# Patient Record
Sex: FEMALE | Race: WHITE | NOT HISPANIC OR LATINO | Employment: OTHER | URBAN - METROPOLITAN AREA
[De-identification: names, ages, dates, MRNs, and addresses within clinical notes are randomized per-mention and may not be internally consistent; named-entity substitution may affect disease eponyms.]

---

## 2017-05-15 ENCOUNTER — ALLSCRIPTS OFFICE VISIT (OUTPATIENT)
Dept: OTHER | Facility: OTHER | Age: 77
End: 2017-05-15

## 2017-05-24 ENCOUNTER — APPOINTMENT (OUTPATIENT)
Dept: LAB | Facility: CLINIC | Age: 77
End: 2017-05-24
Payer: COMMERCIAL

## 2017-05-24 ENCOUNTER — ALLSCRIPTS OFFICE VISIT (OUTPATIENT)
Dept: OTHER | Facility: OTHER | Age: 77
End: 2017-05-24

## 2017-05-24 ENCOUNTER — TRANSCRIBE ORDERS (OUTPATIENT)
Dept: LAB | Facility: CLINIC | Age: 77
End: 2017-05-24

## 2017-05-24 DIAGNOSIS — I10 ESSENTIAL (PRIMARY) HYPERTENSION: ICD-10-CM

## 2017-05-24 DIAGNOSIS — E78.5 HYPERLIPIDEMIA: ICD-10-CM

## 2017-05-24 DIAGNOSIS — Z00.00 ENCOUNTER FOR GENERAL ADULT MEDICAL EXAMINATION WITHOUT ABNORMAL FINDINGS: ICD-10-CM

## 2017-05-24 DIAGNOSIS — I10 ESSENTIAL HYPERTENSION, MALIGNANT: Primary | ICD-10-CM

## 2017-05-24 LAB
ALBUMIN SERPL BCP-MCNC: 4.1 G/DL (ref 3.5–5)
ALP SERPL-CCNC: 57 U/L (ref 46–116)
ALT SERPL W P-5'-P-CCNC: 20 U/L (ref 12–78)
ANION GAP SERPL CALCULATED.3IONS-SCNC: 8 MMOL/L (ref 4–13)
AST SERPL W P-5'-P-CCNC: 16 U/L (ref 5–45)
BASOPHILS # BLD AUTO: 0.02 THOUSANDS/ΜL (ref 0–0.1)
BASOPHILS NFR BLD AUTO: 0 % (ref 0–1)
BILIRUB SERPL-MCNC: 0.37 MG/DL (ref 0.2–1)
BUN SERPL-MCNC: 17 MG/DL (ref 5–25)
CALCIUM SERPL-MCNC: 9.2 MG/DL (ref 8.3–10.1)
CHLORIDE SERPL-SCNC: 104 MMOL/L (ref 100–108)
CHOLEST SERPL-MCNC: 214 MG/DL (ref 50–200)
CO2 SERPL-SCNC: 27 MMOL/L (ref 21–32)
CREAT SERPL-MCNC: 0.76 MG/DL (ref 0.6–1.3)
EOSINOPHIL # BLD AUTO: 0.12 THOUSAND/ΜL (ref 0–0.61)
EOSINOPHIL NFR BLD AUTO: 2 % (ref 0–6)
ERYTHROCYTE [DISTWIDTH] IN BLOOD BY AUTOMATED COUNT: 13.1 % (ref 11.6–15.1)
GFR SERPL CREATININE-BSD FRML MDRD: >60 ML/MIN/1.73SQ M
GLUCOSE P FAST SERPL-MCNC: 72 MG/DL (ref 65–99)
HCT VFR BLD AUTO: 39.7 % (ref 34.8–46.1)
HDLC SERPL-MCNC: 112 MG/DL (ref 40–60)
HGB BLD-MCNC: 13.1 G/DL (ref 11.5–15.4)
LDLC SERPL CALC-MCNC: 92 MG/DL (ref 0–100)
LYMPHOCYTES # BLD AUTO: 2.1 THOUSANDS/ΜL (ref 0.6–4.47)
LYMPHOCYTES NFR BLD AUTO: 34 % (ref 14–44)
MCH RBC QN AUTO: 30.2 PG (ref 26.8–34.3)
MCHC RBC AUTO-ENTMCNC: 33 G/DL (ref 31.4–37.4)
MCV RBC AUTO: 92 FL (ref 82–98)
MONOCYTES # BLD AUTO: 0.86 THOUSAND/ΜL (ref 0.17–1.22)
MONOCYTES NFR BLD AUTO: 14 % (ref 4–12)
NEUTROPHILS # BLD AUTO: 3.06 THOUSANDS/ΜL (ref 1.85–7.62)
NEUTS SEG NFR BLD AUTO: 50 % (ref 43–75)
NRBC BLD AUTO-RTO: 0 /100 WBCS
PLATELET # BLD AUTO: 258 THOUSANDS/UL (ref 149–390)
PMV BLD AUTO: 9.8 FL (ref 8.9–12.7)
POTASSIUM SERPL-SCNC: 3.9 MMOL/L (ref 3.5–5.3)
PROT SERPL-MCNC: 7.4 G/DL (ref 6.4–8.2)
RBC # BLD AUTO: 4.34 MILLION/UL (ref 3.81–5.12)
SODIUM SERPL-SCNC: 139 MMOL/L (ref 136–145)
TRIGL SERPL-MCNC: 52 MG/DL
WBC # BLD AUTO: 6.17 THOUSAND/UL (ref 4.31–10.16)

## 2017-05-24 PROCEDURE — 80053 COMPREHEN METABOLIC PANEL: CPT

## 2017-05-24 PROCEDURE — 80061 LIPID PANEL: CPT

## 2017-05-24 PROCEDURE — 36415 COLL VENOUS BLD VENIPUNCTURE: CPT

## 2017-05-24 PROCEDURE — 85025 COMPLETE CBC W/AUTO DIFF WBC: CPT

## 2017-05-26 ENCOUNTER — APPOINTMENT (OUTPATIENT)
Dept: LAB | Facility: CLINIC | Age: 77
End: 2017-05-26
Payer: COMMERCIAL

## 2017-05-26 LAB
BACTERIA UR QL AUTO: NORMAL /HPF
BILIRUB UR QL STRIP: NEGATIVE
CLARITY UR: CLEAR
COLOR UR: YELLOW
GLUCOSE UR STRIP-MCNC: NEGATIVE MG/DL
HGB UR QL STRIP.AUTO: NEGATIVE
HYALINE CASTS #/AREA URNS LPF: NORMAL /LPF
KETONES UR STRIP-MCNC: NEGATIVE MG/DL
LEUKOCYTE ESTERASE UR QL STRIP: ABNORMAL
NITRITE UR QL STRIP: NEGATIVE
NON-SQ EPI CELLS URNS QL MICRO: NORMAL /HPF
PH UR STRIP.AUTO: 6.5 [PH] (ref 4.5–8)
PROT UR STRIP-MCNC: NEGATIVE MG/DL
RBC #/AREA URNS AUTO: NORMAL /HPF
SP GR UR STRIP.AUTO: 1.01 (ref 1–1.03)
UROBILINOGEN UR QL STRIP.AUTO: 0.2 E.U./DL
WBC #/AREA URNS AUTO: NORMAL /HPF

## 2017-05-26 PROCEDURE — 81001 URINALYSIS AUTO W/SCOPE: CPT

## 2017-05-30 ENCOUNTER — GENERIC CONVERSION - ENCOUNTER (OUTPATIENT)
Dept: OTHER | Facility: OTHER | Age: 77
End: 2017-05-30

## 2017-10-11 ENCOUNTER — ALLSCRIPTS OFFICE VISIT (OUTPATIENT)
Dept: OTHER | Facility: OTHER | Age: 77
End: 2017-10-11

## 2017-10-12 NOTE — PROGRESS NOTES
Assessment  1  Psoriasis (696 1) (L40 9)   2  Screening for skin condition (V82 0) (Z 89)    Plan   · Betamethasone Dipropionate Aug 0 05 % External Ointment; apply sparingly to  affected area(s) twice daily psoriasis   · Calcipotriene 0 005 % External Solution; APPLY TO SCALP TWICE A DAY   · Clobetasol Propionate 0 05 % External Foam; APPLY TOPICALLY TWICE DAILY TO  AFFECTED AREA   · Follow-up visit in 1 month Evaluation and Treatment  Follow-up  Status: Hold For -  Scheduling  Requested for: 11Oct2017    Discussion/Summary  Discussion Summary:   Psoriasis flaring advised the importance of using the medication on a continuous basis to get this under control and then to back off at that point and not to just use it for the itching we also suggested continued use of T-Gel shampoo and add calcipotriene solution  In addition lesion on the legs appear to be consistent with psoriasis but also concerned regarding squamous neoplasia we'll recheck the areas in another month and consider biopsy if any changes that make us concerned  Nothing else of concern noted on exam       Chief Complaint  Chief Complaint Free Text Note Form: psoriasis flare-up      History of Present Illness  HPI: 71-year-old female with known psoriasis presents secondary to concerns regarding flare on her scalp  Patient notes lesions on the scalp with hair loss also concerned regarding several spots on her legs she has been using some clobetasol foam on the area but not consistently only using it when it itches      Review of Systems  [facility] [address] [place] [address] [address] Patient:   Constitutional: Denies constitutional symptoms  Eyes: Denies eye symptoms  ENT:  denies ear symptoms, nasal symptoms, mouth or throat symptoms  Cardiovascular: Denies cardiovascular symptoms  Respiratory: Denies respiratory symptoms  Gastrointestinal: Denies gastrointestinal symptoms  Musculoskeletal: Denies musculoskeletal symptoms     Integumentary: Denies skin, hair and nail symptoms  Neurological: Denies neurologic symptoms  Psychiatric: Denies psychiatric symptoms  Endocrine: Denies endocrine symptoms  Hematologic/Lymphatic: Denies hematologic symptoms  Active Problems  1  Arthropathy (716 90) (M12 9)   2  Benign essential hypertension (401 1) (I10)   3  Encounter for screening colonoscopy (V76 51) (Z12 11)   4  Encounter for screening mammogram for breast cancer (V76 12) (Z12 31)   5  Hyperlipidemia (272 4) (E78 5)   6  Hypertension (401 9) (I10)   7  Osteoporosis (733 00) (M81 0)   8  Psoriasis (696 1) (L40 9)   9  Screening for genitourinary condition (V81 6) (Z13 89)   10  Screening for skin condition (V82 0) (Z13 89)   11  Seborrheic keratoses (702 19) (L82 1)    Past Medical History  1  History of Benign neoplasm of skin of face (216 3) (D23 30)   2  History of Currently Wearing Contact Lenses   3  History of acute sinusitis (V12 69) (Z87 09)   4  History of backache (V13 59) (Z87 39)   5  History of chest pain (V13 89) (Z87 898)   6  History of sebaceous cyst (V13 3) (Z87 2)   7  History of Pulmonary Embolism (V12 55)  Past Medical History Reviewed- Derm:   The past medical history was reviewed  Surgical History  1  History of Appendectomy   2  History of Breast Surgery   3  History of Dilation And Curettage   4  History of Surgical Excision Of Tubal Pregnancy  Surgical History Reviewed [facility] [place]- Derm:   Surgical History reviewed      Family History  Mother    1  Family history of Family Health Status Of Mother -    2  Family history of Hypertension (V17 49)   3  Family history of Ovarian Cancer (V16 41)  Father    4  Family history of Father  At Age 80   11  Family history of Pneumonia  Family History Reviewed- Derm:   Family History was reviewed      Social History   · Being A Social Drinker   · Former smoker (H82 76) (D36 521)  Social History Reviewed [facility] [place]- Derm: The social history was reviewed      Current Meds   1  Caltrate 600+D 600-400 MG-UNIT TABS; Take 1 tablet daily; Therapy: 71DOK1194 to Recorded   2  Clobetasol Propionate 0 05 % External Foam; APPLY SPARINGLY TO AFFECTED   AREA(S) TWICE DAILY; Therapy: 50XGW0348 to (Last Rx:15May2017)  Requested for: 32TFD1907 Ordered   3  Glucosamine Chondr 500 Complex Oral Capsule; TAKE 1 CAPSULE DAILY; Therapy: 23LOT0192 to Recorded   4  Losartan Potassium 50 MG Oral Tablet; TAKE 1 TABLET DAILY; Therapy: 53GNP4826 to (Ibrahima Muñoz)  Requested for: 87XJC1309; Last   Rx:26May2017 Ordered   5  Mometasone Furoate 0 1 % External Cream; APPLY SPARINGLY TO THE AFFECTED   AREA(S) TWICE DAILY  Ears; Therapy: 45RZL0774 to (Last Rx:15May2017)  Requested for: 09XID2450 Ordered  Medication List Reviewed: The medication list was reviewed and updated today  Allergies  1  No Known Drug Allergies    Physical Exam    Constitutional   General appearance: Appears healthy and well developed  Lymphatic   No visible disturbance  Musculoskeletal   Digits and nails: No clubbing, cyanosis or edema  Cutaneous and nail exam normal     Skin   Scalp skin texture and hair distribution: Abnormal     Head: Abnormal     Neck: Normal turgor, no rashes, no lesions  Chest: Normal turgor, no rashes, no lesions  Abdomen: Normal turgor, no rashes, no lesions  Back: Normal turgor, no rashes, no lesions  Right upper extremity: Normal turgor, no rashes, no lesions  Left upper extremity: Normal turgor, no rashes, no lesions  Right lower extremity: Abnormal     Left lower extremity: Abnormal     Neuro/Psych   Alert and oriented x 3  Displays comfort and cooperation during encounterl  Affect is normal     Finding On the scalp increased erythema scaling hair loss noted well-demarcated plaques with silvery scale also keratotic papules noted on the lower legs no other evidence psoriasis elsewhere        Health Management  Encounter for screening colonoscopy   COLONOSCOPY; every 10 years; Last 98QGO6285; Next Due: L1478447; Active  Encounter for screening mammogram for breast cancer   Digital Bilateral Screening Mammogram With CAD; every 1 year; Next Due: 14CNH5000; Overdue  Health Maintenance   * MAMMO SCREENING BILATERAL W CAD; every 1 year; Last 32VKV9101; Next Due: 94UJY8019; Active  Influenza; every 1 year; Last 68DSD5125; Next Due: 89MFB4145; Overdue  Medicare Annual Wellness Visit; every 1 year; Last 51Klz7599; Next Due: 64TCX3463; Overdue    Future Appointments    Date/Time Provider Specialty Site   11/15/2017 08:05 AM SREEDHAR Villarreal  Dermatology [facility]   12/01/2017 02:00 PM Jeaneth Marlowr, DO Internal Medicine [facility]   05/15/2018 03:05 PM SREEDHAR Villarreal   Dermatology [facility]     Signatures   Electronically signed by : SREEDHAR Nieves ; Oct 11 2017  8:33AM EST                       (Author)

## 2017-11-15 ENCOUNTER — ALLSCRIPTS OFFICE VISIT (OUTPATIENT)
Dept: OTHER | Facility: OTHER | Age: 77
End: 2017-11-15

## 2017-11-16 NOTE — PROGRESS NOTES
Assessment  1  Psoriasis (696 1) (L40 9)   2  Screening for skin condition (V82 0) (Z13 89)    Plan     · Betamethasone Dipropionate Aug 0 05 % External Ointment; apply sparingly toaffected area(s) twice daily psoriasis   · Calcipotriene 0 005 % External Solution; APPLY TO SCALP TWICE A DAY   · Clobetasol Propionate 0 05 % External Foam; APPLY SPARINGLY TO AFFECTEDAREA(S) TWICE DAILY   · Follow-up visit in 3 months Evaluation and Treatment  Follow-up  Status: Hold For -Scheduling  Requested for: 87LJU2606    Discussion/Summary  Discussion Summary:   Psoriasis still active on really diffuse involvement at present we discussed options including phototherapy at present patient reluctant to spend the time to do this will go ahead and continue topicals at this point follow-up in 3 months or earlier if the patient decides to go ahead with phototherapy  Chief Complaint  Chief Complaint Free Text Note Form: Psoriasis recheck      History of Present Illness  HPI: 50-year-old female presents for follow-up for psoriasis flare continues to complain of itching a new areas that developed on her torso and back arms and back however notes improvement on the scalp but still present      Review of Systems  [facility] Patient:  Constitutional: Denies constitutional symptoms  Eyes: Denies eye symptoms  ENT:  denies ear symptoms, nasal symptoms, mouth or throat symptoms  Cardiovascular: Denies cardiovascular symptoms  Respiratory: Denies respiratory symptoms  Gastrointestinal: Denies gastrointestinal symptoms  Musculoskeletal: Denies musculoskeletal symptoms  Integumentary: Denies skin, hair and nail symptoms  Neurological: Denies neurologic symptoms  Psychiatric: Denies psychiatric symptoms  Endocrine: Denies endocrine symptoms  Hematologic/Lymphatic: Denies hematologic symptoms  Active Problems  1  Arthropathy (716 90) (M12 9)   2  Benign essential hypertension (401 1) (I10)   3  Encounter for screening colonoscopy (V76 51) (Z12 11)   4  Encounter for screening mammogram for breast cancer (V76 12) (Z12 31)   5  Hyperlipidemia (272 4) (E78 5)   6  Hypertension (401 9) (I10)   7  Osteoporosis (733 00) (M81 0)   8  Psoriasis (696 1) (L40 9)   9  Screening for genitourinary condition (V81 6) (Z13 89)   10  Screening for skin condition (V82 0) (Z13 89)   11  Seborrheic keratoses (702 19) (L82 1)    Past Medical History  1  History of Benign neoplasm of skin of face (216 3) (D23 30)   2  History of Currently Wearing Contact Lenses   3  History of acute sinusitis (V12 69) (Z87 09)   4  History of backache (V13 59) (Z87 39)   5  History of chest pain (V13 89) (Z87 898)   6  History of sebaceous cyst (V13 3) (Z87 2)   7  History of Pulmonary Embolism (V12 55)  Past Medical History Reviewed- Derm:   The past medical history was reviewed  Surgical History  1  History of Appendectomy   2  History of Breast Surgery   3  History of Dilation And Curettage   4  History of Surgical Excision Of Tubal Pregnancy  Surgical History Reviewed [place] [facility]- Derm:   Surgical History reviewed      Family History  Mother    1  Family history of Family Health Status Of Mother -    2  Family history of Hypertension (V17 49)   3  Family history of Ovarian Cancer (V16 41)  Father    4  Family history of Father  At Age 80   11  Family history of Pneumonia  Family History Reviewed- Derm:   Family History was reviewed      Social History     · Being A Social Drinker   · Former smoker (G89 57) (L21 572)  Social History Reviewed [place] [facility]- Derm: The social history was reviewed      Current Meds   1  Betamethasone Dipropionate Aug 0 05 % External Ointment; apply sparingly to affected area(s) twice daily psoriasis; Therapy: 47BBR9089 to (Last Rx:2017)  Requested for: 2017 Ordered   2  Calcipotriene 0 005 % External Solution; APPLY TO SCALP TWICE A DAY;  Therapy: 69NKN5213 to (Last Rx:2017)  Requested for: 16PNQ8485 Ordered   3  Caltrate 600+D 600-400 MG-UNIT TABS; Take 1 tablet daily; Therapy: 26LYG2531 to Recorded   4  Clobetasol Propionate 0 05 % External Foam; APPLY SPARINGLY TO AFFECTED AREA(S) TWICE DAILY; Therapy: 67ZDJ1266 to (Last Rx:60Hio7953)  Requested for: 01ZPB5505 Ordered   5  Clobetasol Propionate 0 05 % External Foam; APPLY TOPICALLY TWICE DAILY TO AFFECTED AREA; Therapy: 36XRV4145 to (Last Rx:11Oct2017)  Requested for: 78TTS2279 Ordered   6  Glucosamine Chondr 500 Complex Oral Capsule; TAKE 1 CAPSULE DAILY; Therapy: 38YHD1072 to Recorded   7  Losartan Potassium 50 MG Oral Tablet; TAKE 1 TABLET DAILY; Therapy: 44PWF7160 to (Boris Chavez)  Requested for: 78BIG2208; Last Rx:61Eqi7656 Ordered   8  Mometasone Furoate 0 1 % External Cream; APPLY SPARINGLY TO THE AFFECTED AREA(S) TWICE DAILY  Ears; Therapy: 21FEE7234 to (Last Rx:15May2017)  Requested for: 54JGB7186 Ordered  Medication List Reviewed: The medication list was reviewed and updated today  Allergies    1  No Known Drug Allergies    Physical Exam   Constitutional  General appearance: Appears healthy and well developed  Lymphatic  No visible disturbance  Musculoskeletal  Digits and nails: No clubbing, cyanosis or edema  Cutaneous and nail exam normal    Skin  Scalp skin texture and hair distribution: Abnormal    Head: Abnormal    Neck: Abnormal    Chest: Abnormal    Abdomen: Abnormal    Back: Abnormal    Right upper extremity: Abnormal    Left upper extremity: Abnormal    Right lower extremity: Abnormal    Left lower extremity: Abnormal    Inspection of eccrine and apocrine glands: Abnormal    Neuro/Psych  Alert and oriented x 3  Displays comfort and cooperation during encounterl  Affect is normal    Finding Scaling erythematous some well-defined patches noted on the scalp scattered areas on the arms legs back and chest mostly thin patches noted        Health Management  Encounter for screening colonoscopy   COLONOSCOPY; every 10 years; Last 17ZXB6321; Next Due: T6508280; Active  Encounter for screening mammogram for breast cancer   Digital Bilateral Screening Mammogram With CAD; every 1 year; Next Due: 31CGJ2557; Overdue  Health Maintenance   * MAMMO SCREENING BILATERAL W CAD; every 1 year; Last 47DEC7342; Next Due: 82QTG9816; Active  Influenza; every 1 year; Last 21RFN2226; Next Due: 95EWG9928; Overdue  Medicare Annual Wellness Visit; every 1 year; Last 28Apr2015; Next Due: 43VBE7394; Overdue    Future Appointments    Date/Time Provider Specialty Site   12/01/2017 02:00 PM Zion Rodrigez DO Internal Medicine [facility]   05/15/2018 03:05 PM SREEDHAR Mayfield   Dermatology [facility]       Signatures   Electronically signed by : SREEDHAR Fried ; Nov 15 2017  8:13AM EST                       (Author)

## 2017-12-01 ENCOUNTER — ALLSCRIPTS OFFICE VISIT (OUTPATIENT)
Dept: OTHER | Facility: OTHER | Age: 77
End: 2017-12-01

## 2017-12-01 ENCOUNTER — APPOINTMENT (OUTPATIENT)
Dept: LAB | Facility: CLINIC | Age: 77
End: 2017-12-01
Payer: COMMERCIAL

## 2017-12-01 DIAGNOSIS — M72.0 PALMAR FASCIAL FIBROMATOSIS: ICD-10-CM

## 2017-12-01 DIAGNOSIS — L40.9 PSORIASIS: ICD-10-CM

## 2017-12-01 DIAGNOSIS — I10 ESSENTIAL (PRIMARY) HYPERTENSION: ICD-10-CM

## 2017-12-01 LAB
ANION GAP SERPL CALCULATED.3IONS-SCNC: 6 MMOL/L (ref 4–13)
BUN SERPL-MCNC: 22 MG/DL (ref 5–25)
CALCIUM SERPL-MCNC: 9.7 MG/DL (ref 8.3–10.1)
CHLORIDE SERPL-SCNC: 104 MMOL/L (ref 100–108)
CO2 SERPL-SCNC: 27 MMOL/L (ref 21–32)
CREAT SERPL-MCNC: 1.06 MG/DL (ref 0.6–1.3)
GFR SERPL CREATININE-BSD FRML MDRD: 51 ML/MIN/1.73SQ M
GLUCOSE P FAST SERPL-MCNC: 91 MG/DL (ref 65–99)
POTASSIUM SERPL-SCNC: 4.3 MMOL/L (ref 3.5–5.3)
SODIUM SERPL-SCNC: 137 MMOL/L (ref 136–145)

## 2017-12-01 PROCEDURE — 36415 COLL VENOUS BLD VENIPUNCTURE: CPT

## 2017-12-01 PROCEDURE — 80048 BASIC METABOLIC PNL TOTAL CA: CPT

## 2017-12-05 NOTE — PROGRESS NOTES
Assessment    1  History of Tobacco non-user (V49 89) (Z78 9)   2  History of Active advance directive (V49 89) (Z78 9)   3  Dupuytren's contracture (728 6) (M72 0)   4  Hypertension (401 9) (I10)   5  Psoriasis (696 1) (L40 9)    Plan  Dupuytren's contracture, Hypertension, Psoriasis    · (1) BASIC METABOLIC PROFILE; Status:Active; Requested for:61Hzq9902;    · Follow-up visit in 6 months Evaluation and Treatment  Follow-up  Status: Hold For - Scheduling Requested for: 25LGP0477   · Prevnar 13 Intramuscular Suspension; INJECT 0 5  ML Intramuscular; To Be Done:48Elu2888  Health Maintenance    · * MAMMO SCREENING BILATERAL W CAD ; every 1 year; Last 87WIH3023; Next 30May2018;Status:Active    Discussion/Summary  Discussion Summary:   Regarding her blood pressure she is stable at the present time  psoriasis is cared for by Dermatology  labs are stable and will be repeated  will get a shingles vaccine in a Prevnar injection and I will see her back here in 6 months  Before if any problems  Chief Complaint  Chief Complaint Free Text Note Form: Problems are 1  Hypertension 2  Psoriasis 3  Do per trains contractures      History of Present Illness  HPI: I see her about every 6 months  She does have hand contractures  She is going to see the plastic surgeon at her request   otherwise is doing well  She is a nonsmoker  She has no angina  She has psoriasis which is being care for by Dermatology  cardiac complaints that she is up-to-date on health maintenance issues  Does have a history of colon polyps      Review of Systems  Complete-Female:  Constitutional: No fever, no chills, feels well, no tiredness, no recent weight gain or weight loss  Eyes: No complaints of eye pain, no red eyes, no eyesight problems, no discharge, no dry eyes, no itching of eyes  ENT: no complaints of earache, no loss of hearing, no nose bleeds, no nasal discharge, no sore throat, no hoarseness    Cardiovascular: No complaints of slow heart rate, no fast heart rate, no chest pain, no palpitations, no leg claudication, no lower extremity edema  Respiratory: No complaints of shortness of breath, no wheezing, no cough, no SOB on exertion, no orthopnea, no PND  Gastrointestinal: No complaints of abdominal pain, no constipation, no nausea or vomiting, no diarrhea, no bloody stools  Genitourinary: No complaints of dysuria, no incontinence, no pelvic pain, no dysmenorrhea, no vaginal discharge or bleeding  Musculoskeletal: No complaints of arthralgias, no myalgias, no joint swelling or stiffness, no limb pain or swelling  Integumentary: No complaints of skin rash or lesions, no itching, no skin wounds, no breast pain or lump  Neurological: No complaints of headache, no confusion, no convulsions, no numbness, no dizziness or fainting, no tingling, no limb weakness, no difficulty walking  Psychiatric: Not suicidal, no sleep disturbance, no anxiety or depression, no change in personality, no emotional problems  Endocrine: No complaints of proptosis, no hot flashes, no muscle weakness, no deepening of the voice, no feelings of weakness  Hematologic/Lymphatic: No complaints of swollen glands, no swollen glands in the neck, does not bleed easily, does not bruise easily  Active Problems  1  History of Active advance directive (V49 89) (Z78 9)   2  Arthropathy (716 90) (M12 9)   3  Benign essential hypertension (401 1) (I10)   4  Encounter for screening colonoscopy (V76 51) (Z12 11)   5  Encounter for screening mammogram for breast cancer (V76 12) (Z12 31)   6  Hyperlipidemia (272 4) (E78 5)   7  Hypertension (401 9) (I10)   8  Osteoporosis (733 00) (M81 0)   9  Psoriasis (696 1) (L40 9)   10  Screening for genitourinary condition (V81 6) (Z13 89)   11  Screening for skin condition (V82 0) (Z13 89)   12  Seborrheic keratoses (702 19) (L82 1)    Past Medical History  1  History of Benign neoplasm of skin of face (216 3) (D23 30)   2   History of Currently Wearing Contact Lenses   3  History of acute sinusitis (V12 69) (Z87 09)   4  History of backache (V13 59) (Z87 39)   5  History of chest pain (V13 89) (Z87 898)   6  History of sebaceous cyst (V13 3) (Z87 2)   7  History of Pulmonary Embolism (V12 55)  Active Problems And Past Medical History Reviewed: The active problems and past medical history were reviewed and updated today  Surgical History  1  History of Appendectomy   2  History of Breast Surgery   3  History of Dilation And Curettage   4  History of Surgical Excision Of Tubal Pregnancy  Surgical History Reviewed: The surgical history was reviewed and updated today  Family History  Mother    1  Family history of Family Health Status Of Mother -    2  Family history of Hypertension (V17 49)   3  Family history of Ovarian Cancer (V16 41)  Father    4  Family history of Father  At Age 80   11  Family history of Pneumonia  Family History Reviewed: The family history was reviewed and updated today  Social History     · History of Active advance directive (V49 89) (Z78 9)   · Being A Social Drinker   · Former smoker (L43 66) (S32 354)   · History of Tobacco non-user (V4) (Z78 9)  Social History Reviewed: The social history was reviewed and updated today  The social history was reviewed and is unchanged  Current Meds   1  Betamethasone Dipropionate Aug 0 05 % External Ointment; apply sparingly to affected area(s) twice daily psoriasis; Therapy: 92EPX7678 to (Last Rx:74Tqe4538)  Requested for: 06BBT7813 Ordered   2  Calcipotriene 0 005 % External Solution; APPLY TO SCALP TWICE A DAY; Therapy: 28JSK2364 to (Last Rx:50Hgd4635)  Requested for: 12WHA6178 Ordered   3  Caltrate 600+D 600-400 MG-UNIT TABS; Take 1 tablet daily; Therapy: 62ZIV8076 to Recorded   4  Clobetasol Propionate 0 05 % External Foam; APPLY SPARINGLY TO AFFECTED AREA(S) TWICE DAILY;  Therapy: 78NSQ0796 to (Last Rx:77Wxp2785)  Requested for: 85VVG4466 Ordered   5  Clobetasol Propionate 0 05 % External Foam; APPLY TOPICALLY TWICE DAILY TO AFFECTED AREA; Therapy: 96MCP7328 to (Last Rx:11Oct2017)  Requested for: 26KZI4098 Ordered   6  Glucosamine Chondr 500 Complex Oral Capsule; TAKE 1 CAPSULE DAILY; Therapy: 19RTX2066 to Recorded   7  Losartan Potassium 50 MG Oral Tablet; TAKE 1 TABLET DAILY; Therapy: 17KAS0827 to (Prudence Blakes)  Requested for: 16LSA6174; Last Rx:38Ygg5996 Ordered   8  Mometasone Furoate 0 1 % External Cream; APPLY SPARINGLY TO THE AFFECTED AREA(S) TWICE DAILY  Ears; Therapy: 46CEM6196 to (Last Rx:65Ofh5083)  Requested for: 94FBG3794 Ordered  Medication List Reviewed: The medication list was reviewed and updated today  Allergies  1  No Known Drug Allergies    Vitals  Vital Signs    Recorded: 24EPL2544 01:55PM   Heart Rate 93   Systolic 106   Diastolic 68   Height 5 ft 4 in   Weight 127 lb    BMI Calculated 21 8   BSA Calculated 1 61   O2 Saturation 98       Physical Exam   Constitutional  General appearance: No acute distress, well appearing and well nourished  -- Blood pressure is 118/68  Weight is 127 lb  O2  Eyes  Conjunctiva and lids: No swelling, erythema or discharge  Pupils and irises: Equal, round and reactive to light  Ears, Nose, Mouth, and Throat  External inspection of ears and nose: Normal    Otoscopic examination: Tympanic membranes translucent with normal light reflex  Canals patent without erythema  Nasal mucosa, septum, and turbinates: Normal without edema or erythema  Oropharynx: Normal with no erythema, edema, exudate or lesions  Pulmonary  Respiratory effort: No increased work of breathing or signs of respiratory distress  Auscultation of lungs: Clear to auscultation  -- Lungs are completely clear  Cardiovascular  Palpation of heart: Normal PMI, no thrills  Auscultation of heart: Normal rate and rhythm, normal S1 and S2, without murmurs     Examination of extremities for edema and/or varicosities: Abnormal  -- She has a few venous varicosities  Carotid pulses: Normal    Abdomen  Abdomen: Non-tender, no masses  Liver and spleen: No hepatomegaly or splenomegaly  Lymphatic  Palpation of lymph nodes in neck: No lymphadenopathy  Musculoskeletal  Gait and station: Normal    Digits and nails: Normal without clubbing or cyanosis  Inspection/palpation of joints, bones, and muscles: Normal    Skin  Skin and subcutaneous tissue: Abnormal  -- She has scattered psoriatic changes of her skin  Neurologic  Cranial nerves: Cranial nerves 2-12 intact  Reflexes: 2+ and symmetric  Sensation: No sensory loss  Psychiatric  Orientation to person, place, and time: Normal    Mood and affect: Normal          Results/Data  PHQ-2 Adult Depression Screening 43LWR2912 01:59PM User, [employer]     Test Name Result Flag Reference   PHQ-2 Adult Depression Score 0       Over the last two weeks, how often have you been bothered by any of the following problems? Little interest or pleasure in doing things: Not at all - 0 Feeling down, depressed, or hopeless: Not at all - 0   PHQ-2 Adult Depression Screening Negative       Falls Risk Assessment (Dx Z13 89 Screen for Neurologic Disorder) 13ENQ0966 01:59PM User, [employer]     Test Name Result Flag Reference   Falls Risk      No falls in the past year     *VB - Urinary Incontinence Screen (Dx Z13 89 Screen for UI) 98AST9802 01:58PM Kavya Romeroberry     Test Name Result Flag Reference   Urinary Incontinence Assessment 11URF6014           Health Management  Encounter for screening colonoscopy   COLONOSCOPY; every 10 years; Last 99MHX1393; Next Due: [id number]; Active  Encounter for screening mammogram for breast cancer   Digital Bilateral Screening Mammogram With CAD; every 1 year; Next Due: 36ACZ1551; Overdue  Health Maintenance   * MAMMO SCREENING BILATERAL W CAD; every 1 year; Last 82LZA0412; Next Due: 92ESV8309; Active  Influenza; every 1 year; Last 99QNA8146;  Next Due: 83QFG8916; Overdue  Medicare Annual Wellness Visit; every 1 year; Last 28Apr2015; Next Due: 54FGH2135; Overdue    Future Appointments    Date/Time Provider Specialty Site   05/31/2018 12:15 PM Joelle Bautista DO Internal Medicine [facility]   02/21/2018 08:05 AM SREEDHAR Fisher  Dermatology [facility]   05/15/2018 03:05 PM SREEDHAR Fisher   Dermatology [facility]       Signatures   Electronically signed by : Tonya Hoang DO; Dec  1 2017  5:51PM EST                       (Author)

## 2017-12-29 ENCOUNTER — ALLSCRIPTS OFFICE VISIT (OUTPATIENT)
Dept: OTHER | Facility: OTHER | Age: 77
End: 2017-12-29

## 2017-12-30 NOTE — PROGRESS NOTES
Assessment   1  Conductive hearing loss, bilateral (389 06) (H90 0)   2  Benign essential hypertension (401 1) (I10)   3  Hyperlipidemia (272 4) (E78 5)   4  Hypertension (401 9) (I10)    Plan   Hypertension    · Follow-up as previously scheduled Evaluation and Treatment  Follow-up  Status: Complete  Done:    66EFW3378    Discussion/Summary   Discussion Summary:    No wax in her ears significant conductive hearing loss she will continue following up with the ENT and audiology  Medication SE Review and Pt Understands Tx: Possible side effects of new medications were reviewed with the patient/guardian today  The treatment plan was reviewed with the patient/guardian  The patient/guardian understands and agrees with the treatment plan      Chief Complaint   Chief Complaint Free Text Note Form: Decreased hearing clogged ears      History of Present Illness   HPI: She has had ongoing problem with decreasing hearing over the past year or so  She has recently seen ENT and hearing aid dealer in order to get a hearing aid  Her ears have been increasingly clogged feeling over the past 2 weeks she irrigated her ears out last week but her ears remained with a clogged up feeling  She feels well otherwise no tinnitus no headaches or dizziness no nausea vomiting normally active and well exercises regularly    Hyperlipidemia (Follow-Up): The patient states her hyperlipidemia has been under good control since the last visit  Comorbid Illnesses: hypertension  She has no significant interval events  Symptoms: The patient is doing well with her hyperlipidemia goals  Hypertension (Follow-Up): The patient presents for follow-up of essential hypertension  The patient states she has been doing well with her blood pressure control since the last visit  She has no comorbid illnesses  She has no significant interval events  Symptoms: The patient is currently asymptomatic        Disease Management: the patient is doing well with her blood pressure goals  Review of Systems   Complete-Female:      Constitutional: No fever, no chills, feels well, no tiredness, no recent weight gain or weight loss  Eyes: No complaints of eye pain, no red eyes, no eyesight problems, no discharge, no dry eyes, no itching of eyes  ENT: as noted in HPI  Cardiovascular: No complaints of slow heart rate, no fast heart rate, no chest pain, no palpitations, no leg claudication, no lower extremity edema  Respiratory: No complaints of shortness of breath, no wheezing, no cough, no SOB on exertion, no orthopnea, no PND  Gastrointestinal: No complaints of abdominal pain, no constipation, no nausea or vomiting, no diarrhea, no bloody stools  Genitourinary: No complaints of dysuria, no incontinence, no pelvic pain, no dysmenorrhea, no vaginal discharge or bleeding  Musculoskeletal: No complaints of arthralgias, no myalgias, no joint swelling or stiffness, no limb pain or swelling  Integumentary: No complaints of skin rash or lesions, no itching, no skin wounds, no breast pain or lump  Neurological: No complaints of headache, no confusion, no convulsions, no numbness, no dizziness or fainting, no tingling, no limb weakness, no difficulty walking  Psychiatric: Not suicidal, no sleep disturbance, no anxiety or depression, no change in personality, no emotional problems  Endocrine: No complaints of proptosis, no hot flashes, no muscle weakness, no deepening of the voice, no feelings of weakness  Hematologic/Lymphatic: No complaints of swollen glands, no swollen glands in the neck, does not bleed easily, does not bruise easily  ROS Reviewed:    ROS reviewed  Active Problems   1  History of Active advance directive (V49 89) (Z78 9)   2  Arthropathy (716 90) (M12 9)   3  Benign essential hypertension (401 1) (I10)   4  Dupuytren's contracture (728 6) (M72 0)   5   Encounter for screening colonoscopy (V76 51) (Z12 11)   6  Encounter for screening mammogram for breast cancer (V76 12) (Z12 31)   7  Hyperlipidemia (272 4) (E78 5)   8  Hypertension (401 9) (I10)   9  Osteoporosis (733 00) (M81 0)   10  Psoriasis (696 1) (L40 9)   11  Screening for genitourinary condition (V81 6) (Z13 89)   12  Screening for skin condition (V82 0) (Z13 89)   13  Seborrheic keratoses (702 19) (L82 1)    Past Medical History   1  History of Benign neoplasm of skin of face (216 3) (D23 30)   2  History of Currently Wearing Contact Lenses   3  History of acute sinusitis (V12 69) (Z87 09)   4  History of backache (V13 59) (Z87 39)   5  History of chest pain (V13 89) (Z87 898)   6  History of sebaceous cyst (V13 3) (Z87 2)   7  History of Pulmonary Embolism (V12 55)  Active Problems And Past Medical History Reviewed: The active problems and past medical history were reviewed and updated today  Surgical History   1  History of Appendectomy   2  History of Breast Surgery   3  History of Dilation And Curettage   4  History of Surgical Excision Of Tubal Pregnancy  Surgical History Reviewed: The surgical history was reviewed and updated today  Family History   Mother    1  Family history of Family Health Status Of Mother -    2  Family history of Hypertension (V17 49)   3  Family history of Ovarian Cancer (V16 41)  Father    4  Family history of Father  At Age 80   11  Family history of Pneumonia  Family History Reviewed: The family history was reviewed and updated today  Social History    · History of Active advance directive (V49 89) (Z78 9)   · Being A Social Drinker   · Former smoker (L12 03) (H23 190)   · History of Tobacco non-user (V49 89) (Z78 9)  Social History Reviewed: The social history was reviewed and updated today  Current Meds    1  Betamethasone Dipropionate Aug 0 05 % External Ointment; apply sparingly to affected area(s)     twice daily psoriasis;      Therapy: 74ZJJ8021 to (Last Rx:61Mnj3749)  Requested for: 06BRS7175 Ordered   2  Calcipotriene 0 005 % External Solution; APPLY TO SCALP TWICE A DAY; Therapy: 08DZE8126 to (Last Rx:61Ynq6084)  Requested for: 60MSP3123 Ordered   3  Caltrate 600+D 600-400 MG-UNIT TABS; Take 1 tablet daily; Therapy: 69VOQ7750 to Recorded   4  Clobetasol Propionate 0 05 % External Foam; APPLY SPARINGLY TO AFFECTED AREA(S) TWICE     DAILY; Therapy: 43HLB1388 to (Last Rx:07Icz6548)  Requested for: 40JBQ6241 Ordered   5  Clobetasol Propionate 0 05 % External Foam; APPLY TOPICALLY TWICE DAILY TO AFFECTED AREA; Therapy: 39QIX8657 to (Last Rx:11Oct2017)  Requested for: 48GWI7731 Ordered   6  Glucosamine Chondr 500 Complex Oral Capsule; TAKE 1 CAPSULE DAILY; Therapy: 52PLW2283 to Recorded   7  Losartan Potassium 50 MG Oral Tablet; TAKE 1 TABLET DAILY; Therapy: 20DLX7755 to (Royal Ly)  Requested for: 33ZZP4565; Last Rx:23Eug5572     Ordered   8  Mometasone Furoate 0 1 % External Cream; APPLY SPARINGLY TO THE AFFECTED AREA(S) TWICE     DAILY  Ears; Therapy: 38RXA1486 to (Last Rx:95Hwz8359)  Requested for: 27THX9894 Ordered  Medication List Reviewed: The medication list was reviewed and updated today  Allergies   1  No Known Drug Allergies    Vitals   Vital Signs    Recorded: 70KDG4059 10:20AM   Temperature 97 6 F, Oral   Heart Rate 65   Systolic 560, LUE, Sitting   Diastolic 82, LUE, Sitting   Height 5 ft 4 in   Weight 130 lb 6 oz   BMI Calculated 22 38   BSA Calculated 1 63   O2 Saturation 96     Physical Exam        Constitutional      General appearance: No acute distress, well appearing and well nourished  Eyes      Conjunctiva and lids: No swelling, erythema or discharge  Pupils and irises: Equal, round and reactive to light  Ears, Nose, Mouth, and Throat      External inspection of ears and nose: Normal        Otoscopic examination: Abnormal   The right tympanic membrane had a diminished light reflex  The left tympanic membrane had a diminished light reflex  Severe tympanosclerosis both sides  -- Decreased hearing both sides  Nasal mucosa, septum, and turbinates: Normal without edema or erythema  Oropharynx: Normal with no erythema, edema, exudate or lesions  Pulmonary      Respiratory effort: No increased work of breathing or signs of respiratory distress  Auscultation of lungs: Clear to auscultation  Cardiovascular      Palpation of heart: Normal PMI, no thrills  Auscultation of heart: Normal rate and rhythm, normal S1 and S2, without murmurs  Examination of extremities for edema and/or varicosities: Normal        Carotid pulses: Normal        Abdomen      Abdomen: Non-tender, no masses  Liver and spleen: No hepatomegaly or splenomegaly  Lymphatic      Palpation of lymph nodes in neck: No lymphadenopathy  Musculoskeletal      Gait and station: Normal        Digits and nails: Normal without clubbing or cyanosis  Inspection/palpation of joints, bones, and muscles: Normal        Skin      Skin and subcutaneous tissue: Normal without rashes or lesions  Neurologic      Cranial nerves: Cranial nerves 2-12 intact  Reflexes: 2+ and symmetric  Sensation: No sensory loss  Psychiatric      Orientation to person, place, and time: Normal        Mood and affect: Normal           Health Management   Encounter for screening colonoscopy   COLONOSCOPY; every 10 years; Last 29KRW8518; Next Due: I9086089; Active  Encounter for screening mammogram for breast cancer   Digital Bilateral Screening Mammogram With CAD; every 1 year; Next Due: 62DAQ7729; Overdue  Health Maintenance   * MAMMO SCREENING BILATERAL W CAD; every 1 year; Last 43MXO8288; Next Due: 30JAO7780; Active  Influenza; every 1 year; Last 11ZMA9222; Next Due: 27QCN6895; Overdue  Medicare Annual Wellness Visit; every 1 year; Last 28Apr2015; Next Due: 63BQW3512;  Overdue    Future Appointments      Date/Time Provider Specialty Site   05/31/2018 12:15 PM Rafaela Richardson DO Internal Medicine [facility]   02/21/2018 08:05 AM SREEDHAR Terrazas  Dermatology [facility]   05/15/2018 03:05 PM SREEDHAR Terrazas   Dermatology [facility]     Signatures    Electronically signed by : Elin Rey [facility]; Dec 29 2017  4:57PM EST                       (Author)     Electronically signed by : Lyndsay Noble DO; Dec 29 2017  5:00PM EST                       (Co-author)

## 2018-01-12 NOTE — PROGRESS NOTES
Plan  Hypertension    · Losartan Potassium 50 MG Oral Tablet; TAKE 1 TABLET DAILY    Discussion/Summary    AWV DONE  PT FEELS SAFE AT HOME, NO ONE IS HURTING HER AT HOME  History of Present Illness  The patient is being seen for the subsequent annual wellness visit  Medicare Screening and Risk Factors   Medicare Screening Tests Risk Questions   Osteoporosis risk assessment: , female gender, over 48years of age and alcohol use  HIV risk assessment: none indicated  Drug and Alcohol Use: The patient is a former cigarette smoker and quit smoking 10 yrs ago  She has 2 PPWEEK pack year(s) of cigarette use  The patient reports occasional alcohol use and drinking 1 drinks per day  She has never used illicit drugs  Diet and Physical Activity: Current diet includes well balanced meals, low salt food choices, limited junk food, 2 servings of fruit per day, 3 servings of vegetables per day, 1 servings of meat per day, 1 servings of whole grains per day, 1 servings of simple carbohydrates per day, 2 servings of dairy products per day and 2 cups of coffee per day  She exercises 3 times per week  Exercise: water exercises, stretching, strength training 60+ minutes per day  Mood Disorder and Cognitive Impairment Screening: PHQ-9 Depression Scale   Over the past 2 weeks, how often have you been bothered by the following problems? 1 ) Little interest or pleasure in doing things? Not at all    2 ) Feeling down, depressed or hopeless? Not at all    3 ) Trouble falling asleep or sleeping too much? Several days  4 ) Feeling tired or having little energy? Not at all    5 ) Poor appetite or overeating? Not at all    6 ) Feeling bad about yourself, or that you are a failure, or have let yourself or your family down? Not at all    7 ) Trouble concentrating on things, such as reading a newspaper or watching television?  Not at all    8 ) Moving or speaking so slowly that other people could have noticed, or the opposite, moving or speaking faster than usual? Not at all    9 ) Thoughts that you would be off dead or of hurting yourself in some way? Not at all  TOTAL SCORE: 1  Functional Ability/Level of Safety: Hearing is slightly decreased and a hearing aid is not used  The patient is currently able to do activities of daily living without limitations, able to do instrumental activities of daily living without limitations, able to participate in social activities without limitations and able to drive without limitations  Activities of daily living details: does not need help using the phone, no transportation help needed, does not need help shopping, no meal preparation help needed, does not need help doing housework, does not need help doing laundry, does not need help managing medications and does not need help managing money  Fall risk factors: The patient fell 0 times in the past 12 months  Home safety risk factors:  no handrails on the stairs and FRONT HOUSE STEPS, but no unfamiliar surroundings, no loose rugs, no poor household lighting, no uneven floors, no household clutter and grab bars in the bathroom  Advance Directives: Advance directives: living will, durable power of  for health care directives and advance directives  Co-Managers and Medical Equipment/Suppliers: See Patient Care Team      Patient Care Team    Care Team Member Role Specialty Office Number   Nettie Ly M D  Dermatology [phone]   Reddy Sevilla [facility]  Internal Medicine [phone]     Active Problems    1  Arthropathy (716 90) (M12 9)   2  Benign essential hypertension (401 1) (I10)   3  Encounter for screening colonoscopy (V76 51) (Z12 11)   4  Encounter for screening mammogram for breast cancer (V76 12) (Z12 31)   5  Hyperlipidemia (272 4) (E78 5)   6  Hypertension (401 9) (I10)   7  Osteoporosis (733 00) (M81 0)   8  Psoriasis (696 1) (L40 9)   9  Screening for skin condition (V82 0) (Z13 89)   10  Seborrheic keratoses (702 19) (L82 1)    Past Medical History    1  History of Benign neoplasm of skin of face (216 3) (D23 30)   2  History of Currently Wearing Contact Lenses   3  History of acute sinusitis (V12 69) (Z87 09)   4  History of backache (V13 59) (Z87 39)   5  History of chest pain (V13 89) (Z87 898)   6  History of sebaceous cyst (V13 3) (Z87 2)   7  History of Pulmonary Embolism (V12 55)    Surgical History    1  History of Appendectomy   2  History of Breast Surgery   3  History of Dilation And Curettage   4  History of Surgical Excision Of Tubal Pregnancy    Family History  Mother    1  Family history of Family Health Status Of Mother -    2  Family history of Hypertension (V17 49)   3  Family history of Ovarian Cancer (V16 41)  Father    4  Family history of Father  At Age 80   11  Family history of Pneumonia    Social History    · Being A Social Drinker   · Former smoker (Q38 37) (N16 272)    Current Meds   1  Caltrate 600+D 600-400 MG-UNIT TABS; Take 1 tablet daily; Therapy: 18QXR2376 to Recorded   2  Clobetasol Propionate 0 05 % External Foam; APPLY SPARINGLY TO AFFECTED   AREA(S) TWICE DAILY; Therapy: 51KPY7768 to (Last Rx:19Ppy7623)  Requested for: 74Str7275 Ordered   3  Glucosamine Chondr 500 Complex Oral Capsule; TAKE 1 CAPSULE DAILY; Therapy: 90JSE4042 to Recorded   4  Losartan Potassium 50 MG Oral Tablet; TAKE 1 TABLET DAILY; Therapy: 51EFH9085 to (Isaac Bocanegra)  Requested for: 90NRJ0418; Last   MI:66NQI4238 Ordered   5  Mometasone Furoate 0 1 % External Cream; APPLY SPARINGLY TO THE AFFECTED   AREA(S) TWICE DAILY; Therapy: 85HHS4894 to (Last CA:67OPI5498) Ordered    Allergies    1   No Known Drug Allergies    Immunizations  Influenza --- James Verdelk: [id number]   Pneumococcal --- James Verdelk: Unknown     Vitals  Signs [Data Includes: Current Encounter]   Recorded: 22WKN5736 01:50PM   Heart Rate: 80  Systolic: 968  Diastolic: 72  Height: 5 ft 4 in  Weight: 134 lb 6 08 oz  BMI Calculated: 23 07  BSA Calculated: 1 66    Results/Data  Encounter Results   *VB - Fall Risk Assessment  (Dx V80 09 Screen for Neurologic Disorder) 29INH5249 01:41PM Mayfield Spry     Test Name Result Flag Reference   Fall Risk Assessment 74OIP3009       *VB-Urinary Incontinence Screen (Dx V81 6 Screen for UI) 74YGN8017 01:41PM Ad Spry     Test Name Result Flag Reference   Urinary Incontinence Assessment 04ICZ0104         Health Management  Encounter for screening colonoscopy   COLONOSCOPY; every 10 years; Last 22WHK3149; Next Due: [id number]; Active  Encounter for screening mammogram for breast cancer   Digital Bilateral Screening Mammogram With CAD; every 1 year; Next Due: 46OAD1841; Overdue  Health Maintenance   Influenza; every 1 year; Last 22NAM9794; Next Due: 16LOS9390; Overdue  Medicare Annual Wellness Visit; every 1 year; Last 89Ird9386; Next Due: 14EQT9843; Overdue    Future Appointments    Date/Time Provider Specialty Site   05/10/2016 02:45 PM SREEDHAR Brasher   [facility]     Signatures   Electronically signed by : Lloi Parikh, [facility]; May  9 2016  2:22PM EST                       (Author)    Electronically signed by : Guzman Fraser DO; May  9 2016  3:15PM EST                       (Co-author)

## 2018-01-13 VITALS
HEART RATE: 77 BPM | BODY MASS INDEX: 22.53 KG/M2 | WEIGHT: 132 LBS | HEIGHT: 64 IN | DIASTOLIC BLOOD PRESSURE: 80 MMHG | SYSTOLIC BLOOD PRESSURE: 130 MMHG | OXYGEN SATURATION: 94 %

## 2018-01-13 NOTE — RESULT NOTES
Verified Results  (1) URINALYSIS (will reflex a microscopy if leukocytes, occult blood, protein or nitrites are not within normal limits) 53KWV5481 11:32AM Shelia Anderson     Test Name Result Flag Reference   COLOR Yellow     CLARITY Clear     SPECIFIC GRAVITY UA 1 010  1 003-1 030   PH UA 6 5  4 5-8 0   LEUKOCYTE ESTERASE UA Trace A Negative   NITRITE UA Negative  Negative   PROTEIN UA Negative mg/dl  Negative   GLUCOSE UA Negative mg/dl  Negative   KETONES UA Negative mg/dl  Negative   UROBILINOGEN UA 0 2 E U /dl  0 2, 1 0 E U /dl   BILIRUBIN UA Negative  Negative   BLOOD UA Negative  Negative   BACTERIA None Seen /hpf  None Seen, Occasional   EPITHELIAL CELLS None Seen /hpf  None Seen, Occasional   HYALINE CASTS None Seen /lpf  None Seen   RBC UA None Seen /hpf  None Seen   WBC UA None Seen /hpf  None Seen     (1) CBC/PLT/DIFF 17CPQ2121 05:21PM Shelia Anderson     Test Name Result Flag Reference   WBC COUNT 6 17 Thousand/uL  4 31-10 16   RBC COUNT 4 34 Million/uL  3 81-5 12   HEMOGLOBIN 13 1 g/dL  11 5-15 4   HEMATOCRIT 39 7 %  34 8-46  1   MCV 92 fL  82-98   MCH 30 2 pg  26 8-34 3   MCHC 33 0 g/dL  31 4-37 4   RDW 13 1 %  11 6-15 1   MPV 9 8 fL  8 9-12 7   PLATELET COUNT 200 Thousands/uL  149-390   nRBC AUTOMATED 0 /100 WBCs     NEUTROPHILS RELATIVE PERCENT 50 %  43-75   LYMPHOCYTES RELATIVE PERCENT 34 %  14-44   MONOCYTES RELATIVE PERCENT 14 % H 4-12   EOSINOPHILS RELATIVE PERCENT 2 %  0-6   BASOPHILS RELATIVE PERCENT 0 %  0-1   NEUTROPHILS ABSOLUTE COUNT 3 06 Thousands/? ??L  1 85-7 62   LYMPHOCYTES ABSOLUTE COUNT 2 10 Thousands/? ??L  0 60-4 47   MONOCYTES ABSOLUTE COUNT 0 86 Thousand/? ??L  0 17-1 22   EOSINOPHILS ABSOLUTE COUNT 0 12 Thousand/? ??L  0 00-0 61   BASOPHILS ABSOLUTE COUNT 0 02 Thousands/? ??L  0 00-0 10     (1) COMPREHENSIVE METABOLIC PANEL 33XLD3038 24:48QM Shelia Anderson     Test Name Result Flag Reference   SODIUM 139 mmol/L  136-145   POTASSIUM 3 9 mmol/L  3 5-5 3   CHLORIDE 104 mmol/L 100-108   CARBON DIOXIDE 27 mmol/L  21-32   ANION GAP (CALC) 8 mmol/L  4-13   BLOOD UREA NITROGEN 17 mg/dL  5-25   CREATININE 0 76 mg/dL  0 60-1 30   Standardized to IDMS reference method   CALCIUM 9 2 mg/dL  8 3-10 1   BILI, TOTAL 0 37 mg/dL  0 20-1 00   ALK PHOSPHATAS 57 U/L     ALT (SGPT) 20 U/L  12-78   AST(SGOT) 16 U/L  5-45   ALBUMIN 4 1 g/dL  3 5-5 0   TOTAL PROTEIN 7 4 g/dL  6 4-8 2   eGFR Non-African American      >60 0 ml/min/1 73sq m   [facility] Disease Education Program recommendations are as follows:  GFR calculation is accurate only with a steady state creatinine  Chronic Kidney disease less than 60 ml/min/1 73 sq  meters  Kidney failure less than 15 ml/min/1 73 sq  meters  GLUCOSE FASTING 72 mg/dL  65-99     (1) LIPID PANEL, FASTING 43CHS2345 05:21PM Francheska Reagin     Test Name Result Flag Reference   CHOLESTEROL 214 mg/dL H    HDL,DIRECT 112 mg/dL H 40-60   Specimen collection should occur prior to Metamizole administration due to the potential for falsely depressed results  LDL CHOLESTEROL CALCULATED 92 mg/dL  0-100   Triglyceride:         Normal              <150 mg/dl       Borderline High    150-199 mg/dl       High               200-499 mg/dl       Very High          >499 mg/dl  Cholesterol:         Desirable        <200 mg/dl      Borderline High  200-239 mg/dl      High             >239 mg/dl  HDL Cholesterol:        High    >59 mg/dL      Low     <41 mg/dL  LDL CALCULATED:    This screening LDL is a calculated result  It does not have the accuracy of the Direct Measured LDL in the monitoring of patients with hyperlipidemia and/or statin therapy  Direct Measure LDL (IBW121) must be ordered separately in these patients  TRIGLYCERIDES 52 mg/dL  <=150   Specimen collection should occur prior to N-Acetylcysteine or Metamizole administration due to the potential for falsely depressed results       Health Maintenance Flow Sheet 36PEY9351 11:09AM      Test Name Result Flag Reference   DEXA 03/13/2012         Plan  Encounter for screening colonoscopy    · COLONOSCOPY ; every 10 years; Last 93FCD4731;  Next [id number]; Status:Active

## 2018-01-22 VITALS
WEIGHT: 127 LBS | HEART RATE: 93 BPM | BODY MASS INDEX: 21.68 KG/M2 | SYSTOLIC BLOOD PRESSURE: 118 MMHG | DIASTOLIC BLOOD PRESSURE: 68 MMHG | HEIGHT: 64 IN | OXYGEN SATURATION: 98 %

## 2018-01-22 VITALS
DIASTOLIC BLOOD PRESSURE: 82 MMHG | SYSTOLIC BLOOD PRESSURE: 118 MMHG | HEIGHT: 64 IN | HEART RATE: 65 BPM | WEIGHT: 130.38 LBS | BODY MASS INDEX: 22.26 KG/M2 | TEMPERATURE: 97.6 F | OXYGEN SATURATION: 96 %

## 2018-02-20 RX ORDER — CALCIPOTRIENE 0.05 MG/ML
SOLUTION TOPICAL 2 TIMES DAILY
COMMUNITY
Start: 2017-10-11 | End: 2018-02-21 | Stop reason: SDUPTHER

## 2018-02-20 RX ORDER — LOSARTAN POTASSIUM 50 MG/1
1 TABLET ORAL DAILY
COMMUNITY
Start: 2014-01-15 | End: 2018-05-30 | Stop reason: SDUPTHER

## 2018-02-20 RX ORDER — BETAMETHASONE DIPROPIONATE 0.5 MG/G
OINTMENT TOPICAL
COMMUNITY
Start: 2017-10-11 | End: 2018-02-21 | Stop reason: SDUPTHER

## 2018-02-20 RX ORDER — MOMETASONE FUROATE 1 MG/G
CREAM TOPICAL
COMMUNITY
Start: 2014-03-11 | End: 2018-05-31 | Stop reason: CLARIF

## 2018-02-21 ENCOUNTER — OFFICE VISIT (OUTPATIENT)
Dept: DERMATOLOGY | Facility: CLINIC | Age: 78
End: 2018-02-21
Payer: COMMERCIAL

## 2018-02-21 DIAGNOSIS — L40.9 PSORIASIS: Primary | ICD-10-CM

## 2018-02-21 DIAGNOSIS — Z13.89 SCREENING FOR SKIN CONDITION: ICD-10-CM

## 2018-02-21 PROCEDURE — 99213 OFFICE O/P EST LOW 20 MIN: CPT | Performed by: DERMATOLOGY

## 2018-02-21 RX ORDER — CLOBETASOL PROPIONATE 0.5 MG/G
AEROSOL, FOAM TOPICAL
Refills: 0 | COMMUNITY
Start: 2017-12-29 | End: 2018-02-21 | Stop reason: SDUPTHER

## 2018-02-21 RX ORDER — CALCIPOTRIENE 0.05 MG/ML
1 SOLUTION TOPICAL 2 TIMES DAILY
Qty: 60 ML | Refills: 4 | Status: SHIPPED | OUTPATIENT
Start: 2018-02-21 | End: 2021-04-05

## 2018-02-21 RX ORDER — BETAMETHASONE DIPROPIONATE 0.5 MG/G
OINTMENT TOPICAL DAILY
Qty: 50 G | Refills: 2 | Status: SHIPPED | OUTPATIENT
Start: 2018-02-21 | End: 2021-04-05

## 2018-02-21 RX ORDER — CLOBETASOL PROPIONATE 0.5 MG/G
AEROSOL, FOAM TOPICAL 2 TIMES DAILY
Qty: 100 G | Refills: 5 | Status: SHIPPED | OUTPATIENT
Start: 2018-02-21 | End: 2021-04-05

## 2018-02-21 NOTE — PATIENT INSTRUCTIONS
Psoriasis still active despite using topical therapy consistently  We discussed potential for other treatments at present my feeling very simply is to consider phototherapy if her insurance will cover this  If this not possible then consider methotrexate    Will plan follow-up again in 3 months or earlier if we can get approval for phototherapy nothing else of concern

## 2018-02-21 NOTE — PROGRESS NOTES
[address] [address] [facility] DERMATOLOGY  [address] [address]     MRN: [id number] : 1940  Encounter: [id number]  Patient Information: Qasim Hahn  Chief complaint: psoriasis follow up  History of present illness:  78-year-old female presents for follow-up for psoriasis  Overall continues to have quite a bit of problems especially on the scalp in scattered areas of her body patient is quite concerned about the appearance and irritation and itching related to this process  Patient said she called her insurance company that said that they do not cover phototherapy or that she has to pay 40 dollar co-pay each treatment  Nothing else of concern noted  No past medical history on file  No past surgical history on file  Social History   History   Alcohol use Not on file     History   Drug use: Unknown     History   Smoking Status    Not on file   Smokeless Tobacco    Not on file     No family history on file  Meds/Allergies   No Known Allergies    Meds:  Prior to Admission medications    Medication Sig Start Date End Date Taking?  Authorizing Provider   betamethasone, augmented, (DIPROLENE) 0 05 % ointment Apply topically 10/11/17  Yes Historical Provider, MD   calcipotriene (DOVONEX) 0 005 % ointment Apply topically 2 (two) times a day 10/11/17  Yes Historical Provider, MD   Calcium Carbonate-Vitamin D (CALTRATE 600+D) 600-400 MG-UNIT per tablet Take 1 tablet by mouth daily 3/11/14  Yes Historical Provider, MD   clobetasol (OLUX) 0 05 % topical foam  17  Yes Historical Provider, MD   Glucosamine-Chondroit-Vit C-Mn (GLUCOSAMINE 1500 COMPLEX PO) Take 1 capsule by mouth daily 3/11/14  Yes Historical Provider, MD   losartan (COZAAR) 50 mg tablet Take 1 tablet by mouth daily 1/15/14  Yes Historical Provider, MD   mometasone (ELOCON) 0 1 % cream Apply topically 3/11/14  Yes Historical Provider, MD       Subjective:     Review of Systems:    General: negative for - chills, fatigue, fever,  weight gain or weight loss  Psychological: negative for - anxiety, behavioral disorder, concentration difficulties, decreased libido, depression, irritability, memory difficulties, mood swings, sleep disturbances or suicidal ideation  ENT: negative for - hearing difficulties , nasal congestion, nasal discharge, oral lesions, sinus pain, sneezing, sore throat  Allergy and Immunology: negative for - hives, insect bite sensitivity,  Hematological and Lymphatic: negative for - bleeding problems, blood clots,bruising, swollen lymph nodes  Endocrine: negative for - hair pattern changes, hot flashes, malaise/lethargy, mood swings, palpitations, polydipsia/polyuria, skin changes, temperature intolerance or unexpected weight change  Respiratory: negative for - cough, hemoptysis, orthopnea, shortness of breath, or wheezing  Cardiovascular: negative for - chest pain, dyspnea on exertion, edema,  Gastrointestinal: negative for - abdominal pain, nausea/vomiting  Genito-Urinary: negative for - dysuria, incontinence, irregular/heavy menses or urinary frequency/urgency  Musculoskeletal: negative for - gait disturbance, joint pain, joint stiffness, joint swelling, muscle pain, muscular weakness  Dermatological:  As in HPI  Neurological: negative for confusion, dizziness, headaches, impaired coordination/balance, memory loss, numbness/tingling, seizures, speech problems, tremors or weakness       Objective: There were no vitals taken for this visit      Physical Exam:    General Appearance:    Alert, cooperative, no distress   Head:    Normocephalic, without obvious abnormality, atraumatic           Skin:   A full skin exam was performed including scalp, head scalp, eyes, ears, nose, lips, neck, chest, axilla, abdomen, back, buttocks, bilateral upper extremities, bilateral lower extremities, hands, feet, fingers, toes, fingernails, and toenails scaling area on scalp chest back arms and legs 30 %BSA involved   Nothing else of concern noted on complete exam      Assessment:     1  Psoriasis  betamethasone, augmented, (DIPROLENE) 0 05 % ointment    calcipotriene (DOVONEX) 0 005 % ointment    clobetasol (OLUX) 0 05 % topical foam   2  Screening for skin condition           Plan:   Psoriasis still active despite using topical therapy consistently  We discussed potential for other treatments at present my feeling very simply is to consider phototherapy if her insurance will cover this  If this not possible then consider methotrexate  Will plan follow-up again in 3 months or earlier if we can get approval for phototherapy nothing else of concern    Clarita Palomo MD  2/21/2018,8:31 AM    Portions of the record may have been created with voice recognition software   Occasional wrong word or "sound a like" substitutions may have occurred due to the inherent limitations of voice recognition software   Read the chart carefully and recognize, using context, where substitutions have occurred

## 2018-05-22 ENCOUNTER — OFFICE VISIT (OUTPATIENT)
Dept: DERMATOLOGY | Facility: CLINIC | Age: 78
End: 2018-05-22
Payer: COMMERCIAL

## 2018-05-22 ENCOUNTER — APPOINTMENT (OUTPATIENT)
Dept: LAB | Facility: CLINIC | Age: 78
End: 2018-05-22
Payer: COMMERCIAL

## 2018-05-22 DIAGNOSIS — D69.2 SOLAR PURPURA (HCC): ICD-10-CM

## 2018-05-22 DIAGNOSIS — Z13.89 SCREENING FOR SKIN CONDITION: ICD-10-CM

## 2018-05-22 DIAGNOSIS — L40.9 PSORIASIS: ICD-10-CM

## 2018-05-22 DIAGNOSIS — D69.2 SOLAR PURPURA (HCC): Primary | ICD-10-CM

## 2018-05-22 LAB
BASOPHILS # BLD AUTO: 0.03 THOUSANDS/ΜL (ref 0–0.1)
BASOPHILS NFR BLD AUTO: 0 % (ref 0–1)
EOSINOPHIL # BLD AUTO: 0.11 THOUSAND/ΜL (ref 0–0.61)
EOSINOPHIL NFR BLD AUTO: 2 % (ref 0–6)
ERYTHROCYTE [DISTWIDTH] IN BLOOD BY AUTOMATED COUNT: 12.8 % (ref 11.6–15.1)
HCT VFR BLD AUTO: 40.1 % (ref 34.8–46.1)
HGB BLD-MCNC: 13 G/DL (ref 11.5–15.4)
INR PPP: 0.97 (ref 0.86–1.17)
LYMPHOCYTES # BLD AUTO: 2.19 THOUSANDS/ΜL (ref 0.6–4.47)
LYMPHOCYTES NFR BLD AUTO: 32 % (ref 14–44)
MCH RBC QN AUTO: 30.1 PG (ref 26.8–34.3)
MCHC RBC AUTO-ENTMCNC: 32.4 G/DL (ref 31.4–37.4)
MCV RBC AUTO: 93 FL (ref 82–98)
MONOCYTES # BLD AUTO: 1.12 THOUSAND/ΜL (ref 0.17–1.22)
MONOCYTES NFR BLD AUTO: 16 % (ref 4–12)
NEUTROPHILS # BLD AUTO: 3.48 THOUSANDS/ΜL (ref 1.85–7.62)
NEUTS SEG NFR BLD AUTO: 50 % (ref 43–75)
NRBC BLD AUTO-RTO: 0 /100 WBCS
PLATELET # BLD AUTO: 255 THOUSANDS/UL (ref 149–390)
PMV BLD AUTO: 9.4 FL (ref 8.9–12.7)
PROTHROMBIN TIME: 13 SECONDS (ref 11.8–14.2)
RBC # BLD AUTO: 4.32 MILLION/UL (ref 3.81–5.12)
WBC # BLD AUTO: 6.94 THOUSAND/UL (ref 4.31–10.16)

## 2018-05-22 PROCEDURE — 99213 OFFICE O/P EST LOW 20 MIN: CPT | Performed by: DERMATOLOGY

## 2018-05-22 PROCEDURE — 85610 PROTHROMBIN TIME: CPT

## 2018-05-22 PROCEDURE — 85025 COMPLETE CBC W/AUTO DIFF WBC: CPT

## 2018-05-22 PROCEDURE — 36415 COLL VENOUS BLD VENIPUNCTURE: CPT

## 2018-05-22 NOTE — PATIENT INSTRUCTIONS
solar purpura may be related to chronic sun exposure also could be related to the topical steroid use over time at present will go ahead and order a CBC and PT to rule out any other etiology for bleeding including thrombocytopenia    Patient will also be seen Dr Eddi Barrios shortly for overall checkup    psoriasis appears stable still active on the scalp and on the back at present will hold off on any other interventions because of concerns regarding the side effects for both  Phototherapy and use of topical steroids   screening for dermatologic disorders nothing else of concern noted plan on seeing follow-up in 6 months

## 2018-05-22 NOTE — PROGRESS NOTES
[address] [facility] DERMATOLOG[facility]  [address] [address]     MRN: [id number] : 1940  Encounter: [id number]  Patient Information: Danyell Goldman  Chief complaint:    History of present illness:   Past Medical History:   Diagnosis Date    Benign neoplasm of skin     Face     Pulmonary embolism ([address] )     Sebaceous cyst     Wears contact lenses      Past Surgical History:   Procedure Laterality Date    APPENDECTOMY      BREAST SURGERY      BX - Benign     DILATION AND CURETTAGE OF UTERUS      OTHER SURGICAL HISTORY      surgical excision of tubal pregnancy      Social History   History   Alcohol Use    Yes     Comment: social      History   Drug use: Unknown     History   Smoking Status    Former Smoker    Quit date:    Smokeless Tobacco    Not on file     Comment: History of tobacco non-user noted in "allscripts"      Family History   Problem Relation Age of Onset    Hypertension Mother     Ovarian cancer Mother     Pneumonia Father      Meds/Allergies   No Known Allergies    Meds:  Prior to Admission medications    Medication Sig Start Date End Date Taking?  Authorizing Provider   betamethasone, augmented, (DIPROLENE) 0 05 % ointment Apply topically daily To psoriasis plaques 18  Yes Magdalene Abreu MD   calcipotriene (DOVONEX) 0 005 % ointment Apply 1 application topically 2 (two) times a day To scalp 18  Yes Magdalene Abreu MD   clobetasol (OLUX) 0 05 % topical foam Apply topically 2 (two) times a day To scalp 18  Yes Magdalene Abreu MD   Glucosamine-Chondroit-Vit C-Mn (GLUCOSAMINE 1500 COMPLEX PO) Take 1 capsule by mouth daily 3/11/14  Yes Historical Provider, MD   losartan (COZAAR) 50 mg tablet Take 1 tablet by mouth daily 1/15/14  Yes Historical Provider, MD   mometasone (ELOCON) 0 1 % cream Apply topically 3/11/14  Yes Historical Provider, MD   Calcium Carbonate-Vitamin D (CALTRATE 600+D) 600-400 MG-UNIT per tablet Take 1 tablet by mouth daily 3/11/14   Historical Provider, MD       Subjective:     Review of Systems:    General: negative for - chills, fatigue, fever,  weight gain or weight loss  Psychological: negative for - anxiety, behavioral disorder, concentration difficulties, decreased libido, depression, irritability, memory difficulties, mood swings, sleep disturbances or suicidal ideation  ENT: negative for - hearing difficulties , nasal congestion, nasal discharge, oral lesions, sinus pain, sneezing, sore throat  Allergy and Immunology: negative for - hives, insect bite sensitivity,  Hematological and Lymphatic: negative for - bleeding problems, blood clots,bruising, swollen lymph nodes  Endocrine: negative for - hair pattern changes, hot flashes, malaise/lethargy, mood swings, palpitations, polydipsia/polyuria, skin changes, temperature intolerance or unexpected weight change  Respiratory: negative for - cough, hemoptysis, orthopnea, shortness of breath, or wheezing  Cardiovascular: negative for - chest pain, dyspnea on exertion, edema,  Gastrointestinal: negative for - abdominal pain, nausea/vomiting  Genito-Urinary: negative for - dysuria, incontinence, irregular/heavy menses or urinary frequency/urgency  Musculoskeletal: negative for - gait disturbance, joint pain, joint stiffness, joint swelling, muscle pain, muscular weakness  Dermatological:  As in HPI  Neurological: negative for confusion, dizziness, headaches, impaired coordination/balance, memory loss, numbness/tingling, seizures, speech problems, tremors or weakness       Objective: There were no vitals taken for this visit      Physical Exam:    General Appearance:    Alert, cooperative, no distress   Head:    Normocephalic, without obvious abnormality, atraumatic           Skin:   A full skin exam was performed including scalp, head scalp, eyes, ears, nose, lips, neck, chest, axilla, abdomen, back, buttocks, bilateral upper extremities, bilateral lower extremities, hands, feet, fingers, toes, fingernails, and toenails Widespread purpuric areas noted on the legs and arms a little bit on the chest normal scaling erythematous well-demarcated patches diffusely present a little bit on the back and scalp but not as bad as previous     Assessment:     1  Psoriasis     2  Screening for skin condition     3  Solar purpura ([address] )           Plan:    solar purpura may be related to chronic sun exposure also could be related to the topical steroid use over time at present will go ahead and order a CBC and PT to rule out any other etiology for bleeding including thrombocytopenia  Patient will also be seen Dr Dread Hernández shortly for overall checkup    psoriasis appears stable still active on the scalp and on the back at present will hold off on any other interventions because of concerns regarding the side effects for both  Phototherapy and use of topical steroids   screening for dermatologic disorders nothing else of concern noted plan on seeing follow-up in 6 months    Bartolome Burton MD  9/03/7818,2:00 PM    Portions of the record may have been created with voice recognition software   Occasional wrong word or "sound a like" substitutions may have occurred due to the inherent limitations of voice recognition software   Read the chart carefully and recognize, using context, where substitutions have occurred

## 2018-05-30 DIAGNOSIS — I10 ESSENTIAL HYPERTENSION: Primary | ICD-10-CM

## 2018-05-30 RX ORDER — LOSARTAN POTASSIUM 50 MG/1
TABLET ORAL
Qty: 90 TABLET | Refills: 1 | Status: SHIPPED | OUTPATIENT
Start: 2018-05-30 | End: 2018-05-31 | Stop reason: SDUPTHER

## 2018-05-31 ENCOUNTER — OFFICE VISIT (OUTPATIENT)
Dept: INTERNAL MEDICINE CLINIC | Facility: CLINIC | Age: 78
End: 2018-05-31

## 2018-05-31 ENCOUNTER — APPOINTMENT (OUTPATIENT)
Dept: LAB | Facility: CLINIC | Age: 78
End: 2018-05-31
Payer: COMMERCIAL

## 2018-05-31 VITALS
OXYGEN SATURATION: 93 % | WEIGHT: 130 LBS | HEIGHT: 65 IN | DIASTOLIC BLOOD PRESSURE: 78 MMHG | SYSTOLIC BLOOD PRESSURE: 116 MMHG | HEART RATE: 82 BPM | BODY MASS INDEX: 21.66 KG/M2

## 2018-05-31 DIAGNOSIS — M25.50 ARTHRALGIA, UNSPECIFIED JOINT: Primary | ICD-10-CM

## 2018-05-31 DIAGNOSIS — L40.9 PSORIASIS: ICD-10-CM

## 2018-05-31 DIAGNOSIS — I10 ESSENTIAL HYPERTENSION: ICD-10-CM

## 2018-05-31 DIAGNOSIS — M25.50 ARTHRALGIA, UNSPECIFIED JOINT: ICD-10-CM

## 2018-05-31 DIAGNOSIS — Z13.89 SCREENING FOR SKIN CONDITION: ICD-10-CM

## 2018-05-31 LAB
ANION GAP SERPL CALCULATED.3IONS-SCNC: 5 MMOL/L (ref 4–13)
BACTERIA UR QL AUTO: NORMAL /HPF
BILIRUB UR QL STRIP: NEGATIVE
BUN SERPL-MCNC: 17 MG/DL (ref 5–25)
CALCIUM SERPL-MCNC: 9.2 MG/DL (ref 8.3–10.1)
CHLORIDE SERPL-SCNC: 106 MMOL/L (ref 100–108)
CLARITY UR: CLEAR
CO2 SERPL-SCNC: 29 MMOL/L (ref 21–32)
COLOR UR: YELLOW
CREAT SERPL-MCNC: 0.82 MG/DL (ref 0.6–1.3)
GFR SERPL CREATININE-BSD FRML MDRD: 70 ML/MIN/1.73SQ M
GLUCOSE P FAST SERPL-MCNC: 80 MG/DL (ref 65–99)
GLUCOSE UR STRIP-MCNC: NEGATIVE MG/DL
HGB UR QL STRIP.AUTO: NEGATIVE
HYALINE CASTS #/AREA URNS LPF: NORMAL /LPF
KETONES UR STRIP-MCNC: NEGATIVE MG/DL
LEUKOCYTE ESTERASE UR QL STRIP: ABNORMAL
NITRITE UR QL STRIP: NEGATIVE
NON-SQ EPI CELLS URNS QL MICRO: NORMAL /HPF
PH UR STRIP.AUTO: 7 [PH] (ref 4.5–8)
POTASSIUM SERPL-SCNC: 3.7 MMOL/L (ref 3.5–5.3)
PROT UR STRIP-MCNC: NEGATIVE MG/DL
RBC #/AREA URNS AUTO: NORMAL /HPF
SODIUM SERPL-SCNC: 140 MMOL/L (ref 136–145)
SP GR UR STRIP.AUTO: 1.01 (ref 1–1.03)
UROBILINOGEN UR QL STRIP.AUTO: 0.2 E.U./DL
WBC #/AREA URNS AUTO: NORMAL /HPF

## 2018-05-31 PROCEDURE — 86618 LYME DISEASE ANTIBODY: CPT

## 2018-05-31 PROCEDURE — 81001 URINALYSIS AUTO W/SCOPE: CPT | Performed by: INTERNAL MEDICINE

## 2018-05-31 PROCEDURE — 3074F SYST BP LT 130 MM HG: CPT | Performed by: INTERNAL MEDICINE

## 2018-05-31 PROCEDURE — 1036F TOBACCO NON-USER: CPT | Performed by: INTERNAL MEDICINE

## 2018-05-31 PROCEDURE — 80048 BASIC METABOLIC PNL TOTAL CA: CPT

## 2018-05-31 PROCEDURE — 1160F RVW MEDS BY RX/DR IN RCRD: CPT | Performed by: INTERNAL MEDICINE

## 2018-05-31 PROCEDURE — 99214 OFFICE O/P EST MOD 30 MIN: CPT | Performed by: INTERNAL MEDICINE

## 2018-05-31 PROCEDURE — 3078F DIAST BP <80 MM HG: CPT | Performed by: INTERNAL MEDICINE

## 2018-05-31 PROCEDURE — 1101F PT FALLS ASSESS-DOCD LE1/YR: CPT | Performed by: INTERNAL MEDICINE

## 2018-05-31 PROCEDURE — 3008F BODY MASS INDEX DOCD: CPT | Performed by: INTERNAL MEDICINE

## 2018-05-31 PROCEDURE — 4040F PNEUMOC VAC/ADMIN/RCVD: CPT | Performed by: INTERNAL MEDICINE

## 2018-05-31 PROCEDURE — 36415 COLL VENOUS BLD VENIPUNCTURE: CPT

## 2018-05-31 PROCEDURE — 3725F SCREEN DEPRESSION PERFORMED: CPT | Performed by: INTERNAL MEDICINE

## 2018-05-31 RX ORDER — LOSARTAN POTASSIUM 50 MG/1
50 TABLET ORAL DAILY
Qty: 90 TABLET | Refills: 2 | Status: SHIPPED | OUTPATIENT
Start: 2018-05-31 | End: 2019-08-19 | Stop reason: SDUPTHER

## 2018-06-01 NOTE — PROGRESS NOTES
Assessment/Plan:  She is up-to-date on eye checkups  No cardiopulmonary complaints  She plays tennis without difficulty  Does have chronic skin problems for which she sees the dermatologist     Will check a BMP urinalysis and because of the joint pain Lyme serology  Will see her back here in 6-12 months  Before if any problems      Recent Results (from the past 1008 hour(s))   CBC and differential    Collection Time: 05/22/18  4:32 PM   Result Value Ref Range    WBC 6 94 4 31 - 10 16 Thousand/uL    RBC 4 32 3 81 - 5 12 Million/uL    Hemoglobin 13 0 11 5 - 15 4 g/dL    Hematocrit 40 1 34 8 - 46 1 %    MCV 93 82 - 98 fL    MCH 30 1 26 8 - 34 3 pg    MCHC 32 4 31 4 - 37 4 g/dL    RDW 12 8 11 6 - 15 1 %    MPV 9 4 8 9 - 12 7 fL    Platelets 758 415 - 987 Thousands/uL    nRBC 0 /100 WBCs    Neutrophils Relative 50 43 - 75 %    Lymphocytes Relative 32 14 - 44 %    Monocytes Relative 16 (H) 4 - 12 %    Eosinophils Relative 2 0 - 6 %    Basophils Relative 0 0 - 1 %    Neutrophils Absolute 3 48 1 85 - 7 62 Thousands/µL    Lymphocytes Absolute 2 19 0 60 - 4 47 Thousands/µL    Monocytes Absolute 1 12 0 17 - 1 22 Thousand/µL    Eosinophils Absolute 0 11 0 00 - 0 61 Thousand/µL    Basophils Absolute 0 03 0 00 - 0 10 Thousands/µL   Protime-INR    Collection Time: 05/22/18  4:32 PM   Result Value Ref Range    Protime 13 0 11 8 - 14 2 seconds    INR 0 97 0 86 - 1 17   UA w Reflex to Microscopic w Reflex to Culture - Clinic Collect    Collection Time: 05/31/18  2:41 PM   Result Value Ref Range    Color, UA Yellow     Clarity, UA Clear     Specific [place], UA 1 011 1 003 - 1 030    pH, UA 7 0 4 5 - 8 0    Leukocytes, UA Trace (A) Negative    Nitrite, UA Negative Negative    Protein, UA Negative Negative mg/dl    Glucose, UA Negative Negative mg/dl    Ketones, UA Negative Negative mg/dl    Urobilinogen, UA 0 2 0 2, 1 0 E U /dl E U /dl    Bilirubin, UA Negative Negative    Blood, UA Negative Negative   Basic metabolic panel Collection Time: 05/31/18  2:41 PM   Result Value Ref Range    Sodium 140 136 - 145 mmol/L    Potassium 3 7 3 5 - 5 3 mmol/L    Chloride 106 100 - 108 mmol/L    CO2 29 21 - 32 mmol/L    Anion Gap 5 4 - 13 mmol/L    BUN 17 5 - 25 mg/dL    Creatinine 0 82 0 60 - 1 30 mg/dL    Glucose, Fasting 80 65 - 99 mg/dL    Calcium 9 2 8 3 - 10 1 mg/dL    eGFR 70 ml/min/1 73sq m   Urine Microscopic    Collection Time: 05/31/18  2:41 PM   Result Value Ref Range    RBC, UA None Seen None Seen, 0-5 /hpf    WBC, UA None Seen None Seen, 0-5, 5-55, 5-65 /hpf    Epithelial Cells None Seen None Seen, Occasional /hpf    Bacteria, UA None Seen None Seen, Occasional /hpf    Hyaline Casts, UA None Seen None Seen /lpf       1  Arthralgia, unspecified joint  Lyme Antibody Profile with reflex to WB   2  Essential hypertension  losartan (COZAAR) 50 mg tablet    Basic metabolic panel    CBC and differential    UA w Reflex to Microscopic w Reflex to Culture - Clinic Collect    Urine Microscopic       Orders Placed This Encounter   Procedures    Basic metabolic panel    CBC and differential    Lyme Antibody Profile with reflex to WB    UA w Reflex to Microscopic w Reflex to Culture - Clinic Collect    Urine Microscopic         Subjective:  Hypertension and psoriasis   Patient ID: Charlene Cutler is a 69 y o  female  HPI she comes in for follow-up  She has controlled hypertension  She takes losartan 50 milligrams per day  She is doing well with the exception of her psoriasis which is troublesome  Up-to-date on her health maintenance issues  No cardiopulmonary complaints  The following portions of the patient's history were reviewed and updated as appropriate:   She has a past medical history of Benign neoplasm of skin; Pulmonary embolism ([address] ); Sebaceous cyst; and Wears contact lenses  ,   does not have any pertinent problems on file  ,   has a past surgical history that includes Appendectomy; Breast surgery; Dilation and curettage of uterus; and Other surgical history  ,  family history includes Hypertension in her mother; Ovarian cancer in her mother; Pneumonia in her father  ,   reports that she quit smoking about 12 years ago  She has never used smokeless tobacco  She reports that she drinks alcohol  She reports that she does not use drugs  ,  has No Known Allergies       Current Outpatient Prescriptions:     betamethasone, augmented, (DIPROLENE) 0 05 % ointment, Apply topically daily To psoriasis plaques, Disp: 50 g, Rfl: 2    calcipotriene (DOVONEX) 0 005 % ointment, Apply 1 application topically 2 (two) times a day To scalp, Disp: 60 mL, Rfl: 4    Calcium Carbonate-Vitamin D (CALTRATE 600+D) 600-400 MG-UNIT per tablet, Take 1 tablet by mouth daily, Disp: , Rfl:     clobetasol (OLUX) 0 05 % topical foam, Apply topically 2 (two) times a day To scalp, Disp: 100 g, Rfl: 5    Glucosamine-Chondroit-Vit C-Mn (GLUCOSAMINE 1500 COMPLEX PO), Take 1 capsule by mouth daily, Disp: , Rfl:     losartan (COZAAR) 50 mg tablet, Take 1 tablet (50 mg total) by mouth daily, Disp: 90 tablet, Rfl: 2    Review of Systems   Constitutional: Negative for activity change, appetite change, chills, diaphoresis, fatigue, fever and unexpected weight change  HENT: Negative for congestion, ear pain, hearing loss, mouth sores, nosebleeds, postnasal drip, sinus pain, sinus pressure, sore throat and trouble swallowing  Eyes: Negative for pain, discharge and visual disturbance  Respiratory: Negative for apnea, cough, chest tightness, shortness of breath and wheezing  Cardiovascular: Negative for chest pain, palpitations and leg swelling  Gastrointestinal: Negative for abdominal pain, anal bleeding, blood in stool, constipation, diarrhea, nausea and vomiting  Endocrine: Negative for polydipsia and polyphagia  Genitourinary: Negative for decreased urine volume, dysuria, flank pain, frequency, hematuria and urgency     Musculoskeletal: Positive for arthralgias  Negative for back pain, gait problem, joint swelling and myalgias  Skin: Negative for rash and wound  Allergic/Immunologic: Negative for environmental allergies and food allergies  Neurological: Negative for dizziness, tremors, seizures, syncope, speech difficulty, light-headedness, numbness and headaches  Hematological: Negative for adenopathy  Does not bruise/bleed easily  Psychiatric/Behavioral: Negative for agitation, confusion, hallucinations, sleep disturbance and suicidal ideas  The patient is not nervous/anxious  Objective:  Vitals:    05/31/18 1330   BP: 116/78   Pulse: 82   SpO2: 93%     Body mass index is 21 63 kg/m²  Physical Exam   Constitutional: She appears well-developed and well-nourished  No distress  Blood pressure is 120/70  Heart rate is say 80  Rhythm is regular  Respirations are 20  HENT:   Head: Normocephalic  Right Ear: External ear normal    Left Ear: External ear normal    Nose: Nose normal    Mouth/Throat: Oropharynx is clear and moist  No oropharyngeal exudate  Eyes: Conjunctivae and EOM are normal  Pupils are equal, round, and reactive to light  Right eye exhibits no discharge  Left eye exhibits no discharge  Neck: Normal range of motion  Neck supple  No thyromegaly present  Cardiovascular: Normal rate, regular rhythm, normal heart sounds and intact distal pulses  Exam reveals no gallop and no friction rub  No murmur heard  Pulmonary/Chest: Effort normal and breath sounds normal  No respiratory distress  She has no wheezes  She has no rales  Abdominal: Soft  Bowel sounds are normal  She exhibits no distension and no mass  There is no tenderness  There is no rebound and no guarding  Musculoskeletal: Normal range of motion  She exhibits no edema, tenderness or deformity  Lymphadenopathy:     She has no cervical adenopathy  Neurological: She is alert  She has normal reflexes  No cranial nerve deficit   Coordination normal  Skin: Skin is warm and dry  No rash noted  No erythema  Psychiatric: She has a normal mood and affect  Her behavior is normal  Judgment and thought content normal    Nursing note and vitals reviewed

## 2018-06-04 LAB
B BURGDOR IGG SER IA-ACNC: 0.21
B BURGDOR IGM SER IA-ACNC: 0.26

## 2019-04-25 ENCOUNTER — APPOINTMENT (OUTPATIENT)
Dept: LAB | Facility: CLINIC | Age: 79
End: 2019-04-25
Payer: COMMERCIAL

## 2019-04-25 ENCOUNTER — OFFICE VISIT (OUTPATIENT)
Dept: INTERNAL MEDICINE CLINIC | Facility: CLINIC | Age: 79
End: 2019-04-25
Payer: COMMERCIAL

## 2019-04-25 VITALS
HEIGHT: 65 IN | DIASTOLIC BLOOD PRESSURE: 66 MMHG | BODY MASS INDEX: 22.02 KG/M2 | WEIGHT: 132.2 LBS | SYSTOLIC BLOOD PRESSURE: 110 MMHG | OXYGEN SATURATION: 99 % | HEART RATE: 70 BPM

## 2019-04-25 DIAGNOSIS — L40.9 PSORIASIS: ICD-10-CM

## 2019-04-25 DIAGNOSIS — I10 ESSENTIAL HYPERTENSION: ICD-10-CM

## 2019-04-25 DIAGNOSIS — I10 ESSENTIAL HYPERTENSION: Primary | ICD-10-CM

## 2019-04-25 DIAGNOSIS — Z12.39 SCREENING FOR BREAST CANCER: ICD-10-CM

## 2019-04-25 DIAGNOSIS — R06.02 EXERTIONAL SHORTNESS OF BREATH: ICD-10-CM

## 2019-04-25 DIAGNOSIS — M13.0 POLYARTHRITIS: ICD-10-CM

## 2019-04-25 LAB
ALBUMIN SERPL BCP-MCNC: 4 G/DL (ref 3.5–5)
ALP SERPL-CCNC: 58 U/L (ref 46–116)
ALT SERPL W P-5'-P-CCNC: 20 U/L (ref 12–78)
ANION GAP SERPL CALCULATED.3IONS-SCNC: 7 MMOL/L (ref 4–13)
AST SERPL W P-5'-P-CCNC: 15 U/L (ref 5–45)
BACTERIA UR QL AUTO: NORMAL /HPF
BASOPHILS # BLD AUTO: 0.03 THOUSANDS/ΜL (ref 0–0.1)
BASOPHILS NFR BLD AUTO: 1 % (ref 0–1)
BILIRUB SERPL-MCNC: 0.47 MG/DL (ref 0.2–1)
BILIRUB UR QL STRIP: NEGATIVE
BUN SERPL-MCNC: 21 MG/DL (ref 5–25)
CALCIUM SERPL-MCNC: 8.8 MG/DL (ref 8.3–10.1)
CHLORIDE SERPL-SCNC: 105 MMOL/L (ref 100–108)
CHOLEST SERPL-MCNC: 211 MG/DL (ref 50–200)
CLARITY UR: CLEAR
CO2 SERPL-SCNC: 29 MMOL/L (ref 21–32)
COLOR UR: YELLOW
CREAT SERPL-MCNC: 0.8 MG/DL (ref 0.6–1.3)
EOSINOPHIL # BLD AUTO: 0.09 THOUSAND/ΜL (ref 0–0.61)
EOSINOPHIL NFR BLD AUTO: 2 % (ref 0–6)
ERYTHROCYTE [DISTWIDTH] IN BLOOD BY AUTOMATED COUNT: 13.1 % (ref 11.6–15.1)
GFR SERPL CREATININE-BSD FRML MDRD: 71 ML/MIN/1.73SQ M
GLUCOSE P FAST SERPL-MCNC: 85 MG/DL (ref 65–99)
GLUCOSE UR STRIP-MCNC: NEGATIVE MG/DL
HCT VFR BLD AUTO: 40.3 % (ref 34.8–46.1)
HDLC SERPL-MCNC: 92 MG/DL (ref 40–60)
HGB BLD-MCNC: 12.8 G/DL (ref 11.5–15.4)
HGB UR QL STRIP.AUTO: NEGATIVE
HYALINE CASTS #/AREA URNS LPF: NORMAL /LPF
IMM GRANULOCYTES # BLD AUTO: 0.01 THOUSAND/UL (ref 0–0.2)
IMM GRANULOCYTES NFR BLD AUTO: 0 % (ref 0–2)
KETONES UR STRIP-MCNC: NEGATIVE MG/DL
LDLC SERPL CALC-MCNC: 106 MG/DL (ref 0–100)
LEUKOCYTE ESTERASE UR QL STRIP: ABNORMAL
LYMPHOCYTES # BLD AUTO: 1.86 THOUSANDS/ΜL (ref 0.6–4.47)
LYMPHOCYTES NFR BLD AUTO: 32 % (ref 14–44)
MCH RBC QN AUTO: 30.3 PG (ref 26.8–34.3)
MCHC RBC AUTO-ENTMCNC: 31.8 G/DL (ref 31.4–37.4)
MCV RBC AUTO: 96 FL (ref 82–98)
MONOCYTES # BLD AUTO: 0.85 THOUSAND/ΜL (ref 0.17–1.22)
MONOCYTES NFR BLD AUTO: 15 % (ref 4–12)
NEUTROPHILS # BLD AUTO: 3.03 THOUSANDS/ΜL (ref 1.85–7.62)
NEUTS SEG NFR BLD AUTO: 50 % (ref 43–75)
NITRITE UR QL STRIP: NEGATIVE
NON-SQ EPI CELLS URNS QL MICRO: NORMAL /HPF
NONHDLC SERPL-MCNC: 119 MG/DL
NRBC BLD AUTO-RTO: 0 /100 WBCS
PH UR STRIP.AUTO: 6.5 [PH]
PLATELET # BLD AUTO: 245 THOUSANDS/UL (ref 149–390)
PMV BLD AUTO: 9.6 FL (ref 8.9–12.7)
POTASSIUM SERPL-SCNC: 3.8 MMOL/L (ref 3.5–5.3)
PROT SERPL-MCNC: 6.9 G/DL (ref 6.4–8.2)
PROT UR STRIP-MCNC: NEGATIVE MG/DL
RBC # BLD AUTO: 4.22 MILLION/UL (ref 3.81–5.12)
RBC #/AREA URNS AUTO: NORMAL /HPF
SODIUM SERPL-SCNC: 141 MMOL/L (ref 136–145)
SP GR UR STRIP.AUTO: 1.02 (ref 1–1.03)
TRIGL SERPL-MCNC: 64 MG/DL
UROBILINOGEN UR QL STRIP.AUTO: 0.2 E.U./DL
WBC # BLD AUTO: 5.87 THOUSAND/UL (ref 4.31–10.16)
WBC #/AREA URNS AUTO: NORMAL /HPF

## 2019-04-25 PROCEDURE — 99214 OFFICE O/P EST MOD 30 MIN: CPT | Performed by: INTERNAL MEDICINE

## 2019-04-25 PROCEDURE — 80061 LIPID PANEL: CPT

## 2019-04-25 PROCEDURE — 85025 COMPLETE CBC W/AUTO DIFF WBC: CPT

## 2019-04-25 PROCEDURE — 3725F SCREEN DEPRESSION PERFORMED: CPT | Performed by: INTERNAL MEDICINE

## 2019-04-25 PROCEDURE — 81001 URINALYSIS AUTO W/SCOPE: CPT | Performed by: INTERNAL MEDICINE

## 2019-04-25 PROCEDURE — 36415 COLL VENOUS BLD VENIPUNCTURE: CPT

## 2019-04-25 PROCEDURE — 80053 COMPREHEN METABOLIC PANEL: CPT

## 2019-04-25 PROCEDURE — 1101F PT FALLS ASSESS-DOCD LE1/YR: CPT | Performed by: INTERNAL MEDICINE

## 2019-04-26 ENCOUNTER — TELEPHONE (OUTPATIENT)
Dept: INTERNAL MEDICINE CLINIC | Facility: CLINIC | Age: 79
End: 2019-04-26

## 2019-05-07 DIAGNOSIS — Z12.39 SCREENING FOR BREAST CANCER: ICD-10-CM

## 2019-05-23 ENCOUNTER — CONSULT (OUTPATIENT)
Dept: CARDIOLOGY CLINIC | Facility: CLINIC | Age: 79
End: 2019-05-23
Payer: COMMERCIAL

## 2019-05-23 VITALS
BODY MASS INDEX: 21.66 KG/M2 | HEIGHT: 65 IN | HEART RATE: 78 BPM | OXYGEN SATURATION: 97 % | WEIGHT: 130 LBS | DIASTOLIC BLOOD PRESSURE: 60 MMHG | SYSTOLIC BLOOD PRESSURE: 124 MMHG

## 2019-05-23 DIAGNOSIS — R00.2 INTERMITTENT PALPITATIONS: ICD-10-CM

## 2019-05-23 DIAGNOSIS — R06.02 EXERTIONAL SHORTNESS OF BREATH: Primary | ICD-10-CM

## 2019-05-23 DIAGNOSIS — I10 ESSENTIAL HYPERTENSION: ICD-10-CM

## 2019-05-23 PROCEDURE — 93000 ELECTROCARDIOGRAM COMPLETE: CPT | Performed by: INTERNAL MEDICINE

## 2019-05-23 PROCEDURE — 99204 OFFICE O/P NEW MOD 45 MIN: CPT | Performed by: INTERNAL MEDICINE

## 2019-06-06 ENCOUNTER — HOSPITAL ENCOUNTER (OUTPATIENT)
Dept: NON INVASIVE DIAGNOSTICS | Facility: CLINIC | Age: 79
Discharge: HOME | End: 2019-06-06
Payer: COMMERCIAL

## 2019-06-06 DIAGNOSIS — R06.02 EXERTIONAL SHORTNESS OF BREATH: ICD-10-CM

## 2019-06-06 DIAGNOSIS — R00.2 INTERMITTENT PALPITATIONS: ICD-10-CM

## 2019-06-06 PROCEDURE — A9502 TC99M TETROFOSMIN: HCPCS

## 2019-06-06 PROCEDURE — 93018 CV STRESS TEST I&R ONLY: CPT | Performed by: INTERNAL MEDICINE

## 2019-06-06 PROCEDURE — 78452 HT MUSCLE IMAGE SPECT MULT: CPT | Performed by: INTERNAL MEDICINE

## 2019-06-06 PROCEDURE — 93017 CV STRESS TEST TRACING ONLY: CPT

## 2019-06-06 PROCEDURE — 93306 TTE W/DOPPLER COMPLETE: CPT

## 2019-06-06 PROCEDURE — 78452 HT MUSCLE IMAGE SPECT MULT: CPT

## 2019-06-06 PROCEDURE — 93016 CV STRESS TEST SUPVJ ONLY: CPT | Performed by: INTERNAL MEDICINE

## 2019-06-07 LAB
ARRHY DURING EX: NORMAL
CHEST PAIN STATEMENT: NORMAL
MAX DIASTOLIC BP: 70 MMHG
MAX HEART RATE: 127 BPM
MAX PREDICTED HEART RATE: 143 BPM
MAX. SYSTOLIC BP: 160 MMHG
PROTOCOL NAME: NORMAL
REASON FOR TERMINATION: NORMAL
TARGET HR FORMULA: NORMAL
TEST INDICATION: NORMAL
TIME IN EXERCISE PHASE: NORMAL

## 2019-06-07 PROCEDURE — 93306 TTE W/DOPPLER COMPLETE: CPT | Performed by: INTERNAL MEDICINE

## 2019-06-18 ENCOUNTER — OFFICE VISIT (OUTPATIENT)
Dept: CARDIOLOGY CLINIC | Facility: CLINIC | Age: 79
End: 2019-06-18
Payer: COMMERCIAL

## 2019-06-18 VITALS
OXYGEN SATURATION: 96 % | HEIGHT: 65 IN | DIASTOLIC BLOOD PRESSURE: 72 MMHG | HEART RATE: 84 BPM | BODY MASS INDEX: 21.83 KG/M2 | SYSTOLIC BLOOD PRESSURE: 120 MMHG | WEIGHT: 131 LBS

## 2019-06-18 DIAGNOSIS — I51.89 DIASTOLIC DYSFUNCTION: ICD-10-CM

## 2019-06-18 DIAGNOSIS — I34.0 NONRHEUMATIC MITRAL VALVE REGURGITATION: ICD-10-CM

## 2019-06-18 DIAGNOSIS — I10 ESSENTIAL HYPERTENSION: ICD-10-CM

## 2019-06-18 DIAGNOSIS — R06.02 EXERTIONAL SHORTNESS OF BREATH: Primary | ICD-10-CM

## 2019-06-18 DIAGNOSIS — R00.2 INTERMITTENT PALPITATIONS: ICD-10-CM

## 2019-06-18 PROCEDURE — 99214 OFFICE O/P EST MOD 30 MIN: CPT | Performed by: INTERNAL MEDICINE

## 2019-08-08 NOTE — PROGRESS NOTES
Spoke to pt Alert-The patient is alert, awake and responds to voice. The patient is oriented to time, place, and person. The triage nurse is able to obtain subjective information.

## 2019-08-19 DIAGNOSIS — I10 ESSENTIAL HYPERTENSION: ICD-10-CM

## 2019-08-19 RX ORDER — LOSARTAN POTASSIUM 50 MG/1
TABLET ORAL
Qty: 90 TABLET | Refills: 2 | Status: SHIPPED | OUTPATIENT
Start: 2019-08-19 | End: 2020-05-14 | Stop reason: SDUPTHER

## 2020-05-14 DIAGNOSIS — I10 ESSENTIAL HYPERTENSION: ICD-10-CM

## 2020-05-14 RX ORDER — LOSARTAN POTASSIUM 50 MG/1
50 TABLET ORAL DAILY
Qty: 90 TABLET | Refills: 2 | Status: SHIPPED | OUTPATIENT
Start: 2020-05-14 | End: 2021-02-11

## 2020-11-12 ENCOUNTER — APPOINTMENT (OUTPATIENT)
Dept: RADIOLOGY | Facility: CLINIC | Age: 80
End: 2020-11-12
Payer: COMMERCIAL

## 2020-11-12 ENCOUNTER — APPOINTMENT (OUTPATIENT)
Dept: LAB | Facility: CLINIC | Age: 80
End: 2020-11-12
Payer: COMMERCIAL

## 2020-11-12 ENCOUNTER — OFFICE VISIT (OUTPATIENT)
Dept: INTERNAL MEDICINE CLINIC | Facility: CLINIC | Age: 80
End: 2020-11-12
Payer: COMMERCIAL

## 2020-11-12 VITALS
BODY MASS INDEX: 20.73 KG/M2 | HEIGHT: 65 IN | SYSTOLIC BLOOD PRESSURE: 138 MMHG | HEART RATE: 82 BPM | WEIGHT: 124.4 LBS | DIASTOLIC BLOOD PRESSURE: 78 MMHG | TEMPERATURE: 97.9 F

## 2020-11-12 DIAGNOSIS — I10 ESSENTIAL HYPERTENSION: ICD-10-CM

## 2020-11-12 DIAGNOSIS — M19.90 ARTHRITIS: ICD-10-CM

## 2020-11-12 DIAGNOSIS — I10 ESSENTIAL HYPERTENSION: Primary | ICD-10-CM

## 2020-11-12 DIAGNOSIS — H57.02 ANISOCORIA: ICD-10-CM

## 2020-11-12 DIAGNOSIS — Z00.00 MEDICARE ANNUAL WELLNESS VISIT, SUBSEQUENT: ICD-10-CM

## 2020-11-12 DIAGNOSIS — Z87.39 HX OF OSTEOPOROSIS: ICD-10-CM

## 2020-11-12 DIAGNOSIS — M25.512 ACUTE PAIN OF LEFT SHOULDER: ICD-10-CM

## 2020-11-12 DIAGNOSIS — M72.0 DUPUYTREN'S CONTRACTURE OF BOTH HANDS: ICD-10-CM

## 2020-11-12 LAB
ALBUMIN SERPL BCP-MCNC: 4.1 G/DL (ref 3.5–5)
ALP SERPL-CCNC: 58 U/L (ref 46–116)
ALT SERPL W P-5'-P-CCNC: 20 U/L (ref 12–78)
ANION GAP SERPL CALCULATED.3IONS-SCNC: 2 MMOL/L (ref 4–13)
AST SERPL W P-5'-P-CCNC: 13 U/L (ref 5–45)
BASOPHILS # BLD AUTO: 0.03 THOUSANDS/ΜL (ref 0–0.1)
BASOPHILS NFR BLD AUTO: 1 % (ref 0–1)
BILIRUB SERPL-MCNC: 0.28 MG/DL (ref 0.2–1)
BUN SERPL-MCNC: 22 MG/DL (ref 5–25)
CALCIUM SERPL-MCNC: 9 MG/DL (ref 8.3–10.1)
CHLORIDE SERPL-SCNC: 109 MMOL/L (ref 100–108)
CO2 SERPL-SCNC: 29 MMOL/L (ref 21–32)
CREAT SERPL-MCNC: 0.93 MG/DL (ref 0.6–1.3)
EOSINOPHIL # BLD AUTO: 0.14 THOUSAND/ΜL (ref 0–0.61)
EOSINOPHIL NFR BLD AUTO: 3 % (ref 0–6)
ERYTHROCYTE [DISTWIDTH] IN BLOOD BY AUTOMATED COUNT: 13.3 % (ref 11.6–15.1)
GFR SERPL CREATININE-BSD FRML MDRD: 59 ML/MIN/1.73SQ M
GLUCOSE SERPL-MCNC: 87 MG/DL (ref 65–140)
HCT VFR BLD AUTO: 39.5 % (ref 34.8–46.1)
HGB BLD-MCNC: 12.5 G/DL (ref 11.5–15.4)
IMM GRANULOCYTES # BLD AUTO: 0.01 THOUSAND/UL (ref 0–0.2)
IMM GRANULOCYTES NFR BLD AUTO: 0 % (ref 0–2)
LYMPHOCYTES # BLD AUTO: 1.73 THOUSANDS/ΜL (ref 0.6–4.47)
LYMPHOCYTES NFR BLD AUTO: 31 % (ref 14–44)
MCH RBC QN AUTO: 29.9 PG (ref 26.8–34.3)
MCHC RBC AUTO-ENTMCNC: 31.6 G/DL (ref 31.4–37.4)
MCV RBC AUTO: 95 FL (ref 82–98)
MONOCYTES # BLD AUTO: 0.91 THOUSAND/ΜL (ref 0.17–1.22)
MONOCYTES NFR BLD AUTO: 16 % (ref 4–12)
NEUTROPHILS # BLD AUTO: 2.8 THOUSANDS/ΜL (ref 1.85–7.62)
NEUTS SEG NFR BLD AUTO: 49 % (ref 43–75)
NRBC BLD AUTO-RTO: 0 /100 WBCS
PLATELET # BLD AUTO: 236 THOUSANDS/UL (ref 149–390)
PMV BLD AUTO: 9.6 FL (ref 8.9–12.7)
POTASSIUM SERPL-SCNC: 4.1 MMOL/L (ref 3.5–5.3)
PROT SERPL-MCNC: 7.2 G/DL (ref 6.4–8.2)
RBC # BLD AUTO: 4.18 MILLION/UL (ref 3.81–5.12)
SODIUM SERPL-SCNC: 140 MMOL/L (ref 136–145)
WBC # BLD AUTO: 5.62 THOUSAND/UL (ref 4.31–10.16)

## 2020-11-12 PROCEDURE — 1101F PT FALLS ASSESS-DOCD LE1/YR: CPT | Performed by: FAMILY MEDICINE

## 2020-11-12 PROCEDURE — 3725F SCREEN DEPRESSION PERFORMED: CPT | Performed by: FAMILY MEDICINE

## 2020-11-12 PROCEDURE — 3288F FALL RISK ASSESSMENT DOCD: CPT | Performed by: FAMILY MEDICINE

## 2020-11-12 PROCEDURE — 73030 X-RAY EXAM OF SHOULDER: CPT

## 2020-11-12 PROCEDURE — 1160F RVW MEDS BY RX/DR IN RCRD: CPT | Performed by: FAMILY MEDICINE

## 2020-11-12 PROCEDURE — 80053 COMPREHEN METABOLIC PANEL: CPT

## 2020-11-12 PROCEDURE — G0439 PPPS, SUBSEQ VISIT: HCPCS | Performed by: FAMILY MEDICINE

## 2020-11-12 PROCEDURE — 1036F TOBACCO NON-USER: CPT | Performed by: FAMILY MEDICINE

## 2020-11-12 PROCEDURE — 36415 COLL VENOUS BLD VENIPUNCTURE: CPT

## 2020-11-12 PROCEDURE — 99214 OFFICE O/P EST MOD 30 MIN: CPT | Performed by: FAMILY MEDICINE

## 2020-11-12 PROCEDURE — 1125F AMNT PAIN NOTED PAIN PRSNT: CPT | Performed by: FAMILY MEDICINE

## 2020-11-12 PROCEDURE — 3075F SYST BP GE 130 - 139MM HG: CPT | Performed by: FAMILY MEDICINE

## 2020-11-12 PROCEDURE — 85025 COMPLETE CBC W/AUTO DIFF WBC: CPT

## 2020-11-12 PROCEDURE — 3078F DIAST BP <80 MM HG: CPT | Performed by: FAMILY MEDICINE

## 2020-11-12 PROCEDURE — 1170F FXNL STATUS ASSESSED: CPT | Performed by: FAMILY MEDICINE

## 2020-11-13 ENCOUNTER — TELEPHONE (OUTPATIENT)
Dept: INTERNAL MEDICINE CLINIC | Facility: CLINIC | Age: 80
End: 2020-11-13

## 2020-11-17 ENCOUNTER — TELEPHONE (OUTPATIENT)
Dept: INTERNAL MEDICINE CLINIC | Facility: CLINIC | Age: 80
End: 2020-11-17

## 2021-01-20 DIAGNOSIS — Z23 ENCOUNTER FOR IMMUNIZATION: ICD-10-CM

## 2021-01-25 ENCOUNTER — IMMUNIZATIONS (OUTPATIENT)
Dept: FAMILY MEDICINE CLINIC | Facility: HOSPITAL | Age: 81
End: 2021-01-25

## 2021-01-25 DIAGNOSIS — Z23 ENCOUNTER FOR IMMUNIZATION: Primary | ICD-10-CM

## 2021-01-25 PROCEDURE — 0011A SARS-COV-2 / COVID-19 MRNA VACCINE (MODERNA) 100 MCG: CPT

## 2021-01-25 PROCEDURE — 91301 SARS-COV-2 / COVID-19 MRNA VACCINE (MODERNA) 100 MCG: CPT

## 2021-02-11 DIAGNOSIS — I10 ESSENTIAL HYPERTENSION: ICD-10-CM

## 2021-02-11 RX ORDER — LOSARTAN POTASSIUM 50 MG/1
TABLET ORAL
Qty: 90 TABLET | Refills: 0 | Status: SHIPPED | OUTPATIENT
Start: 2021-02-11 | End: 2021-05-14

## 2021-02-20 ENCOUNTER — IMMUNIZATIONS (OUTPATIENT)
Dept: FAMILY MEDICINE CLINIC | Facility: HOSPITAL | Age: 81
End: 2021-02-20

## 2021-02-20 DIAGNOSIS — Z23 ENCOUNTER FOR IMMUNIZATION: Primary | ICD-10-CM

## 2021-02-20 PROCEDURE — 0012A SARS-COV-2 / COVID-19 MRNA VACCINE (MODERNA) 100 MCG: CPT

## 2021-02-20 PROCEDURE — 91301 SARS-COV-2 / COVID-19 MRNA VACCINE (MODERNA) 100 MCG: CPT

## 2021-03-29 ENCOUNTER — RA CDI HCC (OUTPATIENT)
Dept: OTHER | Facility: HOSPITAL | Age: 81
End: 2021-03-29

## 2021-03-29 NOTE — PROGRESS NOTES
[facility]  coding oppertunities          Chart reviewed, no opportunity found: CHART REVIEWED, NO OPPORTUNITY FOUND

## 2021-04-05 ENCOUNTER — CONSULT (OUTPATIENT)
Dept: INTERNAL MEDICINE CLINIC | Facility: CLINIC | Age: 81
End: 2021-04-05
Payer: COMMERCIAL

## 2021-04-05 VITALS
TEMPERATURE: 97.9 F | SYSTOLIC BLOOD PRESSURE: 98 MMHG | DIASTOLIC BLOOD PRESSURE: 60 MMHG | HEIGHT: 65 IN | BODY MASS INDEX: 20.83 KG/M2 | WEIGHT: 125 LBS | HEART RATE: 84 BPM | OXYGEN SATURATION: 99 %

## 2021-04-05 DIAGNOSIS — Z01.818 PRE-OP EXAMINATION: Primary | ICD-10-CM

## 2021-04-05 PROCEDURE — 1036F TOBACCO NON-USER: CPT | Performed by: FAMILY MEDICINE

## 2021-04-05 PROCEDURE — 99215 OFFICE O/P EST HI 40 MIN: CPT | Performed by: FAMILY MEDICINE

## 2021-04-05 PROCEDURE — 93000 ELECTROCARDIOGRAM COMPLETE: CPT | Performed by: FAMILY MEDICINE

## 2021-04-05 RX ORDER — TRIAMCINOLONE ACETONIDE 5 MG/G
OINTMENT TOPICAL AS NEEDED
COMMUNITY
Start: 2020-10-23 | End: 2022-05-19 | Stop reason: ALTCHOICE

## 2021-04-05 NOTE — PROGRESS NOTES
INTERNAL MEDICINE PRE-OPERATIVE EVALUATION  [facility] [address] [place] [address]    NAME: Rosa Smith  AGE: 79 y o  SEX: female  : 1940     DATE: 2021     Internal Medicine Pre-Operative Evaluation:     Chief Complaint: Pre-operative Evaluation     Surgery: phaco with Anthony Huertas  Anticipated Date of Surgery: right eye on  4/15/2021  And left eye on    Referring Provider: Dr Anibal Lee       History of Present Illness:     Rosa Smith is a 79 y o  female who presents to the office today for a preoperative consultation at the request of surgeon, SEE ABOVE, who plans on performing SEE ABOVE  Planned anesthesia is MAC  Patient has a bleeding risk of: no recent abnormal bleeding  Patient does NOT have objections to receiving blood products if needed  Current anti-platelet/anti-coagulation medications that the patient is prescribed includes: none   Assessment of Chronic Conditions:   - Hypertension: well controlled  Assessment of Cardiac Risk:  · Denies unstable or severe angina or MI in the last 6 weeks or history of stent placement in the last year   · Denies decompensated heart failure (e g  New onset heart failure, NYHA functional class IV heart failure, or worsening existing heart failure)  · Denies significant arrhythmias such as high grade AV block, symptomatic ventricular arrhythmia, newly recognized ventricular tachycardia, supraventricular tachycardia with resting heart rate >100, or symptomatic bradycardia  · Denies severe heart valve disease including aortic stenosis or symptomatic mitral stenosis     Exercise Capacity:  · Able to walk 4 blocks without symptoms?: Yes  · Able to walk 2 flights without symptoms?: Yes    Prior Anesthesia Reactions: No     Personal history of venous thromboembolic disease? No    History of steroid use for >2 weeks within last year?  No    STOP-BANG Sleep Apnea Screening Questionnaire:      Do you SNORE loudly (louder than talking or loud enough to be heard through closed doors)? No = 0 point   Do you often feel TIRED, fatigued, or sleepy during daytime? Yes = 1 point   Has anyone OBSERVED you stop breathing during your sleep? No = 0 point   Do you have or are you being treated for high blood pressure? Yes = 1 point   BMI more than 35 kg/m2? No = 0 point   AGE over 48years old? Yes = 1 point   NECK circumference > 16 inches (40 cm)? No = 0 point   Male GENDER? No = 0 point   TOTAL SCORE 3 = INTERMEDIATE risk of GUERO       Review of Systems:     Review of Systems   Constitutional: Negative for fatigue and fever  Respiratory: Negative for shortness of breath  Cardiovascular: Negative for chest pain and palpitations  Gastrointestinal: Negative for constipation and diarrhea  Neurological: Negative for headaches          Problem List:     Patient Active Problem List   Diagnosis    Psoriasis    Screening for skin condition    Solar purpura ([facility] [address] )    Essential hypertension    Anisocoria    Dupuytren's contracture of both hands    Arthritis        Allergies:     No Known Allergies     Current Medications:       Current Outpatient Medications:     Glucosamine-Chondroit-Vit C-Mn (GLUCOSAMINE 1500 COMPLEX PO), Take 1 capsule by mouth daily, Disp: , Rfl:     losartan (COZAAR) 50 mg tablet, take 1 tablet by mouth once daily, Disp: 90 tablet, Rfl: 0    triamcinolone (KENALOG) 0 5 % ointment, Apply topically 2 (two) times a day, Disp: , Rfl:      Past History:     Past Medical History:   Diagnosis Date    Benign neoplasm of skin     Face     Pulmonary embolism (HCC)     Sebaceous cyst     Wears contact lenses         Past Surgical History:   Procedure Laterality Date    APPENDECTOMY      BREAST SURGERY      BX - Benign     DILATION AND CURETTAGE OF UTERUS      OTHER SURGICAL HISTORY      surgical excision of tubal pregnancy         Family History   Problem Relation Age of Onset    Hypertension Mother    Jensen Dunaway Ovarian cancer Mother     Pneumonia Father         Social History     Socioeconomic History    Marital status: /Civil Union     Spouse name: Not on file    Number of children: Not on file    Years of education: Not on file    Highest education level: Not on file   Occupational History    Not on file   Social Needs    Financial resource strain: Not on file    Food insecurity     Worry: Not on file     Inability: Not on file   [employer] needs     Medical: Not on file     Non-medical: Not on file   Tobacco Use    Smoking status: Former Smoker     Packs/day: 0 50     Years: 20 00     Pack years: 10 00     Quit date: 1/1/2006     Years since quitting: 15 2    Smokeless tobacco: Never Used    Tobacco comment: History of tobacco non-user noted in "allscripts"    Substance and Sexual Activity    Alcohol use: Yes     Comment: social     Drug use: No    Sexual activity: Not Currently   Lifestyle    Physical activity     Days per week: Not on file     Minutes per session: Not on file    Stress: Not on file   Relationships    Social connections     Talks on phone: Not on file     Gets together: Not on file     Attends Mosque service: Not on file     Active member of club or organization: Not on file     Attends meetings of clubs or organizations: Not on file     Relationship status: Not on file    Intimate partner violence     Fear of current or ex partner: Not on file     Emotionally abused: Not on file     Physically abused: Not on file     Forced sexual activity: Not on file   Other Topics Concern    Not on file   Social History Narrative    History of advance directive         Physical Exam:      BP 98/60 (BP Location: Left arm, Patient Position: Sitting) Comment: bvbvc  Pulse 84   Temp 97 9 °F (36 6 °C) (Tympanic) Comment: bcvbcb  Ht 5' 5" (1 651 m)   Wt 56 7 kg (125 lb)   SpO2 99%   BMI 20 80 kg/m²     Physical Exam  HENT:      Head: Normocephalic and atraumatic        Right Ear: Tympanic membrane and external ear normal       Left Ear: Tympanic membrane and external ear normal       Mouth/Throat:      Pharynx: Oropharynx is clear  Eyes:      Conjunctiva/sclera: Conjunctivae normal       Pupils:      Right eye: Pupil is round and reactive  Left eye: Pupil is not reactive  Pupil is round  Cardiovascular:      Rate and Rhythm: Normal rate and regular rhythm  Heart sounds: No murmur  Pulmonary:      Effort: Pulmonary effort is normal       Breath sounds: Normal breath sounds  Abdominal:      General: Bowel sounds are normal       Palpations: Abdomen is soft  Tenderness: There is no abdominal tenderness  Musculoskeletal:      Right lower leg: No edema  Left lower leg: No edema  Neurological:      Mental Status: She is alert  Gait: Gait normal    Psychiatric:         Mood and Affect: Mood normal          Behavior: Behavior normal               Data:     Pre-operative work-up    Laboratory Results: I have personally reviewed the pertinent laboratory results/reports     EKG: I have personally reviewed pertinent reports  Chest x-ray: n/a     Previous cardiopulmonary studies within the past year:  · Echocardiogram: 2019; EF 60% grade one diastolic HF otherwise unremarkable  · Cardiac Catheterization:n/a  · Stress Test: 2019-negative for evidence of ischemia   · Pulmonary Function Testing:n/a        Assessment:     1  Pre-op examination  POCT ECG        Plan:     79 y o  female with planned surgery of  phaco with IOLOD    Cardiac Risk Estimation: per the Revised Cardiac Risk Index (Circ  100:1043, 1999), the patient's risk factors for cardiac complications include none, putting her in: RCI RISK CLASS I (0 risk factors, risk of major cardiac compl  appr  0 5%)  1  Further preoperative workup as follows:   - ECG- NSR  2  Medication Management/Recommendations:   - None, continue medication regimen including morning of surgery, with sip of water    3  Prophylaxis for cardiac events with perioperative beta-blockers: not indicated  4  Patient requires further consultation with: None    Clearance  Patient is CLEARED for surgery without any additional cardiac testing       Nelson Erickson DO  [facility]  [address] [facility] [address]  Phone#  [phone]  Fax#  [phone]

## 2021-04-08 ENCOUNTER — TELEPHONE (OUTPATIENT)
Dept: INTERNAL MEDICINE CLINIC | Facility: CLINIC | Age: 81
End: 2021-04-08

## 2021-04-08 NOTE — TELEPHONE ENCOUNTER
Please fax over pre op note for patient    [facility] eye fax # [phone]    SD 4/15

## 2021-05-14 DIAGNOSIS — I10 ESSENTIAL HYPERTENSION: ICD-10-CM

## 2021-05-14 RX ORDER — LOSARTAN POTASSIUM 50 MG/1
TABLET ORAL
Qty: 90 TABLET | Refills: 0 | Status: SHIPPED | OUTPATIENT
Start: 2021-05-14 | End: 2021-05-28 | Stop reason: SDUPTHER

## 2021-05-28 ENCOUNTER — TELEPHONE (OUTPATIENT)
Dept: CARDIOLOGY CLINIC | Facility: CLINIC | Age: 81
End: 2021-05-28

## 2021-05-28 ENCOUNTER — OFFICE VISIT (OUTPATIENT)
Dept: CARDIOLOGY CLINIC | Facility: CLINIC | Age: 81
End: 2021-05-28
Payer: COMMERCIAL

## 2021-05-28 VITALS
HEART RATE: 59 BPM | RESPIRATION RATE: 18 BRPM | HEIGHT: 65 IN | WEIGHT: 123 LBS | DIASTOLIC BLOOD PRESSURE: 82 MMHG | OXYGEN SATURATION: 99 % | SYSTOLIC BLOOD PRESSURE: 134 MMHG | BODY MASS INDEX: 20.49 KG/M2

## 2021-05-28 DIAGNOSIS — I48.0 PAROXYSMAL ATRIAL FIBRILLATION (HCC): Primary | ICD-10-CM

## 2021-05-28 DIAGNOSIS — Z79.01 ANTICOAGULANT LONG-TERM USE: ICD-10-CM

## 2021-05-28 DIAGNOSIS — I34.0 NONRHEUMATIC MITRAL VALVE REGURGITATION: ICD-10-CM

## 2021-05-28 DIAGNOSIS — I10 ESSENTIAL HYPERTENSION: ICD-10-CM

## 2021-05-28 DIAGNOSIS — I51.89 DIASTOLIC DYSFUNCTION: ICD-10-CM

## 2021-05-28 PROCEDURE — 3079F DIAST BP 80-89 MM HG: CPT | Performed by: INTERNAL MEDICINE

## 2021-05-28 PROCEDURE — 3075F SYST BP GE 130 - 139MM HG: CPT | Performed by: INTERNAL MEDICINE

## 2021-05-28 PROCEDURE — 1160F RVW MEDS BY RX/DR IN RCRD: CPT | Performed by: INTERNAL MEDICINE

## 2021-05-28 PROCEDURE — 99214 OFFICE O/P EST MOD 30 MIN: CPT | Performed by: INTERNAL MEDICINE

## 2021-05-28 PROCEDURE — 1036F TOBACCO NON-USER: CPT | Performed by: INTERNAL MEDICINE

## 2021-05-28 RX ORDER — LOSARTAN POTASSIUM 25 MG/1
25 TABLET ORAL DAILY
Start: 2021-05-28 | End: 2021-10-29 | Stop reason: SDUPTHER

## 2021-05-28 RX ORDER — METOPROLOL SUCCINATE 25 MG/1
25 TABLET, EXTENDED RELEASE ORAL DAILY
Qty: 30 TABLET | Refills: 2 | Status: SHIPPED | OUTPATIENT
Start: 2021-05-28 | End: 2021-08-24

## 2021-05-28 NOTE — PROGRESS NOTES
CARDIOLOGY OFFICE VISIT  [facility]  [address], [address] [place] [address], [place], [facility] Alex Mccartney  Tel: [phone]      NAME: Madhuri Hernandez  AGE: 79 y o  SEX: female  : 1940   MRN: [id number]      Chief Complaint:  Chief Complaint   Patient presents with    Follow-up     Abnormal EKG          History of Present Illness:   Patient is a retired nurse  Patient comes for follow up with a rhythm strip given to her by her anesthetist  The strip shows atrial fibrillation, which has not been documented for the patient previously though she has been complaining of palpitations for a while now  Patient continues to feel short of breath with activity  Denies chest pain, lightheadedness, syncope, swelling feet, orthopnea, PND, claudication  PAF - I discussed atrial fibrillation, its pathophysiology, stroke risk, need for anticoagulation, bleeding risk, rate versus rhythm control etc  with the patient  CHADS2-VASc = 4 (HTN, Age x 2, Female)  Being started on anticoagulation and beta-blocker    HTN, DD -  Has been hypertensive for many years  Taking medications regularly  Denies lightheadedness, headache, medication side effects        Mitral regurgitation -  Mild, asymptomatic    Past Medical History:  Past Medical History:   Diagnosis Date    Benign neoplasm of skin     Face     Pulmonary embolism (HCC)     Sebaceous cyst     Wears contact lenses          Past Surgical History:  Past Surgical History:   Procedure Laterality Date    APPENDECTOMY      BREAST SURGERY      BX - Benign     DILATION AND CURETTAGE OF UTERUS      OTHER SURGICAL HISTORY      surgical excision of tubal pregnancy          Family History:  Family History   Problem Relation Age of Onset    Hypertension Mother     Ovarian cancer Mother     Pneumonia Father          Social History:  Social History     Socioeconomic History    Marital status: /Civil Union     Spouse name: None    Number of children: None    Years of education: None    Highest education level: None   Occupational History    None   Social Needs    Financial resource strain: None    Food insecurity     Worry: None     Inability: None    Transportation needs     Medical: None     Non-medical: None   Tobacco Use    Smoking status: Former Smoker     Packs/day: 0 50     Years: 20 00     Pack years: 10 00     Quit date: 1/1/2006     Years since quitting: 15 4    Smokeless tobacco: Never Used    Tobacco comment: History of tobacco non-user noted in "allscripts"    Substance and Sexual Activity    Alcohol use: Yes     Comment: social     Drug use: No    Sexual activity: Not Currently   Lifestyle    Physical activity     Days per week: None     Minutes per session: None    Stress: None   Relationships    Social connections     Talks on phone: None     Gets together: None     Attends Hinduism service: None     Active member of club or organization: None     Attends meetings of clubs or organizations: None     Relationship status: None    Intimate partner violence     Fear of current or ex partner: None     Emotionally abused: None     Physically abused: None     Forced sexual activity: None   Other Topics Concern    None   Social History Narrative    History of advance directive          Active Problems:  Patient Active Problem List   Diagnosis    Psoriasis    Screening for skin condition    Solar purpura ([facility] [address] )    Essential hypertension    Anisocoria    Dupuytren's contracture of both hands    Arthritis         The following portions of the patient's history were reviewed and updated as appropriate: past medical history, past surgical history, past family history,  past social history, current medications, allergies and problem list       Review of Systems:  Constitutional: Denies fever, chills  Eyes: Denies eye redness, eye discharge  ENT: Denies hearing loss, tinnitus, sneezing, nasal discharge, sore throat   Respiratory: Denies cough, expectoration, hemoptysis  Cardiovascular: Denies chest pain, orthopnea, PND, lower extremity swelling  Gastrointestinal: Denies abdominal pain, nausea, vomiting, hematemesis, diarrhea, bloody stools  Genito-Urinary: Denies dysuria, incontinence  Musculoskeletal: Denies back pain, joint pain, muscle pain  Neurologic: Denies lightheadedness, syncope, headache, seizures  Endocrine: Denies polydipsia, temperature intolerance  Allergy and Immunology: Denies hives, insect bite sensitivity  Hematological and Lymphatic: Denies bleeding problems, swollen glands   Psychological: Denies depression, suicidal ideation, anxiety, panic  Dermatological: Denies pruritus, rash, skin lesion changes      Vitals:  Vitals:    05/28/21 0938   BP: 134/82   Pulse: 59   Resp: 18   SpO2: 99%       Body mass index is 20 47 kg/m²  Weight (last 2 days)     Date/Time   Weight    05/28/21 0938   55 8 (123)                Physical Examination:  General: Patient is not in acute distress  Awake, alert, oriented in time, place and person  Responding to commands  Head: Normocephalic  Atraumatic  Eyes: Both pupils normal sized, round and reactive to light  Nonicteric  ENT: Normal external ear canals  Neck: Supple  JVP not raised  Trachea central  No thyromegaly  Lungs: Bilateral bronchovascular breath sounds with no crackles or rhonchi  Chest wall: No tenderness  Cardiovascular: RRR  S1 and S2 normal  No murmur, rub or gallop  Gastrointestinal: Abdomen soft, nontender  No guarding or rigidity  Liver and spleen not palpable  Bowel sounds present  Neurologic: Patient is awake, alert, oriented in time, place and person  Responding to command  Moving all extremities  Integumentary:  No skin rash  Lymphatic: No cervical lymphadenopathy  Back: Symmetric   No CVA tenderness  Extremities: No clubbing, cyanosis or edema      Laboratory Results:  CBC with diff:   Lab Results   Component Value Date    WBC 5 62 11/12/2020 WBC 3 7 (L) 2015    RBC 4 18 2020    RBC 4 26 2015    HGB 12 5 2020    HGB 13 0 2015    HCT 39 5 2020    HCT 38 7 2015    MCV 95 2020    MCV 91 2015    MCH 29 9 2020    MCH 30 4 2015    RDW 13 3 2020    RDW 12 3 2015     2020     2015       CMP:  Lab Results   Component Value Date    CREATININE 0 93 2020    CREATININE 0 7 2015    BUN 22 2020    BUN 13 2015     2015    K 4 1 2020    K 4 5 2015     (H) 2020     2015    CO2 29 2020    CO2 28 3 2015    GLUCOSE 85 2015    PROT 7 1 2015    ALKPHOS 58 2020    ALKPHOS 54 2015    ALT 20 2020    ALT 21 2015    AST 13 2020    AST 15 2015     Lipid Profile:   Lab Results   Component Value Date    CHOL 241 2015     Lab Results   Component Value Date    HDL 92 (H) 2019     (H) 2017     2015     Lab Results   Component Value Date    LDLCALC 106 (H) 2019    LDLCALC 92 2017    LDLCALC 113 (H) 2015     Lab Results   Component Value Date    TRIG 64 2019    TRIG 52 2017    TRIG 52 2015       Cardiac testing:   Results for orders placed during the hospital encounter of 19   Echo complete with contrast if indicated    Narrative [facility], [address] [facility]  [phone]    Transthoracic Echocardiogram  2D, M-mode, Doppler, and Color Doppler    Study date:  2019    Patient: Roe Lemus  MR number: [id number]  Account number: [de-identified]  : 1940  Age: 69 years  Gender: Female  Status: Outpatient  Location: [facility]  Height: 65 in  Weight: 130 lb  BP: 124/ 60 mmHg    Indications: Shortness of breath      Diagnoses: R06 02 - Shortness of breath    Sonographer:  NAHUN Beatty  Interpreting Physician:  Daniel Ty MD  Primary Physician:  Glori Libman, DO  Referring Physician:  Daniel Ty MD  Group:  [facility]    SUMMARY    LEFT VENTRICLE:  Systolic function was normal  Ejection fraction was estimated to be 60 %  There were no regional wall motion abnormalities  Doppler parameters were consistent with abnormal left ventricular relaxation (grade 1 diastolic dysfunction)  RIGHT VENTRICLE:  The size was normal   Systolic function was normal     MITRAL VALVE:  There was mild regurgitation  TRICUSPID VALVE:  There was trace regurgitation  Pulmonary artery systolic pressure was within the normal range  HISTORY: PRIOR HISTORY: Pulmonary embolism  Risk factors: hypertension  PROCEDURE: The study was performed in the [address] [place] [address]  This was a routine study  The transthoracic approach was used  The study included complete 2D imaging, M-mode, complete spectral Doppler, and color Doppler  The  heart rate was 66 bpm, at the start of the study  Images were obtained from the parasternal, apical, subcostal, and suprasternal notch acoustic windows  Image quality was adequate  LEFT VENTRICLE: Size was normal  Systolic function was normal  Ejection fraction was estimated to be 60 %  There were no regional wall motion abnormalities  Wall thickness was normal  No evidence of apical thrombus  DOPPLER: Doppler  parameters were consistent with abnormal left ventricular relaxation (grade 1 diastolic dysfunction)  RIGHT VENTRICLE: The size was normal  Systolic function was normal  Wall thickness was normal     LEFT ATRIUM: Size was normal     RIGHT ATRIUM: Size was normal     MITRAL VALVE: Valve structure was normal  There was normal leaflet separation  DOPPLER: The transmitral velocity was within the normal range  There was no evidence for stenosis  There was mild regurgitation  AORTIC VALVE: The valve was trileaflet   Leaflets exhibited normal thickness and normal cuspal separation  DOPPLER: Transaortic velocity was within the normal range  There was no evidence for stenosis  There was no significant  regurgitation  TRICUSPID VALVE: The valve structure was normal  There was normal leaflet separation  DOPPLER: The transtricuspid velocity was within the normal range  There was no evidence for stenosis  There was trace regurgitation  Pulmonary artery  systolic pressure was within the normal range  PULMONIC VALVE: Leaflets exhibited normal thickness, no calcification, and normal cuspal separation  DOPPLER: The transpulmonic velocity was within the normal range  There was no significant regurgitation  PERICARDIUM: There was no pericardial effusion  The pericardium was normal in appearance  AORTA: The root exhibited normal size  SYSTEMIC VEINS: IVC: The inferior vena cava was normal in size  Respirophasic changes were normal     SYSTEM MEASUREMENT TABLES    2D  %FS: 35 62 %  Ao Diam: 3 2 cm  EDV(Teich): 67 19 ml  EF(Teich): 65 73 %  ESV(Teich): 23 02 ml  IVSd: 0 95 cm  LA Area: 16 9 cm2  LA Diam: 2 99 cm  LVEDV MOD A4C: 65 69 ml  LVEF MOD A4C: 57 58 %  LVESV MOD A4C: 27 86 ml  LVIDd: 3 93 cm  LVIDs: 2 53 cm  LVLd A4C: 7 1 cm  LVLs A4C: 5 91 cm  LVPWd: 0 82 cm  RA Area: 18 92 cm2  RVIDd: 3 55 cm  SV MOD A4C: 37 83 ml  SV(Teich): 44 17 ml    CW  AV Env  Ti: 342 53 ms  AV MaxP mmHg  AV VTI: 26 42 cm  AV Vmax: 1 22 m/s  AV Vmean: 0 77 m/s  AV meanP 78 mmHg  TR MaxP 63 mmHg  TR Vmax: 2 72 m/s    MM  TAPSE: 1 97 cm    PW  E': 0 06 m/s  E/E': 8 99  LVOT Env  Ti: 308 28 ms  LVOT VTI: 26 69 cm  LVOT Vmax: 1 23 m/s  LVOT Vmean: 0 87 m/s  LVOT maxP 02 mmHg  LVOT meanPG: 3 34 mmHg  MV A Javon: 0 76 m/s  MV Dec [place]: 2 69 m/s2  MV DecT: 216 62 ms  MV E Javon: 0 58 m/s  MV E/A Ratio: 0 76  MV PHT: 62 82 ms  MVA By PHT: 3 5 cm2    [employer] Accredited Echocardiography Laboratory    Prepared and electronically signed by    Micky Mcghee Marlin Gillette MD  Signed 2019 08:34:32       No results found for this or any previous visit  No results found for this or any previous visit  No procedure found  Results for orders placed during the hospital encounter of 19   NM myocardial perfusion spect (stress and/or rest)    Narrative  [address] [place], [address] [place] [address]  [phone]    Rest/Stress Gated SPECT Myocardial Perfusion Imaging After Exercise    Patient: Lien Olson  MR number: [id number]  Account number: [de-identified]  : 1940  Age: 69 years  Gender: Female  Status: Outpatient  Location: [facility]  Height: 65 in  Weight: 130 lb  BP: 128/ 80 mmHg    Allergies: NO KNOWN ALLERGIES    Diagnosis: R00 2 - Palpitations, R06 09 - Other forms of dyspnea    Primary Physician:  Bonita Amaya DO  Interpreting Physician:  Sosa Ross MD  Referring Physician:  Sosa Ross MD  Technician:  Jaime Walton BS, BA, AART(N)  Group:  [facility]  Other:  Carrington Razo MS, CCT    INDICATIONS: Detection of CAD    HISTORY: The patient is a 69year old  female  Chest pain status: no chest pain  Other symptoms: dyspnea of recent onset and palpitations  Coronary artery disease risk factors: hypertension and post-menopausal state  Cardiovascular history: none significant  Medications: an antihypertensive agent  REST ECG: Sinus bradycardia  PROCEDURE: The study was performed in the [address]  Treadmill exercise testing was performed, using the Freddie protocol  Gated SPECT myocardial perfusion imaging was performed after stress and at rest  Systolic  blood pressure was 128 mmHg, at the start of the study  Diastolic blood pressure was 80 mmHg, at the start of the study  The heart rate was 54 bpm, at the start of the study   Patient was not experiencing chest pain at the time of the  injection of the radiopharmaceutical     Manolo Gauze PROTOCOL:  HR bpm SBP mmHg DBP mmHg Symptoms ST change Rhythm/conduct  Baseline 54 128 80 none none sinus veronica  Stage 1 111 150 96 -- -- sinus tach  Stage 2 126 180 80 moderate dyspnea -- sinus tach  Immediate 125 -- -- -- -- sinus tach  Recovery 1 100 160 70 -- -- sinus tach  Recovery 2 63 -- -- -- -- NSR  Recovery 3 58 -- -- -- -- sinus veronica  Recovery 5 61 126 70 -- -- NSR  No medications or fluids given  STRESS SUMMARY: Duration of exercise was 4 min and 30 sec  The patient exercised to protocol stage 2  Maximal work rate was 7 METs  Functional capacity was normal  Maximal heart rate during stress was 126 bpm ( 88 % of maximal predicted  heart rate)  The heart rate response to stress was exaggerated  There was normal resting blood pressure with an appropriate response to stress  The rate-pressure product for the peak heart rate and blood pressure was 76281  There was no  chest pain during stress  The stress test was terminated due to achievement of target heart rate and moderate dyspnea  The stress ECG was negative for ischemia and normal  Arrhythmia during stress: isolated atrial premature beats and  isolated premature ventricular beats  ISOTOPE ADMINISTRATION:  Resting isotope administration Stress isotope administration  Agent Sestamibi Sestamibi  Dose 10 37 mCi 32 1 mCi  Date 06/06/2019 06/06/2019  Injection-image interval 30 min 45 min    The radiopharmaceutical was injected one minute before the end of exercise  The radiopharmaceutical was injected at the peak effect of pharmacologic stress  MYOCARDIAL PERFUSION IMAGING:  The image quality was good  Rotating projection images reveal mild breast attenuation and mild subdiaphragmatic activity  Left ventricular size was normal     PERFUSION DEFECTS:  -  There were no perfusion defects  GATED SPECT:  The calculated left ventricular ejection fraction was 57 %  Left ventricular ejection fraction was within normal limits by visual estimate   There was no diagnostic evidence for left ventricular regional abnormality  SUMMARY:  -  Stress results: Duration of exercise was 4 min and 30 sec  Target heart rate was achieved  There was no chest pain during stress  -  ECG conclusions: The stress ECG was negative for ischemia and normal  Arrhythmia during stress: isolated atrial premature beats and isolated premature ventricular beats  -  Perfusion imaging: There were no perfusion defects   -  Gated SPECT: The calculated left ventricular ejection fraction was 57 %  Left ventricular ejection fraction was within normal limits by visual estimate  There was no diagnostic evidence for left ventricular regional abnormality  IMPRESSIONS: Normal study after maximal exercise  Myocardial perfusion imaging was normal at rest and with stress  Left ventricular systolic function was normal     Prepared and signed by    Stacie Catalan MD  Signed 06/06/2019 19:03:58         Medications:    Current Outpatient Medications:     Glucosamine-Chondroit-Vit C-Mn (GLUCOSAMINE 1500 COMPLEX PO), Take 1 capsule by mouth daily, Disp: , Rfl:     losartan (COZAAR) 25 mg tablet, Take 1 tablet (25 mg total) by mouth daily, Disp: , Rfl:     triamcinolone (KENALOG) 0 5 % ointment, Apply topically 2 (two) times a day, Disp: , Rfl:     apixaban (ELIQUIS) 2 5 mg, Take 1 tablet (2 5 mg total) by mouth 2 (two) times a day, Disp: 60 tablet, Rfl: 1    metoprolol succinate (TOPROL-XL) 25 mg 24 hr tablet, Take 1 tablet (25 mg total) by mouth daily, Disp: 30 tablet, Rfl: 2      Allergies:  No Known Allergies      Assessment and Plan:  1  Paroxysmal atrial fibrillation (HCC)  I discussed atrial fibrillation, its pathophysiology, stroke risk, need for anticoagulation, bleeding risk, rate versus rhythm control etc  with the pt  Multiple questions were answered  Patient is willing to start anticoagulation  Started on Eliquis  Metoprolol added for rate control      2  Anticoagulant long-term use    Being started on Eliquis after discussing risk benefit profile and a half anticoagulation    3  Essential hypertension   add Toprol-XL 25 mg daily  Decrease losartan from 50 mg to 25 mg daily  Continue to monitor BP and pulse rate and call me with readings in 1 week  Plan to discontinue losartan and transition onto beta-blocker completely provided heart rate allows    4  Diastolic dysfunction   tight BP control    5  Nonrheumatic mitral valve regurgitation   follow-up echocardiogram ordered    Recommend aggressive risk factor modification and therapeutic lifestyle changes  Low-salt, low-calorie, low-fat, low-cholesterol diet with regular exercise and to optimize weight  I will defer the ordering and monitoring of necessity lab studies to you, but I am available and happy to review and manage any of the data at your request in the future  Discussed concepts of atherosclerosis, including signs and symptoms of cardiac disease  Previous studies were reviewed  Safety measures were reviewed  Questions were entertained and answered  Patient was advised to report any problems requiring medical attention  Follow-up with PCP and appropriate specialist and lab work as discussed  Return for follow up visit as scheduled or earlier, if needed  Thank you for allowing me to participate in the care and evaluation of your patient  Should you have any questions, please feel free to contact me        Key Turcios MD  5/28/2021,10:31 AM

## 2021-06-07 ENCOUNTER — TELEPHONE (OUTPATIENT)
Dept: CARDIOLOGY CLINIC | Facility: CLINIC | Age: 81
End: 2021-06-07

## 2021-06-07 NOTE — TELEPHONE ENCOUNTER
----- Message from Otilia Miller  on behalf of Harmeet Matute sent at 6/7/2021  7:52 AM EDT -----  Regarding: Visit Follow-Up Question  Contact: [phone]  This message is being sent by Otilia Miller  on behalf of Omer Cade  ask me to monitor my BP after starting new medication:  as follows  5/29-7am  144/95  Pulse 97       2:30pm  118/82  Pulse 88  5/30-7am   128/88  Pulse  90     2:30pm   117/83  Pulse  56  5/31-7am   130/71   Pulse  60  6/2-   7am   126/83   Pulse  62  6/3-  7am   139/77  Pulse   57  6/4-  7am   130/74  Pulse  63  6/6-  7am    124/79  Pulse  62  6/7-  7am   131/91  Pulse  81  I will continue to monitor my BP    Catracho Coronado

## 2021-07-08 ENCOUNTER — HOSPITAL ENCOUNTER (OUTPATIENT)
Dept: NON INVASIVE DIAGNOSTICS | Facility: CLINIC | Age: 81
Discharge: HOME | End: 2021-07-08
Payer: COMMERCIAL

## 2021-07-08 DIAGNOSIS — I51.89 DIASTOLIC DYSFUNCTION: ICD-10-CM

## 2021-07-08 DIAGNOSIS — I48.0 PAROXYSMAL ATRIAL FIBRILLATION (HCC): ICD-10-CM

## 2021-07-08 DIAGNOSIS — I34.0 NONRHEUMATIC MITRAL VALVE REGURGITATION: ICD-10-CM

## 2021-07-08 PROCEDURE — 93306 TTE W/DOPPLER COMPLETE: CPT | Performed by: INTERNAL MEDICINE

## 2021-07-08 PROCEDURE — 93306 TTE W/DOPPLER COMPLETE: CPT

## 2021-07-27 DIAGNOSIS — I48.0 PAROXYSMAL ATRIAL FIBRILLATION (HCC): ICD-10-CM

## 2021-07-27 RX ORDER — APIXABAN 2.5 MG/1
TABLET, FILM COATED ORAL
Qty: 60 TABLET | Refills: 11 | Status: SHIPPED | OUTPATIENT
Start: 2021-07-27 | End: 2022-01-18 | Stop reason: SDUPTHER

## 2021-07-27 NOTE — TELEPHONE ENCOUNTER
Patient called to request a refill of her apixaban (ELIQUIS) 2 5 mgapixaban (ELIQUIS) 2 5 mg  For 30 day     Confirmed [facility] pharmacy

## 2021-08-24 ENCOUNTER — TELEPHONE (OUTPATIENT)
Dept: CARDIOLOGY CLINIC | Facility: CLINIC | Age: 81
End: 2021-08-24

## 2021-08-24 DIAGNOSIS — I48.0 PAROXYSMAL ATRIAL FIBRILLATION (HCC): ICD-10-CM

## 2021-08-24 RX ORDER — METOPROLOL SUCCINATE 25 MG/1
25 TABLET, EXTENDED RELEASE ORAL DAILY
Qty: 30 TABLET | Refills: 2 | Status: CANCELLED | OUTPATIENT
Start: 2021-08-24

## 2021-08-24 RX ORDER — METOPROLOL SUCCINATE 25 MG/1
TABLET, EXTENDED RELEASE ORAL
Qty: 30 TABLET | Refills: 2 | Status: SHIPPED | OUTPATIENT
Start: 2021-08-24 | End: 2021-11-23 | Stop reason: SDUPTHER

## 2021-08-24 NOTE — TELEPHONE ENCOUNTER
Refill already sent to pharmacy    metoprolol succinate (TOPROL-XL) 25 mg 24 hr tablet   Sig: take 1 tablet by mouth once daily   Sent to pharmacy as: metoprolol succinate (TOPROL-XL) 25 mg 24 hr tablet   Class: Normal   E-Prescribing Status: Receipt confirmed by pharmacy (8/24/2021  2:53 PM EDT)

## 2021-08-25 NOTE — TELEPHONE ENCOUNTER
Spoke with Ina Marlow at [facility], she advised that the prescription is ready for   Spoke with Kandi Schirmer and informed him of the above  Kandi Schirmer understood that the Metoprolol is ready for  at [facility]

## 2021-08-25 NOTE — TELEPHONE ENCOUNTER
Patient states refill was renewed and then declined  She states pharmacy also never received refill      [phone]

## 2021-10-01 ENCOUNTER — VBI (OUTPATIENT)
Dept: ADMINISTRATIVE | Facility: OTHER | Age: 81
End: 2021-10-01

## 2021-10-29 DIAGNOSIS — I10 ESSENTIAL HYPERTENSION: ICD-10-CM

## 2021-10-29 RX ORDER — LOSARTAN POTASSIUM 25 MG/1
25 TABLET ORAL DAILY
Start: 2021-10-29 | End: 2021-11-01

## 2021-11-01 ENCOUNTER — TELEPHONE (OUTPATIENT)
Dept: CARDIOLOGY CLINIC | Facility: CLINIC | Age: 81
End: 2021-11-01

## 2021-11-10 ENCOUNTER — IMMUNIZATIONS (OUTPATIENT)
Dept: FAMILY MEDICINE CLINIC | Facility: HOSPITAL | Age: 81
End: 2021-11-10

## 2021-11-10 DIAGNOSIS — Z23 ENCOUNTER FOR IMMUNIZATION: Primary | ICD-10-CM

## 2021-11-10 PROCEDURE — 0013A COVID-19 MODERNA VACC 0.25 ML BOOSTER: CPT

## 2021-11-10 PROCEDURE — 91306 COVID-19 MODERNA VACC 0.25 ML BOOSTER: CPT

## 2021-11-12 ENCOUNTER — APPOINTMENT (OUTPATIENT)
Dept: LAB | Facility: CLINIC | Age: 81
End: 2021-11-12
Payer: COMMERCIAL

## 2021-11-12 ENCOUNTER — OFFICE VISIT (OUTPATIENT)
Dept: INTERNAL MEDICINE CLINIC | Facility: CLINIC | Age: 81
End: 2021-11-12
Payer: COMMERCIAL

## 2021-11-12 ENCOUNTER — TELEPHONE (OUTPATIENT)
Dept: ADMINISTRATIVE | Facility: OTHER | Age: 81
End: 2021-11-12

## 2021-11-12 ENCOUNTER — APPOINTMENT (OUTPATIENT)
Dept: RADIOLOGY | Facility: CLINIC | Age: 81
End: 2021-11-12
Payer: COMMERCIAL

## 2021-11-12 VITALS
HEART RATE: 71 BPM | HEIGHT: 65 IN | TEMPERATURE: 97.5 F | RESPIRATION RATE: 18 BRPM | SYSTOLIC BLOOD PRESSURE: 112 MMHG | OXYGEN SATURATION: 94 % | WEIGHT: 123 LBS | DIASTOLIC BLOOD PRESSURE: 66 MMHG | BODY MASS INDEX: 20.49 KG/M2

## 2021-11-12 DIAGNOSIS — R22.42 KNEE MASS, LEFT: ICD-10-CM

## 2021-11-12 DIAGNOSIS — I10 ESSENTIAL HYPERTENSION: ICD-10-CM

## 2021-11-12 DIAGNOSIS — Z87.39 HISTORY OF OSTEOPOROSIS: ICD-10-CM

## 2021-11-12 DIAGNOSIS — H90.0 CONDUCTIVE HEARING LOSS, BILATERAL: ICD-10-CM

## 2021-11-12 DIAGNOSIS — M19.90 ARTHRITIS: ICD-10-CM

## 2021-11-12 DIAGNOSIS — I10 ESSENTIAL HYPERTENSION: Primary | ICD-10-CM

## 2021-11-12 DIAGNOSIS — E78.2 MIXED HYPERLIPIDEMIA: ICD-10-CM

## 2021-11-12 PROBLEM — H57.02 ANISOCORIA: Status: RESOLVED | Noted: 2020-11-12 | Resolved: 2021-11-12

## 2021-11-12 PROBLEM — M85.80 OSTEOPENIA: Status: ACTIVE | Noted: 2017-11-14

## 2021-11-12 PROBLEM — D69.2 SOLAR PURPURA (HCC): Status: RESOLVED | Noted: 2018-05-22 | Resolved: 2021-11-12

## 2021-11-12 LAB
ALBUMIN SERPL BCP-MCNC: 3.8 G/DL (ref 3.5–5)
ALP SERPL-CCNC: 64 U/L (ref 46–116)
ALT SERPL W P-5'-P-CCNC: 20 U/L (ref 12–78)
ANION GAP SERPL CALCULATED.3IONS-SCNC: 4 MMOL/L (ref 4–13)
AST SERPL W P-5'-P-CCNC: 14 U/L (ref 5–45)
BASOPHILS # BLD AUTO: 0.06 THOUSANDS/ΜL (ref 0–0.1)
BASOPHILS NFR BLD AUTO: 1 % (ref 0–1)
BILIRUB SERPL-MCNC: 0.48 MG/DL (ref 0.2–1)
BUN SERPL-MCNC: 25 MG/DL (ref 5–25)
CALCIUM SERPL-MCNC: 9.4 MG/DL (ref 8.3–10.1)
CHLORIDE SERPL-SCNC: 105 MMOL/L (ref 100–108)
CHOLEST SERPL-MCNC: 218 MG/DL (ref 50–200)
CO2 SERPL-SCNC: 26 MMOL/L (ref 21–32)
CREAT SERPL-MCNC: 1.08 MG/DL (ref 0.6–1.3)
EOSINOPHIL # BLD AUTO: 0.23 THOUSAND/ΜL (ref 0–0.61)
EOSINOPHIL NFR BLD AUTO: 4 % (ref 0–6)
ERYTHROCYTE [DISTWIDTH] IN BLOOD BY AUTOMATED COUNT: 13.2 % (ref 11.6–15.1)
GFR SERPL CREATININE-BSD FRML MDRD: 49 ML/MIN/1.73SQ M
GLUCOSE P FAST SERPL-MCNC: 98 MG/DL (ref 65–99)
HCT VFR BLD AUTO: 42.1 % (ref 34.8–46.1)
HDLC SERPL-MCNC: 101 MG/DL
HGB BLD-MCNC: 13.5 G/DL (ref 11.5–15.4)
IMM GRANULOCYTES # BLD AUTO: 0.01 THOUSAND/UL (ref 0–0.2)
IMM GRANULOCYTES NFR BLD AUTO: 0 % (ref 0–2)
LDLC SERPL CALC-MCNC: 106 MG/DL (ref 0–100)
LYMPHOCYTES # BLD AUTO: 1.65 THOUSANDS/ΜL (ref 0.6–4.47)
LYMPHOCYTES NFR BLD AUTO: 25 % (ref 14–44)
MCH RBC QN AUTO: 30 PG (ref 26.8–34.3)
MCHC RBC AUTO-ENTMCNC: 32.1 G/DL (ref 31.4–37.4)
MCV RBC AUTO: 94 FL (ref 82–98)
MONOCYTES # BLD AUTO: 0.94 THOUSAND/ΜL (ref 0.17–1.22)
MONOCYTES NFR BLD AUTO: 14 % (ref 4–12)
NEUTROPHILS # BLD AUTO: 3.68 THOUSANDS/ΜL (ref 1.85–7.62)
NEUTS SEG NFR BLD AUTO: 56 % (ref 43–75)
NRBC BLD AUTO-RTO: 0 /100 WBCS
PLATELET # BLD AUTO: 260 THOUSANDS/UL (ref 149–390)
PMV BLD AUTO: 9.3 FL (ref 8.9–12.7)
POTASSIUM SERPL-SCNC: 5 MMOL/L (ref 3.5–5.3)
PROT SERPL-MCNC: 7.7 G/DL (ref 6.4–8.2)
RBC # BLD AUTO: 4.5 MILLION/UL (ref 3.81–5.12)
SODIUM SERPL-SCNC: 135 MMOL/L (ref 136–145)
TRIGL SERPL-MCNC: 55 MG/DL
WBC # BLD AUTO: 6.57 THOUSAND/UL (ref 4.31–10.16)

## 2021-11-12 PROCEDURE — 3078F DIAST BP <80 MM HG: CPT | Performed by: NURSE PRACTITIONER

## 2021-11-12 PROCEDURE — 1036F TOBACCO NON-USER: CPT | Performed by: NURSE PRACTITIONER

## 2021-11-12 PROCEDURE — 3074F SYST BP LT 130 MM HG: CPT | Performed by: NURSE PRACTITIONER

## 2021-11-12 PROCEDURE — 1160F RVW MEDS BY RX/DR IN RCRD: CPT | Performed by: NURSE PRACTITIONER

## 2021-11-12 PROCEDURE — 36415 COLL VENOUS BLD VENIPUNCTURE: CPT

## 2021-11-12 PROCEDURE — 3288F FALL RISK ASSESSMENT DOCD: CPT | Performed by: NURSE PRACTITIONER

## 2021-11-12 PROCEDURE — G0439 PPPS, SUBSEQ VISIT: HCPCS | Performed by: NURSE PRACTITIONER

## 2021-11-12 PROCEDURE — 1170F FXNL STATUS ASSESSED: CPT | Performed by: NURSE PRACTITIONER

## 2021-11-12 PROCEDURE — 1125F AMNT PAIN NOTED PAIN PRSNT: CPT | Performed by: NURSE PRACTITIONER

## 2021-11-12 PROCEDURE — 3725F SCREEN DEPRESSION PERFORMED: CPT | Performed by: NURSE PRACTITIONER

## 2021-11-12 PROCEDURE — 80053 COMPREHEN METABOLIC PANEL: CPT

## 2021-11-12 PROCEDURE — 73562 X-RAY EXAM OF KNEE 3: CPT

## 2021-11-12 PROCEDURE — 99214 OFFICE O/P EST MOD 30 MIN: CPT | Performed by: NURSE PRACTITIONER

## 2021-11-12 PROCEDURE — 85025 COMPLETE CBC W/AUTO DIFF WBC: CPT

## 2021-11-12 PROCEDURE — 80061 LIPID PANEL: CPT

## 2021-11-19 ENCOUNTER — TELEPHONE (OUTPATIENT)
Dept: INTERNAL MEDICINE CLINIC | Facility: CLINIC | Age: 81
End: 2021-11-19

## 2021-11-22 ENCOUNTER — TELEPHONE (OUTPATIENT)
Dept: INTERNAL MEDICINE CLINIC | Facility: CLINIC | Age: 81
End: 2021-11-22

## 2021-11-23 DIAGNOSIS — I10 ESSENTIAL HYPERTENSION: ICD-10-CM

## 2021-11-23 DIAGNOSIS — I48.0 PAROXYSMAL ATRIAL FIBRILLATION (HCC): ICD-10-CM

## 2021-11-23 RX ORDER — METOPROLOL SUCCINATE 25 MG/1
25 TABLET, EXTENDED RELEASE ORAL DAILY
Qty: 90 TABLET | Refills: 3 | Status: SHIPPED | OUTPATIENT
Start: 2021-11-23 | End: 2022-02-17 | Stop reason: SDUPTHER

## 2021-11-23 RX ORDER — LOSARTAN POTASSIUM 25 MG/1
25 TABLET ORAL DAILY
Qty: 90 TABLET | Refills: 3 | Status: SHIPPED | OUTPATIENT
Start: 2021-11-23 | End: 2022-02-17 | Stop reason: SDUPTHER

## 2022-01-18 DIAGNOSIS — I48.0 PAROXYSMAL ATRIAL FIBRILLATION (HCC): ICD-10-CM

## 2022-01-18 NOTE — TELEPHONE ENCOUNTER
----- Message from Ran Garrido MD sent at 1/18/2022  3:37 PM EST -----  Regarding: Patient of Dr Cherie Lujan needs appointment  I sent a prescription for this patient  Last seen by Dr Cherie Lujan in May of 2021  Needs an appointment in the next month or so with him  Thank you

## 2022-01-26 ENCOUNTER — OFFICE VISIT (OUTPATIENT)
Dept: CARDIOLOGY CLINIC | Facility: CLINIC | Age: 82
End: 2022-01-26
Payer: COMMERCIAL

## 2022-01-26 VITALS
SYSTOLIC BLOOD PRESSURE: 120 MMHG | DIASTOLIC BLOOD PRESSURE: 82 MMHG | BODY MASS INDEX: 20.99 KG/M2 | HEIGHT: 65 IN | HEART RATE: 55 BPM | OXYGEN SATURATION: 96 % | WEIGHT: 126 LBS

## 2022-01-26 DIAGNOSIS — E78.2 MIXED HYPERLIPIDEMIA: ICD-10-CM

## 2022-01-26 DIAGNOSIS — I48.0 PAROXYSMAL ATRIAL FIBRILLATION (HCC): Primary | ICD-10-CM

## 2022-01-26 DIAGNOSIS — I10 ESSENTIAL HYPERTENSION: ICD-10-CM

## 2022-01-26 PROCEDURE — 1160F RVW MEDS BY RX/DR IN RCRD: CPT | Performed by: PHYSICIAN ASSISTANT

## 2022-01-26 PROCEDURE — 99214 OFFICE O/P EST MOD 30 MIN: CPT | Performed by: PHYSICIAN ASSISTANT

## 2022-01-26 PROCEDURE — 3074F SYST BP LT 130 MM HG: CPT | Performed by: PHYSICIAN ASSISTANT

## 2022-01-26 PROCEDURE — 1036F TOBACCO NON-USER: CPT | Performed by: PHYSICIAN ASSISTANT

## 2022-01-26 PROCEDURE — 3079F DIAST BP 80-89 MM HG: CPT | Performed by: PHYSICIAN ASSISTANT

## 2022-01-26 NOTE — PROGRESS NOTES
Cardiology Follow Up    Eduarda Laguerre  1940  [id number]  [facility] [address] [address] [phone]    1  Paroxysmal atrial fibrillation (HCC)     2  Essential hypertension     3  Mixed hyperlipidemia         Interval History: Eduarda Laguerre is an [de-identified]year old female with history of atrial fibrillation, hypertension who presents for follow up visit  She is known to cardiologist Dr Yesi Olivas  Patient has been complaining of intermittent palpitations mostly at night which have been bothersome for her  Reports she can feel her atrial fibrillation  She reports of associated lightheadedness, dizziness and shortness of breath sometimes  Symptoms occur at rest and with exertion when she plays tennis  She denies any overt chest pain, tightness or heaviness  Diagnostics:   TTE 7/8/2021:  LEFT VENTRICLE:  Systolic function was mildly reduced  Ejection fraction was estimated to be 50 %  There was mild diffuse hypokinesis  Left ventricular diastolic function parameters were normal   RIGHT VENTRICLE:  The size was normal   Systolic function was normal   MITRAL VALVE:  There was mild regurgitation  TRICUSPID VALVE:  There was mild regurgitation  Estimated peak PA pressure was at least 50 mmHg    PULMONIC VALVE:  There was trace regurgitation  Pharmacologic MPI 6/6/2019:  IMPRESSIONS: Normal study after maximal exercise  Myocardial perfusion imaging was normal at rest and with stress   Left ventricular systolic function was normal     Patient Active Problem List   Diagnosis    Psoriasis    Screening for skin condition    Essential hypertension    Dupuytren's contracture of both hands    Arthritis    Conductive hearing loss, bilateral    Hyperlipidemia    History of osteoporosis    Knee mass, left     Past Medical History:   Diagnosis Date    Benign neoplasm of skin     Face  Pulmonary embolism (HCC)     Sebaceous cyst     Wears contact lenses      Social History     Socioeconomic History    Marital status: /Civil Union     Spouse name: Not on file    Number of children: Not on file    Years of education: Not on file    Highest education level: Not on file   Occupational History    Not on file   Tobacco Use    Smoking status: Former Smoker     Packs/day: 0 50     Years: 20 00     Pack years: 10 00     Quit date: 2006     Years since quittin 0    Smokeless tobacco: Never Used    Tobacco comment: History of tobacco non-user noted in "allscripts"    Vaping Use    Vaping Use: Never used   Substance and Sexual Activity    Alcohol use: Yes     Comment: social     Drug use: No    Sexual activity: Not Currently   Other Topics Concern    Not on file   Social History Narrative    History of advance directive      Social Determinants of Health     Financial Resource Strain: Not on file   Food Insecurity: Not on file   Transportation Needs: Not on file   Physical Activity: Sufficiently Active    Days of Exercise per Week: 5 days    Minutes of Exercise per Session: 40 min   Stress: Stress Concern Present    Feeling of Stress :  To some extent   Social Connections: Not on file   Intimate Partner Violence: Not on file   Housing Stability: Not on file      Family History   Problem Relation Age of Onset    Hypertension Mother     Ovarian cancer Mother     Pneumonia Father      Past Surgical History:   Procedure Laterality Date    APPENDECTOMY      BREAST SURGERY      BX - Benign     DILATION AND CURETTAGE OF UTERUS      OTHER SURGICAL HISTORY      surgical excision of tubal pregnancy        Current Outpatient Medications:     apixaban (Eliquis) 2 5 mg, Take 1 tablet (2 5 mg total) by mouth 2 (two) times a day, Disp: 90 tablet, Rfl: 0    Glucosamine-Chondroit-Vit C-Mn (GLUCOSAMINE 1500 COMPLEX PO), Take 1 capsule by mouth daily, Disp: , Rfl:     losartan (COZAAR) 25 mg tablet, Take 1 tablet (25 mg total) by mouth daily, Disp: 90 tablet, Rfl: 3    metoprolol succinate (TOPROL-XL) 25 mg 24 hr tablet, Take 1 tablet (25 mg total) by mouth daily, Disp: 90 tablet, Rfl: 3    triamcinolone (KENALOG) 0 5 % ointment, Apply topically as needed  , Disp: , Rfl:   No Known Allergies  Vitals:    01/26/22 1039   BP: 120/82   Pulse: 55   SpO2: 96%        Labs:  No visits with results within 2 Month(s) from this visit     Latest known visit with results is:   Appointment on 11/12/2021   Component Date Value    Cholesterol 11/12/2021 218*    Triglycerides 11/12/2021 55     HDL, Direct 11/12/2021 101     LDL Calculated 11/12/2021 106*    WBC 11/12/2021 6 57     RBC 11/12/2021 4 50     Hemoglobin 11/12/2021 13 5     Hematocrit 11/12/2021 42 1     MCV 11/12/2021 94     MCH 11/12/2021 30 0     MCHC 11/12/2021 32 1     RDW 11/12/2021 13 2     MPV 11/12/2021 9 3     Platelets 75/56/4023 260     nRBC 11/12/2021 0     Neutrophils Relative 11/12/2021 56     Immat GRANS % 11/12/2021 0     Lymphocytes Relative 11/12/2021 25     Monocytes Relative 11/12/2021 14*    Eosinophils Relative 11/12/2021 4     Basophils Relative 11/12/2021 1     Neutrophils Absolute 11/12/2021 3 68     Immature Grans Absolute 11/12/2021 0 01     Lymphocytes Absolute 11/12/2021 1 65     Monocytes Absolute 11/12/2021 0 94     Eosinophils Absolute 11/12/2021 0 23     Basophils Absolute 11/12/2021 0 06     Sodium 11/12/2021 135*    Potassium 11/12/2021 5 0     Chloride 11/12/2021 105     CO2 11/12/2021 26     ANION GAP 11/12/2021 4     BUN 11/12/2021 25     Creatinine 11/12/2021 1 08     Glucose, Fasting 11/12/2021 98     Calcium 11/12/2021 9 4     AST 11/12/2021 14     ALT 11/12/2021 20     Alkaline Phosphatase 11/12/2021 64     Total Protein 11/12/2021 7 7     Albumin 11/12/2021 3 8     Total Bilirubin 11/12/2021 0 48     eGFR 11/12/2021 49      Imaging: No results found     Review of Systems:  Review of Systems   Constitutional: Negative for appetite change, chills, diaphoresis, fatigue and fever  Respiratory: Negative for cough, chest tightness and shortness of breath  Cardiovascular: Negative for chest pain, palpitations and leg swelling  Gastrointestinal: Negative for diarrhea, nausea and vomiting  Endocrine: Negative for cold intolerance and heat intolerance  Genitourinary: Negative for difficulty urinating, dysuria and enuresis  Musculoskeletal: Negative for arthralgias, back pain and gait problem  Allergic/Immunologic: Negative for environmental allergies and food allergies  Neurological: Negative for dizziness, facial asymmetry and headaches  Hematological: Negative for adenopathy  Does not bruise/bleed easily  Psychiatric/Behavioral: Negative for agitation, behavioral problems and confusion  Physical Exam:  Physical Exam  Constitutional:       Appearance: She is well-developed  HENT:      Right Ear: External ear normal       Left Ear: External ear normal    Eyes:      Pupils: Pupils are equal, round, and reactive to light  Cardiovascular:      Rate and Rhythm: Normal rate and regular rhythm  Heart sounds: Normal heart sounds  No murmur heard  No friction rub  No gallop  Pulmonary:      Effort: Pulmonary effort is normal       Breath sounds: Normal breath sounds  Abdominal:      Palpations: Abdomen is soft  Musculoskeletal:         General: Normal range of motion  Cervical back: Normal range of motion  Skin:     General: Skin is warm and dry  Neurological:      Mental Status: She is alert and oriented to person, place, and time  Deep Tendon Reflexes: Reflexes are normal and symmetric  Psychiatric:         Behavior: Behavior normal          Thought Content: Thought content normal          Judgment: Judgment normal        Discussion/Summary:  1   Paroxysmal atrial fibrillation - will order 2 week ZIO patch to assess if atrial fibrillation is causing her symptoms  Patient had pharmacologic MPI and recent TTE which were reviewed and did not show any significant abnormalities  Instructed patient to switch her Toprol XL to before bedtime and assess for symptom improvement  Consider increasing at her next visit if unsuccessful  She denies any bleeding issues with Eliquis 2 5mg BID  We discussed possibility of EP referral for ablation if symptoms correlate with AFib and patient wants to hold off at this time  2  Hypertension - BP at goal 120/82  Continue with Toprol XL 25mg QPM and losartan 25mg daily  3  Hyperlipidemia - not on statin therapy  Continue with cardiac diet and exercise as tolerated  RTO in 6 weeks to go over ZIO patch results or sooner if needed

## 2022-02-04 ENCOUNTER — HOSPITAL ENCOUNTER (EMERGENCY)
Facility: HOSPITAL | Age: 82
Discharge: HOME | End: 2022-02-04
Attending: EMERGENCY MEDICINE | Admitting: EMERGENCY MEDICINE
Payer: COMMERCIAL

## 2022-02-04 ENCOUNTER — APPOINTMENT (EMERGENCY)
Dept: CT IMAGING | Facility: HOSPITAL | Age: 82
End: 2022-02-04
Payer: COMMERCIAL

## 2022-02-04 VITALS
RESPIRATION RATE: 18 BRPM | TEMPERATURE: 98.4 F | BODY MASS INDEX: 22.15 KG/M2 | WEIGHT: 132.94 LBS | DIASTOLIC BLOOD PRESSURE: 58 MMHG | HEIGHT: 65 IN | HEART RATE: 69 BPM | OXYGEN SATURATION: 98 % | SYSTOLIC BLOOD PRESSURE: 123 MMHG

## 2022-02-04 DIAGNOSIS — T14.8XXA CONTUSION: Primary | ICD-10-CM

## 2022-02-04 LAB
HOLD SPECIMEN: NORMAL

## 2022-02-04 PROCEDURE — 36415 COLL VENOUS BLD VENIPUNCTURE: CPT

## 2022-02-04 PROCEDURE — 74177 CT ABD & PELVIS W/CONTRAST: CPT

## 2022-02-04 PROCEDURE — 99284 EMERGENCY DEPT VISIT MOD MDM: CPT

## 2022-02-04 PROCEDURE — 99284 EMERGENCY DEPT VISIT MOD MDM: CPT | Performed by: EMERGENCY MEDICINE

## 2022-02-04 PROCEDURE — 71260 CT THORAX DX C+: CPT

## 2022-02-04 RX ADMIN — IOHEXOL 100 ML: 350 INJECTION, SOLUTION INTRAVENOUS at 10:07

## 2022-02-04 NOTE — ED PROVIDER NOTES
History  Chief Complaint   Patient presents with   Jensen Dunaway Fall     tripped over her robe last night, hit left side of ribs on her table  No head injury or LOC      Patient is an 71-year-old female past medical history of PE and atrial fibrillation on Eliquis, hypertension, hyperlipidemia presenting with left flank injury  Patient states that yesterday evening she tripped on her robe and struck the left side of her chest and abdomen on a table  She states that since that time she has had left-sided chest pain inferior and lateral to her left breast as well as left-sided abdominal and upper hip pain  She states that the pain is constant, worse with deep breathing, nonradiating and severe but unable to describe the character  Denies any head trauma or LOC  She denies any nausea/vomiting/diarrhea and states that she iced overnight took Tylenol this morning and does note some improvement  Denies any other chest, back, neck pain, vision changes, numbness or tingling or weakness, dizziness, shortness of breath  Prior to Admission Medications   Prescriptions Last Dose Informant Patient Reported? Taking?    Glucosamine-Chondroit-Vit C-Mn (GLUCOSAMINE 1500 COMPLEX PO) 2/4/2022 at Unknown time Self Yes Yes   Sig: Take 1 capsule by mouth daily   apixaban (Eliquis) 2 5 mg 2/4/2022 at Unknown time Self No Yes   Sig: Take 1 tablet (2 5 mg total) by mouth 2 (two) times a day   losartan (COZAAR) 25 mg tablet 2/4/2022 at Unknown time Self No Yes   Sig: Take 1 tablet (25 mg total) by mouth daily   metoprolol succinate (TOPROL-XL) 25 mg 24 hr tablet 2/4/2022 at Unknown time Self No Yes   Sig: Take 1 tablet (25 mg total) by mouth daily   triamcinolone (KENALOG) 0 5 % ointment  Self Yes No   Sig: Apply topically as needed        Facility-Administered Medications: None       Past Medical History:   Diagnosis Date    Benign neoplasm of skin     Face     Pulmonary embolism (HCC)     Sebaceous cyst     Wears contact lenses Past Surgical History:   Procedure Laterality Date    APPENDECTOMY      BREAST SURGERY      BX - Benign     DILATION AND CURETTAGE OF UTERUS      OTHER SURGICAL HISTORY      surgical excision of tubal pregnancy        Family History   Problem Relation Age of Onset    Hypertension Mother     Ovarian cancer Mother     Pneumonia Father      I have reviewed and agree with the history as documented  E-Cigarette/Vaping    E-Cigarette Use Never User      E-Cigarette/Vaping Substances    Nicotine No     THC No     CBD No     Flavoring No     Other No     Unknown No      Social History     Tobacco Use    Smoking status: Former Smoker     Packs/day: 0 50     Years: 20 00     Pack years: 10 00     Quit date: 2006     Years since quittin 1    Smokeless tobacco: Never Used    Tobacco comment: History of tobacco non-user noted in "allscripts"    Vaping Use    Vaping Use: Never used   Substance Use Topics    Alcohol use: Yes     Comment: social     Drug use: No       Review of Systems   All other systems reviewed and are negative  Physical Exam  Physical Exam  Vitals reviewed  Constitutional:       General: She is not in acute distress  Appearance: Normal appearance  She is not ill-appearing  Comments: Airway:  Intact  Breathing:  Equal bilaterally  Circulation:  Intact  Deformity/disability:  None  Exposure:  Exposed       HENT:      Head: Normocephalic and atraumatic  Mouth/Throat:      Mouth: Mucous membranes are moist    Eyes:      Extraocular Movements: Extraocular movements intact  Conjunctiva/sclera: Conjunctivae normal    Cardiovascular:      Rate and Rhythm: Normal rate and regular rhythm  Heart sounds: Normal heart sounds  Pulmonary:      Effort: Pulmonary effort is normal       Breath sounds: Normal breath sounds  Abdominal:      General: Abdomen is flat  Palpations: Abdomen is soft  Tenderness: There is abdominal tenderness   There is left CVA tenderness  There is no right CVA tenderness or guarding  Comments: Left lateral upper quadrant and lateral lower quadrant pain   Musculoskeletal:         General: No swelling or tenderness  Normal range of motion  Cervical back: Normal range of motion and neck supple  No tenderness  Skin:     General: Skin is warm and dry  Neurological:      General: No focal deficit present  Mental Status: She is alert  Sensory: No sensory deficit  Motor: No weakness  Coordination: Coordination normal    Psychiatric:         Mood and Affect: Mood normal          Vital Signs  ED Triage Vitals [02/04/22 0944]   Temperature Pulse Respirations Blood Pressure SpO2   98 4 °F (36 9 °C) 78 18 142/74 97 %      Temp Source Heart Rate Source Patient Position - Orthostatic VS BP Location FiO2 (%)   Oral Monitor Sitting Right arm --      Pain Score       3           Vitals:    02/04/22 0944 02/04/22 1044   BP: 142/74 123/58   Pulse: 78 69   Patient Position - Orthostatic VS: Sitting Lying         Visual Acuity  Visual Acuity      Most Recent Value   L Pupil Size (mm) 7  [hx of injury, pt states this is baseline presentation]   R Pupil Size (mm) 2          ED Medications  Medications   iohexol (OMNIPAQUE) 350 MG/ML injection (SINGLE-DOSE) 100 mL (100 mL Intravenous Given 2/4/22 1007)       Diagnostic Studies  Results Reviewed     Procedure Component Value Units Date/Time    Trauma tubes on hold [[id number]] Collected: 02/04/22 0958    Lab Status: Final result Specimen: Blood from Arm, Left Updated: 02/04/22 1201    Narrative: The following orders were created for panel order Trauma tubes on hold    Procedure                               Abnormality         Status                     ---------                               -----------         ------                     Bryon De Souza on PKEJ[[id number]]                           Final result               Green / Yellow tube on DB[[id number]] Final result               Green / Black tube on PCYC[[id number]]                       Final result               Lavender Top 3 ml on TXHA[[id number]]                        Final result               Grey top tube on NKVK[[id number]]                            Final result                 Please view results for these tests on the individual orders  CT chest abdomen pelvis w contrast   Final Result by Rubén Garcia DO (02/04 1045)      No acute injury in the chest, abdomen or pelvis  Mild left lateral abdominal wall fat stranding consistent with contusion  No large hematoma or evidence for active bleeding  Diffuse colonic diverticulosis  Severe pectus excavatum  The study was marked in [facility] for immediate notification  Workstation performed: [id number]                    Procedures  Procedures         ED Course  ED Course as of 02/09/22 1443   Fri Feb 04, 2022   1047 CT concerning for contusion but otherwise unremarkable, will discharge with return precautions  SBIRT 20yo+      Most Recent Value   SBIRT (24 yo +)    In order to provide better care to our patients, we are screening all of our patients for alcohol and drug use  Would it be okay to ask you these screening questions? Yes Filed at: 02/04/2022 1023   Initial Alcohol Screen: US AUDIT-C     1  How often do you have a drink containing alcohol? 0 Filed at: 02/04/2022 1023   2  How many drinks containing alcohol do you have on a typical day you are drinking? 0 Filed at: 02/04/2022 1023   3a  Male UNDER 65: How often do you have five or more drinks on one occasion? 0 Filed at: 02/04/2022 1023   3b  FEMALE Any Age, or MALE 65+: How often do you have 4 or more drinks on one occassion? 0 Filed at: 02/04/2022 1023   Audit-C Score 0 Filed at: 02/04/2022 1023   ROEL: How many times in the past year have you        Used an illegal drug or used a prescription medication for non-medical reasons? Never Filed at: 02/04/2022 1023                    [facility]  Number of Diagnoses or Management Options  Diagnosis management comments: Patient is an 52-year-old female past medical history of PE and atrial fibrillation on Eliquis, hypertension, hyperlipidemia presenting with left flank pain following fall  Patient is well-appearing bedside stable vitals and in no acute distress  She has no ecchymosis or abrasions but does have tenderness along the left lateral chest wall, left costovertebral angle, and left lateral upper and lower abdomen  No guarding  Will obtain CT chest abdomen pelvis for further characterization  Patient declines need for pain control currently  Disposition  Final diagnoses:   Contusion     Time reflects when diagnosis was documented in both MDM as applicable and the Disposition within this note     Time User Action Codes Description Comment    2/4/2022 10:48 AM Olga Huggins Add [T14  8XXA] Contusion       ED Disposition     ED Disposition Condition Date/Time Comment    Discharge Stable Fri Feb 4, 2022 10:48 AM Ricci Ferraro discharge to home/self care              Follow-up Information     Follow up With Specialties Details Why Contact Info Additional Information    [address] [facility] Emergency Department Emergency Medicine  If symptoms worsen [address] [place] [id number] [facility] Emergency Department, [address] [place] [address] 84, DO Family Medicine In 1 week  [address] [address]  [phone]             Discharge Medication List as of 2/4/2022 10:49 AM      CONTINUE these medications which have NOT CHANGED    Details   apixaban (Eliquis) 2 5 mg Take 1 tablet (2 5 mg total) by mouth 2 (two) times a day, Starting Tue 1/18/2022, Normal      Glucosamine-Chondroit-Vit C-Mn (GLUCOSAMINE 1500 COMPLEX PO) Take 1 capsule by mouth daily, Starting Tue 3/11/2014, Historical Med      losartan (COZAAR) 25 mg tablet Take 1 tablet (25 mg total) by mouth daily, Starting Tue 11/23/2021, Normal      metoprolol succinate (TOPROL-XL) 25 mg 24 hr tablet Take 1 tablet (25 mg total) by mouth daily, Starting Tue 11/23/2021, Normal      triamcinolone (KENALOG) 0 5 % ointment Apply topically as needed  , Starting Fri 10/23/2020, Until Wed 1/26/2022 at 2359, Historical Med             No discharge procedures on file      PDMP Review     None          ED Provider  Electronically Signed by           Lyndsay Montana, DO  02/04/22 157 [facility], DO  02/09/22 [id number]

## 2022-02-07 ENCOUNTER — VBI (OUTPATIENT)
Dept: INTERNAL MEDICINE CLINIC | Facility: CLINIC | Age: 82
End: 2022-02-07

## 2022-02-07 NOTE — TELEPHONE ENCOUNTER
Samra Cliftonillo    ED Visit Information     Ed visit date: 2-4-2022   Diagnosis Description:   Contusion      In Network? Yes Jamie Dub  Discharge status: Home  Discharged with meds ? No  Number of ED visits to date: 1  ED Severity:3     Outreach Information    Outreach successful: Yes 2  Date letter mailed:c19  Date [facility] Coordination    Follow up appointment with pcp: no  , Line disconected twice after identifying myself in outreach attempts  Transportation issues ?  NA    Value Base Outreach    Outreach type: 3 Day Outreach  Patient refsued the answer questions: Yes  Emergent necessity warranted by diagnosis: Yes  [facility] PCP: Yes  02/07/2022 12:35 PM Phone (VBI) [facility] (Self) [phone] (H) Remove  Call Complete - Person picked up, I dentified myself & Line immediatly disconnected    By Miriam Medina MA    02/07/2022 12:37 PM Phone (VBI) [facility] (Self) [phone] (H) Remove  Call Complete - I dentified myself & Line immediatly disconnected    By Miriam Medina MA

## 2022-02-09 ENCOUNTER — OFFICE VISIT (OUTPATIENT)
Dept: INTERNAL MEDICINE CLINIC | Facility: CLINIC | Age: 82
End: 2022-02-09
Payer: COMMERCIAL

## 2022-02-09 VITALS
TEMPERATURE: 97.5 F | DIASTOLIC BLOOD PRESSURE: 68 MMHG | HEIGHT: 65 IN | WEIGHT: 123.2 LBS | HEART RATE: 77 BPM | BODY MASS INDEX: 20.53 KG/M2 | OXYGEN SATURATION: 100 % | SYSTOLIC BLOOD PRESSURE: 110 MMHG

## 2022-02-09 DIAGNOSIS — J06.9 UPPER RESPIRATORY TRACT INFECTION, UNSPECIFIED TYPE: Primary | ICD-10-CM

## 2022-02-09 PROCEDURE — 1160F RVW MEDS BY RX/DR IN RCRD: CPT | Performed by: FAMILY MEDICINE

## 2022-02-09 PROCEDURE — 1036F TOBACCO NON-USER: CPT | Performed by: FAMILY MEDICINE

## 2022-02-09 PROCEDURE — 99213 OFFICE O/P EST LOW 20 MIN: CPT | Performed by: FAMILY MEDICINE

## 2022-02-09 PROCEDURE — 3074F SYST BP LT 130 MM HG: CPT | Performed by: FAMILY MEDICINE

## 2022-02-09 PROCEDURE — 3078F DIAST BP <80 MM HG: CPT | Performed by: FAMILY MEDICINE

## 2022-02-09 RX ORDER — AZITHROMYCIN 250 MG/1
TABLET, FILM COATED ORAL
Qty: 6 TABLET | Refills: 0 | Status: SHIPPED | OUTPATIENT
Start: 2022-02-09 | End: 2022-02-14

## 2022-02-09 RX ORDER — PSEUDOEPHEDRINE HCL 60 MG/1
60 TABLET ORAL EVERY 8 HOURS PRN
Qty: 21 TABLET | Refills: 0 | Status: SHIPPED | OUTPATIENT
Start: 2022-02-09 | End: 2022-03-07 | Stop reason: ALTCHOICE

## 2022-02-09 NOTE — PROGRESS NOTES
FOLLOW-UP OFFICE VISIT  [facility] SAMINA GREGORY    NAME: Mauricio Brochure  AGE: [de-identified] y o  SEX: female  : 1940     DATE: 2022     Assessment and Plan:     Problem List Items Addressed This Visit     None      Visit Diagnoses     Upper respiratory tract infection, unspecified type    -  Primary    Relevant Medications    azithromycin (Zithromax) 250 mg tablet    pseudoephedrine (SUDAFED) 60 mg tablet        A/P:     reported URI s/s on 2/3/22  Seen in ED on 22 for fall  CT abd/ct/pelvis negative for PNA  Tested for Covid-19 on 22 and PCR testing negative as of this past Tuesday  No progression of symptoms just no improvement  Will treat URI with abx and decongestant  Pt encouraged to take vitamin C and zinc for immune support  Increase hydration          Chief Complaint:     Chief Complaint   Patient presents with    URI     tired , fatigue,         History of Present Illness:     Pt present c/o cold like symptoms  Tickle in throat wed  Cough, rhinorrea and congestion thrusday evening  Cough is infrequently productive  No hemoptysis  Using saline nasal spray for congestion  Afebrile  Suffered from a mechanical fall thrusday night  CT collected in ED on Thursday was negative for fractues or pneumonia  covid tested at [address] [place] [address] clinic on Saturday  negative test resutled yesterday  Vaccinated against covid 19        Review of Systems:     Review of Systems   Constitutional: Positive for fatigue  HENT: Positive for congestion and rhinorrhea  Negative for sore throat  Respiratory: Positive for cough and shortness of breath (chronic; 2/2 afib)  Negative for chest tightness and wheezing  Cardiovascular: Negative for chest pain  Gastrointestinal: Negative for diarrhea and nausea  Neurological: Negative for dizziness and headaches          Problem List:     Patient Active Problem List   Diagnosis    Psoriasis    Screening for skin condition  Essential hypertension    Dupuytren's contracture of both hands    Arthritis    Conductive hearing loss, bilateral    Hyperlipidemia    History of osteoporosis    Knee mass, left        Objective:     /68   Pulse 77   Temp 97 5 °F (36 4 °C)   Ht 5' 5" (1 651 m)   Wt 55 9 kg (123 lb 3 2 oz)   SpO2 100%   BMI 20 50 kg/m²     Physical Exam  HENT:      Head: Normocephalic  Right Ear: External ear normal       Left Ear: External ear normal       Nose: Rhinorrhea present  Mouth/Throat:      Mouth: Mucous membranes are moist       Pharynx: Oropharynx is clear  Cardiovascular:      Rate and Rhythm: Normal rate  Rhythm irregularly irregular  Heart sounds: No murmur heard  Pulmonary:      Breath sounds: Normal breath sounds  Comments: Breathlessness between sentences  Skin:     Capillary Refill: Capillary refill takes less than 2 seconds  Neurological:      Mental Status: She is alert and oriented to person, place, and time  Pertinent Laboratory/Diagnostic Studies:    Laboratory Results: I have personally reviewed the pertinent laboratory results/reports         Radiology/Other Diagnostic Testing Results: I have personally reviewed pertinent reports          Anastasiya ARANA [facility]  2/9/2022 10:09 AM

## 2022-02-11 ENCOUNTER — CLINICAL SUPPORT (OUTPATIENT)
Dept: CARDIOLOGY CLINIC | Facility: CLINIC | Age: 82
End: 2022-02-11
Payer: COMMERCIAL

## 2022-02-11 ENCOUNTER — TELEPHONE (OUTPATIENT)
Dept: CARDIOLOGY CLINIC | Facility: CLINIC | Age: 82
End: 2022-02-11

## 2022-02-11 DIAGNOSIS — I48.0 PAROXYSMAL ATRIAL FIBRILLATION (HCC): ICD-10-CM

## 2022-02-11 PROCEDURE — 93248 EXT ECG>7D<15D REV&INTERPJ: CPT | Performed by: INTERNAL MEDICINE

## 2022-02-11 NOTE — TELEPHONE ENCOUNTER
Name of Ottoniel Erickson from [facility] left a      Reason for call:abnormal zio patch result      Test results? zio patch         Call back number:[phone] Reference Number [id number]    Fax number:

## 2022-02-11 NOTE — TELEPHONE ENCOUNTER
Spoke with Luke Lang at [facility]  She stated that the pts report shows rapid Afib 70% of wear time which was from 01/26 - 02/08      174 beats per min for 60 sec  168 runs of SVT    The report is available online    Please advise!

## 2022-02-17 DIAGNOSIS — I10 ESSENTIAL HYPERTENSION: ICD-10-CM

## 2022-02-17 DIAGNOSIS — I48.0 PAROXYSMAL ATRIAL FIBRILLATION (HCC): ICD-10-CM

## 2022-02-17 RX ORDER — METOPROLOL SUCCINATE 25 MG/1
25 TABLET, EXTENDED RELEASE ORAL DAILY
Qty: 90 TABLET | Refills: 3 | Status: SHIPPED | OUTPATIENT
Start: 2022-02-17 | End: 2022-03-07

## 2022-02-17 RX ORDER — LOSARTAN POTASSIUM 25 MG/1
25 TABLET ORAL DAILY
Qty: 90 TABLET | Refills: 3 | Status: SHIPPED | OUTPATIENT
Start: 2022-02-17 | End: 2022-02-18

## 2022-02-17 NOTE — TELEPHONE ENCOUNTER
----- Message from Alexx Handler  on behalf of Naye Harris sent at 2/17/2022 10:03 AM EST -----  Regarding: Scripts  This message is being sent by Alexx Martinez  on behalf of Yimi Llanes      Need refill orders for losartan and metoprolol  [facility]: phone #  [phone]  Thank you,   Dwayne Espana left a message on the answering machine also

## 2022-02-17 NOTE — TELEPHONE ENCOUNTER
Name of Caller: Patient     Call back Number: [phone]    Medication(s): Metoprolol and Losartan       Are we prescribing provider?: Yes    30 or 90 day supply: 90 Day     Pharmacy name/number: [facility]

## 2022-02-18 DIAGNOSIS — I10 ESSENTIAL HYPERTENSION: ICD-10-CM

## 2022-02-18 RX ORDER — LOSARTAN POTASSIUM 25 MG/1
TABLET ORAL
Qty: 90 TABLET | Refills: 2 | Status: SHIPPED | OUTPATIENT
Start: 2022-02-18 | End: 2022-03-07

## 2022-03-07 ENCOUNTER — OFFICE VISIT (OUTPATIENT)
Dept: CARDIOLOGY CLINIC | Facility: CLINIC | Age: 82
End: 2022-03-07
Payer: COMMERCIAL

## 2022-03-07 ENCOUNTER — TELEPHONE (OUTPATIENT)
Dept: CARDIOLOGY CLINIC | Facility: CLINIC | Age: 82
End: 2022-03-07

## 2022-03-07 VITALS
HEIGHT: 65 IN | RESPIRATION RATE: 16 BRPM | SYSTOLIC BLOOD PRESSURE: 110 MMHG | DIASTOLIC BLOOD PRESSURE: 68 MMHG | HEART RATE: 97 BPM | BODY MASS INDEX: 20.33 KG/M2 | OXYGEN SATURATION: 98 % | WEIGHT: 122 LBS

## 2022-03-07 DIAGNOSIS — I51.89 DIASTOLIC DYSFUNCTION: ICD-10-CM

## 2022-03-07 DIAGNOSIS — I48.0 PAROXYSMAL ATRIAL FIBRILLATION (HCC): ICD-10-CM

## 2022-03-07 DIAGNOSIS — I42.8 OTHER CARDIOMYOPATHY (HCC): ICD-10-CM

## 2022-03-07 DIAGNOSIS — I27.20 PULMONARY HYPERTENSION (HCC): ICD-10-CM

## 2022-03-07 DIAGNOSIS — R06.02 SHORTNESS OF BREATH ON EXERTION: Primary | ICD-10-CM

## 2022-03-07 DIAGNOSIS — I48.0 PAROXYSMAL ATRIAL FIBRILLATION (HCC): Primary | ICD-10-CM

## 2022-03-07 DIAGNOSIS — Z79.01 ANTICOAGULANT LONG-TERM USE: ICD-10-CM

## 2022-03-07 DIAGNOSIS — I34.0 NONRHEUMATIC MITRAL VALVE REGURGITATION: ICD-10-CM

## 2022-03-07 DIAGNOSIS — I10 ESSENTIAL HYPERTENSION: ICD-10-CM

## 2022-03-07 PROCEDURE — 99214 OFFICE O/P EST MOD 30 MIN: CPT | Performed by: INTERNAL MEDICINE

## 2022-03-07 PROCEDURE — 3074F SYST BP LT 130 MM HG: CPT | Performed by: INTERNAL MEDICINE

## 2022-03-07 PROCEDURE — 1036F TOBACCO NON-USER: CPT | Performed by: INTERNAL MEDICINE

## 2022-03-07 PROCEDURE — 1160F RVW MEDS BY RX/DR IN RCRD: CPT | Performed by: INTERNAL MEDICINE

## 2022-03-07 PROCEDURE — 3078F DIAST BP <80 MM HG: CPT | Performed by: INTERNAL MEDICINE

## 2022-03-07 RX ORDER — METOPROLOL SUCCINATE 50 MG/1
50 TABLET, EXTENDED RELEASE ORAL DAILY
Qty: 90 TABLET | Refills: 2 | Status: SHIPPED | OUTPATIENT
Start: 2022-03-07 | End: 2022-04-22 | Stop reason: HOSPADM

## 2022-03-07 NOTE — TELEPHONE ENCOUNTER
S/w Artur He from [address] [place] [address] - Approved  Auth# [id number]  Case/Ref# [id number]  Valid 3/7/22-5/6/22

## 2022-03-07 NOTE — TELEPHONE ENCOUNTER
S/w patient after office visit  Prescreening completed  Aware BW is needed prior and [facility] lab confirmed  No medication holds needed  LUIS EDUARDO/CV prep/info sheet given to patient  Can we please have auth for cardioversion at [facility] on 3/21/22?

## 2022-03-07 NOTE — PROGRESS NOTES
CARDIOLOGY OFFICE VISIT  [facility]  [facility], [facility], [address] [facility], [address], [address]  Tel: [phone]      NAME: Paul Ruiz  AGE: [de-identified] y o  SEX: female  : 1940   MRN: [id number]      Chief Complaint:  Chief Complaint   Patient presents with    Follow-up         History of Present Illness:   Patient comes for follow up  States she continues to be short of breath since the last 3+ years  After TTE and stress test had ruled out significant heart disease as the cause of her SOB in 2019, she was referred to follow-up with her PCP / pulmonologist for her SOB but she never did  In the meantime she was diagnosed with PAF  Currently she does feel palpitations frequently  Denies chest pain, lightheadedness, syncope, swelling feet, orthopnea, PND, claudication  PAF - CHADS2-VASc = 4 (HTN, Age x 2, Female)  On Eliquis for [facility] and BB for rate control  Feels palpitations+  Recent event monitor has shown frequent RVR episodes    Mild cardiomyopathy - Recent echocardiogram (2021) has shown mild diffuse hypokinesis with EF 50% down from 60% on last echo in 2019  Likely tachycardia related from PAF with RVR     HTN, DD -  Has been hypertensive for many years  Taking medications regularly    Denies lightheadedness, headache, medication side effects      Mitral regurgitation -  Mild, asymptomatic    PHTN - PA pressure 50 mmHg which is a new finding for the patient    Past Medical History:  Past Medical History:   Diagnosis Date    Benign neoplasm of skin     Face     Pulmonary embolism (HCC)     Sebaceous cyst     Wears contact lenses          Past Surgical History:  Past Surgical History:   Procedure Laterality Date    APPENDECTOMY      BREAST SURGERY      BX - Benign     DILATION AND CURETTAGE OF UTERUS      OTHER SURGICAL HISTORY      surgical excision of tubal pregnancy          Family History:  Family History   Problem Relation Age of Onset    Hypertension Mother     Ovarian cancer Mother     Pneumonia Father          Social History:  Social History     Socioeconomic History    Marital status: /Civil Union     Spouse name: None    Number of children: None    Years of education: None    Highest education level: None   Occupational History    None   Tobacco Use    Smoking status: Former Smoker     Packs/day: 0 50     Years: 20 00     Pack years: 10 00     Quit date: 2006     Years since quittin 1    Smokeless tobacco: Never Used    Tobacco comment: History of tobacco non-user noted in "allscripts"    Vaping Use    Vaping Use: Never used   Substance and Sexual Activity    Alcohol use: Yes     Comment: social     Drug use: No    Sexual activity: Not Currently   Other Topics Concern    None   Social History Narrative    History of advance directive      Social Determinants of Health     Financial Resource Strain: Not on file   Food Insecurity: Not on file   Transportation Needs: Not on file   Physical Activity: Sufficiently Active    Days of Exercise per Week: 5 days    Minutes of Exercise per Session: 40 min   Stress: Stress Concern Present    Feeling of Stress :  To some extent   Social Connections: Not on file   Intimate Partner Violence: Not on file   Housing Stability: Not on file         Active Problems:  Patient Active Problem List   Diagnosis    Psoriasis    Screening for skin condition    Essential hypertension    Dupuytren's contracture of both hands    Arthritis    Conductive hearing loss, bilateral    Hyperlipidemia    History of osteoporosis    Knee mass, left         The following portions of the patient's history were reviewed and updated as appropriate: past medical history, past surgical history, past family history,  past social history, current medications, allergies and problem list       Review of Systems:  Constitutional: Denies fever, chills  Eyes: Denies eye redness, eye discharge  ENT: Denies sneezing, nasal discharge, sore throat   Respiratory: Denies cough, expectoration  +shortness of breath  Cardiovascular: Denies chest pain, lower extremity swelling  + palpitations  Gastrointestinal: Denies abdominal pain, nausea, vomiting, diarrhea  Genito-Urinary: Denies dysuria, incontinence  Musculoskeletal: Denies muscle pain  Neurologic: Denies lightheadedness, syncope, headache, seizures  Endocrine: Denies polydipsia, temperature intolerance  Allergy and Immunology: Denies hives, insect bite sensitivity  Hematological and Lymphatic: Denies bleeding problems, swollen glands   Psychological: Denies suicidal ideation, panic  Dermatological: Denies pruritus, rash, skin lesion changes      Vitals:  Vitals:    03/07/22 1317   BP: 110/68   Pulse: 97   Resp: 16   SpO2: 98%       Body mass index is 20 3 kg/m²  Weight (last 2 days)     Date/Time Weight    03/07/22 1317 55 3 (122)            Physical Examination:  General: Patient is not in acute distress  Awake, alert, oriented in time, place and person  Responding to commands  Head: Normocephalic  Atraumatic  Eyes: Both pupils normal sized, round and reactive to light  Nonicteric  ENT: Normal external ear canals  Neck: Supple  JVP not raised  Trachea central  No thyromegaly  Lungs: Bilateral bronchovascular breath sounds with no crackles or rhonchi  Chest wall: No tenderness  Cardiovascular: Irregular  S1 variable and S2 normal  No murmur, rub or gallop  Gastrointestinal: Abdomen soft, nontender  No guarding or rigidity  Liver and spleen not palpable  Bowel sounds present  Neurologic: Patient is awake, alert, oriented in time, place and person  Responding to commands  Moving all extremities  Integumentary:  No skin rash  Lymphatic: No cervical lymphadenopathy  Back: Symmetric   No CVA tenderness  Extremities: No clubbing, cyanosis or edema      Laboratory Results:  CBC with diff:   Lab Results   Component Value Date    WBC 6 57 11/12/2021 WBC 3 7 (L) 2015    RBC 4 50 2021    RBC 4 26 2015    HGB 13 5 2021    HGB 13 0 2015    HCT 42 1 2021    HCT 38 7 2015    MCV 94 2021    MCV 91 2015    MCH 30 0 2021    MCH 30 4 2015    RDW 13 2 2021    RDW 12 3 2015     2021     2015       CMP:  Lab Results   Component Value Date    CREATININE 1 08 2021    CREATININE 0 7 2015    BUN 25 2021    BUN 13 2015     2015    K 5 0 2021    K 4 5 2015     2021     2015    CO2 26 2021    CO2 28 3 2015    GLUCOSE 85 2015    PROT 7 1 2015    ALKPHOS 64 2021    ALKPHOS 54 2015    ALT 20 2021    ALT 21 2015    AST 14 2021    AST 15 2015       Lipid Profile:   Lab Results   Component Value Date    CHOL 241 2015     Lab Results   Component Value Date     2021    HDL 92 (H) 2019     (H) 2017     Lab Results   Component Value Date    LDLCALC 106 (H) 2021    LDLCALC 106 (H) 2019    LDLCALC 92 2017     Lab Results   Component Value Date    TRIG 55 2021    TRIG 64 2019    TRIG 52 2017       Cardiac testing:   Results for orders placed during the hospital encounter of 21    Echo complete with contrast if indicated    Narrative  [facility], [address] [facility]  [phone]    Transthoracic Echocardiogram  2D, M-mode, Doppler, and Color Doppler    Study date:  2021    Patient: Lex Padgett  MR number: [id number]  Account number: [de-identified]  : 1940  Age: 79 years  Gender: Female  Status: Outpatient  Location: [facility]  Height: 65 in  Weight: 123 lb  BP: 134/ 82 mmHg    Indications: Atrial fibrillation      Diagnoses: I48 0 - Atrial fibrillation    Sonographer:  NAHUN Muir  Primary Physician:  Jay Chavez Shen Wyatt  Referring Physician:  Berkley Wood MD  Group:  [facility]  Interpreting Physician:  Berkley Wood MD    SUMMARY    LEFT VENTRICLE:  Systolic function was mildly reduced  Ejection fraction was estimated to be 50 %  There was mild diffuse hypokinesis  Left ventricular diastolic function parameters were normal     RIGHT VENTRICLE:  The size was normal   Systolic function was normal     MITRAL VALVE:  There was mild regurgitation  TRICUSPID VALVE:  There was mild regurgitation  Estimated peak PA pressure was at least 50 mmHg  PULMONIC VALVE:  There was trace regurgitation  HISTORY: PRIOR HISTORY: Pulmonary Embolism, Former smoker, Atrial Fibrillation  PROCEDURE: The study was performed in the [address] [place] [address]  This was a routine study  The transthoracic approach was used  The study included complete 2D imaging, M-mode, complete spectral Doppler, and color Doppler  The  heart rate was 64 bpm, at the start of the study  Images were obtained from the parasternal, apical, subcostal, and suprasternal notch acoustic windows  Image quality was adequate  LEFT VENTRICLE: Size was normal  Systolic function was mildly reduced  Ejection fraction was estimated to be 50 %  There was mild diffuse hypokinesis  Wall thickness was normal  No evidence of apical thrombus  DOPPLER: Left ventricular  diastolic function parameters were normal     RIGHT VENTRICLE: The size was normal  Systolic function was normal  Wall thickness was normal     LEFT ATRIUM: Size was normal     RIGHT ATRIUM: Size was normal     MITRAL VALVE: Valve structure was normal  There was normal leaflet separation  DOPPLER: The transmitral velocity was within the normal range  There was no evidence for stenosis  There was mild regurgitation  AORTIC VALVE: The valve was trileaflet  Leaflets exhibited normal thickness and normal cuspal separation   DOPPLER: Transaortic velocity was within the normal range  There was no evidence for stenosis  There was no significant  regurgitation  TRICUSPID VALVE: The valve structure was normal  There was normal leaflet separation  DOPPLER: The transtricuspid velocity was within the normal range  There was no evidence for stenosis  There was mild regurgitation  Estimated peak PA  pressure was at least 50 mmHg  PULMONIC VALVE: Leaflets exhibited normal thickness, no calcification, and normal cuspal separation  DOPPLER: The transpulmonic velocity was within the normal range  There was trace regurgitation  PERICARDIUM: There was no pericardial effusion  The pericardium was normal in appearance  AORTA: The root exhibited normal size  SYSTEMIC VEINS: IVC: The inferior vena cava was normal in size  SYSTEM MEASUREMENT TABLES    2D  %FS: 26 19 %  Ao Diam: 3 19 cm  EDV(Teich): 78 28 ml  EF(Teich): 51 76 %  ESV(Teich): 37 76 ml  IVSd: 0 94 cm  LA Area: 16 34 cm2  LA Diam: 3 3 cm  LVEDV MOD A4C: 65 73 ml  LVEF MOD A4C: 40 94 %  LVESV MOD A4C: 38 82 ml  LVIDd: 4 19 cm  LVIDs: 3 09 cm  LVLd A4C: 6 67 cm  LVLs A4C: 6 15 cm  LVPWd: 0 92 cm  RA Area: 14 8 cm2  RVIDd: 3 92 cm  SV MOD A4C: 26 91 ml  SV(Teich): 40 52 ml    CW  AV MaxPG: 3 75 mmHg  AV Vmax: 0 97 m/s  MR Vmax: 4 4 m/s  MR maxP 52 mmHg  TR MaxP 5 mmHg  TR Vmax: 3 45 m/s    MM  TAPSE: 2 24 cm    PW  E' Sept: 0 08 m/s  E/E' Sept: 8 63  LVOT Vmax: 0 79 m/s  LVOT maxP 5 mmHg  MV A Javon: 0 66 m/s  MV Dec [place]: 3 95 m/s2  MV DecT: 171 74 ms  MV E Javon: 0 68 m/s  MV E/A Ratio: 1 03  MV PHT: 49 8 ms  MVA By PHT: 4 42 cm2    [employer] Accredited Echocardiography Laboratory    Prepared and electronically signed by    Wei Fowler MD  Signed 2021 20:12:35    No results found for this or any previous visit  No results found for this or any previous visit  No valid procedures specified    Results for orders placed during the hospital encounter of 19    NM myocardial perfusion spect (stress and/or rest)    Narrative  [address], [address] [facility]  [phone]    Rest/Stress Gated SPECT Myocardial Perfusion Imaging After Exercise    Patient: Emmy Antoine  MR number: [id number]  Account number: [de-identified]  : 1940  Age: 69 years  Gender: Female  Status: Outpatient  Location: [facility]  Height: 65 in  Weight: 130 lb  BP: 128/ 80 mmHg    Allergies: NO KNOWN ALLERGIES    Diagnosis: R00 2 - Palpitations, R06 09 - Other forms of dyspnea    Primary Physician:  Leyla Feliciano DO  Interpreting Physician:  Husam Negro MD  Referring Physician:  Husam Negro MD  Technician:  Deneen Amato BS, BA, AART(N)  Group:  [facility]  Other:  Charlcie Gottron, MS, CCT    INDICATIONS: Detection of CAD    HISTORY: The patient is a 69year old  female  Chest pain status: no chest pain  Other symptoms: dyspnea of recent onset and palpitations  Coronary artery disease risk factors: hypertension and post-menopausal state  Cardiovascular history: none significant  Medications: an antihypertensive agent  REST ECG: Sinus bradycardia  PROCEDURE: The study was performed in the [address]  Treadmill exercise testing was performed, using the Freddie protocol  Gated SPECT myocardial perfusion imaging was performed after stress and at rest  Systolic  blood pressure was 128 mmHg, at the start of the study  Diastolic blood pressure was 80 mmHg, at the start of the study  The heart rate was 54 bpm, at the start of the study   Patient was not experiencing chest pain at the time of the  injection of the radiopharmaceutical     FREDDIE PROTOCOL:  HR bpm SBP mmHg DBP mmHg Symptoms ST change Rhythm/conduct  Baseline 54 128 80 none none sinus veronica  Stage 1 111 150 96 -- -- sinus tach  Stage 2 126 180 80 moderate dyspnea -- sinus tach  Immediate 125 -- -- -- -- sinus tach  Recovery 1 100 160 70 -- -- sinus tach  Recovery 2 63 -- -- -- -- NSR  Recovery 3 58 -- -- -- -- sinus veronica  Recovery 5 61 126 70 -- -- NSR  No medications or fluids given  STRESS SUMMARY: Duration of exercise was 4 min and 30 sec  The patient exercised to protocol stage 2  Maximal work rate was 7 METs  Functional capacity was normal  Maximal heart rate during stress was 126 bpm ( 88 % of maximal predicted  heart rate)  The heart rate response to stress was exaggerated  There was normal resting blood pressure with an appropriate response to stress  The rate-pressure product for the peak heart rate and blood pressure was 12528  There was no  chest pain during stress  The stress test was terminated due to achievement of target heart rate and moderate dyspnea  The stress ECG was negative for ischemia and normal  Arrhythmia during stress: isolated atrial premature beats and  isolated premature ventricular beats  ISOTOPE ADMINISTRATION:  Resting isotope administration Stress isotope administration  Agent Sestamibi Sestamibi  Dose 10 37 mCi 32 1 mCi  Date 06/06/2019 06/06/2019  Injection-image interval 30 min 45 min    The radiopharmaceutical was injected one minute before the end of exercise  The radiopharmaceutical was injected at the peak effect of pharmacologic stress  MYOCARDIAL PERFUSION IMAGING:  The image quality was good  Rotating projection images reveal mild breast attenuation and mild subdiaphragmatic activity  Left ventricular size was normal     PERFUSION DEFECTS:  -  There were no perfusion defects  GATED SPECT:  The calculated left ventricular ejection fraction was 57 %  Left ventricular ejection fraction was within normal limits by visual estimate  There was no diagnostic evidence for left ventricular regional abnormality  SUMMARY:  -  Stress results: Duration of exercise was 4 min and 30 sec  Target heart rate was achieved  There was no chest pain during stress  -  ECG conclusions:  The stress ECG was negative for ischemia and normal  Arrhythmia during stress: isolated atrial premature beats and isolated premature ventricular beats  -  Perfusion imaging: There were no perfusion defects   -  Gated SPECT: The calculated left ventricular ejection fraction was 57 %  Left ventricular ejection fraction was within normal limits by visual estimate  There was no diagnostic evidence for left ventricular regional abnormality  IMPRESSIONS: Normal study after maximal exercise  Myocardial perfusion imaging was normal at rest and with stress  Left ventricular systolic function was normal     Prepared and signed by    Barry Guzman MD  Signed 06/06/2019 19:03:58      Medications:    Current Outpatient Medications:     apixaban (Eliquis) 2 5 mg, Take 1 tablet (2 5 mg total) by mouth 2 (two) times a day, Disp: 90 tablet, Rfl: 0    Glucosamine-Chondroit-Vit C-Mn (GLUCOSAMINE 1500 COMPLEX PO), Take 1 capsule by mouth daily, Disp: , Rfl:     triamcinolone (KENALOG) 0 5 % ointment, Apply topically as needed  , Disp: , Rfl:     metoprolol succinate (TOPROL-XL) 50 mg 24 hr tablet, Take 1 tablet (50 mg total) by mouth daily, Disp: 90 tablet, Rfl: 2      Allergies:  No Known Allergies      Assessment and Plan:  1  Paroxysmal atrial fibrillation (HCC)  Discontinue losartan  Increase metoprolol succinate from 25 mg to 50 mg daily for better ventricular rate control  Continue Eliquis for anticoagulation  LUIS EDUARDO/CV planned    2  Mild cardiomyopathy  Likely tachycardia mediated from atrial fibrillation with RVR  Rhythm control versus tight rate control planned  Beta-blocker dose increased  LUIS EDUARDO/cardioversion procedures discussed including risks benefits alternatives  Questions answered  Recommended and being scheduled    3  Shortness of breath on exertion  If from AFib, cardioversion should help  Follow-up with pulmonology for pulmonary hypertension and to look for other causes of SOB    4  Essential hypertension  BP stable    Continue beta-blocker    5  Diastolic dysfunction  Tight BP control    6  Pulmonary hypertension ([address] )  Referred to pulmonologist    Recommend aggressive risk factor modification and therapeutic lifestyle changes  Low-salt, low-calorie, low-fat, low-cholesterol diet with regular exercise and to optimize weight  I will defer the ordering and monitoring of necessity lab studies to you, but I am available and happy to review and manage any of the data at your request in the future  Discussed concepts of atherosclerosis, including signs and symptoms of cardiac disease  Previous studies were reviewed  Safety measures were reviewed  Questions were entertained and answered  Patient was advised to report any problems requiring medical attention  Follow-up with PCP and appropriate specialist and lab work as discussed  Return for follow up visit as scheduled or earlier, if needed  Thank you for allowing me to participate in the care and evaluation of your patient  Should you have any questions, please feel free to contact me        Husam Negro MD  3/7/2022,2:07 PM

## 2022-03-09 DIAGNOSIS — I48.0 PAROXYSMAL ATRIAL FIBRILLATION (HCC): ICD-10-CM

## 2022-03-09 RX ORDER — APIXABAN 2.5 MG/1
TABLET, FILM COATED ORAL
Qty: 90 TABLET | Refills: 7 | Status: SHIPPED | OUTPATIENT
Start: 2022-03-09

## 2022-03-25 ENCOUNTER — APPOINTMENT (OUTPATIENT)
Dept: LAB | Facility: CLINIC | Age: 82
End: 2022-03-25
Payer: COMMERCIAL

## 2022-03-25 LAB
ANION GAP SERPL CALCULATED.3IONS-SCNC: 4 MMOL/L (ref 4–13)
BASOPHILS # BLD AUTO: 0.05 THOUSANDS/ΜL (ref 0–0.1)
BASOPHILS NFR BLD AUTO: 1 % (ref 0–1)
BUN SERPL-MCNC: 22 MG/DL (ref 5–25)
CALCIUM SERPL-MCNC: 9.6 MG/DL (ref 8.3–10.1)
CHLORIDE SERPL-SCNC: 109 MMOL/L (ref 100–108)
CO2 SERPL-SCNC: 27 MMOL/L (ref 21–32)
CREAT SERPL-MCNC: 1.01 MG/DL (ref 0.6–1.3)
EOSINOPHIL # BLD AUTO: 0.11 THOUSAND/ΜL (ref 0–0.61)
EOSINOPHIL NFR BLD AUTO: 2 % (ref 0–6)
ERYTHROCYTE [DISTWIDTH] IN BLOOD BY AUTOMATED COUNT: 13.5 % (ref 11.6–15.1)
GFR SERPL CREATININE-BSD FRML MDRD: 52 ML/MIN/1.73SQ M
GLUCOSE SERPL-MCNC: 95 MG/DL (ref 65–140)
HCT VFR BLD AUTO: 43.7 % (ref 34.8–46.1)
HGB BLD-MCNC: 13.6 G/DL (ref 11.5–15.4)
IMM GRANULOCYTES # BLD AUTO: 0.02 THOUSAND/UL (ref 0–0.2)
IMM GRANULOCYTES NFR BLD AUTO: 0 % (ref 0–2)
INR PPP: 1.16 (ref 0.84–1.19)
LYMPHOCYTES # BLD AUTO: 2.15 THOUSANDS/ΜL (ref 0.6–4.47)
LYMPHOCYTES NFR BLD AUTO: 33 % (ref 14–44)
MCH RBC QN AUTO: 29.8 PG (ref 26.8–34.3)
MCHC RBC AUTO-ENTMCNC: 31.1 G/DL (ref 31.4–37.4)
MCV RBC AUTO: 96 FL (ref 82–98)
MONOCYTES # BLD AUTO: 0.91 THOUSAND/ΜL (ref 0.17–1.22)
MONOCYTES NFR BLD AUTO: 14 % (ref 4–12)
NEUTROPHILS # BLD AUTO: 3.26 THOUSANDS/ΜL (ref 1.85–7.62)
NEUTS SEG NFR BLD AUTO: 50 % (ref 43–75)
NRBC BLD AUTO-RTO: 0 /100 WBCS
PLATELET # BLD AUTO: 310 THOUSANDS/UL (ref 149–390)
PMV BLD AUTO: 9.4 FL (ref 8.9–12.7)
POTASSIUM SERPL-SCNC: 4.8 MMOL/L (ref 3.5–5.3)
PROTHROMBIN TIME: 14.3 SECONDS (ref 11.6–14.5)
RBC # BLD AUTO: 4.56 MILLION/UL (ref 3.81–5.12)
SODIUM SERPL-SCNC: 140 MMOL/L (ref 136–145)
WBC # BLD AUTO: 6.5 THOUSAND/UL (ref 4.31–10.16)

## 2022-03-25 PROCEDURE — 85025 COMPLETE CBC W/AUTO DIFF WBC: CPT

## 2022-03-25 PROCEDURE — 36415 COLL VENOUS BLD VENIPUNCTURE: CPT

## 2022-03-25 PROCEDURE — 80048 BASIC METABOLIC PNL TOTAL CA: CPT

## 2022-03-25 PROCEDURE — 85610 PROTHROMBIN TIME: CPT

## 2022-03-28 ENCOUNTER — ANESTHESIA EVENT (OUTPATIENT)
Dept: NON INVASIVE DIAGNOSTICS | Facility: HOSPITAL | Age: 82
End: 2022-03-28

## 2022-03-29 ENCOUNTER — ANESTHESIA (OUTPATIENT)
Dept: NON INVASIVE DIAGNOSTICS | Facility: HOSPITAL | Age: 82
End: 2022-03-29

## 2022-03-29 ENCOUNTER — HOSPITAL ENCOUNTER (OUTPATIENT)
Dept: NON INVASIVE DIAGNOSTICS | Facility: HOSPITAL | Age: 82
Discharge: HOME | End: 2022-03-29
Attending: INTERNAL MEDICINE
Payer: COMMERCIAL

## 2022-03-29 VITALS
DIASTOLIC BLOOD PRESSURE: 77 MMHG | TEMPERATURE: 97.2 F | HEIGHT: 65 IN | RESPIRATION RATE: 18 BRPM | WEIGHT: 122 LBS | OXYGEN SATURATION: 93 % | SYSTOLIC BLOOD PRESSURE: 127 MMHG | BODY MASS INDEX: 20.33 KG/M2 | HEART RATE: 103 BPM

## 2022-03-29 DIAGNOSIS — I48.0 PAROXYSMAL ATRIAL FIBRILLATION (HCC): Primary | ICD-10-CM

## 2022-03-29 DIAGNOSIS — I48.0 PAROXYSMAL ATRIAL FIBRILLATION (HCC): ICD-10-CM

## 2022-03-29 LAB
ATRIAL RATE: 357 BPM
QRS AXIS: 81 DEGREES
QRSD INTERVAL: 74 MS
QT INTERVAL: 378 MS
QTC INTERVAL: 467 MS
T WAVE AXIS: 69 DEGREES
VENTRICULAR RATE: 92 BPM

## 2022-03-29 PROCEDURE — 93005 ELECTROCARDIOGRAM TRACING: CPT

## 2022-03-29 PROCEDURE — 93312 ECHO TRANSESOPHAGEAL: CPT

## 2022-03-29 PROCEDURE — 92960 CARDIOVERSION ELECTRIC EXT: CPT | Performed by: INTERNAL MEDICINE

## 2022-03-29 PROCEDURE — 93010 ELECTROCARDIOGRAM REPORT: CPT | Performed by: INTERNAL MEDICINE

## 2022-03-29 PROCEDURE — 92960 CARDIOVERSION ELECTRIC EXT: CPT

## 2022-03-29 RX ORDER — SODIUM CHLORIDE 9 MG/ML
50 INJECTION, SOLUTION INTRAVENOUS CONTINUOUS
Status: DISCONTINUED | OUTPATIENT
Start: 2022-03-29 | End: 2022-03-30 | Stop reason: HOSPADM

## 2022-03-29 RX ORDER — PHENYLEPHRINE HYDROCHLORIDE 10 MG/ML
INJECTION INTRAVENOUS AS NEEDED
Status: DISCONTINUED | OUTPATIENT
Start: 2022-03-29 | End: 2022-03-29

## 2022-03-29 RX ORDER — AMIODARONE HYDROCHLORIDE 200 MG/1
200 TABLET ORAL 2 TIMES DAILY
Qty: 60 TABLET | Refills: 0 | Status: SHIPPED | OUTPATIENT
Start: 2022-03-29 | End: 2022-04-22 | Stop reason: SDUPTHER

## 2022-03-29 RX ORDER — PROPOFOL 10 MG/ML
INJECTION, EMULSION INTRAVENOUS AS NEEDED
Status: DISCONTINUED | OUTPATIENT
Start: 2022-03-29 | End: 2022-03-29

## 2022-03-29 RX ORDER — LIDOCAINE HYDROCHLORIDE 20 MG/ML
INJECTION, SOLUTION EPIDURAL; INFILTRATION; INTRACAUDAL; PERINEURAL AS NEEDED
Status: DISCONTINUED | OUTPATIENT
Start: 2022-03-29 | End: 2022-03-29

## 2022-03-29 RX ADMIN — SODIUM CHLORIDE 50 ML/HR: 9 INJECTION, SOLUTION INTRAVENOUS at 08:20

## 2022-03-29 RX ADMIN — LIDOCAINE HYDROCHLORIDE 60 MG: 20 INJECTION, SOLUTION EPIDURAL; INFILTRATION; INTRACAUDAL; PERINEURAL at 09:19

## 2022-03-29 RX ADMIN — PROPOFOL 20 MG: 10 INJECTION, EMULSION INTRAVENOUS at 09:24

## 2022-03-29 RX ADMIN — PROPOFOL 80 MG: 10 INJECTION, EMULSION INTRAVENOUS at 09:19

## 2022-03-29 RX ADMIN — PROPOFOL 20 MG: 10 INJECTION, EMULSION INTRAVENOUS at 09:28

## 2022-03-29 RX ADMIN — PHENYLEPHRINE HYDROCHLORIDE 100 MCG: 10 INJECTION INTRAVENOUS at 09:31

## 2022-03-29 RX ADMIN — PROPOFOL 20 MG: 10 INJECTION, EMULSION INTRAVENOUS at 09:22

## 2022-03-29 NOTE — PROGRESS NOTES
General Cardiology   Progress Note   Venkat Davies [de-identified] y o  female MRN: [id number]  Unit/Bed#:  Encounter: [id number]      SUBJECTIVE:   This is a interim history and physical    Patient of Dr Yanely Adorno who has history of atrial fibrillation on rate control on anticoagulation who has been symptomatic with atrial fibrillation and is scheduled for transesophageal echo followed by cardioversion  Patient does have some shortness of breath with exertion  Patient denies any bleeding issues  Patient states that she has been compliant with all her present medications  Recent office visit from Dr Yanely Adorno reviewed  OBJECTIVE:   Vitals:  Vitals:    03/29/22 0809   BP: 137/88   Pulse: 95   Resp: 19   Temp: 97 7 °F (36 5 °C)   SpO2: 97%     Body mass index is 20 91 kg/m²  Systolic (34UOS), HQP:431 , Min:137 , QVQ:503     Diastolic (15PVF), VXQ:42, Min:88, Max:88    No intake or output data in the 24 hours ending 03/29/22 0912  Weight (last 2 days)     Date/Time Weight    03/29/22 0809 57 (125 66)          Telemetry Review:  Atrial fibrillation        PHYSICAL EXAMS:  General:  Patient is not in acute distress, laying in the bed comfortably, awake, alert responding to commands  Head: Normocephalic, Atraumatic  HEENT:  Both pupils normal-size atraumatic, normocephalic, nonicteric  Neck:  JVP not raised  Trachea central  Respiratory:  Decreased breath sounds bilateral  Cardiovascular:  Irregularly irregular  GI:  Abdomen soft nontender   Liver and spleen normal size  Lymphatic:  No cervical or inguinal lymphadenopathy  Neurologic:  Patient is awake alert, responding to command, well-oriented to time and place and person moving   Extremities no edema    LABORATORY RESULTS:        CBC with diff:   Results from last 7 days   Lab Units 03/25/22  1139   WBC Thousand/uL 6 50   HEMOGLOBIN g/dL 13 6   HEMATOCRIT % 43 7   MCV fL 96   PLATELETS Thousands/uL 310   MCH pg 29 8   MCHC g/dL 31 1*   RDW % 13 5   MPV fL 9 4   NRBC AUTO /100 WBCs 0       CMP:  Results from last 7 days   Lab Units 22  1139   POTASSIUM mmol/L 4 8   CHLORIDE mmol/L 109*   CO2 mmol/L 27   BUN mg/dL 22   CREATININE mg/dL 1 01   CALCIUM mg/dL 9 6   EGFR ml/min/1 73sq m 52       BMP:  Results from last 7 days   Lab Units 22  1139   POTASSIUM mmol/L 4 8   CHLORIDE mmol/L 109*   CO2 mmol/L 27   BUN mg/dL 22   CREATININE mg/dL 1 01   CALCIUM mg/dL 9 6                        Results from last 7 days   Lab Units 22  1139   INR  1 16       Lipid Profile:   Lab Results   Component Value Date    CHOL 241 2015     Lab Results   Component Value Date     2021    HDL 92 (H) 2019     (H) 2017     Lab Results   Component Value Date    LDLCALC 106 (H) 2021    LDLCALC 106 (H) 2019    LDLCALC 92 2017     Lab Results   Component Value Date    TRIG 55 2021    TRIG 64 2019    TRIG 52 2017       Cardiac testing:  Results for orders placed during the hospital encounter of 21    Echo complete with contrast if indicated    Narrative  [place] [address] [address] [place] [address]  [phone]    Transthoracic Echocardiogram  2D, M-mode, Doppler, and Color Doppler    Study date:  2021    Patient: Roe Lemus  MR number: [id number]  Account number: [de-identified]  : 1940  Age: 79 years  Gender: Female  Status: Outpatient  Location: [facility]  Height: 65 in  Weight: 123 lb  BP: 134/ 82 mmHg    Indications: Atrial fibrillation  Diagnoses: I48 0 - Atrial fibrillation    Sonographer:  Erica Carrel, RCS  Primary Physician:  Kathleen Kelley  Referring Physician:  Dorothy Gill MD  Group:  [facility]  Interpreting Physician:  Dorothy Gill MD    SUMMARY    LEFT VENTRICLE:  Systolic function was mildly reduced  Ejection fraction was estimated to be 50 %  There was mild diffuse hypokinesis    Left ventricular diastolic function parameters were normal     RIGHT VENTRICLE:  The size was normal   Systolic function was normal     MITRAL VALVE:  There was mild regurgitation  TRICUSPID VALVE:  There was mild regurgitation  Estimated peak PA pressure was at least 50 mmHg  PULMONIC VALVE:  There was trace regurgitation  HISTORY: PRIOR HISTORY: Pulmonary Embolism, Former smoker, Atrial Fibrillation  PROCEDURE: The study was performed in the [address] [place] [address]  This was a routine study  The transthoracic approach was used  The study included complete 2D imaging, M-mode, complete spectral Doppler, and color Doppler  The  heart rate was 64 bpm, at the start of the study  Images were obtained from the parasternal, apical, subcostal, and suprasternal notch acoustic windows  Image quality was adequate  LEFT VENTRICLE: Size was normal  Systolic function was mildly reduced  Ejection fraction was estimated to be 50 %  There was mild diffuse hypokinesis  Wall thickness was normal  No evidence of apical thrombus  DOPPLER: Left ventricular  diastolic function parameters were normal     RIGHT VENTRICLE: The size was normal  Systolic function was normal  Wall thickness was normal     LEFT ATRIUM: Size was normal     RIGHT ATRIUM: Size was normal     MITRAL VALVE: Valve structure was normal  There was normal leaflet separation  DOPPLER: The transmitral velocity was within the normal range  There was no evidence for stenosis  There was mild regurgitation  AORTIC VALVE: The valve was trileaflet  Leaflets exhibited normal thickness and normal cuspal separation  DOPPLER: Transaortic velocity was within the normal range  There was no evidence for stenosis  There was no significant  regurgitation  TRICUSPID VALVE: The valve structure was normal  There was normal leaflet separation  DOPPLER: The transtricuspid velocity was within the normal range  There was no evidence for stenosis  There was mild regurgitation   Estimated peak PA  pressure was at least 50 mmHg  PULMONIC VALVE: Leaflets exhibited normal thickness, no calcification, and normal cuspal separation  DOPPLER: The transpulmonic velocity was within the normal range  There was trace regurgitation  PERICARDIUM: There was no pericardial effusion  The pericardium was normal in appearance  AORTA: The root exhibited normal size  SYSTEMIC VEINS: IVC: The inferior vena cava was normal in size  SYSTEM MEASUREMENT TABLES    2D  %FS: 26 19 %  Ao Diam: 3 19 cm  EDV(Teich): 78 28 ml  EF(Teich): 51 76 %  ESV(Teich): 37 76 ml  IVSd: 0 94 cm  LA Area: 16 34 cm2  LA Diam: 3 3 cm  LVEDV MOD A4C: 65 73 ml  LVEF MOD A4C: 40 94 %  LVESV MOD A4C: 38 82 ml  LVIDd: 4 19 cm  LVIDs: 3 09 cm  LVLd A4C: 6 67 cm  LVLs A4C: 6 15 cm  LVPWd: 0 92 cm  RA Area: 14 8 cm2  RVIDd: 3 92 cm  SV MOD A4C: 26 91 ml  SV(Teich): 40 52 ml    CW  AV MaxPG: 3 75 mmHg  AV Vmax: 0 97 m/s  MR Vmax: 4 4 m/s  MR maxP 52 mmHg  TR MaxP 5 mmHg  TR Vmax: 3 45 m/s    MM  TAPSE: 2 24 cm    PW  E' Sept: 0 08 m/s  E/E' Sept: 8 63  LVOT Vmax: 0 79 m/s  LVOT maxP 5 mmHg  MV A Javon: 0 66 m/s  MV Dec [place]: 3 95 m/s2  MV DecT: 171 74 ms  MV E Javon: 0 68 m/s  MV E/A Ratio: 1 03  MV PHT: 49 8 ms  MVA By PHT: 4 42 cm2    [employer] Accredited Echocardiography Laboratory    Prepared and electronically signed by    Carrie Moeller MD  Signed 2021 20:12:35    No results found for this or any previous visit  No results found for this or any previous visit  No valid procedures specified    Results for orders placed during the hospital encounter of 19    NM myocardial perfusion spect (stress and/or rest)    [facility]  [phone]    Rest/Stress Gated SPECT Myocardial Perfusion Imaging After Exercise    Patient: Tarun Egan  MR number: [id number]  Account number: [de-identified]  : 1940  Age: 69 years  Gender: Female  Status: Outpatient  Location: [facility]  Height: 65 in  Weight: 130 lb  BP: 128/ 80 mmHg    Allergies: NO KNOWN ALLERGIES    Diagnosis: R00 2 - Palpitations, R06 09 - Other forms of dyspnea    Primary Physician:  Vimal Shields DO  Interpreting Physician:  Dorothy Gill MD  Referring Physician:  Dorothy Gill MD  Technician:  Delia Quispe BS, BA, AART(N)  Group:  [facility]  Other:  Alexa Ramirez MS, CCT    INDICATIONS: Detection of CAD    HISTORY: The patient is a 69year old  female  Chest pain status: no chest pain  Other symptoms: dyspnea of recent onset and palpitations  Coronary artery disease risk factors: hypertension and post-menopausal state  Cardiovascular history: none significant  Medications: an antihypertensive agent  REST ECG: Sinus bradycardia  PROCEDURE: The study was performed in the [address]  Treadmill exercise testing was performed, using the Freddie protocol  Gated SPECT myocardial perfusion imaging was performed after stress and at rest  Systolic  blood pressure was 128 mmHg, at the start of the study  Diastolic blood pressure was 80 mmHg, at the start of the study  The heart rate was 54 bpm, at the start of the study  Patient was not experiencing chest pain at the time of the  injection of the radiopharmaceutical     FREDDIE PROTOCOL:  HR bpm SBP mmHg DBP mmHg Symptoms ST change Rhythm/conduct  Baseline 54 128 80 none none sinus veronica  Stage 1 111 150 96 -- -- sinus tach  Stage 2 126 180 80 moderate dyspnea -- sinus tach  Immediate 125 -- -- -- -- sinus tach  Recovery 1 100 160 70 -- -- sinus tach  Recovery 2 63 -- -- -- -- NSR  Recovery 3 58 -- -- -- -- sinus veronica  Recovery 5 61 126 70 -- -- NSR  No medications or fluids given  STRESS SUMMARY: Duration of exercise was 4 min and 30 sec  The patient exercised to protocol stage 2  Maximal work rate was 7 METs   Functional capacity was normal  Maximal heart rate during stress was 126 bpm ( 88 % of maximal predicted  heart rate)  The heart rate response to stress was exaggerated  There was normal resting blood pressure with an appropriate response to stress  The rate-pressure product for the peak heart rate and blood pressure was 06954  There was no  chest pain during stress  The stress test was terminated due to achievement of target heart rate and moderate dyspnea  The stress ECG was negative for ischemia and normal  Arrhythmia during stress: isolated atrial premature beats and  isolated premature ventricular beats  ISOTOPE ADMINISTRATION:  Resting isotope administration Stress isotope administration  Agent Sestamibi Sestamibi  Dose 10 37 mCi 32 1 mCi  Date 06/06/2019 06/06/2019  Injection-image interval 30 min 45 min    The radiopharmaceutical was injected one minute before the end of exercise  The radiopharmaceutical was injected at the peak effect of pharmacologic stress  MYOCARDIAL PERFUSION IMAGING:  The image quality was good  Rotating projection images reveal mild breast attenuation and mild subdiaphragmatic activity  Left ventricular size was normal     PERFUSION DEFECTS:  -  There were no perfusion defects  GATED SPECT:  The calculated left ventricular ejection fraction was 57 %  Left ventricular ejection fraction was within normal limits by visual estimate  There was no diagnostic evidence for left ventricular regional abnormality  SUMMARY:  -  Stress results: Duration of exercise was 4 min and 30 sec  Target heart rate was achieved  There was no chest pain during stress  -  ECG conclusions: The stress ECG was negative for ischemia and normal  Arrhythmia during stress: isolated atrial premature beats and isolated premature ventricular beats  -  Perfusion imaging: There were no perfusion defects   -  Gated SPECT: The calculated left ventricular ejection fraction was 57 %   Left ventricular ejection fraction was within normal limits by visual estimate  There was no diagnostic evidence for left ventricular regional abnormality  IMPRESSIONS: Normal study after maximal exercise  Myocardial perfusion imaging was normal at rest and with stress  Left ventricular systolic function was normal     Prepared and signed by    Daniel Ty MD  Signed 06/06/2019 19:03:58      Meds/Allergies   all current active meds have been reviewed      ASSESSMENT & PLAN     Patient with symptomatic atrial fibrillation  Risks and benefits of transesophageal ECHO and if negative cardioversion discussed at length with patient  Risks and benefits of transesophageal ECHO including but not limited to aspiration, damage to the esophagus, complications related to anesthesia were discussed with the patient  Patient understands the same and wishes to proceed    Risks and benefits of cardioversion including but not limited to aspiration, stroke, complications related to anesthesia were discussed with patient  Patient wishes to proceed  NPO  Consent in chart  Huey Contreras MD  3/29/2022,9:12 AM    Portions of the record may have been created with voice recognition software   Occasional wrong word or "sound a like" substitutions may have occurred due to the inherent limitations of voice recognition software   Read the chart carefully and recognize, using context, where substitutions have occurred

## 2022-03-29 NOTE — ANESTHESIA PREPROCEDURE EVALUATION
Procedure:  LUIS EDUARDO    Relevant Problems   CARDIO   (+) Essential hypertension   (+) Hyperlipidemia      MUSCULOSKELETAL   (+) Arthritis      NEURO/PSYCH   (+) History of osteoporosis        Physical Exam    Airway    Mallampati score: I  TM Distance: >3 FB  Neck ROM: full     Dental   No notable dental hx     Cardiovascular      Pulmonary      Other Findings        Anesthesia Plan  ASA Score- 2     Anesthesia Type- IV sedation with anesthesia with ASA Monitors  Additional Monitors:   Airway Plan:           Plan Factors-Exercise tolerance (METS): <4 METS  Chart reviewed  EKG reviewed  Existing labs reviewed  Patient summary reviewed  Patient is not a current smoker  Induction- intravenous  Postoperative Plan-     Informed Consent- Anesthetic plan and risks discussed with patient  I personally reviewed this patient with the CRNA  Discussed and agreed on the Anesthesia Plan with the CRNA  José Gill

## 2022-03-29 NOTE — DISCHARGE INSTRUCTIONS
Cardioversion   WHAT YOU SHOULD KNOW:   Cardioversion is a procedure to correct arrhythmias, which is when your heart beats too fast or irregularly  Arrhythmias may prevent your body from getting the blood and oxygen it needs  Cardioversion delivers a shock of electricity to your heart to help it return to its normal rhythm  INSTRUCTIONS:   Medicines:   · Anticoagulants    are a type of blood thinner medicine that helps prevent clots  Clots can cause strokes, heart attacks, and death  These medicines may cause you to bleed or bruise more easily  ¨ Watch for bleeding from your gums or nose  Watch for blood in your urine and bowel movements  Use a soft washcloth and a soft toothbrush  If you shave, use an electric razor  Avoid activities that can cause bruising or bleeding  ¨ Tell your healthcare provider about all medicines you take because many medicines cannot be used with anticoagulants  Do not start or stop any medicines unless your healthcare provider tells you to  Tell your dentist and other healthcare providers that you take anticoagulants  Wear a bracelet or necklace that says you take this medicine  ¨ You will need regular blood tests so your healthcare provider can decide how much medicine you need  Take anticoagulants exactly as directed  Tell your healthcare provider right away if you forget to take the medicine, or if you take too much  ¨ If you take warfarin, some foods can change how your blood clots  Do not make major changes to your diet while you take warfarin  Warfarin works best when you eat about the same amount of vitamin K every day  Vitamin K is found in green leafy vegetables, broccoli, grapes, and other foods  Ask for more information about what to eat when you take warfarin  · Heart medicine: This medicine is given to strengthen or regulate your heartbeat  · Take your medicine as directed    Call your healthcare provider if you think your medicine is not helping or if you have side effects  Tell him if you are allergic to any medicine  Keep a list of the medicines, vitamins, and herbs you take  Include the amounts, and when and why you take them  Bring the list or the pill bottles to follow-up visits  Carry your medicine list with you in case of an emergency  Follow up with your cardiologist as directed:  Write down your questions so you remember to ask them during your visits  Contact your cardiologist if:   · You have a fever  · You have new or worsening weakness or tiredness  · You have questions or concerns about your condition or care  Return to the emergency department if:   · You feel like your heart is fluttering or jumping in your chest     · You feel lightheaded or you have fainted  · You have chest pain when you take a deep breath or cough  You may cough up blood  · You have discomfort in your chest that feels like squeezing, pressure, fullness, or pain  · You have pain or discomfort in your back, neck, jaw, stomach, or arm  · You have weakness or numbness in part of your body  · You have sudden trouble breathing  · You become confused or have difficulty speaking  · You have dizziness, a severe headache, or vision loss  © 2014 [employer] is for End User's use only and may not be sold, redistributed or otherwise used for commercial purposes  All illustrations and images included in CareNotes® are the copyrighted property of A D A M , Inc  or Ollie Gage  The above information is an  only  It is not intended as medical advice for individual conditions or treatments  Talk to your doctor, nurse or pharmacist before following any medical regimen to see if it is safe and effective for you  Transesophageal Echocardiogram   WHAT YOU NEED TO KNOW:   A transesophageal echocardiogram (LUIS EDUARDO) is a procedure used to check for problems with your heart   It will also show any problems in the blood vessels near your heart  Sound waves are sent to the heart through a tube inserted into your throat  The sound waves show the structure and function of your heart through pictures on a monitor  DISCHARGE INSTRUCTIONS:   Rest:  Rest until you are fully awake  You may be sleepy for a while after your procedure  Do not eat or drink until you are able to swallow normally:  Start with soft foods, such as oatmeal, yogurt, or applesauce  Once you can eat soft foods easily, you may begin to eat solid foods  Follow up with your healthcare provider as directed:  Write down your questions so you remember to ask them during your visits  Contact your healthcare provider if:   · You have a fever or chills  · You taste blood in your mouth  · You have a severe sore throat or difficulty swallowing  · You have questions or concerns about your condition or care  Seek care immediately or call 911 if:   · You have any of the following signs of a heart attack:      ? Squeezing, pressure, or pain in your chest     ? and  any of the following:     § Discomfort or pain in your back, neck, jaw, stomach, or arm     § Shortness of breath    § Nausea or vomiting    § Lightheadedness or a sudden cold sweat      © Copyright [employer] 2018 Information is for End User's use only and may not be sold, redistributed or otherwise used for commercial purposes  All illustrations and images included in CareNotes® are the copyrighted property of [employer]  or Mannie Hernandez  The above information is an  only  It is not intended as medical advice for individual conditions or treatments  Talk to your doctor, nurse or pharmacist before following any medical regimen to see if it is safe and effective for you

## 2022-03-30 LAB — SL CV LV EF: 50

## 2022-03-30 PROCEDURE — 93312 ECHO TRANSESOPHAGEAL: CPT | Performed by: INTERNAL MEDICINE

## 2022-03-30 PROCEDURE — 93325 DOPPLER ECHO COLOR FLOW MAPG: CPT | Performed by: INTERNAL MEDICINE

## 2022-03-30 PROCEDURE — 93320 DOPPLER ECHO COMPLETE: CPT | Performed by: INTERNAL MEDICINE

## 2022-03-31 ENCOUNTER — TELEPHONE (OUTPATIENT)
Dept: CARDIOLOGY CLINIC | Facility: CLINIC | Age: 82
End: 2022-03-31

## 2022-03-31 NOTE — TELEPHONE ENCOUNTER
----- Message from Hannah Moore PA-C sent at 3/29/2022  9:52 AM EDT -----  Regarding: DCCKAREN Colmenares,     Please schedule this patient for cardioversion only in 3 weeks      Thanks,   [employer]

## 2022-03-31 NOTE — TELEPHONE ENCOUNTER
S/w patient, informed to follow same instructions from Cardioversion prep/info sheet provided for previous Cardioversion  Patient verbally understood and was also informed that if she needs another copy to call the office

## 2022-04-21 ENCOUNTER — TELEPHONE (OUTPATIENT)
Dept: NON INVASIVE DIAGNOSTICS | Facility: HOSPITAL | Age: 82
End: 2022-04-21

## 2022-04-21 RX ORDER — SODIUM CHLORIDE 9 MG/ML
75 INJECTION, SOLUTION INTRAVENOUS CONTINUOUS
Status: CANCELLED | OUTPATIENT
Start: 2022-04-22

## 2022-04-22 ENCOUNTER — TELEPHONE (OUTPATIENT)
Dept: CARDIOLOGY CLINIC | Facility: CLINIC | Age: 82
End: 2022-04-22

## 2022-04-22 ENCOUNTER — HOSPITAL ENCOUNTER (OUTPATIENT)
Dept: NON INVASIVE DIAGNOSTICS | Facility: HOSPITAL | Age: 82
Discharge: HOME | End: 2022-04-22
Attending: INTERNAL MEDICINE

## 2022-04-22 VITALS
SYSTOLIC BLOOD PRESSURE: 150 MMHG | DIASTOLIC BLOOD PRESSURE: 78 MMHG | RESPIRATION RATE: 16 BRPM | BODY MASS INDEX: 20.24 KG/M2 | TEMPERATURE: 97 F | OXYGEN SATURATION: 99 % | HEIGHT: 65 IN | WEIGHT: 121.47 LBS | HEART RATE: 56 BPM

## 2022-04-22 DIAGNOSIS — I48.0 PAROXYSMAL ATRIAL FIBRILLATION (HCC): ICD-10-CM

## 2022-04-22 RX ORDER — AMIODARONE HYDROCHLORIDE 200 MG/1
200 TABLET ORAL DAILY
Qty: 60 TABLET | Refills: 0 | Status: ON HOLD | OUTPATIENT
Start: 2022-04-22 | End: 2022-05-03 | Stop reason: SDUPTHER

## 2022-04-22 RX ORDER — SODIUM CHLORIDE 9 MG/ML
75 INJECTION, SOLUTION INTRAVENOUS CONTINUOUS
Status: DISCONTINUED | OUTPATIENT
Start: 2022-04-22 | End: 2022-04-23 | Stop reason: HOSPADM

## 2022-04-22 NOTE — NURSING NOTE
Patient AAOx3, EKG done and sinus bradycardia noted  MD Cross aware and at beside  Patient stated she had a dizzy spell yesterday  Patient ambulated with Nurse in hallway for 5min and tolerated well without any symptoms  After discussing care  With MD Cross patient stated she would like to go home  Patient verbalized understanding of fall risk and being on blood thinners and well as symptomatic bradycardia and the need to seek help if she was to become symptomatic  Patient stated she wanted to go home and would follow up with Dr Wilson Ly this week

## 2022-04-22 NOTE — TELEPHONE ENCOUNTER
----- Message from Raven Frost, [address] [facility] sent at 4/22/2022  8:57 AM EDT -----  Regarding: Hospital Follow-up  Cardiology Follow-up:    Patient clinical visit in 1 week at the [address] [place] [address] location: [place] office. .    Schedule visit with Cardio Castillo Providers: Everrett Duverney. Type of Visit: VISIT TYPE: in-person office visit. Test ordered: .     Additional Details:

## 2022-04-22 NOTE — PROGRESS NOTES
This 62-year-old lady was scheduled for cardioversion  She is on amiodarone 200 mg twice daily and Toprol 50 mg daily  She took the Toprol this morning  When she arrived to the hospital she was found to have sinus bradycardia with a heart rate of 41  She did state that she had a dizzy spell yesterday and leaned over the couch but did not lose consciousness  She also felt somewhat dizzy today  On exam her lungs are clear  Rhythm is regular  She is in no distress  We ambulated her and her heart rate increased to 66  I suggested that she might stay for observation but she wishes to go home  She will be discharged on amiodarone 200 mg only once daily as well as no further doses of metoprolol  She was advised that if she has any more symptoms that she should return and otherwise she should take it easy at this time  It is anticipated that her heart rate will improve with discontinuing the beta blocker  Recommendations:     1  Amiodarone 200 mg daily  2  Stop beta blocker  3  See Dr Sunday Goltz this week  4  Return if further symptoms  5   Outpatient Holter monitor

## 2022-04-24 ENCOUNTER — HOSPITAL ENCOUNTER (INPATIENT)
Facility: HOSPITAL | Age: 82
LOS: 2 days | DRG: 309 | End: 2022-04-27
Attending: EMERGENCY MEDICINE | Admitting: FAMILY MEDICINE
Payer: COMMERCIAL

## 2022-04-24 ENCOUNTER — APPOINTMENT (EMERGENCY)
Dept: CT IMAGING | Facility: HOSPITAL | Age: 82
DRG: 309 | End: 2022-04-24
Payer: COMMERCIAL

## 2022-04-24 ENCOUNTER — APPOINTMENT (EMERGENCY)
Dept: RADIOLOGY | Facility: HOSPITAL | Age: 82
DRG: 309 | End: 2022-04-24
Payer: COMMERCIAL

## 2022-04-24 DIAGNOSIS — S52.509A DISTAL RADIUS FRACTURE: ICD-10-CM

## 2022-04-24 DIAGNOSIS — I48.91 ATRIAL FIBRILLATION (HCC): ICD-10-CM

## 2022-04-24 DIAGNOSIS — S69.90XA WRIST INJURY: Primary | ICD-10-CM
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
## 2022-04-24 DIAGNOSIS — R42 LIGHTHEADEDNESS: ICD-10-CM

## 2022-04-24 LAB
2HR DELTA HS TROPONIN: 2 NG/L
4HR DELTA HS TROPONIN: 10 NG/L
ALBUMIN SERPL BCP-MCNC: 4.2 G/DL (ref 3.5–5)
ALP SERPL-CCNC: 80 U/L (ref 46–116)
ALT SERPL W P-5'-P-CCNC: 24 U/L (ref 12–78)
ANION GAP SERPL CALCULATED.3IONS-SCNC: 8 MMOL/L (ref 4–13)
APTT PPP: 30 SECONDS (ref 23–37)
AST SERPL W P-5'-P-CCNC: 17 U/L (ref 5–45)
BASOPHILS # BLD AUTO: 0.03 THOUSANDS/ΜL (ref 0–0.1)
BASOPHILS NFR BLD AUTO: 0 % (ref 0–1)
BILIRUB SERPL-MCNC: 0.53 MG/DL (ref 0.2–1)
BUN SERPL-MCNC: 21 MG/DL (ref 5–25)
CALCIUM SERPL-MCNC: 9.4 MG/DL (ref 8.3–10.1)
CARDIAC TROPONIN I PNL SERPL HS: 15 NG/L
CARDIAC TROPONIN I PNL SERPL HS: 5 NG/L
CARDIAC TROPONIN I PNL SERPL HS: 7 NG/L
CHLORIDE SERPL-SCNC: 103 MMOL/L (ref 100–108)
CO2 SERPL-SCNC: 27 MMOL/L (ref 21–32)
CREAT SERPL-MCNC: 1.12 MG/DL (ref 0.6–1.3)
EOSINOPHIL # BLD AUTO: 0.02 THOUSAND/ΜL (ref 0–0.61)
EOSINOPHIL NFR BLD AUTO: 0 % (ref 0–6)
ERYTHROCYTE [DISTWIDTH] IN BLOOD BY AUTOMATED COUNT: 13.4 % (ref 11.6–15.1)
GFR SERPL CREATININE-BSD FRML MDRD: 46 ML/MIN/1.73SQ M
GLUCOSE SERPL-MCNC: 120 MG/DL (ref 65–140)
HCT VFR BLD AUTO: 46.4 % (ref 34.8–46.1)
HGB BLD-MCNC: 15.2 G/DL (ref 11.5–15.4)
IMM GRANULOCYTES # BLD AUTO: 0.02 THOUSAND/UL (ref 0–0.2)
IMM GRANULOCYTES NFR BLD AUTO: 0 % (ref 0–2)
INR PPP: 1.12 (ref 0.84–1.19)
LYMPHOCYTES # BLD AUTO: 1.2 THOUSANDS/ΜL (ref 0.6–4.47)
LYMPHOCYTES NFR BLD AUTO: 12 % (ref 14–44)
MAGNESIUM SERPL-MCNC: 2 MG/DL (ref 1.6–2.6)
MCH RBC QN AUTO: 30.6 PG (ref 26.8–34.3)
MCHC RBC AUTO-ENTMCNC: 32.8 G/DL (ref 31.4–37.4)
MCV RBC AUTO: 93 FL (ref 82–98)
MONOCYTES # BLD AUTO: 0.89 THOUSAND/ΜL (ref 0.17–1.22)
MONOCYTES NFR BLD AUTO: 9 % (ref 4–12)
NEUTROPHILS # BLD AUTO: 7.62 THOUSANDS/ΜL (ref 1.85–7.62)
NEUTS SEG NFR BLD AUTO: 79 % (ref 43–75)
NRBC BLD AUTO-RTO: 0 /100 WBCS
PLATELET # BLD AUTO: 278 THOUSANDS/UL (ref 149–390)
PMV BLD AUTO: 9.2 FL (ref 8.9–12.7)
POTASSIUM SERPL-SCNC: 4.4 MMOL/L (ref 3.5–5.3)
PROT SERPL-MCNC: 7.6 G/DL (ref 6.4–8.2)
PROTHROMBIN TIME: 14 SECONDS (ref 11.6–14.5)
RBC # BLD AUTO: 4.97 MILLION/UL (ref 3.81–5.12)
SODIUM SERPL-SCNC: 138 MMOL/L (ref 136–145)
WBC # BLD AUTO: 9.78 THOUSAND/UL (ref 4.31–10.16)

## 2022-04-24 PROCEDURE — 70450 CT HEAD/BRAIN W/O DYE: CPT

## 2022-04-24 PROCEDURE — 80053 COMPREHEN METABOLIC PANEL: CPT | Performed by: EMERGENCY MEDICINE

## 2022-04-24 PROCEDURE — 85610 PROTHROMBIN TIME: CPT | Performed by: EMERGENCY MEDICINE

## 2022-04-24 PROCEDURE — 85730 THROMBOPLASTIN TIME PARTIAL: CPT | Performed by: EMERGENCY MEDICINE

## 2022-04-24 PROCEDURE — NC001 PR NO CHARGE: Performed by: PHYSICIAN ASSISTANT

## 2022-04-24 PROCEDURE — 99220 PR INITIAL OBSERVATION CARE/DAY 70 MINUTES: CPT | Performed by: FAMILY MEDICINE

## 2022-04-24 PROCEDURE — 36415 COLL VENOUS BLD VENIPUNCTURE: CPT | Performed by: EMERGENCY MEDICINE

## 2022-04-24 PROCEDURE — 84484 ASSAY OF TROPONIN QUANT: CPT | Performed by: EMERGENCY MEDICINE

## 2022-04-24 PROCEDURE — 85025 COMPLETE CBC W/AUTO DIFF WBC: CPT | Performed by: EMERGENCY MEDICINE

## 2022-04-24 PROCEDURE — 29125 APPL SHORT ARM SPLINT STATIC: CPT | Performed by: EMERGENCY MEDICINE

## 2022-04-24 PROCEDURE — 73110 X-RAY EXAM OF WRIST: CPT

## 2022-04-24 PROCEDURE — 99215 OFFICE O/P EST HI 40 MIN: CPT | Performed by: INTERNAL MEDICINE

## 2022-04-24 PROCEDURE — 99285 EMERGENCY DEPT VISIT HI MDM: CPT

## 2022-04-24 PROCEDURE — 99285 EMERGENCY DEPT VISIT HI MDM: CPT | Performed by: EMERGENCY MEDICINE

## 2022-04-24 PROCEDURE — 83735 ASSAY OF MAGNESIUM: CPT | Performed by: EMERGENCY MEDICINE

## 2022-04-24 RX ORDER — TRAMADOL HYDROCHLORIDE 50 MG/1
25 TABLET ORAL EVERY 6 HOURS PRN
Status: DISCONTINUED | OUTPATIENT
Start: 2022-04-24 | End: 2022-04-24

## 2022-04-24 RX ORDER — ONDANSETRON 2 MG/ML
4 INJECTION INTRAMUSCULAR; INTRAVENOUS EVERY 6 HOURS PRN
Status: DISCONTINUED | OUTPATIENT
Start: 2022-04-24 | End: 2022-04-27 | Stop reason: HOSPADM

## 2022-04-24 RX ORDER — AMIODARONE HYDROCHLORIDE 200 MG/1
200 TABLET ORAL DAILY
Status: DISCONTINUED | OUTPATIENT
Start: 2022-04-24 | End: 2022-04-26

## 2022-04-24 RX ORDER — OXYCODONE HYDROCHLORIDE 5 MG/1
2.5 TABLET ORAL EVERY 4 HOURS PRN
Status: DISCONTINUED | OUTPATIENT
Start: 2022-04-24 | End: 2022-04-27 | Stop reason: HOSPADM

## 2022-04-24 RX ORDER — ACETAMINOPHEN 325 MG/1
650 TABLET ORAL EVERY 6 HOURS PRN
Status: DISCONTINUED | OUTPATIENT
Start: 2022-04-24 | End: 2022-04-27 | Stop reason: HOSPADM

## 2022-04-24 RX ORDER — OXYCODONE HYDROCHLORIDE 5 MG/1
5 TABLET ORAL EVERY 4 HOURS PRN
Status: DISCONTINUED | OUTPATIENT
Start: 2022-04-24 | End: 2022-04-27 | Stop reason: HOSPADM

## 2022-04-24 RX ORDER — ACETAMINOPHEN 325 MG/1
650 TABLET ORAL ONCE
Status: COMPLETED | OUTPATIENT
Start: 2022-04-24 | End: 2022-04-24

## 2022-04-24 RX ADMIN — OXYCODONE HYDROCHLORIDE 2.5 MG: 5 TABLET ORAL at 17:51

## 2022-04-24 RX ADMIN — APIXABAN 2.5 MG: 2.5 TABLET, FILM COATED ORAL at 17:49

## 2022-04-24 RX ADMIN — ACETAMINOPHEN 650 MG: 325 TABLET, FILM COATED ORAL at 08:51

## 2022-04-24 RX ADMIN — OXYCODONE HYDROCHLORIDE 5 MG: 5 TABLET ORAL at 22:15

## 2022-04-24 RX ADMIN — TRAMADOL HYDROCHLORIDE 25 MG: 50 TABLET ORAL at 13:53

## 2022-04-24 NOTE — ASSESSMENT & PLAN NOTE
· History of a-fib, has had a cardioversion before; however appears to convert in and out of NSR  · Cardiology consult, appreciate input  · Continue home medication regimen

## 2022-04-24 NOTE — ED PROVIDER NOTES
History  Chief Complaint   Patient presents with    Multiple Falls     Pt brought in by EMS for c/o L wrist pain and swelling due to fall backwards last night  Multiple falls due to dizziness, going in and out of afib  Was seen yesterday for 2nd cardioversion but was in rsr  Feels afib and dizzy spells coming on  Takes eliquis     HPI  [de-identified] yo F with PMH PAF, htn presents with L wrist pain and swelling after falling yesterday evening  She states that she was feeling lightheaded and fell backwards onto her wrist  She was seen two days ago for planned cardioversion and was found to be sinus bradycardia and feeling lightheaded, patient recommended to be admitted for observation but she declined  Denies chest pain, SOB, palpitations  Prior to Admission Medications   Prescriptions Last Dose Informant Patient Reported? Taking? Eliquis 2 5 MG   No No   Sig: TAKE 1 TABLET TWICE A DAY  NEED APPOINTMENT WITH DR Mehnaz Diaz FOR FURTHER REFILLS   Glucosamine-Chondroit-Vit C-Mn (GLUCOSAMINE 1500 COMPLEX PO)  Self Yes No   Sig: Take 1 capsule by mouth daily   amiodarone 200 mg tablet   No No   Sig: Take 1 tablet (200 mg total) by mouth daily   triamcinolone (KENALOG) 0 5 % ointment  Self Yes No   Sig: Apply topically as needed        Facility-Administered Medications: None       Past Medical History:   Diagnosis Date    Atrial fibrillation ([address] )     Hypertension        Past Surgical History:   Procedure Laterality Date    APPENDECTOMY      BREAST SURGERY      BX - Benign     CATARACT EXTRACTION, BILATERAL      DILATION AND CURETTAGE OF UTERUS      LAPAROSCOPY FOR ECTOPIC PREGNANCY      OTHER SURGICAL HISTORY      surgical excision of tubal pregnancy        Family History   Problem Relation Age of Onset    Hypertension Mother     Ovarian cancer Mother     Pneumonia Father      I have reviewed and agree with the history as documented      E-Cigarette/Vaping    E-Cigarette Use Never User      E-Cigarette/Vaping Substances  Nicotine No     THC No     CBD No     Flavoring No     Other No     Unknown No      Social History     Tobacco Use    Smoking status: Former Smoker     Packs/day: 0 50     Years: 20 00     Pack years: 10 00     Quit date: 2006     Years since quittin 3    Smokeless tobacco: Never Used   Vaping Use    Vaping Use: Never used   Substance Use Topics    Alcohol use: Yes     Comment: social     Drug use: No       Review of Systems   Constitutional: Negative for chills and fever  HENT: Negative for dental problem and ear pain  Eyes: Negative for pain and redness  Respiratory: Negative for cough and shortness of breath  Cardiovascular: Negative for chest pain and palpitations  Gastrointestinal: Negative for abdominal pain and nausea  Endocrine: Negative for polydipsia and polyphagia  Genitourinary: Negative for dysuria and frequency  Musculoskeletal: Positive for arthralgias  Negative for joint swelling  Skin: Negative for color change and rash  Neurological: Positive for light-headedness  Negative for dizziness and headaches  Psychiatric/Behavioral: Negative for behavioral problems and confusion  All other systems reviewed and are negative  Physical Exam  Physical Exam  Vitals and nursing note reviewed  Constitutional:       General: She is not in acute distress  Appearance: She is well-developed  She is not diaphoretic  HENT:      Head: Atraumatic  Right Ear: External ear normal       Left Ear: External ear normal       Nose: Nose normal    Eyes:      Conjunctiva/sclera: Conjunctivae normal       Pupils: Pupils are equal, round, and reactive to light  Neck:      Vascular: No JVD  Cardiovascular:      Rate and Rhythm: Normal rate and regular rhythm  Heart sounds: Normal heart sounds  No murmur heard  Pulmonary:      Effort: Pulmonary effort is normal  No respiratory distress  Breath sounds: Normal breath sounds  No wheezing     Abdominal: General: Bowel sounds are normal  There is no distension  Palpations: Abdomen is soft  Tenderness: There is no abdominal tenderness  Musculoskeletal:         General: Normal range of motion  Cervical back: Normal range of motion and neck supple  Comments: L wrist TTP, decreased ROM due to pain, radial pulse 2+, normal sensation   Skin:     General: Skin is warm and dry  Capillary Refill: Capillary refill takes less than 2 seconds  Neurological:      Mental Status: She is alert and oriented to person, place, and time  Cranial Nerves: No cranial nerve deficit  Psychiatric:         Behavior: Behavior normal          Vital Signs  ED Triage Vitals   Temperature Pulse Respirations Blood Pressure SpO2   04/24/22 0830 04/24/22 0805 04/24/22 0805 04/24/22 0805 04/24/22 0805   97 7 °F (36 5 °C) 94 14 (!) 183/112 98 %      Temp Source Heart Rate Source Patient Position - Orthostatic VS BP Location FiO2 (%)   04/24/22 0830 04/24/22 0805 04/24/22 0805 04/24/22 0805 --   Oral Monitor Sitting Left arm       Pain Score       04/24/22 0805       5           Vitals:    04/24/22 0805 04/24/22 1030   BP: (!) 183/112 156/100   Pulse: 94 99   Patient Position - Orthostatic VS: Sitting Lying         Visual Acuity      ED Medications  Medications   acetaminophen (TYLENOL) tablet 650 mg (650 mg Oral Given 4/24/22 0851)       Diagnostic Studies  Results Reviewed     Procedure Component Value Units Date/Time    HS Troponin I 2hr [[id number]] Collected: 04/24/22 1108    Lab Status:  In process Specimen: Blood from Arm, Right Updated: 04/24/22 1114    HS Troponin I 4hr [[id number]]     Lab Status: No result Specimen: Blood     HS Troponin 0hr (reflex protocol) [[id number]]  (Normal) Collected: 04/24/22 0836    Lab Status: Final result Specimen: Blood from Arm, Right Updated: 04/24/22 0922     hs TnI 0hr 5 ng/L     Comprehensive metabolic panel [[id number]] Collected: 04/24/22 0836    Lab Status: Final result Specimen: Blood from Arm, Right Updated: 04/24/22 0912     Sodium 138 mmol/L      Potassium 4 4 mmol/L      Chloride 103 mmol/L      CO2 27 mmol/L      ANION GAP 8 mmol/L      BUN 21 mg/dL      Creatinine 1 12 mg/dL      Glucose 120 mg/dL      Calcium 9 4 mg/dL      AST 17 U/L      ALT 24 U/L      Alkaline Phosphatase 80 U/L      Total Protein 7 6 g/dL      Albumin 4 2 g/dL      Total Bilirubin 0 53 mg/dL      eGFR 46 ml/min/1 73sq m     Narrative:      [place] guidelines for Chronic Kidney Disease (CKD):     Stage 1 with normal or high GFR (GFR > 90 mL/min/1 73 square meters)    Stage 2 Mild CKD (GFR = 60-89 mL/min/1 73 square meters)    Stage 3A Moderate CKD (GFR = 45-59 mL/min/1 73 square meters)    Stage 3B Moderate CKD (GFR = 30-44 mL/min/1 73 square meters)    Stage 4 Severe CKD (GFR = 15-29 mL/min/1 73 square meters)    Stage 5 End Stage CKD (GFR <15 mL/min/1 73 square meters)  Note: GFR calculation is accurate only with a steady state creatinine    Magnesium [[id number]]  (Normal) Collected: 04/24/22 0836    Lab Status: Final result Specimen: Blood from Arm, Right Updated: 04/24/22 0912     Magnesium 2 0 mg/dL     Protime-INR [[id number]]  (Normal) Collected: 04/24/22 0836    Lab Status: Final result Specimen: Blood from Arm, Right Updated: 04/24/22 0906     Protime 14 0 seconds      INR 1 12    APTT [[id number]]  (Normal) Collected: 04/24/22 0836    Lab Status: Final result Specimen: Blood from Arm, Right Updated: 04/24/22 0906     PTT 30 seconds     CBC and differential [[id number]]  (Abnormal) Collected: 04/24/22 0836    Lab Status: Final result Specimen: Blood from Arm, Right Updated: 04/24/22 0848     WBC 9 78 Thousand/uL      RBC 4 97 Million/uL      Hemoglobin 15 2 g/dL      Hematocrit 46 4 %      MCV 93 fL      MCH 30 6 pg      MCHC 32 8 g/dL      RDW 13 4 %      MPV 9 2 fL      Platelets 311 Thousands/uL      nRBC 0 /100 WBCs      Neutrophils Relative 79 %      Immat GRANS % 0 %      Lymphocytes Relative 12 %      Monocytes Relative 9 %      Eosinophils Relative 0 %      Basophils Relative 0 %      Neutrophils Absolute 7 62 Thousands/µL      Immature Grans Absolute 0 02 Thousand/uL      Lymphocytes Absolute 1 20 Thousands/µL      Monocytes Absolute 0 89 Thousand/µL      Eosinophils Absolute 0 02 Thousand/µL      Basophils Absolute 0 03 Thousands/µL                  XR wrist 3+ views LEFT   Final Result by Megan Shah MD (04/24 1022)      There is a fracture of the distal left radius, involving the articular surface, without significant displacement  The study was marked in [facility] for immediate notification  Workstation performed: [id number]         CT head without contrast   Final Result by Darian Crain MD (04/24 8103)      No acute intracranial abnormality  Microangiopathic changes  Workstation performed: [id number]                    Procedures  ECG 12 Lead Documentation Only    Date/Time: 4/24/2022 8:18 AM  Performed by: Cachorro Bhardwaj MD  Authorized by: Cachorro Bhardwaj MD     Comments:      Atrial fibrillation with RVR rate of 101 normal axis and intervals no acute ST elevations or depressions    Splint application    Date/Time: 4/24/2022 10:08 AM  Performed by: Cachorro Bhardwaj MD  Authorized by: Cachorro Bhardwaj MD   Universal Protocol:  Consent given by: patient  Patient identity confirmed: verbally with patient and arm band      Pre-procedure details:     Sensation:  Normal  Procedure details:     Laterality:  Left    Splint type:  Short arm splint, static (forearm to hand)    Supplies:  Cotton padding and Ortho-Glass             ED Course               Identification of Seniors at Risk      Most Recent Value   (ISAR) Identification of Seniors at Risk    Before the illness or injury that brought you to the Emergency, did you need someone to help you on a regular basis?  0 Filed at: 04/24/2022 0803   In the last 24 hours, have you needed more help than usual? 0 Filed at: 04/24/2022 5669   Have you been hospitalized for one or more nights during the past 6 months? 0 Filed at: 04/24/2022 6986   In general, do you see well? 0 Filed at: 04/24/2022 6692   In general, do you have serious problems with your memory? 0 Filed at: 04/24/2022 0803   Do you take more than three different medications every day? 1 Filed at: 04/24/2022 0803   ISAR Score 1 Filed at: 04/24/2022 0803                                    MDM   [de-identified] yo F with PAF on eliquis here after fall last night due to lightheadedness  Xray shows distal radius fracture, nondisplaced  Splint placed  CT head negative and labs unremarkable  Given symptomatic arrhythmia will admit on telemetry with cardiology consult  Disposition  Final diagnoses:   Wrist injury   Atrial fibrillation ([facility] [address] )   Lightheadedness   Distal radius fracture     Time reflects when diagnosis was documented in both MDM as applicable and the Disposition within this note     Time User Action Codes Description Comment    4/24/2022  9:34 AM Nan Coupe Add [S69 90XA] Wrist injury     4/24/2022  9:34 AM Nan Coupe Add [I48 91] Atrial fibrillation ([facility] [address] )     4/24/2022  9:34 AM Nan Coupe Add [R42] Lightheadedness     4/24/2022 11:25 AM Nan Coupe Add [O46 664S] Distal radius fracture       ED Disposition     ED Disposition Condition Date/Time Comment    Admit Stable Sun Apr 24, 2022  9:54 AM Case was discussed with Dr Monica Sheehan and the patient's admission status was agreed to be Admission Status: observation status to the service of Dr Monica Sheehan   Follow-up Information    None         Patient's Medications   Discharge Prescriptions    No medications on file       No discharge procedures on file      PDMP Review     None          ED Provider  Electronically Signed by           Briana Zamarripa MD  04/24/22 84 Layne Finn MD  04/24/22 2036

## 2022-04-24 NOTE — ASSESSMENT & PLAN NOTE
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

## 2022-04-24 NOTE — ASSESSMENT & PLAN NOTE
· Chronic  · Continue home medication regimen  · Above goal; will discuss restarting patients Losartan with cardiology

## 2022-04-24 NOTE — ASSESSMENT & PLAN NOTE
· Patient presents to the ED with complaints of multiple falls; brought in by EMS after patient fell and complained of left wrist pain/swelling  Patient reports multiple falls lately due to being dizzy  · Was to be seen by cardiology later this week for possible cardioversion  · Of note, she is on eliquis  · Left wrist x-ray (4/24/22); Notes a left wrist fracture; however, does not appear to have a fracture when reviewed    · PT/OT  · Fall precautions  · Monitor on telemetry

## 2022-04-24 NOTE — H&P
[facility]  H&P- Genesis Rosales 1940, [de-identified] y o  female MRN: [id number]  Unit/Bed#: FT 04 Encounter: [id number]  Primary Care Provider: Claribel Vargas DO   Date and time admitted to hospital: 4/24/2022  7:48 AM    * Fall  Assessment & Plan  · Patient presents to the ED with complaints of multiple falls; brought in by EMS after patient fell and complained of left wrist pain/swelling  Patient reports multiple falls lately due to being dizzy  · Was to be seen by cardiology later this week for possible cardioversion  · Of note, she is on eliquis  · Left wrist x-ray (4/24/22); Notes a left wrist fracture  · Orthopedic surgery consulted  · Splint in place  · PT/OT  · Fall precautions  · Monitor on telemetry    Atrial fibrillation ([facility] [address] )  Assessment & Plan  · History of a-fib, has had a cardioversion before; however appears to convert in and out of NSR  · Cardiology consult, appreciate input  · Continue home medication regimen    Hyperlipidemia  Assessment & Plan  · Chronic  · Continue home medication regimen    Essential hypertension  Assessment & Plan  · Chronic  · Continue home medication regimen  · Above goal; will discuss restarting patients Losartan with cardiology  VTE Pharmacologic Prophylaxis: VTE Score: 3 Moderate Risk (Score 3-4) - Pharmacological DVT Prophylaxis Ordered: apixaban (Eliquis)  Code Status: Level 1 - Full Code   Discussion with family: Patient declined call to   Anticipated Length of Stay: Patient will be admitted on an observation basis with an anticipated length of stay of less than 2 midnights secondary to falls; a-fib  Total Time for Visit, including Counseling / Coordination of Care: 60 minutes Greater than 50% of this total time spent on direct patient counseling and coordination of care      Chief Complaint: Falls x 2 since thursday    History of Present Illness:  Genesis Rosales is a [de-identified] y o  female with a PMH of atrial fibrillation, HTN, Cardiomyopathy who presents with multiple falls at home since Thursday, left wrist pain and swelling  She had an x-ray completed of her left wrist with concerns of a fracture; left wrist was splinted  She reports two falls for 4 days  Patient also with history of a-fib, was to come to the hospital for a cardioversion, however, when she was placed on the monitor at that time she was in NSR  Cardiology consulted  Denies chest pain, shortness of breath, fever, or chills at home  Review of Systems:  Review of Systems   Constitutional: Negative for activity change, appetite change, fatigue and fever  HENT: Negative for congestion, sinus pressure, sinus pain and sneezing  Eyes: Negative for pain, discharge, redness and itching  Respiratory: Negative for apnea, choking, shortness of breath and wheezing  Cardiovascular: Negative for chest pain, palpitations and leg swelling  Gastrointestinal: Negative for abdominal distention, diarrhea, nausea and vomiting  Endocrine: Negative for cold intolerance, heat intolerance, polydipsia and polyphagia  Genitourinary: Negative for difficulty urinating, dysuria, frequency and urgency  Musculoskeletal: Negative for arthralgias, joint swelling, myalgias and neck pain  Skin: Negative for color change, pallor, rash and wound  Neurological: Positive for weakness (Falls at home x 2)  Negative for dizziness, syncope and facial asymmetry  Hematological: Negative for adenopathy  Does not bruise/bleed easily  Psychiatric/Behavioral: Negative for agitation, decreased concentration, dysphoric mood and hallucinations         Past Medical and Surgical History:   Past Medical History:   Diagnosis Date    Atrial fibrillation ([address] )     Hypertension        Past Surgical History:   Procedure Laterality Date    APPENDECTOMY      BREAST SURGERY      BX - Benign     CATARACT EXTRACTION, BILATERAL      DILATION AND CURETTAGE OF UTERUS      LAPAROSCOPY FOR ECTOPIC PREGNANCY      OTHER SURGICAL HISTORY      surgical excision of tubal pregnancy        Meds/Allergies:  Prior to Admission medications    Medication Sig Start Date End Date Taking? Authorizing Provider   amiodarone 200 mg tablet Take 1 tablet (200 mg total) by mouth daily 22   DAVID Ching 2 5 MG TAKE 1 TABLET TWICE A DAY  NEED APPOINTMENT WITH DR Vanessa Acosta FOR FURTHER REFILLS 3/9/22   Caitlin Romero MD   Glucosamine-Chondroit-Vit C-Mn (GLUCOSAMINE 1500 COMPLEX PO) Take 1 capsule by mouth daily 3/11/14   Historical Provider, MD   triamcinolone (KENALOG) 0 5 % ointment Apply topically as needed   10/23/20 4/22/22  Historical Provider, MD     I have reviewed home medications with patient personally  Allergies: No Known Allergies    Social History:  Marital Status: /Civil Union   Occupation:   Patient Pre-hospital Living Situation: Home  Patient Pre-hospital Level of Mobility: walks  Patient Pre-hospital Diet Restrictions:   Substance Use History:   Social History     Substance and Sexual Activity   Alcohol Use Yes    Comment: social      Social History     Tobacco Use   Smoking Status Former Smoker    Packs/day: 0 50    Years: 20 00    Pack years: 10 00    Quit date: 2006    Years since quittin 3   Smokeless Tobacco Never Used     Social History     Substance and Sexual Activity   Drug Use No       Family History:  Family History   Problem Relation Age of Onset    Hypertension Mother     Ovarian cancer Mother     Pneumonia Father        Physical Exam:     Vitals:   Blood Pressure: (!) 165/104 (22 1300)  Pulse: 105 (22 1300)  Temperature: 97 7 °F (36 5 °C) (22 0830)  Temp Source: Oral (22 0830)  Respirations: 21 (22 1300)  SpO2: 95 % (22 1300)    Physical Exam  Vitals and nursing note reviewed  Constitutional:       General: She is not in acute distress  Appearance: She is normal weight  She is ill-appearing     Cardiovascular: Rate and Rhythm: Tachycardia present  Rhythm irregularly irregular  Pulses: Normal pulses  Heart sounds: Normal heart sounds  Pulmonary:      Effort: Pulmonary effort is normal       Breath sounds: Normal breath sounds  Abdominal:      General: Bowel sounds are normal       Palpations: Abdomen is soft  Musculoskeletal:         General: Normal range of motion  Right lower leg: No edema  Left lower leg: No edema  Comments: Splint noted to left wrist   Skin:     General: Skin is warm and dry  Comments: Chronic venous stasis BLE   Neurological:      Mental Status: She is alert and oriented to person, place, and time  Psychiatric:         Mood and Affect: Mood normal           Additional Data:     Lab Results:  Results from last 7 days   Lab Units 04/24/22  0836   WBC Thousand/uL 9 78   HEMOGLOBIN g/dL 15 2   HEMATOCRIT % 46 4*   PLATELETS Thousands/uL 278   NEUTROS PCT % 79*   LYMPHS PCT % 12*   MONOS PCT % 9   EOS PCT % 0     Results from last 7 days   Lab Units 04/24/22  0836   SODIUM mmol/L 138   POTASSIUM mmol/L 4 4   CHLORIDE mmol/L 103   CO2 mmol/L 27   BUN mg/dL 21   CREATININE mg/dL 1 12   ANION GAP mmol/L 8   CALCIUM mg/dL 9 4   ALBUMIN g/dL 4 2   TOTAL BILIRUBIN mg/dL 0 53   ALK PHOS U/L 80   ALT U/L 24   AST U/L 17   GLUCOSE RANDOM mg/dL 120     Results from last 7 days   Lab Units 04/24/22  0836   INR  1 12                   Imaging: Reviewed radiology reports from this admission including: CT head and xray(s)  XR wrist 3+ views LEFT   Final Result by Connie Wheeler MD (04/24 6712)      There is a fracture of the distal left radius, involving the articular surface, without significant displacement  The study was marked in [facility] for immediate notification  Workstation performed: [id number]         CT head without contrast   Final Result by Jorge Cobb MD (04/24 4300)      No acute intracranial abnormality  Microangiopathic changes  Workstation performed: [id number]             EKG and Other Studies Reviewed on Admission:   · EKG: Atrial fibrillation    ** Please Note: This note has been constructed using a voice recognition system   **

## 2022-04-25 ENCOUNTER — TELEPHONE (OUTPATIENT)
Dept: CARDIOLOGY CLINIC | Facility: CLINIC | Age: 82
End: 2022-04-25

## 2022-04-25 PROBLEM — S62.102A CLOSED FRACTURE OF LEFT WRIST: Status: ACTIVE | Noted: 2022-04-25

## 2022-04-25 LAB
ALBUMIN SERPL BCP-MCNC: 3.5 G/DL (ref 3.5–5)
ALP SERPL-CCNC: 71 U/L (ref 46–116)
ALT SERPL W P-5'-P-CCNC: 18 U/L (ref 12–78)
ANION GAP SERPL CALCULATED.3IONS-SCNC: 7 MMOL/L (ref 4–13)
AST SERPL W P-5'-P-CCNC: 14 U/L (ref 5–45)
BILIRUB SERPL-MCNC: 1.13 MG/DL (ref 0.2–1)
BUN SERPL-MCNC: 17 MG/DL (ref 5–25)
CALCIUM SERPL-MCNC: 8.8 MG/DL (ref 8.3–10.1)
CHLORIDE SERPL-SCNC: 102 MMOL/L (ref 100–108)
CO2 SERPL-SCNC: 26 MMOL/L (ref 21–32)
CREAT SERPL-MCNC: 0.92 MG/DL (ref 0.6–1.3)
ERYTHROCYTE [DISTWIDTH] IN BLOOD BY AUTOMATED COUNT: 13.5 % (ref 11.6–15.1)
GFR SERPL CREATININE-BSD FRML MDRD: 58 ML/MIN/1.73SQ M
GLUCOSE P FAST SERPL-MCNC: 114 MG/DL (ref 65–99)
GLUCOSE SERPL-MCNC: 114 MG/DL (ref 65–140)
HCT VFR BLD AUTO: 44 % (ref 34.8–46.1)
HGB BLD-MCNC: 14.7 G/DL (ref 11.5–15.4)
MAGNESIUM SERPL-MCNC: 1.8 MG/DL (ref 1.6–2.6)
MCH RBC QN AUTO: 30.8 PG (ref 26.8–34.3)
MCHC RBC AUTO-ENTMCNC: 33.4 G/DL (ref 31.4–37.4)
MCV RBC AUTO: 92 FL (ref 82–98)
PHOSPHATE SERPL-MCNC: 3.9 MG/DL (ref 2.3–4.1)
PLATELET # BLD AUTO: 236 THOUSANDS/UL (ref 149–390)
PMV BLD AUTO: 9.1 FL (ref 8.9–12.7)
POTASSIUM SERPL-SCNC: 4.1 MMOL/L (ref 3.5–5.3)
PROT SERPL-MCNC: 6.8 G/DL (ref 6.4–8.2)
RBC # BLD AUTO: 4.78 MILLION/UL (ref 3.81–5.12)
SODIUM SERPL-SCNC: 135 MMOL/L (ref 136–145)
WBC # BLD AUTO: 8.99 THOUSAND/UL (ref 4.31–10.16)

## 2022-04-25 PROCEDURE — 80053 COMPREHEN METABOLIC PANEL: CPT | Performed by: NURSE PRACTITIONER

## 2022-04-25 PROCEDURE — 85027 COMPLETE CBC AUTOMATED: CPT | Performed by: NURSE PRACTITIONER

## 2022-04-25 PROCEDURE — 83735 ASSAY OF MAGNESIUM: CPT | Performed by: NURSE PRACTITIONER

## 2022-04-25 PROCEDURE — 84100 ASSAY OF PHOSPHORUS: CPT | Performed by: NURSE PRACTITIONER

## 2022-04-25 PROCEDURE — 99232 SBSQ HOSP IP/OBS MODERATE 35: CPT | Performed by: PHYSICIAN ASSISTANT

## 2022-04-25 PROCEDURE — 99222 1ST HOSP IP/OBS MODERATE 55: CPT

## 2022-04-25 PROCEDURE — 99232 SBSQ HOSP IP/OBS MODERATE 35: CPT | Performed by: INTERNAL MEDICINE

## 2022-04-25 RX ADMIN — ACETAMINOPHEN 650 MG: 325 TABLET ORAL at 18:00

## 2022-04-25 RX ADMIN — OXYCODONE HYDROCHLORIDE 2.5 MG: 5 TABLET ORAL at 22:18

## 2022-04-25 RX ADMIN — ACETAMINOPHEN 650 MG: 325 TABLET ORAL at 10:41

## 2022-04-25 RX ADMIN — OXYCODONE HYDROCHLORIDE 5 MG: 5 TABLET ORAL at 08:07

## 2022-04-25 RX ADMIN — OXYCODONE HYDROCHLORIDE 5 MG: 5 TABLET ORAL at 03:00

## 2022-04-25 RX ADMIN — AMIODARONE HYDROCHLORIDE 0.5 MG/MIN: 50 INJECTION, SOLUTION INTRAVENOUS at 13:11

## 2022-04-25 RX ADMIN — APIXABAN 2.5 MG: 2.5 TABLET, FILM COATED ORAL at 08:07

## 2022-04-25 RX ADMIN — APIXABAN 2.5 MG: 2.5 TABLET, FILM COATED ORAL at 18:00

## 2022-04-25 RX ADMIN — AMIODARONE HYDROCHLORIDE 200 MG: 200 TABLET ORAL at 08:07

## 2022-04-25 NOTE — UTILIZATION REVIEW
Initial Clinical Review    OBS order 4/24 0954 converted to IP on 4/25 @ 1414 for cardiac eval and transfer to [facility] for higher level of care     04/25/22 1414  Inpatient Admission  Once         04/25/22 1413   04/25/22 1407  Transfer to other facility  Once       Comments:  Med-surg TELE for EP evaluatio       04/25/22 1407   04/24/22 0954  Place in Observation  (ED Bridging Orders Panel)  Once         04/24/22 0954       ED Arrival Information     Expected Arrival Acuity    - 4/24/2022 07:47 Urgent         Means of arrival Escorted by Service Admission type    Ambulance [facility] EMS Hospitalist Urgent         Arrival complaint    Fall Left Wrist Pain         Chief Complaint   Patient presents with    Multiple Falls     Pt brought in by EMS for c/o L wrist pain and swelling due to fall backwards last night  Multiple falls due to dizziness, going in and out of afib  Was seen yesterday for 2nd cardioversion but was in rsr  Feels afib and dizzy spells coming on  Takes eliquis       Initial Presentation: [de-identified] y o  female  To ED from home w/ multiple falls since thurs , L wrist pain and swelling   Xray revealed concerns for fx and was splinted  PMHX afib , HTN , cardiomyopathy, HTN    Admitted OBS status for fall ,afib and HLD , HTN   Plan for ortho and cardiology consult , PT OT , tele , cont home meds  4/24 Cardiology Consult  Paroxysmal atrial fibrillation alternating with sinus bradycardia  Near syncope  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  She appears to have benefited from the amiodarone  She is now off the beta blocker  I recommended she have a permanent pacemaker  She wishes to think about it for 24 hours  4/25 Ortho Consult   L distal radius fracture   Plan NWB LUE , maintain splint , pain control , PT OT eval       4/25 IM Note   Pt w/ tachy-veronica syndrome plan for ER eval and transfer to B   Cont home meds  PT OT consult  4/25 Cardiology Note    afib /flutter   Paroxsymal Atrial Flutter with RVR, HR  130-140's  Continue oral Amiodarone  Start Amiodarone gtt  Monitor tele  Will discuss with EP about the need for Aflutter ablation during this admission  Continue eliquis  4/26 IM Note   Plan to transfer to B   Cont tele   Discussed w/ EP cont amiodarone gtt   HTN hold losartan   4/26 Ortho Note   L distal radius fracture   NWB , LUE w/ splint intact   PT OT shoulder , elbow and digit ROM   Pain control  ,DVT prophy        ED Triage Vitals   Temperature Pulse Respirations Blood Pressure SpO2   04/24/22 0830 04/24/22 0805 04/24/22 0805 04/24/22 0805 04/24/22 0805   97 7 °F (36 5 °C) 94 14 (!) 183/112 98 %      Temp Source Heart Rate Source Patient Position - Orthostatic VS BP Location FiO2 (%)   04/24/22 0830 04/24/22 0805 04/24/22 0805 04/24/22 0805 --   Oral Monitor Sitting Left arm       Pain Score       04/24/22 0803       6          Wt Readings from Last 1 Encounters:   04/24/22 55 7 kg (122 lb 12 7 oz)     Additional Vital Signs:   04/26/22 07:53:59 98 6 °F (37 °C) 71 18 122/74 90 91 % -- --   04/26/22 03:28:22 98 5 °F (36 9 °C) 73 -- 117/69 85 90 % -- --   04/25/22 23:15:27 98 5 °F (36 9 °C) 67 18 116/67 83 87 % Abnormal  -- --   04/25/22 1930 -- -- -- -- -- -- None (Room air) --   04/25/22 15:24:30 98 3 °F (36 8 °C) 79 18 129/88 102 93 % -- --   04/25/22 13:15:42 -- 82 -- 119/78 92 92 % -- --   04/25/22 0700 -- 105 -- -- -- 90 % -- --       04/25/22 06:04:54 98 5 °F (36 9 °C) 93 -- 126/78 94 89 % Abnormal  -- --   04/25/22 06:02:56 98 5 °F (36 9 °C) 132 Abnormal  -- 126/78 94 90 % -- --   04/24/22 22:35:33 98 6 °F (37 °C) 99 -- 142/98 113 92 % -- --   04/24/22 2211 -- -- -- -- -- -- None (Room air) --   04/24/22 21:28:27 97 6 °F (36 4 °C) 113 Abnormal  -- 141/95 110 93 % -- --   04/24/22 1300 -- 105 21 165/104 Abnormal  127 95 % None (Room air) Lying   04/24/22 1200 -- 93 21 168/102 Abnormal  127 95 % None (Room air) Lying   04/24/22 1030 -- 99 14 156/100 123 95 % None (Room air) Lying 04/24/22 0830 97 7 °F (36 5 °C) -- -- -- -- -- -- --   04/24/22 0805 -- 94 14 183/112 Abnormal  -- 98 % None (Room air) Sitting       Pertinent Labs/Diagnostic Test Results:   4/24 EKG Atrial fibrillation with RVR rate of 101 normal axis and intervals no acute ST elevations or depressions  XR wrist 3+ views LEFT   Final Result by Sam Martell MD (04/24 1022)      There is a fracture of the distal left radius, involving the articular surface, without significant displacement  The study was marked in [facility] for immediate notification  Workstation performed: [id number]         CT head without contrast   Final Result by Carisa Bocanegra MD (04/24 5023)      No acute intracranial abnormality  Microangiopathic changes                    Workstation performed: [id number]           Results from last 7 days   Lab Units 04/25/22  0557 04/24/22  0836   WBC Thousand/uL 8 99 9 78   HEMOGLOBIN g/dL 14 7 15 2   HEMATOCRIT % 44 0 46 4*   PLATELETS Thousands/uL 236 278   NEUTROS ABS Thousands/µL  --  7 62     Results from last 7 days   Lab Units 04/25/22  0557 04/24/22  0836   SODIUM mmol/L 135* 138   POTASSIUM mmol/L 4 1 4 4   CHLORIDE mmol/L 102 103   CO2 mmol/L 26 27   ANION GAP mmol/L 7 8   BUN mg/dL 17 21   CREATININE mg/dL 0 92 1 12   EGFR ml/min/1 73sq m 58 46   CALCIUM mg/dL 8 8 9 4   MAGNESIUM mg/dL 1 8 2 0   PHOSPHORUS mg/dL 3 9  --      Results from last 7 days   Lab Units 04/25/22  0557 04/24/22  0836   AST U/L 14 17   ALT U/L 18 24   ALK PHOS U/L 71 80   TOTAL PROTEIN g/dL 6 8 7 6   ALBUMIN g/dL 3 5 4 2   TOTAL BILIRUBIN mg/dL 1 13* 0 53     Results from last 7 days   Lab Units 04/25/22  0557 04/24/22  0836   GLUCOSE RANDOM mg/dL 114 120     Results from last 7 days   Lab Units 04/24/22  1249 04/24/22  1108 04/24/22  0836   HS TNI 0HR ng/L  --   --  5   HS TNI 2HR ng/L  --  7  --    HSTNI D2 ng/L  --  2  --    HS TNI 4HR ng/L 15  --   --    HSTNI D4 ng/L 10  --   --      Results from last 7 days   Lab Units 04/24/22  0836   PROTIME seconds 14 0   INR  1 12   PTT seconds 30   ED Treatment:   Medication Administration from 04/24/2022 0747 to 04/24/2022 2105       Date/Time Order Dose Route Action     04/24/2022 0851 acetaminophen (TYLENOL) tablet 650 mg 650 mg Oral Given     04/24/2022 1749 apixaban (ELIQUIS) tablet 2 5 mg 2 5 mg Oral Given     04/24/2022 1353 traMADol (ULTRAM) tablet 25 mg 25 mg Oral Given     04/24/2022 1751 oxyCODONE (ROXICODONE) IR tablet 2 5 mg 2 5 mg Oral Given        Past Medical History:   Diagnosis Date    Atrial fibrillation ([facility] )     Hypertension      Present on Admission:   Essential hypertension   Hyperlipidemia   Fall   Atrial fibrillation ([facility] )   Closed fracture of left wrist      Admitting Diagnosis: Atrial fibrillation ([facility] ) [I48 91]  Lightheadedness [R42]  Wrist injury [S69 90XA]  Weakness [R53 1]  Fall [W19  XXXA]  Distal radius fracture [S52 509A]  Age/Sex: [de-identified] y o  female  Admission Orders:  Scheduled Medications:  apixaban, 2 5 mg, Oral, BID  senna-docusate sodium, 1 tablet, Oral, BID      Continuous IV Infusions:  amiodarone, 0 5 mg/min, Intravenous, Continuous      PRN Meds:  acetaminophen, 650 mg, Oral, Q6H PRN  ondansetron, 4 mg, Intravenous, Q6H PRN  oxyCODONE, 2 5 mg, Oral, Q4H PRN  oxyCODONE, 5 mg, Oral, Q4H PRN    NPO to cardiac diet  OT PT eval   Tele       IP CONSULT TO CARDIOLOGY  IP CONSULT TO ORTHOPEDIC SURGERY    Network Utilization Review Department  ATTENTION: Please call with any questions or concerns to [phone] and carefully listen to the prompts so that you are directed to the right person  All voicemails are confidential   Maryuri Morejon all requests for admission clinical reviews, approved or denied determinations and any other requests to dedicated fax number below belonging to the campus where the patient is receiving treatment   List of dedicated fax numbers for the Facilities:  FACILITY NAME UR FAX NUMBER   ADMISSION DENIALS (Administrative/Medical Necessity) [facility] (Maternity/NICU/Pediatrics) [address] [place] [address],[address] [facility] [address] [place] [address]  [phone]   [id number] [address] Katia Lopez 14[address] [phone]   [address] [phone]

## 2022-04-25 NOTE — OCCUPATIONAL THERAPY NOTE
Occupational Therapy Cancellation Note        Patient Name: Nasreen Jauregui  VLDGS'Y Date: 4/25/2022 04/25/22 1045   OT Last Visit   OT Visit Date 04/25/22   Note Type   Note type Cancelled Session   Cancel Reasons Medical status   Additional Comments OT order received, chart review performed  At this time, OT evaluation cancelled as pt with elevated HR on telemetry, discussed with ANNA Conner  OT will follow and evaluate as medically appropriate       Saniya Baron OTSAMINA/L

## 2022-04-25 NOTE — TELEPHONE ENCOUNTER
Leonidas Dave called to cancel appt with Dr Nemesio Arzola on Thursday  She is in the hospital and would like him to review her records  She stated she was admitted for cardiac issues

## 2022-04-25 NOTE — ASSESSMENT & PLAN NOTE
· Present on admission, secondary to fall    Seen by Orthopedics, conservative management  · Wrist splint, nonweightbearing  · Follow-up in orthopedic office for repeat imaging  · PT and OT consults

## 2022-04-25 NOTE — CONSULTS
Orthopedics   Jeremie Turner [de-identified] y o  female MRN: [id number]  Unit/Bed#: -01      Chief Complaint:   left wrist pain    HPI:   [de-identified] y o   female with past medical history of paroxysmal atrial fibrillation and hypertension presents with complaint of left wrist pain  Patient states she had a fall two days ago where she fell backwards on her left wrist  Patient has history of multiple falls recently due to dizziness from atrial fibrillation  At this time, pain is on the anterior and posterior radial aspect of left wrist  Pain is made worse with motion or contact to the area  Patient denies any numbness or tingling in left upper extremity  Patient denies any radiation of pain into distal hand  Patient was placed in a splint in the ED yesterday  Patient states wrist has felt better since splint application  Patient has been elevating left upper extremity  Patient is awaiting pacemaker placement today  Patient denies any chest pain or shortness of breath at this time       Review Of Systems:   · Skin: Normal  · Neuro: See HPI  · Musculoskeletal: See HPI  · 14 point review of systems negative except as stated above     Past Medical History:   Past Medical History:   Diagnosis Date    Atrial fibrillation ([address] )     Hypertension        Past Surgical History:   Past Surgical History:   Procedure Laterality Date    APPENDECTOMY      BREAST SURGERY      BX - Benign     CATARACT EXTRACTION, BILATERAL      DILATION AND CURETTAGE OF UTERUS      LAPAROSCOPY FOR ECTOPIC PREGNANCY      OTHER SURGICAL HISTORY      surgical excision of tubal pregnancy        Family History:  Family history reviewed and non-contributory  Family History   Problem Relation Age of Onset    Hypertension Mother     Ovarian cancer Mother     Pneumonia Father        Social History:  Social History     Socioeconomic History    Marital status: /Civil Union     Spouse name: None    Number of children: None    Years of education: None    Highest education level: None   Occupational History    None   Tobacco Use    Smoking status: Former Smoker     Packs/day: 0 50     Years: 20 00     Pack years: 10 00     Quit date: 2006     Years since quittin 3    Smokeless tobacco: Never Used   Vaping Use    Vaping Use: Never used   Substance and Sexual Activity    Alcohol use: Yes     Comment: social     Drug use: No    Sexual activity: Not Currently   Other Topics Concern    None   Social History Narrative    History of advance directive      Social Determinants of Health     Financial Resource Strain: Not on file   Food Insecurity: Not on file   Transportation Needs: Not on file   Physical Activity: Sufficiently Active    Days of Exercise per Week: 5 days    Minutes of Exercise per Session: 40 min   Stress: Stress Concern Present    Feeling of Stress :  To some extent   Social Connections: Not on file   Intimate Partner Violence: Not on file   Housing Stability: Not on file       Allergies:   No Known Allergies        Labs:  0   Lab Value Date/Time    HCT 44 0 2022 0557    HCT 46 4 (H) 2022 0836    HCT 43 7 2022 1139    HCT 38 7 2015 0726    HCT 43 4 2014 1114    HGB 14 7 2022 0557    HGB 15 2 2022 0836    HGB 13 6 2022 1139    HGB 13 0 2015 0726    HGB 14 4 2014 1114    INR 1 12 2022 0836    WBC 8 99 2022 0557    WBC 9 78 2022 0836    WBC 6 50 2022 1139    WBC 3 7 (L) 2015 0726    WBC 5 6 2014 1114       Meds:    Current Facility-Administered Medications:     acetaminophen (TYLENOL) tablet 650 mg, 650 mg, Oral, Q6H PRN, DAVID Rodriguez    amiodarone tablet 200 mg, 200 mg, Oral, Daily, DAVID Rodriguez    apixaban (ELIQUIS) tablet 2 5 mg, 2 5 mg, Oral, BID, DAVID Rodriguez, 2 5 mg at 22 1749    ondansetron (ZOFRAN) injection 4 mg, 4 mg, Intravenous, Q6H PRN, DAVID Rodriguez    oxyCODONE (ROXICODONE) IR tablet 2 5 mg, 2 5 mg, Oral, Q4H PRN, Jennifer Spoon, CRNP, 2 5 mg at 04/24/22 1751    oxyCODONE (ROXICODONE) IR tablet 5 mg, 5 mg, Oral, Q4H PRN, Jennifer Spoon, CRNP, 5 mg at 04/25/22 0300    Blood Culture:   No results found for: BLOODCX    Wound Culture:   No results found for: WOUNDCULT    Ins and Outs:  I/O last 24 hours: In: 10 [I V :10]  Out: 550 [Urine:550]          Physical Exam:   /78   Pulse 93   Temp 98 5 °F (36 9 °C)   Resp 21   Ht 5' 5" (1 651 m)   Wt 55 7 kg (122 lb 12 7 oz)   SpO2 (!) 89%   BMI 20 43 kg/m²   Gen: Alert and oriented to person, place, time  HEENT: EOMI, eyes clear, moist mucus membranes, hearing intact  Respiratory: Bilateral chest rise  No audible wheezing found  Cardiovascular: Regular Rate and Rhythm  Abdomen: soft nontender/nondistended  Musculoskeletal: left upper extremity  · Patient is a 52-year-old female laying comfortably in hospital bed in no acute distress with left upper extremity elevated  · Splint is intact   · Tender to palpation over distal radius  · Forearm soft and compressible, no pain with passive stretch of digits  · Sensation intact to median, radial, and ulnar nerve distributions  · Active ROM in all digits and elbow  · Cap refill less than 2 sec    Radiology:   I personally reviewed the films  X-rays left wrist shows a distal radius fracture without significant displacement        _*_*_*_*_*_*_*_*_*_*_*_*_*_*_*_*_*_*_*_*_*_*_*_*_*_*_*_*_*_*_*_*_*_*_*_*_*_*_*_*_*    Assessment:  [de-identified] y  o female with a left distal radius fracture     Plan:   · Nonweightbearing left upper extremity  · Maintain splint   · Analgesics per primary  · DVT prophylaxis per primary  · PT/OT eval  · Body mass index is 20 43 kg/m²  · Dispo: [id number] Lexy Anaya for discharge from ortho perspective  Xrays of left wrist show left distal radius fracture without significant displacement  At this time, conservative management is recommended   Maintain splint on left upper extremity  Nonweightbearing left upper extremity  Continue elevation and application of ice on left upper extremity  Patient should follow-up outpatient with orthopedics upon discharge       Christy Mae PA-C

## 2022-04-25 NOTE — CASE MANAGEMENT
Case Management Progress Note    Patient name Cara Huerta  Location [address] 332/-01 MRN [id number]  : 1940 Date 2022       LOS (days): 0  Geometric Mean LOS (GMLOS) (days):   Days to GMLOS:        OBJECTIVE:        Current admission status: Inpatient  Preferred Pharmacy:   [address] [facility] [address]  Phone: [phone] Fax: [phone] [facility] [place] [address]  Phone: [phone] Fax: [phone]    Primary Care Provider: Norma Chan DO    Primary Insurance: [address]  Secondary Insurance:     PROGRESS NOTE:  Patient will require transfer to B for EP "evaluation for fib/flutter ablation  Consider ILR implant due to bradycardia " CMN completed and placed on chart  CM Dept will continue to follow

## 2022-04-25 NOTE — ASSESSMENT & PLAN NOTE
· Patient presents to the ED with complaints of multiple falls; brought in by EMS after patient fell and complained of left wrist pain/swelling  Patient reports multiple falls lately due to being dizzy  · Was to be seen by cardiology later this week for possible cardioversion    · Monitor on telemetry  · Cards recommending EP evaluation for afib/flutter ablation   · Plan to transfer to hospitals

## 2022-04-25 NOTE — ASSESSMENT & PLAN NOTE
· Present on admission, secondary to fall    Seen by Orthopedics, conservative management  · Wrist splint, nonweightbearing  · Follow-up in orthopedic office for repeat imaging, casting   · PT and OT consults deferred for now due to tachy and dizzy

## 2022-04-25 NOTE — PROGRESS NOTES
Cardiology Progress Note - Bufford Hatchet [de-identified] y o  female MRN: [id number]    Unit/Bed#: -01 Encounter: [id number]      Assessment/Plan:  1  Paroxsymal Atrial Flutter with RVR, HR  130-140's  Continue oral Amiodarone  Start Amiodarone gtt  Monitor tele  Will discuss with EP about the need for Aflutter ablation during this admission  Continue eliquis  2  Fall, mechanical with left wrist fracture  Mgmt per SLIM/Ortho  3  Hypertension, stable  Continue all meds  Subjective:   Patient seen and examined  No significant events overnight  Denies chest pain  Pt states she feels her heart racing  Objective:     Vitals: Blood pressure 126/78, pulse 105, temperature 98 5 °F (36 9 °C), resp  rate 21, height 5' 5" (1 651 m), weight 55 7 kg (122 lb 12 7 oz), SpO2 90 %  , Body mass index is 20 43 kg/m² ,   Orthostatic Blood Pressures      Most Recent Value   Blood Pressure 126/78 filed at 04/25/2022 0604   Patient Position - Orthostatic VS Lying filed at 04/24/2022 1300            Intake/Output Summary (Last 24 hours) at 4/25/2022 1253  Last data filed at 4/25/2022 2277  Gross per 24 hour   Intake 10 ml   Output 550 ml   Net -540 ml         Physical Exam:    GEN: Bufford Hatchet appears well, alert and oriented x 3, pleasant and cooperative   HEENT: pupils equal, round, and reactive to light; extraocular muscles intact  NECK: supple, no carotid bruits   HEART: Irregularly irregular, normal S1 and S2, no murmurs, clicks, gallops or rubs   LUNGS: clear to auscultation bilaterally; no wheezes, rales, or rhonchi   ABDOMEN: normal bowel sounds, soft, no tenderness, no distention  EXTREMITIES: peripheral pulses normal; no clubbing, cyanosis, or edema      Medications:      Current Facility-Administered Medications:     acetaminophen (TYLENOL) tablet 650 mg, 650 mg, Oral, Q6H PRN, Garfield Muhammad, DAVID, 650 mg at 04/25/22 1041    amiodarone (CORDARONE) 900 mg in dextrose 5 % 500 mL infusion, 0 5 mg/min, Intravenous, Continuous, Vaughn Dutta PA-C    amiodarone tablet 200 mg, 200 mg, Oral, Daily, Vane Rang, CRNP, 200 mg at 04/25/22 9582    apixaban (ELIQUIS) tablet 2 5 mg, 2 5 mg, Oral, BID, Vane Rang, CRNP, 2 5 mg at 04/25/22 0807    ondansetron (ZOFRAN) injection 4 mg, 4 mg, Intravenous, Q6H PRN, Vane Rang, CRNP    oxyCODONE (ROXICODONE) IR tablet 2 5 mg, 2 5 mg, Oral, Q4H PRN, Vane Rang, CRNP, 2 5 mg at 04/24/22 1751    oxyCODONE (ROXICODONE) IR tablet 5 mg, 5 mg, Oral, Q4H PRN, Vane Rang, CRNP, 5 mg at 04/25/22 9349     Labs & Results:        Results from last 7 days   Lab Units 04/25/22  0557 04/24/22  0836   WBC Thousand/uL 8 99 9 78   HEMOGLOBIN g/dL 14 7 15 2   HEMATOCRIT % 44 0 46 4*   PLATELETS Thousands/uL 236 278         Results from last 7 days   Lab Units 04/25/22  0557 04/24/22  0836   POTASSIUM mmol/L 4 1 4 4   CHLORIDE mmol/L 102 103   CO2 mmol/L 26 27   BUN mg/dL 17 21   CREATININE mg/dL 0 92 1 12   CALCIUM mg/dL 8 8 9 4   ALK PHOS U/L 71 80   ALT U/L 18 24   AST U/L 14 17     Results from last 7 days   Lab Units 04/24/22  0836   INR  1 12   PTT seconds 30     Results from last 7 days   Lab Units 04/25/22  0557 04/24/22  0836   MAGNESIUM mg/dL 1 8 2 0

## 2022-04-25 NOTE — NURSING NOTE
Patient's HR irregular and intermittently increasing to the 130s and 140s  Tiana Gonzalez PA-C notified  Order placed for amiodarone drip

## 2022-04-25 NOTE — PLAN OF CARE
Problem: INFECTION - ADULT  Goal: Absence or prevention of progression during hospitalization  Description: INTERVENTIONS:  - Assess and monitor for signs and symptoms of infection  - Monitor lab/diagnostic results  - Monitor all insertion sites, i e  indwelling lines, tubes, and drains  - Monitor endotracheal if appropriate and nasal secretions for changes in amount and color  - Phenix appropriate cooling/warming therapies per order  - Administer medications as ordered  - Instruct and encourage patient and family to use good hand hygiene technique  - Identify and instruct in appropriate isolation precautions for identified infection/condition  Outcome: Progressing

## 2022-04-25 NOTE — PHYSICAL THERAPY NOTE
Physical Therapy Cancellation    Patient's Name: Benny Langley    Admitting Diagnosis  Atrial fibrillation ([facility] ) [I48 91]  Lightheadedness [R42]  Wrist injury [S69 90XA]  Weakness [R53 1]  Fall [W19  XXXA]  Distal radius fracture [S52 509A]    Problem List  Patient Active Problem List   Diagnosis    Psoriasis    Screening for skin condition    Essential hypertension    Dupuytren's contracture of both hands    Arthritis    Conductive hearing loss, bilateral    Hyperlipidemia    History of osteoporosis    Knee mass, left    Fall    Atrial fibrillation ([address] )    Closed fracture of left wrist       Past Medical History  Past Medical History:   Diagnosis Date    Atrial fibrillation ([address] )     Hypertension        Past Surgical History  Past Surgical History:   Procedure Laterality Date    APPENDECTOMY      BREAST SURGERY      BX - Benign     CATARACT EXTRACTION, BILATERAL      DILATION AND CURETTAGE OF UTERUS      LAPAROSCOPY FOR ECTOPIC PREGNANCY      OTHER SURGICAL HISTORY      surgical excision of tubal pregnancy         04/25/22 1043   PT Last Visit   PT Visit Date 04/25/22   Note Type   Note type Cancelled Session   Cancel Reasons Medical status   Additional Comments PT order received  Chart review performed  At this time, PT evaluation cancelled as pt with elevated HR on telemetry, discussed with ANNA Iglesias  PT will follow and evaluate as medically appropriate             Melanie Haney, PT

## 2022-04-25 NOTE — ASSESSMENT & PLAN NOTE
· Chronic  · Continue home medication regimen    · Hold losartan as BP now soft, likely due to tachycardia

## 2022-04-25 NOTE — ASSESSMENT & PLAN NOTE
· History of a-fib, has had a cardioversion before; however persistent aflutter here  · Cardiology consult, appreciate input  · Discussed with EP at [facility], will plan to transfer  · Continue amiodarone drip

## 2022-04-25 NOTE — DISCHARGE SUMMARY
[address] Discharge- Demetria Damon 1940, [de-identified] y o  female MRN: [id number]  Unit/Bed#: -Ella Encounter: [id number]  Primary Care Provider: Oleg Ross DO   Date and time admitted to hospital: 4/24/2022  7:48 AM    * Fall  Assessment & Plan  Patient presents to the ED with complaints of multiple falls; brought in by EMS after patient fell and complained of left wrist pain/swelling  Patient reports multiple falls lately due to being dizzy  Was to be seen by cardiology later this week for possible cardioversion  Monitor on telemetry  Cards recommending EP evaluation for afib/flutter ablation   Plan to transfer to [facility]    Atrial fibrillation [facility])  Assessment & Plan  History of a-fib, has had a cardioversion before; however persistent aflutter here  Cardiology consult, appreciate input  Discussed with EP at [place], will plan to transfer  Continue amiodarone drip     Essential hypertension  Assessment & Plan  Chronic  Continue home medication regimen  Hold losartan as BP now soft, likely due to tachycardia     Hyperlipidemia  Assessment & Plan  Chronic  Continue home medication regimen    Closed fracture of left wrist  Assessment & Plan  Present on admission, secondary to fall    Seen by Orthopedics, conservative management  Wrist splint, nonweightbearing  Follow-up in orthopedic office for repeat imaging, casting   PT and OT consults deferred for now due to tachy and dizzy       Medical Problems                 Resolved Problems  Date Reviewed: 4/25/2022      None                  Discharging Physician / Practitioner: Maxine Espana PA-C  PCP: Oleg Ross DO  Admission Date:   Admission Orders (From admission, onward)       Ordered        04/25/22 1413  Inpatient Admission  Once            04/24/22 0954  Place in Observation  Once                          Discharge Date: 04/27/22    Consultations During Hospital Stay:  IP CONSULT TO CARDIOLOGY  IP CONSULT TO ORTHOPEDIC SURGERY     Procedures Performed:   None     Significant Findings / Test Results:   XR wrist 3+ views LEFT   Final Result by Lauren Peters MD (04/24 1022)      There is a fracture of the distal left radius, involving the articular surface, without significant displacement  The study was marked in [facility] for immediate notification  Workstation performed: [id number]         CT head without contrast   Final Result by Minerva Granados MD (04/24 3676)      No acute intracranial abnormality  Microangiopathic changes  Workstation performed: [id number]              Incidental Findings:   Left wrist fracture      Test Results Pending at Discharge (will require follow up): Outpatient Tests Requested:  Ortho follow up for casting/imaging     Complications:  Persistent afib/flutter     Reason for Admission: falls, dizziness, afib RVR     Hospital Course:   Emile Harvey is a [de-identified] y o  female patient who originally presented to the hospital on 4/24/2022 due to falls and dizziness at home  Patient with known afib on eliquis, had been considered for cardioversion after experiencing symptoms after decreasing amiodarone and discontinuing metoprolol  Found to have episodes of rapid afib and ultimately advised to undergo ablation with electrophysiology  Currently on amiodarone drip and remains on eliquis 2 5 mg BID  Unfortunately from fall, she does have a left distal radial fracture that is currently splinted and will need casting upon discharge  Orthopedics did evaluate patient in hospital   NWB to the left arm  PT/OT evaluated as well to educate on safe maneuvers  Please see above list of diagnoses and related plan for additional information  Home with Antonio Giovanny recommended  Condition at Discharge: stable    Discharge Day Visit / Exam:   See note from same day for examination    Discussion with Family: Patient declined call to        Discharge instructions/Information to patient and family:   See after visit summary for information provided to patient and family  Provisions for Follow-Up Care:  See after visit summary for information related to follow-up care and any pertinent home health orders  Disposition:   [address] Transfer to [facility]    Planned Readmission: direct re-admit      Discharge Statement:  I spent approximately 45 minutes discharging the patient  This time was spent on the day of discharge  I had direct contact with the patient on the day of discharge  Greater than 50% of the total time was spent examining patient, answering all patient questions, arranging and discussing plan of care with patient as well as directly providing post-discharge instructions  Additional time then spent on discharge activities  Discharge Medications:  See after visit summary for reconciled discharge medications provided to patient and/or family        **Please Note: This note may have been constructed using a voice recognition system**

## 2022-04-26 PROCEDURE — 99231 SBSQ HOSP IP/OBS SF/LOW 25: CPT | Performed by: PHYSICIAN ASSISTANT

## 2022-04-26 PROCEDURE — 99232 SBSQ HOSP IP/OBS MODERATE 35: CPT | Performed by: PHYSICIAN ASSISTANT

## 2022-04-26 RX ORDER — AMOXICILLIN 250 MG
1 CAPSULE ORAL 2 TIMES DAILY
Status: DISCONTINUED | OUTPATIENT
Start: 2022-04-26 | End: 2022-04-27 | Stop reason: HOSPADM

## 2022-04-26 RX ADMIN — ACETAMINOPHEN 650 MG: 325 TABLET ORAL at 14:58

## 2022-04-26 RX ADMIN — AMIODARONE HYDROCHLORIDE 0.5 MG/MIN: 50 INJECTION, SOLUTION INTRAVENOUS at 18:40

## 2022-04-26 RX ADMIN — APIXABAN 2.5 MG: 2.5 TABLET, FILM COATED ORAL at 17:23

## 2022-04-26 RX ADMIN — OXYCODONE HYDROCHLORIDE 5 MG: 5 TABLET ORAL at 12:35

## 2022-04-26 RX ADMIN — OXYCODONE HYDROCHLORIDE 5 MG: 5 TABLET ORAL at 17:23

## 2022-04-26 RX ADMIN — DOCUSATE SODIUM AND SENNOSIDES 1 TABLET: 8.6; 5 TABLET, FILM COATED ORAL at 08:33

## 2022-04-26 RX ADMIN — ACETAMINOPHEN 650 MG: 325 TABLET ORAL at 22:24

## 2022-04-26 RX ADMIN — APIXABAN 2.5 MG: 2.5 TABLET, FILM COATED ORAL at 08:06

## 2022-04-26 RX ADMIN — OXYCODONE HYDROCHLORIDE 5 MG: 5 TABLET ORAL at 08:06

## 2022-04-26 RX ADMIN — DOCUSATE SODIUM AND SENNOSIDES 1 TABLET: 8.6; 5 TABLET, FILM COATED ORAL at 17:23

## 2022-04-26 NOTE — PROGRESS NOTES
Progress Note - Orthopedics   Jeremie Turner [de-identified] y o  female MRN: [id number]  Unit/Bed#: -01      Subjective:    [de-identified] y  o female s/p mechanical fall resulting in Left distal radius fracture  Patient reports mild numbness in ring and small fingers and admits this was not present yesterday  She has been performing ROM exercises of Left shoulder, elbow, and digits within confines of splint  She denies additional musculoskeletal complaints at this time         Labs:  0   Lab Value Date/Time    HCT 44 0 04/25/2022 0557    HCT 46 4 (H) 04/24/2022 0836    HCT 43 7 03/25/2022 1139    HCT 38 7 04/29/2015 0726    HCT 43 4 04/22/2014 1114    HGB 14 7 04/25/2022 0557    HGB 15 2 04/24/2022 0836    HGB 13 6 03/25/2022 1139    HGB 13 0 04/29/2015 0726    HGB 14 4 04/22/2014 1114    INR 1 12 04/24/2022 0836    WBC 8 99 04/25/2022 0557    WBC 9 78 04/24/2022 0836    WBC 6 50 03/25/2022 1139    WBC 3 7 (L) 04/29/2015 0726    WBC 5 6 04/22/2014 1114       Meds:    Current Facility-Administered Medications:     acetaminophen (TYLENOL) tablet 650 mg, 650 mg, Oral, Q6H PRN, ANTHONY HernandezNP, 650 mg at 04/25/22 1800    amiodarone (CORDARONE) 900 mg in dextrose 5 % 500 mL infusion, 0 5 mg/min, Intravenous, Continuous, Krystal Mccartney PA-C, Last Rate: 16 7 mL/hr at 04/25/22 1311, 0 5 mg/min at 04/25/22 1311    amiodarone tablet 200 mg, 200 mg, Oral, Daily, ANTHONY HernandezNP, 200 mg at 04/25/22 4450    apixaban (ELIQUIS) tablet 2 5 mg, 2 5 mg, Oral, BID, DAVID Hernandez, 2 5 mg at 04/25/22 1800    ondansetron (ZOFRAN) injection 4 mg, 4 mg, Intravenous, Q6H PRN, DAVID Hernandez    oxyCODONE (ROXICODONE) IR tablet 2 5 mg, 2 5 mg, Oral, Q4H PRN, DAVID Hernandez, 2 5 mg at 04/25/22 8061    oxyCODONE (ROXICODONE) IR tablet 5 mg, 5 mg, Oral, Q4H PRN, DAVID Hernandez, 5 mg at 04/25/22 7139    Blood Culture:   No results found for: BLOODCX    Wound Culture:   No results found for: WOUNDCULT    Ins and Outs:  I/O last 24 hours: In: 720 [P O :720]  Out: 450 [Urine:450]          Physical:  Vitals:    04/26/22 0328   BP: 117/69   Pulse: 73   Resp:    Temp: 98 5 °F (36 9 °C)   SpO2: 90%     Musculoskeletal:  Left wrist  · Splint clean, dry, and intact  · ROM of all digits intact, within confines of splint  · SILT m/r/u, with subjective decreased sensation to volar aspect of ring and small fingers      Assessment:    81 y  o female Left distal radius fracture     Plan:  · Nonweightbearing Left upper extremity with splint intact  · PT/OT, shoulder, elbow, and digit ROM   · Pain control  · DVT ppx  · Dispo: Overall, patient comfortable today with decreased pain  Advised patient subjective numbness can be associated with splint application or nerve irritation at elbow  Advised patient to continue with ROM of shoulder, elbow, and digits  Nonweightbearing Left upper extremity in splint       Rafa Guzman PA-C

## 2022-04-26 NOTE — PROGRESS NOTES
[address] [facility] Progress Note - Ankita Singh 1940, [de-identified] y o  female MRN: [id number]  Unit/Bed#: [facility] [place] Encounter: [id number]  Primary Care Provider: Pito Joseph DO   Date and time admitted to hospital: 4/24/2022  7:48 AM    * Fall  Assessment & Plan  · Patient presents to the ED with complaints of multiple falls; brought in by EMS after patient fell and complained of left wrist pain/swelling  Patient reports multiple falls lately due to being dizzy  · Was to be seen by cardiology later this week for possible cardioversion  · Monitor on telemetry  · Cards recommending EP evaluation for afib/flutter ablation   · Plan to transfer to \[facility]""    Atrial fibrillation [facility])  Assessment & Plan  · History of a-fib, has had a cardioversion before; however persistent aflutter here  · Cardiology consult, appreciate input  · Discussed with EP at [place], will plan to transfer  · Continue amiodarone drip     Essential hypertension  Assessment & Plan  · Chronic  · Continue home medication regimen  · Hold losartan as BP now soft, likely due to tachycardia     Hyperlipidemia  Assessment & Plan  · Chronic  · Continue home medication regimen    Closed fracture of left wrist  Assessment & Plan  · Present on admission, secondary to fall  Seen by Orthopedics, conservative management  · Wrist splint, nonweightbearing  · Follow-up in orthopedic office for repeat imaging, casting   · PT and OT consults deferred for now due to tachy and dizzy         VTE Pharmacologic Prophylaxis: VTE Score: 3 Moderate Risk (Score 3-4) - Pharmacological DVT Prophylaxis Ordered: apixaban (Eliquis)  Patient Centered Rounds: I performed bedside rounds with nursing staff today  Discussions with Specialists or Other Care Team Provider: DARRICK mullins     Education and Discussions with Family / Patient: patient on phone upon my entering the room  Time Spent for Care: 15 minutes   More than 50% of total time spent on counseling and coordination of care as described above  Current Length of Stay: 1 day(s)  Current Patient Status: Inpatient   Certification Statement: The patient will continue to require additional inpatient hospital stay due to pending transfer for EP evaluation  Discharge Plan: Anticipate discharge later today or tomorrow to [facility]    Code Status: Level 1 - Full Code    Subjective:   Patient doing well, no new issues overnight  She feels she let her pain get too bad and is waiting on more medication  Objective:     Vitals:   Temp (24hrs), Av 5 °F (36 9 °C), Min:98 3 °F (36 8 °C), Max:98 6 °F (37 °C)    Temp:  [98 3 °F (36 8 °C)-98 6 °F (37 °C)] 98 6 °F (37 °C)  HR:  [67-82] 71  Resp:  [18] 18  BP: (116-129)/(67-88) 122/74  SpO2:  [87 %-93 %] 91 %  Body mass index is 20 43 kg/m²  Input and Output Summary (last 24 hours): Intake/Output Summary (Last 24 hours) at 2022 1027  Last data filed at 2022 0900  Gross per 24 hour   Intake 960 ml   Output 450 ml   Net 510 ml       Physical Exam:   Physical Exam  Vitals and nursing note reviewed  Constitutional:       General: She is not in acute distress  Appearance: She is not ill-appearing or toxic-appearing  Pulmonary:      Effort: Pulmonary effort is normal  No respiratory distress  Breath sounds: No wheezing  Neurological:      Mental Status: She is alert and oriented to person, place, and time  Psychiatric:         Mood and Affect: Mood normal          Behavior: Behavior normal            Additional Data:     Labs:  Results from last 7 days   Lab Units 22  0557 22  0836 22  0836   WBC Thousand/uL 8 99   < > 9 78   HEMOGLOBIN g/dL 14 7   < > 15 2   HEMATOCRIT % 44 0   < > 46 4*   PLATELETS Thousands/uL 236   < > 278   NEUTROS PCT %  --   --  79*   LYMPHS PCT %  --   --  12*   MONOS PCT %  --   --  9   EOS PCT %  --   --  0    < > = values in this interval not displayed       Results from last 7 days Lab Units 04/25/22  0557   SODIUM mmol/L 135*   POTASSIUM mmol/L 4 1   CHLORIDE mmol/L 102   CO2 mmol/L 26   BUN mg/dL 17   CREATININE mg/dL 0 92   ANION GAP mmol/L 7   CALCIUM mg/dL 8 8   ALBUMIN g/dL 3 5   TOTAL BILIRUBIN mg/dL 1 13*   ALK PHOS U/L 71   ALT U/L 18   AST U/L 14   GLUCOSE RANDOM mg/dL 114     Results from last 7 days   Lab Units 04/24/22  0836   INR  1 12                   Lines/Drains:  Invasive Devices  Report    Peripheral Intravenous Line            Peripheral IV 04/24/22 Right Forearm 1 day                  Telemetry:  Telemetry Orders (From admission, onward)             24 Hour Telemetry Monitoring  Continuous x 24 Hours (Telem)        References:    Telemetry Guidelines   Question:  Reason for 24 Hour Telemetry  Answer:  Syncope suspected to be cardiac in origin                 Telemetry Reviewed: Normal Sinus Rhythm  Indication for Continued Telemetry Use: Arrthymias requiring medical therapy             Imaging: No pertinent imaging reviewed  Recent Cultures (last 7 days):         Last 24 Hours Medication List:   Current Facility-Administered Medications   Medication Dose Route Frequency Provider Last Rate    acetaminophen  650 mg Oral Q6H PRN DAVID Duong      amiodarone  0 5 mg/min Intravenous Continuous Krystal Mccartney PA-C 0 5 mg/min (04/25/22 1311)    apixaban  2 5 mg Oral BID DAVID Duong      ondansetron  4 mg Intravenous Q6H PRN DAVID Duong      oxyCODONE  2 5 mg Oral Q4H PRN Brenda Cavazos, DAVID      oxyCODONE  5 mg Oral Q4H PRN DAVID Duong      senna-docusate sodium  1 tablet Oral BID Bonita Liu PA-C          Today, Patient Was Seen By: Bonita Liu PA-C    **Please Note: This note may have been constructed using a voice recognition system  **

## 2022-04-27 ENCOUNTER — HOSPITAL ENCOUNTER (INPATIENT)
Facility: HOSPITAL | Age: 82
LOS: 6 days | Discharge: HOME HEALTH | DRG: 271 | End: 2022-05-03
Attending: GENERAL PRACTICE | Admitting: ANESTHESIOLOGY
Payer: COMMERCIAL

## 2022-04-27 ENCOUNTER — TRANSITIONAL CARE MANAGEMENT (OUTPATIENT)
Dept: INTERNAL MEDICINE CLINIC | Facility: CLINIC | Age: 82
End: 2022-04-27

## 2022-04-27 VITALS
BODY MASS INDEX: 20.46 KG/M2 | RESPIRATION RATE: 16 BRPM | TEMPERATURE: 98.1 F | DIASTOLIC BLOOD PRESSURE: 67 MMHG | HEART RATE: 86 BPM | SYSTOLIC BLOOD PRESSURE: 112 MMHG | WEIGHT: 122.8 LBS | HEIGHT: 65 IN | OXYGEN SATURATION: 90 %

## 2022-04-27 DIAGNOSIS — S30.1XXA RECTUS SHEATH HEMATOMA, INITIAL ENCOUNTER: Primary | ICD-10-CM
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
## 2022-04-27 DIAGNOSIS — I48.91 ATRIAL FIBRILLATION, UNSPECIFIED TYPE (HCC): ICD-10-CM

## 2022-04-27 DIAGNOSIS — I48.19 PERSISTENT ATRIAL FIBRILLATION (HCC): ICD-10-CM

## 2022-04-27 DIAGNOSIS — I48.0 PAROXYSMAL ATRIAL FIBRILLATION (HCC): ICD-10-CM

## 2022-04-27 LAB
ANION GAP SERPL CALCULATED.3IONS-SCNC: 7 MMOL/L (ref 4–13)
BUN SERPL-MCNC: 20 MG/DL (ref 5–25)
CALCIUM SERPL-MCNC: 9 MG/DL (ref 8.3–10.1)
CHLORIDE SERPL-SCNC: 102 MMOL/L (ref 100–108)
CO2 SERPL-SCNC: 27 MMOL/L (ref 21–32)
CREAT SERPL-MCNC: 0.92 MG/DL (ref 0.6–1.3)
GFR SERPL CREATININE-BSD FRML MDRD: 58 ML/MIN/1.73SQ M
GLUCOSE SERPL-MCNC: 111 MG/DL (ref 65–140)
POTASSIUM SERPL-SCNC: 4.1 MMOL/L (ref 3.5–5.3)
SODIUM SERPL-SCNC: 136 MMOL/L (ref 136–145)

## 2022-04-27 PROCEDURE — 97163 PT EVAL HIGH COMPLEX 45 MIN: CPT

## 2022-04-27 PROCEDURE — 99231 SBSQ HOSP IP/OBS SF/LOW 25: CPT

## 2022-04-27 PROCEDURE — 99223 1ST HOSP IP/OBS HIGH 75: CPT | Performed by: INTERNAL MEDICINE

## 2022-04-27 PROCEDURE — 80048 BASIC METABOLIC PNL TOTAL CA: CPT | Performed by: PHYSICIAN ASSISTANT

## 2022-04-27 PROCEDURE — 99232 SBSQ HOSP IP/OBS MODERATE 35: CPT | Performed by: INTERNAL MEDICINE

## 2022-04-27 PROCEDURE — 97166 OT EVAL MOD COMPLEX 45 MIN: CPT

## 2022-04-27 PROCEDURE — NC001 PR NO CHARGE: Performed by: PHYSICIAN ASSISTANT

## 2022-04-27 RX ORDER — AMOXICILLIN 250 MG
1 CAPSULE ORAL 2 TIMES DAILY
Status: DISCONTINUED | OUTPATIENT
Start: 2022-04-27 | End: 2022-05-03 | Stop reason: HOSPADM

## 2022-04-27 RX ORDER — ONDANSETRON 2 MG/ML
4 INJECTION INTRAMUSCULAR; INTRAVENOUS EVERY 6 HOURS PRN
Status: CANCELLED | OUTPATIENT
Start: 2022-04-27

## 2022-04-27 RX ORDER — ACETAMINOPHEN 325 MG/1
650 TABLET ORAL EVERY 6 HOURS PRN
Status: CANCELLED | OUTPATIENT
Start: 2022-04-27

## 2022-04-27 RX ORDER — OXYCODONE HYDROCHLORIDE 5 MG/1
5 TABLET ORAL EVERY 4 HOURS PRN
Status: DISCONTINUED | OUTPATIENT
Start: 2022-04-27 | End: 2022-04-30

## 2022-04-27 RX ORDER — ONDANSETRON 2 MG/ML
4 INJECTION INTRAMUSCULAR; INTRAVENOUS EVERY 6 HOURS PRN
Status: DISCONTINUED | OUTPATIENT
Start: 2022-04-27 | End: 2022-05-03 | Stop reason: HOSPADM

## 2022-04-27 RX ORDER — OXYCODONE HYDROCHLORIDE 5 MG/1
5 TABLET ORAL EVERY 4 HOURS PRN
Status: CANCELLED | OUTPATIENT
Start: 2022-04-27

## 2022-04-27 RX ORDER — ACETAMINOPHEN 325 MG/1
650 TABLET ORAL EVERY 6 HOURS PRN
Status: DISCONTINUED | OUTPATIENT
Start: 2022-04-27 | End: 2022-05-03 | Stop reason: HOSPADM

## 2022-04-27 RX ORDER — OXYCODONE HYDROCHLORIDE 5 MG/1
2.5 TABLET ORAL EVERY 4 HOURS PRN
Status: DISCONTINUED | OUTPATIENT
Start: 2022-04-27 | End: 2022-04-30

## 2022-04-27 RX ORDER — OXYCODONE HYDROCHLORIDE 5 MG/1
2.5 TABLET ORAL EVERY 4 HOURS PRN
Status: CANCELLED | OUTPATIENT
Start: 2022-04-27

## 2022-04-27 RX ORDER — AMOXICILLIN 250 MG
1 CAPSULE ORAL 2 TIMES DAILY
Status: CANCELLED | OUTPATIENT
Start: 2022-04-27

## 2022-04-27 RX ADMIN — APIXABAN 2.5 MG: 2.5 TABLET, FILM COATED ORAL at 17:07

## 2022-04-27 RX ADMIN — APIXABAN 2.5 MG: 2.5 TABLET, FILM COATED ORAL at 08:51

## 2022-04-27 RX ADMIN — DOCUSATE SODIUM AND SENNOSIDES 1 TABLET: 8.6; 5 TABLET, FILM COATED ORAL at 08:51

## 2022-04-27 RX ADMIN — ONDANSETRON 4 MG: 2 INJECTION INTRAMUSCULAR; INTRAVENOUS at 10:25

## 2022-04-27 RX ADMIN — OXYCODONE HYDROCHLORIDE 2.5 MG: 5 TABLET ORAL at 08:51

## 2022-04-27 RX ADMIN — SENNOSIDES AND DOCUSATE SODIUM 1 TABLET: 50; 8.6 TABLET ORAL at 17:07

## 2022-04-27 RX ADMIN — ACETAMINOPHEN 650 MG: 325 TABLET, FILM COATED ORAL at 23:41

## 2022-04-27 RX ADMIN — AMIODARONE HYDROCHLORIDE 0.5 MG/MIN: 50 INJECTION, SOLUTION INTRAVENOUS at 16:36

## 2022-04-27 NOTE — PROGRESS NOTES
Progress Note - Orthopedics   Mills Goodpailan [de-identified] y o  female MRN: [id number]  Unit/Bed#: -01      Subjective:    [de-identified] y  o female s/p mechanical fall resulting in Left distal radius fracture  Patient states pain in left wrist is well controlled  Patient reports numbness in last two digits has resolved  Patient denies any numbness or tingling in left upper extremity  Patient mentions her range of motion of fingers has improved  Patient has maintained splint and nonweightbearing status as directed  Patient denies any shortness of breath, chest pain, lightheadedness, dizziness, nausea, and vomiting  Patient offers no other complaints at this time         Labs:  0   Lab Value Date/Time    HCT 44 0 04/25/2022 0557    HCT 46 4 (H) 04/24/2022 0836    HCT 43 7 03/25/2022 1139    HCT 38 7 04/29/2015 0726    HCT 43 4 04/22/2014 1114    HGB 14 7 04/25/2022 0557    HGB 15 2 04/24/2022 0836    HGB 13 6 03/25/2022 1139    HGB 13 0 04/29/2015 0726    HGB 14 4 04/22/2014 1114    INR 1 12 04/24/2022 0836    WBC 8 99 04/25/2022 0557    WBC 9 78 04/24/2022 0836    WBC 6 50 03/25/2022 1139    WBC 3 7 (L) 04/29/2015 0726    WBC 5 6 04/22/2014 1114       Meds:    Current Facility-Administered Medications:     acetaminophen (TYLENOL) tablet 650 mg, 650 mg, Oral, Q6H PRN, DAVID Luna, 650 mg at 04/26/22 2224    amiodarone (CORDARONE) 900 mg in dextrose 5 % 500 mL infusion, 0 5 mg/min, Intravenous, Continuous, Krystal Mccartney PA-C, Last Rate: 16 7 mL/hr at 04/26/22 1840, 0 5 mg/min at 04/26/22 1840    apixaban (ELIQUIS) tablet 2 5 mg, 2 5 mg, Oral, BID, DAVID Luna, 2 5 mg at 04/26/22 1723    ondansetron (ZOFRAN) injection 4 mg, 4 mg, Intravenous, Q6H PRN, DAVID Luna    oxyCODONE (ROXICODONE) IR tablet 2 5 mg, 2 5 mg, Oral, Q4H PRN, DAVID Luna, 2 5 mg at 04/25/22 2218    oxyCODONE (ROXICODONE) IR tablet 5 mg, 5 mg, Oral, Q4H PRN, DAVID Luna, 5 mg at 04/26/22 1723    senna-docusate sodium (SENOKOT S) 8 6-50 mg per tablet 1 tablet, 1 tablet, Oral, BID, Virginia Slater PA-C, 1 tablet at 04/26/22 1723    Blood Culture:   No results found for: BLOODCX    Wound Culture:   No results found for: WOUNDCULT    Ins and Outs:  I/O last 24 hours: In: 26 [P O :470]  Out: 0           Physical:  Vitals:    04/27/22 0659   BP: 112/67   Pulse: 86   Resp: 16   Temp: 98 1 °F (36 7 °C)   SpO2: 90%     Musculoskeletal:  Left wrist  · Patient is a [de-identified] female laying comfortably in hospital bed in no acute distress with left upper extremity elevated  · Splint clean, dry, and intact  · ROM of all digits intact, within confines of splint  · SILT m/r/u  · Cap refill < 2 sec      Assessment:    81 y  o female Left distal radius fracture     Plan:  · Nonweightbearing Left upper extremity with splint intact  · PT/OT, shoulder, elbow, and digit ROM   · Pain control per primary  · DVT ppx per primary  · Dispo: [id number] [address]aya for discharge from ortho perspective  Maintain splint and nonweightbearing of left upper extremity  Elevate and apply ice as needed for pain and swelling  Patient is advised to continue working on ROM of shoulder, elbow, and digits  Patient will follow-up outpatient with orthopedics upon discharge         Rebel Singh PA-C

## 2022-04-27 NOTE — PHYSICAL THERAPY NOTE
Physical Therapy Evaluation   Time in: 811  Time out: 834  Total evaluation time: 23 minutes    Patient's Name: Breanna Mohan    Admitting Diagnosis  Atrial fibrillation ([facility] [address] ) [I48 91]  Lightheadedness [R42]  Wrist injury [S69 90XA]  Weakness [R53 1]  Fall [W19  XXXA]  Distal radius fracture [S52 509A]    Problem List  Patient Active Problem List   Diagnosis    Psoriasis    Screening for skin condition    Essential hypertension    Dupuytren's contracture of both hands    Arthritis    Conductive hearing loss, bilateral    Hyperlipidemia    History of osteoporosis    Knee mass, left    Fall    Atrial fibrillation ([address] )    Closed fracture of left wrist       Past Medical History  Past Medical History:   Diagnosis Date    Atrial fibrillation ([facility] [address] )     Hypertension        Past Surgical History  Past Surgical History:   Procedure Laterality Date    APPENDECTOMY      BREAST SURGERY      BX - Benign     CATARACT EXTRACTION, BILATERAL      DILATION AND CURETTAGE OF UTERUS      LAPAROSCOPY FOR ECTOPIC PREGNANCY      OTHER SURGICAL HISTORY      surgical excision of tubal pregnancy        PT performed at least 2 patient identifiers during session: Name and wristband  04/27/22 0812   PT Last Visit   PT Visit Date 04/27/22   Note Type   Note type Evaluation   Pain Assessment   Pain Assessment Tool 0-10   Pain Score 1   Pain Location/Orientation Orientation: Left; Location: Arm  (wrist)   Restrictions/Precautions   Weight Bearing Precautions Per Order Yes   LUE Weight Bearing Per Order NWB   Braces or Orthoses Splint  (L wrist splint  none at baseline)   Other Precautions WBS; Chair Alarm; Bed Alarm;Multiple lines;Telemetry; Fall Risk;Pain   Home Living   Type [address] One level; Laundry in basement;Stairs to enter with rails  (6 JASEN with R handrail ascending   FOS w/ railing to basement)   Bathroom Shower/Tub Walk-in shower   Bathroom Toilet Raised   Bathroom Equipment Built-in shower seat;Grab bars in shower;Hand-held shower;Grab bars around toilet   [address] Other (Comment); Walker;Cane  (no AD used at baseline)   Prior Function   Level of [place] Independent with ADLs and functional mobility   Lives With Spouse  ( had recent stroke)   Receives Help From Family   ADL Assistance Independent   IADLs Independent   Falls in the last 6 months 1 to 4  (2 falls in past week + 1 fall on ice + 1 tripping incident)   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[employer])   Comments pt reports she walks 2-3x/wk, plays bridge, enjoys reading  (+)    General   Additional Pertinent History pt reports dizziness x1 year   Family/Caregiver Present No   Cognition   Overall Cognitive Status WFL   Arousal/Participation Alert   Orientation Level Oriented X4   Memory Within functional limits   Following Commands Follows all commands and directions without difficulty   Comments pt agreeable to PT eval   RUE Assessment   RUE Assessment   (defer to OT eval for comments)   LUE Assessment   LUE Assessment   (defer to OT eval for comments)   RLE Assessment   RLE Assessment   (observed 4/5 with functional mobility)   LLE Assessment   LLE Assessment   (observed 4/5 with functional mobility)   Coordination   Movements are Fluid and Coordinated 1   Sensation WFL   Bed Mobility   Supine to Sit 5  Supervision   Additional items Assist x 1;HOB elevated; Increased time required;Verbal cues   Transfers   Sit to Stand 4  Minimal assistance  (CGA)   Additional items Assist x 1; Increased time required;Verbal cues   Stand to Sit 4  Minimal assistance  (CGA)   Additional items Assist x 1; Increased time required;Verbal cues   Ambulation/Elevation   Gait pattern Decreased foot clearance; Short stride; Step to   Gait Assistance 4  Minimal assist   Additional items Assist x 1;Verbal cues   Assistive Device   (R HHA)   Distance 15'   Balance   Static Sitting Fair +   Dynamic Sitting Fair Static Standing Fair   Dynamic Standing [address],Floors 3,4, & 5 -   Endurance Deficit   Endurance Deficit Yes   Activity Tolerance   Activity Tolerance Patient limited by fatigue   Medical Staff Made Aware discussed case with RACHEL Bhatt prior to therapy Mindi Benjamin verbalized pt appropriate to see for PT/OT evals   Nurse Made Aware ANNA Fang   Assessment   Prognosis Excellent   Problem List Decreased strength;Decreased endurance; Impaired balance;Decreased mobility   Assessment Pt is [de-identified] y o  female seen for high-complexity PT evaluation on 4/27/2022 s/p admit to [facility] on 4/24/2022 w/ Fall  Patient presents to the ED with complaints of multiple falls; brought in by EMS after patient fell and complained of left wrist pain/swelling; dx with closed L wrist fx, NWB, wrist splint donned by ortho  Plan to transfer to B, pending bed due to capacity at accepting facility, planning for cardiology procedure, currently on amiodarone drip  PT was consulted to assess pt's functional mobility and d/c needs  Order placed for PT eval and tx  PTA, pt resides with spouse in [facility] with 6 JASEN, ambulates without AD, +fall history, retired, I with all I/ADLs  At time of eval, pt performing bed mobility SBA, CGA for transfers, min A for level surface household distance with R UE HHA  Upon evaluation, pt presenting with impaired functional mobility d/t decreased strength, decreased endurance, impaired balance, decreased mobility, orthopedic restrictions of NWB LUE, pain and activity intolerance  Pertinent PMHx and current co-morbidities affecting pt's physical performance at time of assessment include: fall, essential HTN, HLD, A fib, closed fracture of L wrist, psoriasis, Dupuytren's contracture of B/L hands, arthritis, conductive hearing loss, osteoporosis, knee mass  Personal factors affecting pt at time of eval include: stairs to enter home, inability to navigate community distances and positive fall history   The following objective measures performed on IE also reveal limitations: Barthel Index: 45/100, Modified [place]: 3 (moderate disability) and AM-PAC 6-Clicks: 60/42  Pt's clinical presentation is currently unstable/unpredictable seen in pt's presentation of advanced age, abnormal lab value(s), need for input for task focus and mobility technique, ongoing medical assessment and on telemetry monitoring  Overall, pt's rehab potential and prognosis to return to PLOF is excellent as impacted by objective findings, warranting pt to receive further skilled PT interventions to address identified impairments, activity limitation(s), and participation restriction(s)  Pt to benefit from continued PT tx to address deficits as defined above and maximize level of functional independent mobility and consistency  From PT/mobility standpoint, recommendation at time of d/c would be home with home health rehabilitation pending progress in order to facilitate return to PLOF     Barriers to Discharge Inaccessible home environment   Goals   Patient Goals to go to B for procedure   STG Expiration Date 05/07/22   Short Term Goal #1 In 7-10 days: Increase bilateral LE strength 1/2 grade to facilitate independent mobility, Perform all bed mobility tasks modified independent to decrease caregiver burden, Perform all transfers modified independent to improve independence, Ambulate > 150 ft  with least restrictive assistive device modified independent w/o LOB and w/ normalized gait pattern 100% of the time, Navigate 6 stairs modified independent with unilateral handrail to facilitate return to previous living environment, Increase all balance 1/2 grade to decrease risk for falls, Tolerate 4 hr OOB to faciliate upright tolerance, Improve Barthel Index score to 60 or greater to facilitate independence and PT provider will perform functional balance assessment to determine fall risk   PT Treatment Day 0   Plan   Treatment/Interventions Functional transfer training;LE strengthening/ROM; Elevations; Therapeutic exercise; Endurance training;Patient/family training;Equipment eval/education; Bed mobility;Gait training;Spoke to nursing   PT Frequency 3-5x/wk   Recommendation   PT Discharge Recommendation Home with home health rehabilitation   Equipment Recommended   (TBD - will require U/L AD, trial SPC next session)   AM-PAC Basic Mobility Inpatient   Turning in Bed Without Bedrails 3   Lying on Back to Sitting on Edge of Flat Bed 3   Moving Bed to Chair 3   Standing Up From Chair 3   Walk in Room 3   Climb 3-5 Stairs 2   Basic Mobility Inpatient Raw Score 17   Basic Mobility Standardized Score 39 67   Highest Level Of Mobility   -[facility] Goal 5: Stand one or more mins   -[facility] Highest Level of Mobility 6: Walk 10 steps or more   -HL Goal Achieved Yes   Modified [place] Scale   Modified [place] Scale 3   Barthel Index   Feeding 5   Bathing 0   Grooming Score 0   Dressing Score 5   Bladder Score 10   Bowels Score 10   Toilet Use Score 5   Transfers (Bed/Chair) Score 10   Mobility (Level Surface) Score 0   Stairs Score 0   Barthel Index Score 45       Angelina Mccoy, PT, DPT

## 2022-04-27 NOTE — ASSESSMENT & PLAN NOTE
· Present on admission, secondary to fall    Seen by Orthopedics, conservative management  · Wrist splint, nonweightbearing  · Follow-up in orthopedic office for repeat imaging, casting

## 2022-04-27 NOTE — Clinical Note
Site (pad location): back and flank  Laterality: right  Grounding pad site assessment: skin integrity intact

## 2022-04-27 NOTE — CASE MANAGEMENT
Transport auth submitted to [employer], called into p# [phone] which advises submission of auths via fax    ALS transport request on 4/27/22 via [facility] submitted to fax [phone]    Pending

## 2022-04-27 NOTE — NURSING NOTE
Patient just picked up by [facility]  Report called to [facility] nurse Jodi  Patient sent with IV in place, amiodarone drip running at 16 7 mL/hr

## 2022-04-27 NOTE — H&P
[facility]  H&P- Pranay Malone 1940, [de-identified] y o  female MRN: [id number]  Unit/Bed#: [facility] 501-01 Encounter: [id number]  Primary Care Provider: Rickey Rasmussen DO   Date and time admitted to hospital: 4/27/2022  4:11 PM    * Atrial fibrillation [facility])  Assessment & Plan  · Patient is currently in atrial flutter with heart rate in the 60s  · S/p unsuccessful DCCV on 03/29  Patient has had recurrent symptomatic atrial fibrillation/flutter despite treatment with oral amiodarone  · Second DCCV was aborted on 04/22 when she was in sinus bradycardia with heart rate in the 40s  · Continue Eliquis 2 5 mg b i d  (age, weight)  · Continue amiodarone infusion for rate control  · Transferred here for an EP consultation for possible ablation, await EP input      Closed fracture of left wrist  Assessment & Plan  · Present on admission, secondary to fall  Seen by Orthopedics, conservative management  · Wrist splint, nonweightbearing  · Follow-up in orthopedic office for repeat imaging, casting     Fall  Assessment & Plan  · Patient presents to the ED with complaints of multiple falls; brought in by EMS after patient fell and complained of left wrist pain/swelling  Patient reports multiple falls lately due to being dizzy  ? Was to be seen by cardiology to consider possible cardioversion outpatient this week  § Monitor on telemetry - in/out of afib briefly overnight   § Transferred here for EP evaluation      Essential hypertension  Assessment & Plan  · Bps meds are currently on hold  · BP is acceptable at this time  · Can adjust meds post ablation when more stable    VTE Pharmacologic Prophylaxis:   Moderate Risk (Score 3-4) - Pharmacological DVT Prophylaxis Ordered: apixaban (Eliquis)    Code Status: Level 1 - Full Code     Anticipated Length of Stay: Patient will be admitted on an inpatient basis with an anticipated length of stay of greater than 2 midnights secondary to afib/flutter  Total Time for Visit, including Counseling / Coordination of Care: 30 minutes Greater than 50% of this total time spent on direct patient counseling and coordination of care  Chief Complaint: dizziness    History of Present Illness:  Kathryn Loo is a [de-identified] y o  female with a PMH of afib who presents with dizziness and fall  Kathryn Loo is a [de-identified] y o  female patient who originally presented to [facility] on 4/24/2022 due to falls and dizziness at home  Patient with known afib on eliquis, had been considered for cardioversion after experiencing symptoms after decreasing amiodarone and discontinuing metoprolol  Found to have episodes of rapid afib and ultimately advised to undergo ablation with electrophysiology  Currently on amiodarone drip and remains on eliquis 2 5 mg BID  Unfortunately from fall, she does have a left distal radial fracture that is currently splinted and will need casting upon discharge  Orthopedics did evaluate patient in hospital   NWB to the left arm  PT/OT evaluated as well to educate on safe maneuvers  She was transferred to [facility] for an EP evaluation  Currently reports mild nausea from the pain in her wrist     Review of Systems:  Review of Systems   All other systems reviewed and are negative  Past Medical and Surgical History:   Past Medical History:   Diagnosis Date    Atrial fibrillation ([address] )     Hypertension        Past Surgical History:   Procedure Laterality Date    APPENDECTOMY      BREAST SURGERY      BX - Benign     CATARACT EXTRACTION, BILATERAL      DILATION AND CURETTAGE OF UTERUS      LAPAROSCOPY FOR ECTOPIC PREGNANCY      OTHER SURGICAL HISTORY      surgical excision of tubal pregnancy        Meds/Allergies:  Prior to Admission medications    Medication Sig Start Date End Date Taking?  Authorizing Provider   amiodarone 200 mg tablet Take 1 tablet (200 mg total) by mouth daily 4/22/22   DAVID Mcbride   Eliquis 2 5 MG TAKE 1 TABLET TWICE A DAY  NEED APPOINTMENT WITH DR Paul Vear FOR FURTHER REFILLS 3/9/22   Dar Agee MD   Glucosamine-Chondroit-Vit C-Mn (GLUCOSAMINE 1500 COMPLEX PO) Take 1 capsule by mouth daily 3/11/14   Historical Provider, MD   triamcinolone (KENALOG) 0 5 % ointment Apply topically as needed   10/23/20 4/22/22  Historical Provider, MD     I have reviewed home medications using recent Epic encounter  Allergies: No Known Allergies    Social History:  Marital Status: /Civil Union   Occupation: retired  Patient Pre-hospital Living Situation: Home  Patient Pre-hospital Level of Mobility: walks  Patient Pre-hospital Diet Restrictions: none  Substance Use History:   Social History     Substance and Sexual Activity   Alcohol Use Yes    Comment: social      Social History     Tobacco Use   Smoking Status Former Smoker    Packs/day: 0 50    Years: 20 00    Pack years: 10 00    Quit date: 2006    Years since quittin 3   Smokeless Tobacco Never Used     Social History     Substance and Sexual Activity   Drug Use No       Family History:  Family History   Problem Relation Age of Onset    Hypertension Mother     Ovarian cancer Mother     Pneumonia Father        Physical Exam:     Vitals:   Blood Pressure: 155/98 (22 1615)  Pulse: 104 (22 1615)  Temperature: 98 3 °F (36 8 °C) (22 1615)  Respirations: 16 (22 1615)  SpO2: 95 % (22 1615)    Physical Exam  Constitutional:       General: She is not in acute distress  Appearance: Normal appearance  She is not toxic-appearing  HENT:      Head: Normocephalic and atraumatic  Nose: Nose normal    Eyes:      Extraocular Movements: Extraocular movements intact  Cardiovascular:      Rate and Rhythm: Normal rate  Rhythm irregular  Pulmonary:      Effort: Pulmonary effort is normal    Abdominal:      General: There is no distension  Neurological:      Mental Status: She is alert and oriented to person, place, and time  Mental status is at baseline  Cranial Nerves: No cranial nerve deficit  Psychiatric:         Mood and Affect: Mood normal          Behavior: Behavior normal           Additional Data:     Lab Results:  Results from last 7 days   Lab Units 04/25/22  0557 04/24/22  0836 04/24/22  0836   WBC Thousand/uL 8 99   < > 9 78   HEMOGLOBIN g/dL 14 7   < > 15 2   HEMATOCRIT % 44 0   < > 46 4*   PLATELETS Thousands/uL 236   < > 278   NEUTROS PCT %  --   --  79*   LYMPHS PCT %  --   --  12*   MONOS PCT %  --   --  9   EOS PCT %  --   --  0    < > = values in this interval not displayed  Results from last 7 days   Lab Units 04/27/22  0516 04/25/22  0557 04/25/22  0557   SODIUM mmol/L 136   < > 135*   POTASSIUM mmol/L 4 1   < > 4 1   CHLORIDE mmol/L 102   < > 102   CO2 mmol/L 27   < > 26   BUN mg/dL 20   < > 17   CREATININE mg/dL 0 92   < > 0 92   ANION GAP mmol/L 7   < > 7   CALCIUM mg/dL 9 0   < > 8 8   ALBUMIN g/dL  --   --  3 5   TOTAL BILIRUBIN mg/dL  --   --  1 13*   ALK PHOS U/L  --   --  71   ALT U/L  --   --  18   AST U/L  --   --  14   GLUCOSE RANDOM mg/dL 111   < > 114    < > = values in this interval not displayed  Results from last 7 days   Lab Units 04/24/22  0836   INR  1 12                   Imaging: No pertinent imaging reviewed  No orders to display       EKG and Other Studies Reviewed on Admission:   · EKG: Atrial flutter  HR 90     ** Please Note: This note has been constructed using a voice recognition system   **

## 2022-04-27 NOTE — ASSESSMENT & PLAN NOTE
· Patient is currently in atrial flutter with heart rate in the 60s  · S/p unsuccessful DCCV on 03/29  Patient has had recurrent symptomatic atrial fibrillation/flutter despite treatment with oral amiodarone  · Second DCCV was aborted on 04/22 when she was in sinus bradycardia with heart rate in the 40s    · Continue Eliquis 2 5 mg b i d  (age, weight)  · Continue amiodarone infusion for rate control  · Transferred here for an EP consultation for possible ablation, await EP input

## 2022-04-27 NOTE — ASSESSMENT & PLAN NOTE
· History of a-fib, has had a cardioversion before; however persistent aflutter here  · Cardiology consult, appreciate input  · Discussed with EP at [facility], will plan to transfer  · Continue amiodarone drip, currently back in NSR

## 2022-04-27 NOTE — PROGRESS NOTES
Cardiology Progress Note - Luis Bazan [de-identified] y o  female MRN: [id number]    Unit/Bed#: -01 Encounter: [id number]      Assessment/Plan:  1  Paroxysmal atrial fibrillation/flutter  · Patient is currently in atrial flutter with heart rate in the 60s  · S/p unsuccessful DCCV on 03/29  Patient has had recurrent symptomatic atrial fibrillation/flutter despite treatment with oral amiodarone  · Second DCCV was aborted on 04/22 when she was in sinus bradycardia with heart rate in the 40s  · Continue Eliquis 2 5 mg b i d  (age, weight)  · Patient awaiting transfer to [facility] for evaluation for atrial fibrillation/flutter ablation  2  Two recent fall/near syncope-reported symptomatic PAF  · See plan above     3  Hypertension  · BP well controlled off of antihypertensive agents  4  Left distal radius fracture  · Management per orthopedics  Subjective:   Patient seen and examined  No significant events overnight  Patient appears comfortable sitting up in the chair  Patient awaiting transfer to [facility]  Patient denies chest pain, palpitations, or shortness of breath  Objective:     Vitals: Blood pressure 112/67, pulse 86, temperature 98 1 °F (36 7 °C), resp  rate 16, height 5' 5" (1 651 m), weight 55 7 kg (122 lb 12 7 oz), SpO2 90 %  , Body mass index is 20 43 kg/m² ,   Orthostatic Blood Pressures      Most Recent Value   Blood Pressure 112/67 filed at 04/27/2022 9020   Patient Position - Orthostatic VS Lying filed at 04/24/2022 1300            Intake/Output Summary (Last 24 hours) at 4/27/2022 1401  Last data filed at 4/27/2022 1307  Gross per 24 hour   Intake 650 ml   Output 0 ml   Net 650 ml         Physical Exam:  Physical Exam  Vitals and nursing note reviewed  Constitutional:       General: She is not in acute distress  Appearance: She is well-developed  HENT:      Head: Normocephalic and atraumatic     Eyes:      Conjunctiva/sclera: Conjunctivae normal  Cardiovascular:      Rate and Rhythm: Normal rate  Rhythm regularly irregular  Heart sounds: Normal heart sounds  No murmur heard  Pulmonary:      Effort: Pulmonary effort is normal  No respiratory distress  Breath sounds: Normal breath sounds  Abdominal:      Palpations: Abdomen is soft  Tenderness: There is no abdominal tenderness  Musculoskeletal:      Cervical back: Neck supple  Right lower leg: No edema  Left lower leg: No edema  Skin:     General: Skin is warm and dry  Neurological:      Mental Status: She is alert and oriented to person, place, and time     Psychiatric:         Mood and Affect: Mood normal          Behavior: Behavior normal               Medications:      Current Facility-Administered Medications:     acetaminophen (TYLENOL) tablet 650 mg, 650 mg, Oral, Q6H PRN, [place], CRNP, 650 mg at 04/26/22 2224    amiodarone (CORDARONE) 900 mg in dextrose 5 % 500 mL infusion, 0 5 mg/min, Intravenous, Continuous, Krystal Mccartney PA-C, Last Rate: 16 7 mL/hr at 04/26/22 1840, 0 5 mg/min at 04/26/22 1840    apixaban (ELIQUIS) tablet 2 5 mg, 2 5 mg, Oral, BID, [place], CRNP, 2 5 mg at 04/27/22 0851    ondansetron (ZOFRAN) injection 4 mg, 4 mg, Intravenous, Q6H PRN, [place], CRNP, 4 mg at 04/27/22 1025    oxyCODONE (ROXICODONE) IR tablet 2 5 mg, 2 5 mg, Oral, Q4H PRN, Clayborn Vin, CRNP, 2 5 mg at 04/27/22 0851    oxyCODONE (ROXICODONE) IR tablet 5 mg, 5 mg, Oral, Q4H PRN, [place], CRNP, 5 mg at 04/26/22 1723    senna-docusate sodium (SENOKOT S) 8 6-50 mg per tablet 1 tablet, 1 tablet, Oral, BID, Virginia Slater PA-C, 1 tablet at 04/27/22 0851     Labs & Results:     Results from last 7 days   Lab Units 04/24/22  1249 04/24/22  1108 04/24/22  0836   HS TNI 0HR ng/L  --   --  5   HS TNI 2HR ng/L  --  7  --    HSTNI D2 ng/L  --  2  --    HS TNI 4HR ng/L 15  --   --    HSTNI D4 ng/L 10  --   --      Results from last 7 days   Lab Units 04/25/22  0557 04/24/22  0836   WBC Thousand/uL 8 99 9 78   HEMOGLOBIN g/dL 14 7 15 2   HEMATOCRIT % 44 0 46 4*   PLATELETS Thousands/uL 236 278         Results from last 7 days   Lab Units 04/27/22  0516 04/25/22  0557 04/24/22  0836   POTASSIUM mmol/L 4 1 4 1 4 4   CHLORIDE mmol/L 102 102 103   CO2 mmol/L 27 26 27   BUN mg/dL 20 17 21   CREATININE mg/dL 0 92 0 92 1 12   CALCIUM mg/dL 9 0 8 8 9 4   ALK PHOS U/L  --  71 80   ALT U/L  --  18 24   AST U/L  --  14 17     Results from last 7 days   Lab Units 04/24/22  0836   INR  1 12   PTT seconds 30     Results from last 7 days   Lab Units 04/25/22  0557 04/24/22  0836   MAGNESIUM mg/dL 1 8 2 0       Vitals: Blood pressure 112/67, pulse 86, temperature 98 1 °F (36 7 °C), resp  rate 16, height 5' 5" (1 651 m), weight 55 7 kg (122 lb 12 7 oz), SpO2 90 %  , Body mass index is 20 43 kg/m² ,   Orthostatic Blood Pressures      Most Recent Value   Blood Pressure 112/67 filed at 04/27/2022 7005   Patient Position - Orthostatic VS Lying filed at 04/24/2022 2444          Systolic (20BKI), VES:671 , Min:112 , TVA:155     Diastolic (74MUN), QAH:88, Min:65, Max:80        Intake/Output Summary (Last 24 hours) at 4/27/2022 1401  Last data filed at 4/27/2022 1307  Gross per 24 hour   Intake 650 ml   Output 0 ml   Net 650 ml       Invasive Devices  Report    Peripheral Intravenous Line            Peripheral IV 04/24/22 Right Forearm 2 days                  EKG:  Atrial flutter with variable AV block, ST and T-wave abnormality, consider inferior ischemia    Telemetry:  Telemetry Orders (From admission, onward)             24 Hour Telemetry Monitoring  Continuous x 24 Hours (Telem)        References:    Telemetry Guidelines   Question:  Reason for 24 Hour Telemetry  Answer:  Syncope suspected to be cardiac in origin                 Telemetry Reviewed: Atrial flutter   HR averaging 60-80s  Indication for Continued Telemetry Use: Arrthymias requiring medical therapy    BP Readings from Last 3 Encounters:   04/27/22 112/67   04/22/22 150/78   03/29/22 127/77      Wt Readings from Last 3 Encounters:   04/24/22 55 7 kg (122 lb 12 7 oz)   04/22/22 55 1 kg (121 lb 7 6 oz)   03/29/22 55 3 kg (122 lb)

## 2022-04-27 NOTE — PLAN OF CARE
Problem: OCCUPATIONAL THERAPY ADULT  Goal: Performs self-care activities at highest level of function for planned discharge setting  See evaluation for individualized goals  Description: Treatment Interventions: ADL retraining,Functional transfer training,UE strengthening/ROM,Endurance training,Cardiac education,Patient/family training,Compensatory technique education,Energy conservation,Activityengagement,Continued evaluation          See flowsheet documentation for full assessment, interventions and recommendations  Note: Limitation: Decreased ADL status,Decreased endurance,Decreased self-care trans,Decreased high-level ADLs  Prognosis: Good  Assessment: Patient is a [de-identified] y o  female seen for OT evaluation s/p admit to [address] [facility] on 4/24/2022 w/Fall  Commorbidities affecting patient's functional performance at time of assessment include: atrial fibrillation, essential HTN, hyperlipidemia, and closed fracture of left wrist  Patient  has a past medical history of Atrial fibrillation ([facility] [address] ) and Hypertension  Orders placed for OT evaluation and treatment  Performed at least two patient identifiers during session including name and wristband  Prior to admission, patient was living with her spouse in a one-story home with 6 steps to enter  At baseline, patient reports that she is independent in both ADLs and IADLs and is ambulatory with no AD  Personal factors affecting patient at time of initial evaluation include: steps to enter, difficulty performing ADLs and difficulty performing IADLs  Upon evaluation, patient requires minimal assist for UB ADLs, minimal assist for LB ADLs, transfers and functional ambulation in room with supervision and minimal assist, with R HHA  Patient is alert and oriented x 4   Occupational performance is affected by the following deficits: decreased functional use of BUEs, dynamic sit/ stand balance deficit with poor standing tolerance time for self care and functional mobility, decreased activity tolerance and orthopedic restrictions, and decreased cardiovascular endurance  Patient to benefit from continued Occupational Therapy treatment while in the hospital to address deficits as defined above and maximize level of functional independence with ADLs and functional mobility  Occupational Performance areas to address include: grooming , bathing/ shower, dressing, toilet hygiene, transfer to all surfaces, functional mobility, IADLs: safety procedures, Leisure Participation and Home management  From OT standpoint, recommendation at time of d/c would be Home with family support and Home OT         OT Discharge Recommendation: Home with home health rehabilitation

## 2022-04-27 NOTE — PROGRESS NOTES
[facility] Progress Note - Nasreen Jauregui 1940, [de-identified] y o  female MRN: [id number]  Unit/Bed#: MS [facility] Encounter: [id number]  Primary Care Provider: Ginger Maynard DO   Date and time admitted to hospital: 4/24/2022  7:48 AM    * Fall  Assessment & Plan  · Patient presents to the ED with complaints of multiple falls; brought in by EMS after patient fell and complained of left wrist pain/swelling  Patient reports multiple falls lately due to being dizzy  · Was to be seen by cardiology to consider possible cardioversion outpatient this wekk  · Monitor on telemetry - in/out of afib briefly overnight   · Cards recommending EP evaluation for afib/flutter ablation   · Plan to transfer to [facility], pending bed due to capacity at accepting facility     Atrial fibrillation [facility])  Assessment & Plan  · History of a-fib, has had a cardioversion before; however persistent aflutter here  · Cardiology consult, appreciate input  · Discussed with EP at [facility], will plan to transfer  · Continue amiodarone drip, currently back in NSR     Essential hypertension  Assessment & Plan  · Chronic, hold losartan  · Monitor per unit protocol    Hyperlipidemia  Assessment & Plan  · Chronic  · Continue home medication regimen    Closed fracture of left wrist  Assessment & Plan  · Present on admission, secondary to fall  Seen by Orthopedics, conservative management  · Wrist splint, nonweightbearing  · Follow-up in orthopedic office for repeat imaging, casting   · PT and OT consults today          VTE Pharmacologic Prophylaxis: VTE Score: 3 Moderate Risk (Score 3-4) - Pharmacological DVT Prophylaxis Ordered: apixaban (Eliquis)  Patient Centered Rounds: I performed bedside rounds with nursing staff today  Discussions with Specialists or Other Care Team Provider: CM, PACS     Education and Discussions with Family / Patient: Patient declined call to   Time Spent for Care: 20 minutes   More than 50% of total time spent on counseling and coordination of care as described above  Current Length of Stay: 2 day(s)  Current Patient Status: Inpatient   Certification Statement: The patient will continue to require additional inpatient hospital stay due to pending transfer for EP evaluation  Discharge Plan: Anticipate discharge later today or tomorrow to [facility]    Code Status: Level 1 - Full Code    Subjective:   Patient seen this morning, she is anxiously awaiting transfer to [place]  Denies any chest pain or palpitations  Per nurse, she did get a little dizzy last night when up in the bathroom, however that was the 1st time she was really out of bed for more than a few minutes  Did have an episode of AFib again overnight, but back in sinus rhythm this morning  Objective:     Vitals:   Temp (24hrs), Av 5 °F (36 9 °C), Min:98 1 °F (36 7 °C), Max:99 °F (37 2 °C)    Temp:  [98 1 °F (36 7 °C)-99 °F (37 2 °C)] 98 1 °F (36 7 °C)  HR:  [61-86] 86  Resp:  [16-18] 16  BP: (112-133)/(65-80) 112/67  SpO2:  [90 %-92 %] 90 %  Body mass index is 20 43 kg/m²  Input and Output Summary (last 24 hours): Intake/Output Summary (Last 24 hours) at 2022 1018  Last data filed at 2022 0601  Gross per 24 hour   Intake 230 ml   Output 0 ml   Net 230 ml       Physical Exam:   Physical Exam  Vitals and nursing note reviewed  Cardiovascular:      Rate and Rhythm: Normal rate and regular rhythm  Pulmonary:      Effort: Pulmonary effort is normal  No respiratory distress  Breath sounds: Normal breath sounds  Abdominal:      General: Bowel sounds are normal       Palpations: Abdomen is soft  Neurological:      Mental Status: She is alert and oriented to person, place, and time     Psychiatric:         Mood and Affect: Mood normal          Behavior: Behavior normal           Additional Data:     Labs:  Results from last 7 days   Lab Units 22  0557 22  0836 22  0836   WBC Thousand/uL 8 99   < > 9 78   HEMOGLOBIN g/dL 14 7   < > 15 2   HEMATOCRIT % 44 0   < > 46 4*   PLATELETS Thousands/uL 236   < > 278   NEUTROS PCT %  --   --  79*   LYMPHS PCT %  --   --  12*   MONOS PCT %  --   --  9   EOS PCT %  --   --  0    < > = values in this interval not displayed  Results from last 7 days   Lab Units 04/27/22  0516 04/25/22  0557 04/25/22  0557   SODIUM mmol/L 136   < > 135*   POTASSIUM mmol/L 4 1   < > 4 1   CHLORIDE mmol/L 102   < > 102   CO2 mmol/L 27   < > 26   BUN mg/dL 20   < > 17   CREATININE mg/dL 0 92   < > 0 92   ANION GAP mmol/L 7   < > 7   CALCIUM mg/dL 9 0   < > 8 8   ALBUMIN g/dL  --   --  3 5   TOTAL BILIRUBIN mg/dL  --   --  1 13*   ALK PHOS U/L  --   --  71   ALT U/L  --   --  18   AST U/L  --   --  14   GLUCOSE RANDOM mg/dL 111   < > 114    < > = values in this interval not displayed  Results from last 7 days   Lab Units 04/24/22  0836   INR  1 12                   Lines/Drains:  Invasive Devices  Report    Peripheral Intravenous Line            Peripheral IV 04/24/22 Right Forearm 2 days                  Telemetry:  Telemetry Orders (From admission, onward)             24 Hour Telemetry Monitoring  Continuous x 24 Hours (Telem)        References:    Telemetry Guidelines   Question:  Reason for 24 Hour Telemetry  Answer:  Syncope suspected to be cardiac in origin                 Telemetry Reviewed: Normal Sinus Rhythm  Indication for Continued Telemetry Use: Arrthymias requiring medical therapy             Imaging: No pertinent imaging reviewed      Recent Cultures (last 7 days):         Last 24 Hours Medication List:   Current Facility-Administered Medications   Medication Dose Route Frequency Provider Last Rate    acetaminophen  650 mg Oral Q6H PRN DAVID Jesus      amiodarone  0 5 mg/min Intravenous Continuous Krystal Mccartney PA-C 0 5 mg/min (04/26/22 1840)    apixaban  2 5 mg Oral BID DAVID Jesus      ondansetron  4 mg Intravenous Q6H PRN Maci Stanton DAVID Irving      oxyCODONE  2 5 mg Oral Q4H PRN DAVID Simons      oxyCODONE  5 mg Oral Q4H PRN DAVID Simons      senna-docusate sodium  1 tablet Oral BID Sarai Neri PA-C          Today, Patient Was Seen By: Sarai Neri PA-C    **Please Note: This note may have been constructed using a voice recognition system  **

## 2022-04-27 NOTE — CASE MANAGEMENT
Case Management Progress Note    Patient name Gisselle James  Location [address] 332/-[id number] MRN [id number]  : 1940 Date 2022       LOS (days): 2  Geometric Mean LOS (GMLOS) (days): 2 40  Days to GMLOS:0 4        OBJECTIVE:        Current admission status: Inpatient  [facility]   [facility] [address] [facility] [address]  Phone: [phone] Fax: [phone], [address] [place] [address]  Phone: [phone] Fax: [phone]    Primary Care Provider: Latrice Prakash DO    Primary Insurance: [address]  Secondary Insurance:     PROGRESS NOTE:  [facility] arrived to transfer patient to [facility]  CM tasked transport auth to [employer] [facility] support as it's unclear if her plan requires authorization for this type of trip  CMN provided to EMS

## 2022-04-27 NOTE — OCCUPATIONAL THERAPY NOTE
Occupational Therapy Evaluation Note        Patient Name: Cintia Clark  UNUOJ'Z Date: 4/27/2022 04/27/22 0831   OT Last Visit   OT Visit Date 04/27/22   Note Type   Note type Evaluation   Restrictions/Precautions   Weight Bearing Precautions Per Order Yes   LUE Weight Bearing Per Order NWB  (per orthopedics)   Braces or Orthoses Splint  (L wrist splint  none at baseline)   Other Precautions WBS; Chair Alarm; Bed Alarm;Multiple lines;Telemetry; Fall Risk;Pain   Pain Assessment   Pain Assessment Tool 0-10   Pain Score 1   Pain Location/Orientation Orientation: Left; Location: Arm  (Wrist)   Hospital Pain Intervention(s)   (RN aware)   Home Living   Type of [address] [place] [address] One level;Performs ADLs on one level;Stairs to enter with rails; Laundry in basement  (6 JASEN w/ R handrail ascending; FOS w/ railing to basement)   Bathroom Shower/Tub Walk-in shower   Bathroom Toilet Raised   Bathroom Equipment Built-in shower seat;Grab bars in shower;Hand-held shower;Grab bars around toilet   [address] Other (Comment)  (no AD used at baseline)   Prior Function   Level of [place] Independent with ADLs and functional mobility   Lives With Spouse  ( recently had stroke)   Receives Help From Family   ADL Assistance Independent   IADLs Independent   Falls in the last 6 months 1 to 4  (2 falls in past week + 1 fall on ice + 1 tripping incident)   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[employer])   Comments (+)    Lifestyle   Autonomy Per patient report, she lives with her spouse in a one-story home with 6 JASEN and a FOS to the laundry in the basement  At baseline, patient reports that she is independent in both ADLs and IADLs and is ambulatory with no AD at baseline     Reciprocal Relationships Spouse   Service to Others Retired- RN   Intrinsic Gratification Walking, reading, playing bridge   Psychosocial   Psychosocial (WDL) [address] Stack Supervision/Setup   Grooming Assistance 5  Supervision/Setup   UB Bathing Assistance 4  Minimal Assistance   LB Bathing Assistance 4  Minimal Assistance   UB Dressing Assistance 4  Minimal Assistance   LB Dressing Assistance 4  Minimal [address] Assistance  5  Supervision/Setup   Functional Assistance 4  Minimal Assistance   Additional Comments ADL assist levels based on pt's functional performance during OT evaluation   Bed Mobility   Supine to Sit 5  Supervision   Additional items Assist x 1;HOB elevated; Increased time required;Verbal cues   Additional Comments Patient received lying supine in bed upon OT arrival; at end of session: pt seated OOB to recliner chair w/ all needs within reach and chair alarm activated  Patient verbalized understanding of NWB LUE per orthopedics prior to bed and OOB mobility  Transfers   Sit to Stand 5  Supervision   Additional items Assist x 1; Increased time required;Verbal cues   Stand to Sit 5  Supervision   Additional items Assist x 1; Increased time required;Verbal cues   Additional Comments Performed functional transfers using no AD  (+) dizziness   Functional Mobility   Functional Mobility 4  Minimal assistance   Additional Comments x1; ~10 feet within pt room with no overt LOB  Additional items Hand hold assistance  (RUE HHA)   Balance   Static Sitting Fair +   Dynamic Sitting Fair   Static Standing Fair   Dynamic Standing Fair -   Ambulatory Poor +   Activity Tolerance   Activity Tolerance Patient limited by fatigue   Medical Staff Made Aware PT Ina Woodruff;  Per PT conversation w/ PA Elsa Suazo, patient is appropriate for therapy evaluations at this time   Nurse Made Aware ANNA Suresh   RUE Assessment   RUE Assessment WFL  (AROM and strength WFL)   RUE Overall AROM   R Mass Grasp Patient reports that she is R-hand dominant at baseline   LUE Assessment   LUE Assessment X  (Maintained NWB t/o session)   LUE Overall AROM   L Shoulder Flexion WFL   L Elbow Flexion WFL   L Elbow Extension WFL   Hand Function   Gross Motor Coordination Functional  (Based on pt's functional performance)   Fine Motor Coordination Functional  (Based on pt's functional performance)   Sensation   Light Touch No apparent deficits  (BUEs)   Additional Comments Patient denies diminished sensation t/o BUEs   Vision-Basic Assessment   Current Vision Does not wear glasses   Visual History Cataracts   Cognition   Overall Cognitive Status WFL   Arousal/Participation Responsive; Alert; Cooperative   Attention Within functional limits   Orientation Level Oriented X4   Memory Within functional limits   Following Commands Follows all commands and directions without difficulty   Comments Patient agreeable to OT evaluation   Assessment   Limitation Decreased ADL status; Decreased endurance;Decreased self-care trans;Decreased high-level ADLs   Prognosis Good   Assessment Patient is a [de-identified] y o  female seen for OT evaluation s/p admit to [id number] [facility] on 4/24/2022 w/Fall  Commorbidities affecting patient's functional performance at time of assessment include: atrial fibrillation, essential HTN, hyperlipidemia, and closed fracture of left wrist  Patient  has a past medical history of Atrial fibrillation ([facility] [address] ) and Hypertension  Orders placed for OT evaluation and treatment  Performed at least two patient identifiers during session including name and wristband  Prior to admission, patient was living with her spouse in a one-story home with 6 steps to enter  At baseline, patient reports that she is independent in both ADLs and IADLs and is ambulatory with no AD  Personal factors affecting patient at time of initial evaluation include: steps to enter, difficulty performing ADLs and difficulty performing IADLs  Upon evaluation, patient requires minimal assist for UB ADLs, minimal assist for LB ADLs, transfers and functional ambulation in room with supervision and minimal assist, with R HHA  Patient is alert and oriented x 4  Occupational performance is affected by the following deficits: decreased functional use of BUEs, dynamic sit/ stand balance deficit with poor standing tolerance time for self care and functional mobility, decreased activity tolerance and orthopedic restrictions, and decreased cardiovascular endurance  Patient to benefit from continued Occupational Therapy treatment while in the hospital to address deficits as defined above and maximize level of functional independence with ADLs and functional mobility  Occupational Performance areas to address include: grooming , bathing/ shower, dressing, toilet hygiene, transfer to all surfaces, functional mobility, IADLs: safety procedures, Leisure Participation and Home management  From OT standpoint, recommendation at time of d/c would be Home with family support and Home OT  Goals   Patient Goals to go to [facility] procedure   Plan   Treatment Interventions ADL retraining;Functional transfer training;UE strengthening/ROM; Endurance training;Cardiac education;Patient/family training; Compensatory technique education; Energy conservation; Activityengagement;Continued evaluation   Goal Expiration Date 05/07/22   OT Treatment Day 0   OT Frequency 2-3x/wk   Recommendation   OT Discharge Recommendation Home with home health rehabilitation   AM-PAC Daily Activity Inpatient   Lower Body Dressing 3   Bathing 3   Toileting 3   Upper Body Dressing 3   Grooming 3   Eating 3   Daily Activity Raw Score 18   Daily Activity Standardized Score (Calc for Raw Score >=11) 38 66   AM-PAC Applied Cognition Inpatient   Following a Speech/Presentation 4   Understanding Ordinary Conversation 4   Taking Medications 4   Remembering Where Things Are Placed or Put Away 4   Remembering List of 4-5 Errands 4   Taking Care of Complicated Tasks 4   Applied Cognition Raw Score 24   Applied Cognition Standardized Score 62 21   Barthel Index   Feeding 5   Bathing 0   Grooming Score 0   Dressing Score 5   Bladder Score 10   Bowels Score 10   Toilet Use Score 5   Transfers (Bed/Chair) Score 10   Mobility (Level Surface) Score 0   Stairs Score 0   Barthel Index Score 45   Modified Petroleum Scale   Modified Petroleum Scale 4     Occupational Therapy Goals to be completed in 7-10 Days:    1 - Patient will verbalize and demonstrate use of energy conservation/ deep breathing technique and work simplification skills during functional activity with no verbal cues  2 - Patient will verbalize and demonstrate good body mechanics and joint protection techniques during  ADLs/ IADLs with no verbal cues  3 - Patient will increase OOB/ sitting tolerance to 2-4 hours per day for increased participation in self care and leisure tasks with no s/s of exertion  4 - Patient will increase standing tolerance time to 10 minutes with unilateral UE support to complete sink level ADLs@ mod I level  5 - Patient will increase sitting tolerance at edge of bed to 30 minutes to complete UB ADLs @ set up assist level  6 - Patient will transfer bed to Chair / toilet at Set up assist level with AD as indicated  7 - Patient will complete UB ADLs with set up assist with use of compensatory techniques as indicated  8 - Patient will complete LB ADLs with supervision/setup assist      9 - Patient will complete toileting hygiene with Mod I assist/ supervision for thoroughness      10 - Patient/ Family will demonstrate competency with 1 SHIRA Gray/NOEL

## 2022-04-27 NOTE — ASSESSMENT & PLAN NOTE
· Patient presents to the ED with complaints of multiple falls; brought in by EMS after patient fell and complained of left wrist pain/swelling  Patient reports multiple falls lately due to being dizzy    · Was to be seen by cardiology to consider possible cardioversion outpatient this wekk  · Monitor on telemetry - in/out of afib briefly overnight   · Cards recommending EP evaluation for afib/flutter ablation   · Plan to transfer to [facility], pending bed due to capacity at accepting facility

## 2022-04-27 NOTE — ASSESSMENT & PLAN NOTE
· Bps meds are currently on hold  · BP is acceptable at this time  · Can adjust meds post ablation when more stable

## 2022-04-27 NOTE — ASSESSMENT & PLAN NOTE
· Present on admission, secondary to fall    Seen by Orthopedics, conservative management  · Wrist splint, nonweightbearing  · Follow-up in orthopedic office for repeat imaging, casting   · PT and OT consults today

## 2022-04-27 NOTE — PLAN OF CARE
Problem: PHYSICAL THERAPY ADULT  Goal: Performs mobility at highest level of function for planned discharge setting  See evaluation for individualized goals  Description: Treatment/Interventions: Functional transfer training,LE strengthening/ROM,Elevations,Therapeutic exercise,Endurance training,Patient/family training,Equipment eval/education,Bed mobility,Gait training,Spoke to nursing  Equipment Recommended:  (TBD - will require U/L AD, trial SPC next session)       See flowsheet documentation for full assessment, interventions and recommendations  4/27/2022 1330 by Marco A Bauman PT  Note: Prognosis: Excellent  Problem List: Decreased strength,Decreased endurance,Impaired balance,Decreased mobility  Assessment: Pt is [de-identified] y o  female seen for high-complexity PT evaluation on 4/27/2022 s/p admit to [facility] on 4/24/2022 w/ Fall  Patient presents to the ED with complaints of multiple falls; brought in by EMS after patient fell and complained of left wrist pain/swelling; dx with closed L wrist fx, NWB, wrist splint donned by ortho  Plan to transfer to [facility], pending bed due to capacity at accepting facility, planning for cardiology procedure, currently on amiodarone drip  PT was consulted to assess pt's functional mobility and d/c needs  Order placed for PT eval and tx  PTA, pt resides with spouse in [facility] with 6 JASEN, ambulates without AD, +fall history, retired, I with all I/ADLs  At time of eval, pt performing bed mobility SBA, CGA for transfers, min A for level surface household distance with R UE HHA  Upon evaluation, pt presenting with impaired functional mobility d/t decreased strength, decreased endurance, impaired balance, decreased mobility, orthopedic restrictions of NWB LUE, pain and activity intolerance   Pertinent PMHx and current co-morbidities affecting pt's physical performance at time of assessment include: fall, essential HTN, HLD, A fib, closed fracture of L wrist, psoriasis, Dupuytren's contracture of B/L hands, arthritis, conductive hearing loss, osteoporosis, knee mass  Personal factors affecting pt at time of eval include: stairs to enter home, inability to navigate community distances and positive fall history  The following objective measures performed on IE also reveal limitations: Barthel Index: 45/100, Modified Brantley: 3 (moderate disability) and AM-PAC 6-Clicks: 31/70  Pt's clinical presentation is currently unstable/unpredictable seen in pt's presentation of advanced age, abnormal lab value(s), need for input for task focus and mobility technique, ongoing medical assessment and on telemetry monitoring  Overall, pt's rehab potential and prognosis to return to PLOF is excellent as impacted by objective findings, warranting pt to receive further skilled PT interventions to address identified impairments, activity limitation(s), and participation restriction(s)  Pt to benefit from continued PT tx to address deficits as defined above and maximize level of functional independent mobility and consistency  From PT/mobility standpoint, recommendation at time of d/c would be home with home health rehabilitation pending progress in order to facilitate return to PLOF  Barriers to Discharge: Inaccessible home environment        PT Discharge Recommendation: Home with home health rehabilitation          See flowsheet documentation for full assessment  4/27/2022 1330 by Henny Jang PT  Note: Prognosis: Excellent  Problem List: Decreased strength,Decreased endurance,Impaired balance,Decreased mobility  Assessment: Pt is [de-identified] y o  female seen for high-complexity PT evaluation on 4/27/2022 s/p admit to [facility] on 4/24/2022 w/ Fall  Patient presents to the ED with complaints of multiple falls; brought in by EMS after patient fell and complained of left wrist pain/swelling; dx with closed L wrist fx, NWB, wrist splint donned by ortho   Plan to transfer to [facility], pending bed due to capacity at accepting facility, planning for cardiology procedure, currently on amiodarone drip  PT was consulted to assess pt's functional mobility and d/c needs  Order placed for PT eval and tx  PTA, pt resides with spouse in [facility] with 6 JASEN, ambulates without AD, +fall history, retired, I with all I/ADLs  At time of eval, pt performing bed mobility SBA, CGA for transfers, min A for level surface household distance with R UE HHA  Upon evaluation, pt presenting with impaired functional mobility d/t decreased strength, decreased endurance, impaired balance, decreased mobility, orthopedic restrictions of NWB LUE, pain and activity intolerance  Pertinent PMHx and current co-morbidities affecting pt's physical performance at time of assessment include: fall, essential HTN, HLD, A fib, closed fracture of L wrist, psoriasis, Dupuytren's contracture of B/L hands, arthritis, conductive hearing loss, osteoporosis, knee mass  Personal factors affecting pt at time of eval include: stairs to enter home, inability to navigate community distances and positive fall history  The following objective measures performed on IE also reveal limitations: Barthel Index: 45/100, Modified [place]acia: 3 (moderate disability) and AM-PAC 6-Clicks: 83/17  Pt's clinical presentation is currently unstable/unpredictable seen in pt's presentation of advanced age, abnormal lab value(s), need for input for task focus and mobility technique, ongoing medical assessment and on telemetry monitoring  Overall, pt's rehab potential and prognosis to return to PLOF is excellent as impacted by objective findings, warranting pt to receive further skilled PT interventions to address identified impairments, activity limitation(s), and participation restriction(s)  Pt to benefit from continued PT tx to address deficits as defined above and maximize level of functional independent mobility and consistency   From PT/mobility standpoint, recommendation at time of d/c would be home with home health rehabilitation pending progress in order to facilitate return to PLOF  Barriers to Discharge: Inaccessible home environment        PT Discharge Recommendation: Home with home health rehabilitation          See flowsheet documentation for full assessment

## 2022-04-27 NOTE — ASSESSMENT & PLAN NOTE
· Patient presents to the ED with complaints of multiple falls; brought in by EMS after patient fell and complained of left wrist pain/swelling  Patient reports multiple falls lately due to being dizzy    ? Was to be seen by cardiology to consider possible cardioversion outpatient this week  § Monitor on telemetry - in/out of afib briefly overnight   § Transferred here for EP evaluation

## 2022-04-27 NOTE — Clinical Note
[facility]CHO [facility] ARRIVED FOR LUIS EDUARDO Subjective   Tran Weston is a 56 y.o. female.     History of Present Illness     She complains of ST this AM, chills  HA and feeling very tired  Her dad is in the hospital and she has been going abck and forth to see him  Left ear pain  Some congestion and mildly productive cough    She also complains of right shoulder pain  Hurts with most movements including lifting and moving behind her  No trauma that she is aware of, did not fall, no inciting event  The pain is with any movement, rest does make it better but it returns with movement    The following portions of the patient's history were reviewed and updated as appropriate: allergies, current medications, past family history, past medical history, past social history, past surgical history and problem list.    Review of Systems   Constitutional: Negative.    HENT: Positive for congestion and sore throat.    Eyes: Negative.    Respiratory: Negative.    Cardiovascular: Negative.    Gastrointestinal: Negative.    Musculoskeletal:        Right shoulder   Skin: Negative.    Neurological: Negative.    Psychiatric/Behavioral: Negative.    All other systems reviewed and are negative.      Objective   Physical Exam   Constitutional: She appears well-developed and well-nourished.   HENT:   Head: Normocephalic and atraumatic.   Right Ear: Hearing, tympanic membrane, external ear and ear canal normal.   Left Ear: Hearing, tympanic membrane, external ear and ear canal normal.   Nose: Nose normal.   Mouth/Throat: Uvula is midline, oropharynx is clear and moist and mucous membranes are normal.   Eyes: Conjunctivae and EOM are normal.   Neck: Normal range of motion.   Cardiovascular: Normal rate, regular rhythm and normal heart sounds.   Pulmonary/Chest: Effort normal and breath sounds normal.   Musculoskeletal:        Arms:  Lymphadenopathy:     She has no cervical adenopathy.   Psychiatric: She has a normal mood and affect. Her behavior is normal.   Nursing note and  vitals reviewed.      Assessment/Plan   Tran was seen today for sore throat, headache and chills.    Diagnoses and all orders for this visit:    Sore throat  -     POCT rapid strep A  -     predniSONE (DELTASONE) 20 MG tablet; Take 2 tablets by mouth Daily.    Acute URI  -     Chlorcyclizine-Pseudoephed 25-60 MG tablet; 1/2-1 po q 8 hours PRN    Chronic right shoulder pain  -     XR Shoulder 2+ View Right; Future    strep negative, will treat with pred and time  stahist for congestion  XR of right shoulder and home PT.  Written instructions given.

## 2022-04-28 ENCOUNTER — APPOINTMENT (INPATIENT)
Dept: RADIOLOGY | Facility: HOSPITAL | Age: 82
DRG: 271 | End: 2022-04-28
Attending: RADIOLOGY
Payer: COMMERCIAL

## 2022-04-28 ENCOUNTER — ANESTHESIA EVENT (INPATIENT)
Dept: NON INVASIVE DIAGNOSTICS | Facility: HOSPITAL | Age: 82
DRG: 271 | End: 2022-04-28
Payer: COMMERCIAL

## 2022-04-28 ENCOUNTER — APPOINTMENT (INPATIENT)
Dept: RADIOLOGY | Facility: HOSPITAL | Age: 82
DRG: 271 | End: 2022-04-28
Payer: COMMERCIAL

## 2022-04-28 ENCOUNTER — APPOINTMENT (INPATIENT)
Dept: NON INVASIVE DIAGNOSTICS | Facility: HOSPITAL | Age: 82
DRG: 271 | End: 2022-04-28
Attending: INTERNAL MEDICINE
Payer: COMMERCIAL

## 2022-04-28 PROBLEM — Z98.890 H/O CARDIAC RADIOFREQUENCY ABLATION: Status: ACTIVE | Noted: 2022-04-28

## 2022-04-28 PROBLEM — S30.1XXA RECTUS SHEATH HEMATOMA: Status: ACTIVE | Noted: 2022-04-28

## 2022-04-28 LAB
ABO GROUP BLD: NORMAL
ANION GAP SERPL CALCULATED.3IONS-SCNC: 5 MMOL/L (ref 4–13)
BASOPHILS # BLD AUTO: 0.02 THOUSANDS/ΜL (ref 0–0.1)
BASOPHILS NFR BLD AUTO: 0 % (ref 0–1)
BLD GP AB SCN SERPL QL: NEGATIVE
BUN SERPL-MCNC: 16 MG/DL (ref 5–25)
CALCIUM SERPL-MCNC: 9.1 MG/DL (ref 8.3–10.1)
CHLORIDE SERPL-SCNC: 103 MMOL/L (ref 100–108)
CO2 SERPL-SCNC: 28 MMOL/L (ref 21–32)
CREAT SERPL-MCNC: 0.88 MG/DL (ref 0.6–1.3)
EOSINOPHIL # BLD AUTO: 0.01 THOUSAND/ΜL (ref 0–0.61)
EOSINOPHIL NFR BLD AUTO: 0 % (ref 0–6)
ERYTHROCYTE [DISTWIDTH] IN BLOOD BY AUTOMATED COUNT: 13.2 % (ref 11.6–15.1)
ERYTHROCYTE [DISTWIDTH] IN BLOOD BY AUTOMATED COUNT: 13.2 % (ref 11.6–15.1)
GFR SERPL CREATININE-BSD FRML MDRD: 62 ML/MIN/1.73SQ M
GLUCOSE SERPL-MCNC: 101 MG/DL (ref 65–140)
HCT VFR BLD AUTO: 38.1 % (ref 34.8–46.1)
HCT VFR BLD AUTO: 40.7 % (ref 34.8–46.1)
HGB BLD-MCNC: 12 G/DL (ref 11.5–15.4)
HGB BLD-MCNC: 13 G/DL (ref 11.5–15.4)
IMM GRANULOCYTES # BLD AUTO: 0.03 THOUSAND/UL (ref 0–0.2)
IMM GRANULOCYTES NFR BLD AUTO: 0 % (ref 0–2)
KCT BLD-ACNC: 150 SEC (ref 89–137)
KCT BLD-ACNC: 313 SEC (ref 89–137)
KCT BLD-ACNC: 327 SEC (ref 89–137)
KCT BLD-ACNC: 334 SEC (ref 89–137)
KCT BLD-ACNC: 628 SEC (ref 89–137)
LYMPHOCYTES # BLD AUTO: 0.99 THOUSANDS/ΜL (ref 0.6–4.47)
LYMPHOCYTES NFR BLD AUTO: 12 % (ref 14–44)
MAGNESIUM SERPL-MCNC: 2.1 MG/DL (ref 1.6–2.6)
MCH RBC QN AUTO: 29.7 PG (ref 26.8–34.3)
MCH RBC QN AUTO: 29.7 PG (ref 26.8–34.3)
MCHC RBC AUTO-ENTMCNC: 31.5 G/DL (ref 31.4–37.4)
MCHC RBC AUTO-ENTMCNC: 31.9 G/DL (ref 31.4–37.4)
MCV RBC AUTO: 93 FL (ref 82–98)
MCV RBC AUTO: 94 FL (ref 82–98)
MONOCYTES # BLD AUTO: 0.22 THOUSAND/ΜL (ref 0.17–1.22)
MONOCYTES NFR BLD AUTO: 3 % (ref 4–12)
NEUTROPHILS # BLD AUTO: 7.01 THOUSANDS/ΜL (ref 1.85–7.62)
NEUTS SEG NFR BLD AUTO: 85 % (ref 43–75)
NRBC BLD AUTO-RTO: 0 /100 WBCS
PLATELET # BLD AUTO: 229 THOUSANDS/UL (ref 149–390)
PLATELET # BLD AUTO: 229 THOUSANDS/UL (ref 149–390)
PMV BLD AUTO: 9.2 FL (ref 8.9–12.7)
PMV BLD AUTO: 9.3 FL (ref 8.9–12.7)
POTASSIUM SERPL-SCNC: 4.4 MMOL/L (ref 3.5–5.3)
RBC # BLD AUTO: 4.04 MILLION/UL (ref 3.81–5.12)
RBC # BLD AUTO: 4.37 MILLION/UL (ref 3.81–5.12)
RH BLD: NEGATIVE
SODIUM SERPL-SCNC: 136 MMOL/L (ref 136–145)
SPECIMEN EXPIRATION DATE: NORMAL
SPECIMEN SOURCE: ABNORMAL
WBC # BLD AUTO: 6.87 THOUSAND/UL (ref 4.31–10.16)
WBC # BLD AUTO: 8.28 THOUSAND/UL (ref 4.31–10.16)

## 2022-04-28 PROCEDURE — 04LH3DZ OCCLUSION OF RIGHT EXTERNAL ILIAC ARTERY WITH INTRALUMINAL DEVICE, PERCUTANEOUS APPROACH: ICD-10-PCS | Performed by: RADIOLOGY

## 2022-04-28 PROCEDURE — 80048 BASIC METABOLIC PNL TOTAL CA: CPT | Performed by: PHYSICIAN ASSISTANT

## 2022-04-28 PROCEDURE — C1893 INTRO/SHEATH, FIXED,NON-PEEL: HCPCS | Performed by: INTERNAL MEDICINE

## 2022-04-28 PROCEDURE — C1769 GUIDE WIRE: HCPCS | Performed by: INTERNAL MEDICINE

## 2022-04-28 PROCEDURE — 93656 COMPRE EP EVAL ABLTJ ATR FIB: CPT | Performed by: INTERNAL MEDICINE

## 2022-04-28 PROCEDURE — C1769 GUIDE WIRE: HCPCS

## 2022-04-28 PROCEDURE — 74177 CT ABD & PELVIS W/CONTRAST: CPT

## 2022-04-28 PROCEDURE — 76937 US GUIDE VASCULAR ACCESS: CPT | Performed by: INTERNAL MEDICINE

## 2022-04-28 PROCEDURE — 76831 ECHO EXAM UTERUS: CPT

## 2022-04-28 PROCEDURE — 36247 INS CATH ABD/L-EXT ART 3RD: CPT | Performed by: RADIOLOGY

## 2022-04-28 PROCEDURE — C1887 CATHETER, GUIDING: HCPCS

## 2022-04-28 PROCEDURE — 75625 CONTRAST EXAM ABDOMINL AORTA: CPT

## 2022-04-28 PROCEDURE — C1733 CATH, EP, OTHR THAN COOL-TIP: HCPCS | Performed by: INTERNAL MEDICINE

## 2022-04-28 PROCEDURE — 99223 1ST HOSP IP/OBS HIGH 75: CPT | Performed by: INTERNAL MEDICINE

## 2022-04-28 PROCEDURE — 99152 MOD SED SAME PHYS/QHP 5/>YRS: CPT | Performed by: RADIOLOGY

## 2022-04-28 PROCEDURE — 99153 MOD SED SAME PHYS/QHP EA: CPT

## 2022-04-28 PROCEDURE — C1759 CATH, INTRA ECHOCARDIOGRAPHY: HCPCS | Performed by: INTERNAL MEDICINE

## 2022-04-28 PROCEDURE — 85027 COMPLETE CBC AUTOMATED: CPT | Performed by: INTERNAL MEDICINE

## 2022-04-28 PROCEDURE — C1894 INTRO/SHEATH, NON-LASER: HCPCS | Performed by: INTERNAL MEDICINE

## 2022-04-28 PROCEDURE — 97163 PT EVAL HIGH COMPLEX 45 MIN: CPT

## 2022-04-28 PROCEDURE — C1730 CATH, EP, 19 OR FEW ELECT: HCPCS | Performed by: INTERNAL MEDICINE

## 2022-04-28 PROCEDURE — 83735 ASSAY OF MAGNESIUM: CPT | Performed by: INTERNAL MEDICINE

## 2022-04-28 PROCEDURE — C1732 CATH, EP, DIAG/ABL, 3D/VECT: HCPCS | Performed by: INTERNAL MEDICINE

## 2022-04-28 PROCEDURE — 99291 CRITICAL CARE FIRST HOUR: CPT | Performed by: ANESTHESIOLOGY

## 2022-04-28 PROCEDURE — 85347 COAGULATION TIME ACTIVATED: CPT

## 2022-04-28 PROCEDURE — B41C1ZZ FLUOROSCOPY OF PELVIC ARTERIES USING LOW OSMOLAR CONTRAST: ICD-10-PCS | Performed by: RADIOLOGY

## 2022-04-28 PROCEDURE — 86850 RBC ANTIBODY SCREEN: CPT | Performed by: INTERNAL MEDICINE

## 2022-04-28 PROCEDURE — C9113 INJ PANTOPRAZOLE SODIUM, VIA: HCPCS | Performed by: PHYSICIAN ASSISTANT

## 2022-04-28 PROCEDURE — 75710 ARTERY X-RAYS ARM/LEG: CPT | Performed by: RADIOLOGY

## 2022-04-28 PROCEDURE — 93005 ELECTROCARDIOGRAM TRACING: CPT

## 2022-04-28 PROCEDURE — 99231 SBSQ HOSP IP/OBS SF/LOW 25: CPT | Performed by: INTERNAL MEDICINE

## 2022-04-28 PROCEDURE — 76937 US GUIDE VASCULAR ACCESS: CPT | Performed by: RADIOLOGY

## 2022-04-28 PROCEDURE — 85025 COMPLETE CBC W/AUTO DIFF WBC: CPT | Performed by: STUDENT IN AN ORGANIZED HEALTH CARE EDUCATION/TRAINING PROGRAM

## 2022-04-28 PROCEDURE — 37244 VASC EMBOLIZE/OCCLUDE BLEED: CPT | Performed by: RADIOLOGY

## 2022-04-28 PROCEDURE — 37244 VASC EMBOLIZE/OCCLUDE BLEED: CPT

## 2022-04-28 PROCEDURE — 02583ZZ DESTRUCTION OF CONDUCTION MECHANISM, PERCUTANEOUS APPROACH: ICD-10-PCS | Performed by: INTERNAL MEDICINE

## 2022-04-28 PROCEDURE — 86900 BLOOD TYPING SEROLOGIC ABO: CPT | Performed by: INTERNAL MEDICINE

## 2022-04-28 PROCEDURE — 93655 ICAR CATH ABLTJ DSCRT ARRHYT: CPT | Performed by: INTERNAL MEDICINE

## 2022-04-28 PROCEDURE — C1760 CLOSURE DEV, VASC: HCPCS

## 2022-04-28 PROCEDURE — 99152 MOD SED SAME PHYS/QHP 5/>YRS: CPT

## 2022-04-28 PROCEDURE — 86901 BLOOD TYPING SEROLOGIC RH(D): CPT | Performed by: INTERNAL MEDICINE

## 2022-04-28 PROCEDURE — 36246 INS CATH ABD/L-EXT ART 2ND: CPT

## 2022-04-28 PROCEDURE — C1894 INTRO/SHEATH, NON-LASER: HCPCS

## 2022-04-28 PROCEDURE — 99233 SBSQ HOSP IP/OBS HIGH 50: CPT | Performed by: STUDENT IN AN ORGANIZED HEALTH CARE EDUCATION/TRAINING PROGRAM

## 2022-04-28 PROCEDURE — 93312 ECHO TRANSESOPHAGEAL: CPT

## 2022-04-28 PROCEDURE — 97167 OT EVAL HIGH COMPLEX 60 MIN: CPT

## 2022-04-28 RX ORDER — HEPARIN SODIUM 1000 [USP'U]/ML
1500 INJECTION, SOLUTION INTRAVENOUS; SUBCUTANEOUS
Status: DISCONTINUED | OUTPATIENT
Start: 2022-04-28 | End: 2022-04-28

## 2022-04-28 RX ORDER — PROTAMINE SULFATE 10 MG/ML
INJECTION, SOLUTION INTRAVENOUS AS NEEDED
Status: DISCONTINUED | OUTPATIENT
Start: 2022-04-28 | End: 2022-04-28

## 2022-04-28 RX ORDER — FENTANYL CITRATE/PF 50 MCG/ML
25 SYRINGE (ML) INJECTION
Status: COMPLETED | OUTPATIENT
Start: 2022-04-28 | End: 2022-04-28

## 2022-04-28 RX ORDER — PROPOFOL 10 MG/ML
INJECTION, EMULSION INTRAVENOUS AS NEEDED
Status: DISCONTINUED | OUTPATIENT
Start: 2022-04-28 | End: 2022-04-28

## 2022-04-28 RX ORDER — SODIUM CHLORIDE, SODIUM GLUCONATE, SODIUM ACETATE, POTASSIUM CHLORIDE, MAGNESIUM CHLORIDE, SODIUM PHOSPHATE, DIBASIC, AND POTASSIUM PHOSPHATE .53; .5; .37; .037; .03; .012; .00082 G/100ML; G/100ML; G/100ML; G/100ML; G/100ML; G/100ML; G/100ML
50 INJECTION, SOLUTION INTRAVENOUS CONTINUOUS
Status: DISCONTINUED | OUTPATIENT
Start: 2022-04-28 | End: 2022-04-30

## 2022-04-28 RX ORDER — LIDOCAINE HYDROCHLORIDE 10 MG/ML
INJECTION, SOLUTION EPIDURAL; INFILTRATION; INTRACAUDAL; PERINEURAL AS NEEDED
Status: DISCONTINUED | OUTPATIENT
Start: 2022-04-28 | End: 2022-04-28

## 2022-04-28 RX ORDER — ONDANSETRON 2 MG/ML
INJECTION INTRAMUSCULAR; INTRAVENOUS AS NEEDED
Status: DISCONTINUED | OUTPATIENT
Start: 2022-04-28 | End: 2022-04-28

## 2022-04-28 RX ORDER — HYDRALAZINE HYDROCHLORIDE 20 MG/ML
10 INJECTION INTRAMUSCULAR; INTRAVENOUS EVERY 6 HOURS PRN
Status: DISCONTINUED | OUTPATIENT
Start: 2022-04-28 | End: 2022-04-28

## 2022-04-28 RX ORDER — HEPARIN SODIUM 1000 [USP'U]/ML
3000 INJECTION, SOLUTION INTRAVENOUS; SUBCUTANEOUS
Status: DISCONTINUED | OUTPATIENT
Start: 2022-04-28 | End: 2022-04-28

## 2022-04-28 RX ORDER — PANTOPRAZOLE SODIUM 40 MG/1
40 INJECTION, POWDER, FOR SOLUTION INTRAVENOUS ONCE
Status: COMPLETED | OUTPATIENT
Start: 2022-04-28 | End: 2022-04-28

## 2022-04-28 RX ORDER — PANTOPRAZOLE SODIUM 40 MG/1
40 TABLET, DELAYED RELEASE ORAL
Status: DISCONTINUED | OUTPATIENT
Start: 2022-04-29 | End: 2022-05-03 | Stop reason: HOSPADM

## 2022-04-28 RX ORDER — SUCCINYLCHOLINE/SOD CL,ISO/PF 100 MG/5ML
SYRINGE (ML) INTRAVENOUS AS NEEDED
Status: DISCONTINUED | OUTPATIENT
Start: 2022-04-28 | End: 2022-04-28

## 2022-04-28 RX ORDER — HEPARIN SODIUM 1000 [USP'U]/ML
3000 INJECTION, SOLUTION INTRAVENOUS; SUBCUTANEOUS ONCE
Status: DISCONTINUED | OUTPATIENT
Start: 2022-04-28 | End: 2022-04-28

## 2022-04-28 RX ORDER — GLYCOPYRROLATE 0.2 MG/ML
INJECTION INTRAMUSCULAR; INTRAVENOUS AS NEEDED
Status: DISCONTINUED | OUTPATIENT
Start: 2022-04-28 | End: 2022-04-28

## 2022-04-28 RX ORDER — SODIUM CHLORIDE 9 MG/ML
INJECTION, SOLUTION INTRAVENOUS CONTINUOUS PRN
Status: DISCONTINUED | OUTPATIENT
Start: 2022-04-28 | End: 2022-04-28

## 2022-04-28 RX ORDER — HEPARIN SODIUM 1000 [USP'U]/ML
INJECTION, SOLUTION INTRAVENOUS; SUBCUTANEOUS AS NEEDED
Status: DISCONTINUED | OUTPATIENT
Start: 2022-04-28 | End: 2022-04-28 | Stop reason: HOSPADM

## 2022-04-28 RX ORDER — BENZONATATE 100 MG/1
100 CAPSULE ORAL 3 TIMES DAILY PRN
Status: DISCONTINUED | OUTPATIENT
Start: 2022-04-28 | End: 2022-05-03 | Stop reason: HOSPADM

## 2022-04-28 RX ORDER — HEPARIN SODIUM 10000 [USP'U]/100ML
3-20 INJECTION, SOLUTION INTRAVENOUS
Status: DISCONTINUED | OUTPATIENT
Start: 2022-04-28 | End: 2022-04-28

## 2022-04-28 RX ORDER — LIDOCAINE WITH 8.4% SOD BICARB 0.9%(10ML)
SYRINGE (ML) INJECTION CODE/TRAUMA/SEDATION MEDICATION
Status: COMPLETED | OUTPATIENT
Start: 2022-04-28 | End: 2022-04-28

## 2022-04-28 RX ORDER — DEXAMETHASONE SODIUM PHOSPHATE 4 MG/ML
INJECTION, SOLUTION INTRA-ARTICULAR; INTRALESIONAL; INTRAMUSCULAR; INTRAVENOUS; SOFT TISSUE AS NEEDED
Status: DISCONTINUED | OUTPATIENT
Start: 2022-04-28 | End: 2022-04-28

## 2022-04-28 RX ORDER — ONDANSETRON 2 MG/ML
4 INJECTION INTRAMUSCULAR; INTRAVENOUS ONCE AS NEEDED
Status: DISCONTINUED | OUTPATIENT
Start: 2022-04-28 | End: 2022-04-28 | Stop reason: SDUPTHER

## 2022-04-28 RX ORDER — FENTANYL CITRATE 50 UG/ML
INJECTION, SOLUTION INTRAMUSCULAR; INTRAVENOUS AS NEEDED
Status: DISCONTINUED | OUTPATIENT
Start: 2022-04-28 | End: 2022-04-28

## 2022-04-28 RX ORDER — HEPARIN SODIUM 10000 [USP'U]/100ML
INJECTION, SOLUTION INTRAVENOUS
Status: DISCONTINUED | OUTPATIENT
Start: 2022-04-28 | End: 2022-04-28 | Stop reason: HOSPADM

## 2022-04-28 RX ORDER — FENTANYL CITRATE 50 UG/ML
INJECTION, SOLUTION INTRAMUSCULAR; INTRAVENOUS CODE/TRAUMA/SEDATION MEDICATION
Status: COMPLETED | OUTPATIENT
Start: 2022-04-28 | End: 2022-04-28

## 2022-04-28 RX ORDER — MIDAZOLAM HYDROCHLORIDE 2 MG/2ML
INJECTION, SOLUTION INTRAMUSCULAR; INTRAVENOUS CODE/TRAUMA/SEDATION MEDICATION
Status: COMPLETED | OUTPATIENT
Start: 2022-04-28 | End: 2022-04-28

## 2022-04-28 RX ORDER — COLCHICINE 0.6 MG/1
0.6 TABLET ORAL 2 TIMES DAILY PRN
Status: DISCONTINUED | OUTPATIENT
Start: 2022-04-28 | End: 2022-05-03 | Stop reason: HOSPADM

## 2022-04-28 RX ADMIN — IOHEXOL 100 ML: 350 INJECTION, SOLUTION INTRAVENOUS at 23:00

## 2022-04-28 RX ADMIN — ONDANSETRON 4 MG: 2 INJECTION INTRAMUSCULAR; INTRAVENOUS at 16:40

## 2022-04-28 RX ADMIN — SODIUM CHLORIDE 1000 ML: 0.9 INJECTION, SOLUTION INTRAVENOUS at 20:30

## 2022-04-28 RX ADMIN — Medication 80 MG: at 14:06

## 2022-04-28 RX ADMIN — PROTAMINE SULFATE 30 MG: 10 INJECTION, SOLUTION INTRAVENOUS at 16:45

## 2022-04-28 RX ADMIN — Medication 25 MCG: at 18:53

## 2022-04-28 RX ADMIN — Medication 25 MCG: at 18:20

## 2022-04-28 RX ADMIN — Medication 25 MCG: at 18:02

## 2022-04-28 RX ADMIN — PANTOPRAZOLE SODIUM 40 MG: 40 INJECTION, POWDER, FOR SOLUTION INTRAVENOUS at 14:17

## 2022-04-28 RX ADMIN — MIDAZOLAM 0.5 MG: 1 INJECTION INTRAMUSCULAR; INTRAVENOUS at 21:45

## 2022-04-28 RX ADMIN — DEXAMETHASONE SODIUM PHOSPHATE 10 MG: 4 INJECTION INTRA-ARTICULAR; INTRALESIONAL; INTRAMUSCULAR; INTRAVENOUS; SOFT TISSUE at 14:16

## 2022-04-28 RX ADMIN — FENTANYL CITRATE 25 MCG: 50 INJECTION INTRAMUSCULAR; INTRAVENOUS at 14:35

## 2022-04-28 RX ADMIN — GLYCOPYRROLATE 0.1 MG: 0.2 INJECTION, SOLUTION INTRAMUSCULAR; INTRAVENOUS at 14:20

## 2022-04-28 RX ADMIN — PROPOFOL 70 MG: 10 INJECTION, EMULSION INTRAVENOUS at 14:06

## 2022-04-28 RX ADMIN — FENTANYL CITRATE 25 MCG: 50 INJECTION INTRAMUSCULAR; INTRAVENOUS at 21:45

## 2022-04-28 RX ADMIN — Medication 25 MCG: at 17:45

## 2022-04-28 RX ADMIN — FENTANYL CITRATE 25 MCG: 50 INJECTION INTRAMUSCULAR; INTRAVENOUS at 21:09

## 2022-04-28 RX ADMIN — SODIUM CHLORIDE: 0.9 INJECTION, SOLUTION INTRAVENOUS at 14:03

## 2022-04-28 RX ADMIN — MIDAZOLAM 0.5 MG: 1 INJECTION INTRAMUSCULAR; INTRAVENOUS at 21:09

## 2022-04-28 RX ADMIN — HYDRALAZINE HYDROCHLORIDE 10 MG: 20 INJECTION, SOLUTION INTRAMUSCULAR; INTRAVENOUS at 19:13

## 2022-04-28 RX ADMIN — LIDOCAINE HYDROCHLORIDE 50 MG: 10 INJECTION, SOLUTION EPIDURAL; INFILTRATION; INTRACAUDAL; PERINEURAL at 14:05

## 2022-04-28 RX ADMIN — NOREPINEPHRINE BITARTRATE 2 MCG/MIN: 1 INJECTION, SOLUTION, CONCENTRATE INTRAVENOUS at 14:20

## 2022-04-28 RX ADMIN — APIXABAN 2.5 MG: 2.5 TABLET, FILM COATED ORAL at 08:17

## 2022-04-28 RX ADMIN — IODIXANOL 85 ML: 320 INJECTION, SOLUTION INTRAVASCULAR at 19:41

## 2022-04-28 RX ADMIN — Medication 10 ML: at 21:24

## 2022-04-28 RX ADMIN — SENNOSIDES AND DOCUSATE SODIUM 1 TABLET: 50; 8.6 TABLET ORAL at 08:17

## 2022-04-28 RX ADMIN — AMIODARONE HYDROCHLORIDE 0.5 MG/MIN: 50 INJECTION, SOLUTION INTRAVENOUS at 04:41

## 2022-04-28 RX ADMIN — FENTANYL CITRATE 25 MCG: 50 INJECTION INTRAMUSCULAR; INTRAVENOUS at 22:26

## 2022-04-28 NOTE — ASSESSMENT & PLAN NOTE
· Present on admission, secondary to fall    Seen by Orthopedics, conservative management  · Wrist splint, nonweightbearing  · Follow-up in orthopedic office as outpatient for repeat imaging and casting

## 2022-04-28 NOTE — ANESTHESIA POSTPROCEDURE EVALUATION
Post-Op Assessment Note    CV Status:  Stable  Pain Score: 0    Pain management: adequate     Mental Status:  Alert and awake   Hydration Status:  Euvolemic   PONV Controlled:  Controlled   Airway Patency:  Patent   Two or more mitigation strategies used for obstructive sleep apnea   Post Op Vitals Reviewed: Yes      Staff: CRNA         No complications documented      BP   174/98   Temp 98 0   Pulse  79   Resp   19   SpO2   98%

## 2022-04-28 NOTE — PROGRESS NOTES
[address] [facility]  Progress Note - Ankita Singh 1940, [de-identified] y o  female MRN: [id number]  Unit/Bed#: [facility] 501-01 Encounter: [id number]  Primary Care Provider: Pito Joseph,    Date and time admitted to hospital: 4/27/2022  4:11 PM    * Atrial fibrillation [facility])  Assessment & Plan  · Initially presented to [facility] on 4/24/22 after multiple falls related to dizziness, suspected related to afib  · Transferred here for an EP consultation for possible ablation  · On transfer here, in atrial flutter with heart rate in the 60s  · Echo from 7/2021 with EF 50%, mild diffuse hypokinesis  · S/p unsuccessful DCCV on 03/29  Patient has had recurrent symptomatic atrial fibrillation/flutter despite treatment with oral amiodarone  · Second DCCV was aborted on 04/22 when she was in sinus bradycardia with heart rate in the 40s  · Continue Eliquis 2 5 mg b i d  (age, weight)  · Continue amiodarone infusion for rate/rhythm control  · Currently in NSR  · Patient continues to endorse dizziness when changing positions   · EP consulted ; plan for ablation later today  · Keep NPO, resume diet post-procedure      Closed fracture of left wrist  Assessment & Plan  · Present on admission, secondary to fall  Seen by Orthopedics, conservative management  · Wrist splint, nonweightbearing  · Follow-up in orthopedic office as outpatient for repeat imaging and casting     Fall  Assessment & Plan  · Patient presents to the ED with complaints of multiple falls; brought in by EMS after patient fell and complained of left wrist pain/swelling  Patient reports multiple falls lately due to being dizzy    · Was to be seen by cardiology to consider possible cardioversion outpatient this week  · Monitor on telemetry - in/out of afib briefly overnight   · Transferred here for EP evaluation as above      Essential hypertension  Assessment & Plan  · Bps meds are currently on hold  · BP is acceptable at this time  · Can adjust meds post ablation when more stable      VTE Pharmacologic Prophylaxis:   Moderate Risk (Score 3-4) - Pharmacological DVT Prophylaxis Ordered: apixaban (Eliquis)  Patient Centered Rounds: I performed bedside rounds with nursing staff today  Discussions with Specialists or Other Care Team Provider: cardiology    Education and Discussions with Family / Patient: Updated  ( and daughter) via phone  Time Spent for Care: 30 minutes  More than 50% of total time spent on counseling and coordination of care as described above  Current Length of Stay: 1 day(s)  Current Patient Status: Inpatient   Certification Statement: The patient will continue to require additional inpatient hospital stay due to afib management  Discharge Plan: Anticipate discharge tomorrow to home with home services  Code Status: Level 1 - Full Code    Subjective:   States that she feels okay now  Did have dizziness when she tried standing up earlier  Objective:     Vitals:   Temp (24hrs), Av 1 °F (36 7 °C), Min:97 6 °F (36 4 °C), Max:98 6 °F (37 °C)    Temp:  [97 6 °F (36 4 °C)-98 6 °F (37 °C)] 97 6 °F (36 4 °C)  HR:  [] 58  Resp:  [16-21] 21  BP: (116-155)/(66-98) 116/66  SpO2:  [95 %-97 %] 95 %  Body mass index is 19 84 kg/m²  Input and Output Summary (last 24 hours): Intake/Output Summary (Last 24 hours) at 2022 1148  Last data filed at 2022 0800  Gross per 24 hour   Intake 243 86 ml   Output 1400 ml   Net -1156 14 ml       Physical Exam:   Physical Exam  Vitals and nursing note reviewed  Constitutional:       General: She is not in acute distress  Appearance: Normal appearance  HENT:      Head: Normocephalic  Mouth/Throat:      Mouth: Mucous membranes are moist    Eyes:      Extraocular Movements: Extraocular movements intact  Pupils: Pupils are equal, round, and reactive to light     Cardiovascular:      Rate and Rhythm: Normal rate and regular rhythm  Heart sounds: No murmur heard  Pulmonary:      Effort: Pulmonary effort is normal  No respiratory distress  Breath sounds: Normal breath sounds  Abdominal:      General: Bowel sounds are normal       Palpations: Abdomen is soft  Tenderness: There is no abdominal tenderness  Musculoskeletal:      Right lower leg: No edema  Left lower leg: No edema  Comments: Left wrist in splint  Skin:     General: Skin is warm and dry  Neurological:      General: No focal deficit present  Mental Status: She is alert and oriented to person, place, and time  Mental status is at baseline  Psychiatric:         Mood and Affect: Mood normal          Behavior: Behavior normal          Additional Data:     Labs:  Results from last 7 days   Lab Units 04/28/22  0450 04/25/22  0557 04/24/22  0836   WBC Thousand/uL 6 87   < > 9 78   HEMOGLOBIN g/dL 13 0   < > 15 2   HEMATOCRIT % 40 7   < > 46 4*   PLATELETS Thousands/uL 229   < > 278   NEUTROS PCT %  --   --  79*   LYMPHS PCT %  --   --  12*   MONOS PCT %  --   --  9   EOS PCT %  --   --  0    < > = values in this interval not displayed  Results from last 7 days   Lab Units 04/28/22  0449 04/27/22  0516 04/25/22  0557   SODIUM mmol/L 136   < > 135*   POTASSIUM mmol/L 4 4   < > 4 1   CHLORIDE mmol/L 103   < > 102   CO2 mmol/L 28   < > 26   BUN mg/dL 16   < > 17   CREATININE mg/dL 0 88   < > 0 92   ANION GAP mmol/L 5   < > 7   CALCIUM mg/dL 9 1   < > 8 8   ALBUMIN g/dL  --   --  3 5   TOTAL BILIRUBIN mg/dL  --   --  1 13*   ALK PHOS U/L  --   --  71   ALT U/L  --   --  18   AST U/L  --   --  14   GLUCOSE RANDOM mg/dL 101   < > 114    < > = values in this interval not displayed  Results from last 7 days   Lab Units 04/24/22  0836   INR  1 12                   Lines/Drains:  Invasive Devices  Report    Peripheral Intravenous Line            Peripheral IV 04/27/22 Right;Upper Arm <1 day    Peripheral IV 04/28/22 Dorsal (posterior); Right Forearm <1 day                  Telemetry:  Telemetry Orders (From admission, onward)             24 Hour Telemetry Monitoring  Continuous x 24 Hours (Telem)        Expiring   References:    Telemetry Guidelines   Question:  Reason for 24 Hour Telemetry  Answer:  Syncope suspected to be cardiac in origin                 Telemetry Reviewed: Normal Sinus Rhythm  Indication for Continued Telemetry Use: Arrthymias requiring medical therapy             Imaging: Reviewed radiology reports from this admission including: CT head and xray(s)    Recent Cultures (last 7 days):         Last 24 Hours Medication List:   Current Facility-Administered Medications   Medication Dose Route Frequency Provider Last Rate    acetaminophen  650 mg Oral Q6H PRN Yvon Garrett MD      amiodarone  0 5 mg/min Intravenous Continuous Yvon Garrett MD 0 5 mg/min (04/28/22 7447)    apixaban  2 5 mg Oral BID Yvon Garrett MD      ondansetron  4 mg Intravenous Q6H PRN Yvon Garrett MD      oxyCODONE  2 5 mg Oral Q4H PRN Yvon Garrett MD      oxyCODONE  5 mg Oral Q4H PRN Yvon Garrett MD      senna-docusate sodium  1 tablet Oral BID Yvon Garrett MD          Today, Patient Was Seen By: Renny Vazquez MD    **Please Note: This note may have been constructed using a voice recognition system  **

## 2022-04-28 NOTE — PROGRESS NOTES
ADDENDUM:  STAT CT came back with concern for rectus sheath hematoma and right RP gutter collection  Vascular surgery input appreciated, likely spontaneous bleed  IR consulted and plan for arteriogram with possible embolization  ICU consult place for further monitoring  LABS:  -hgb stable at 12 from 13 at 4:50 AM  -Q6 CBC with next at 2 AM  -type & screen sent as well    Dr Rosario Leavitt aware and on his way     ================================================================  Called to the bedside by PACU RN for right lower quadrant discomfort to palpation  S/p 13F right groin venopuncture/access under ultrasound  Patient found resting in bed, in no apparent distress  Exam:  BP (!) 194/101   Pulse 70   Temp 98 °F (36 7 °C)   Resp 12   Ht 5' 5" (1 651 m)   Wt 54 kg (119 lb)   SpO2 96%   BMI 19 80 kg/m²      -Groin sites intact without drainage, oozing or ecchymoses  Dressings non-saturated  -Right rectus abdominalis point tenderness without rebound  -Mid abdominal unneveness but no ecchymosis    PLAN:  -PRN hydralazine for goal ABD <150  -CT abdomen/pelvis with contrast STAT for RP, access site bleed or any collection  -repeat CBC      Discussed with Dr Rosario Leavitt

## 2022-04-28 NOTE — CONSULTS
Consultation - Electrophysiology-Cardiology (EP)   Kathryn Loo [de-identified] y o  female MRN: [id number]  Unit/Bed#: [facility] 501-01 Encounter: [id number]      Inpatient consult to Electrophysiology  Consult performed by: Rojelio Marino PA-C  Consult ordered by: Lesly Morales MD          Assessment/Plan     Assessment:  1  Symptomatic paroxysmal atrial fibrillation and flutter with periods of rapid ventricular response, with breakthrough despite amiodarone   A ) prior event monitor 01/2022 showing 71% atrial fibrillation burden, average heart rate 108 beats per minute   B ) cardioversion attempted 3/29/2022 however was unsuccessful after multiple attempts; started on amiodarone at that time   C ) repeat cardioversion scheduled 4/22/2022 however canceled as patient presented with sinus bradycardia   D ) maintained on amiodarone and Eliquis as an outpatient  2  Low-normal LV systolic function with EF 50% per LUIS EDUARDO 03/2022  3  Tachy-veronica syndrome  4  Presyncope with subsequent fall and left distal radius fracture  5  Hypertension      Plan:  She continues to have breakthrough episodes of paroxysmal atrial fibrillation/flutter despite outpatient amiodarone  She also has an element of tachy-veronica syndrome, with sinus bradycardia into the 40s noted previously  She converted to normal sinus rhythm earlier this admission on IV amiodarone  Given her persistent symptoms and episodes despite amiodarone, recommend atrial fibrillation/flutter ablation  The procedure was explained to the patient in detail, including immediate postop restrictions and expectations  All questions were answered, and she is in agreement to proceed  We will try to add her on later this afternoon, thus will keep her NPO  She received Eliquis this morning, and the performing physician is aware and still okay to proceed  I explained that we will need to monitor her rhythm in the post ablation setting prior to making further recommendations    She has evidence of sinus bradycardia, and she initially presented to the hospital with presyncope and a fall  Unclear if this was due to tachycardia or if she was profoundly bradycardic at that time  I explained that she still may require pacemaker implantation moving forward, but we are going to start with an ablation to decrease her overall arrhythmia burden  History of Present Illness   Physician Requesting Consult: Mary Garcia*  Reason for Consult / Principal Problem:  Recurrent atrial fibrillation/flutter    HPI: Cara Huerta is a [de-identified]y o  year old female with paroxysmal atrial fibrillation/flutter, low normal LV systolic function, possible tachy-veronica syndrome, and hypertension  She follows with Dr Ashley Mac as an outpatient, and initially established care in 2019 due to dyspnea with exertion  Initial cardiac workup at that time, include nuclear stress test and echocardiogram, were essentially normal   She was not seen again until 05/2021, at which time she was diagnosed with paroxysmal atrial fibrillation  She was initially rate controlled with Toprol-XL, and was started on Eliquis anticoagulation  She was seen again in follow-up 01/2022, at which time she reported palpitations, dizziness, and ongoing shortness of breath  She underwent a 2 week event monitor which showed 71% overall atrial fibrillation burden, with average heart rate 108 beats per minute when in atrial fibrillation  Her average sinus rate was 76 beats per minute, with a minimum heart rate of 49 beats per minute  She was seen 03/2022 to discuss these results, at which time she was still in atrial fibrillation and thus a LUIS EDUARDO/cardioversion was recommended  This was attempted later that month, however with multiple attempts she quickly reverted back to atrial fibrillation  Thus, she was started on amiodarone antiarrhythmic therapy at that time with plans to repeat cardioversion after an appropriate load    When she presented back to the hospital 04/2022 for repeat cardioversion, she was actually found to have converted but was significantly bradycardic with rates in the low 40s  At that time, her amiodarone was reduced to 200 mg daily and metoprolol was discontinued  She presented to [facility] several days ago with dizziness, presyncope, and subsequent fall with left distal radius fracture  She continued to have paroxysmal atrial fibrillation with periods of rapid ventricular response, as well as intermittent sinus bradycardia  A pacemaker was recommended, however the patient was still debating this option  When she was seen the following day, she was back in atrial flutter with variable AV block  She was started on IV amiodarone, and was transferred to [facility] for EP evaluation and hopeful ablation  She converted to normal sinus rhythm earlier this admission, and currently has a heart rate in the 60s  She typically aware of her arrhythmias, but remains active  She admits to several episodes of dizziness and presyncope, unclear whether her heart was racing at the time of her recent fall  She believes a lot of her symptoms are due to her ongoing arrhythmias, and is anxious to have him better treated  Review of Systems  ROS as noted above, otherwise 12 point review of systems was performed and is negative         Historical Information   Past Medical History:   Diagnosis Date    Atrial fibrillation ([address] )     Hypertension      Past Surgical History:   Procedure Laterality Date    APPENDECTOMY      BREAST SURGERY      BX - Benign     CATARACT EXTRACTION, BILATERAL      DILATION AND CURETTAGE OF UTERUS      LAPAROSCOPY FOR ECTOPIC PREGNANCY      OTHER SURGICAL HISTORY      surgical excision of tubal pregnancy      Social History     Substance and Sexual Activity   Alcohol Use Yes    Comment: social      Social History     Substance and Sexual Activity   Drug Use No     Social History Tobacco Use   Smoking Status Former Smoker    Packs/day: 0 50    Years: 20 00    Pack years: 10 00    Quit date: 2006    Years since quittin 3   Smokeless Tobacco Never Used     Family History:   Family History   Problem Relation Age of Onset    Hypertension Mother     Ovarian cancer Mother     Pneumonia Father        Meds/Allergies   Hospital Medications:   Current Facility-Administered Medications   Medication Dose Route Frequency    acetaminophen (TYLENOL) tablet 650 mg  650 mg Oral Q6H PRN    amiodarone (CORDARONE) 900 mg in dextrose 5 % 500 mL infusion  0 5 mg/min Intravenous Continuous    apixaban (ELIQUIS) tablet 2 5 mg  2 5 mg Oral BID    ondansetron (ZOFRAN) injection 4 mg  4 mg Intravenous Q6H PRN    oxyCODONE (ROXICODONE) IR tablet 2 5 mg  2 5 mg Oral Q4H PRN    oxyCODONE (ROXICODONE) IR tablet 5 mg  5 mg Oral Q4H PRN    senna-docusate sodium (SENOKOT S) 8 6-50 mg per tablet 1 tablet  1 tablet Oral BID     Home Medications:   Medications Prior to Admission   Medication    amiodarone 200 mg tablet    Eliquis 2 5 MG    Glucosamine-Chondroit-Vit C-Mn (GLUCOSAMINE 1500 COMPLEX PO)    triamcinolone (KENALOG) 0 5 % ointment       No Known Allergies    Objective   Vitals: Blood pressure 116/66, pulse 58, temperature 97 6 °F (36 4 °C), temperature source Oral, resp  rate 21, weight 54 1 kg (119 lb 3 2 oz), SpO2 95 %  Orthostatic Blood Pressures      Most Recent Value   Blood Pressure 116/66 filed at 2022 0618   Patient Position - Orthostatic VS Lying filed at 2022 0256            Intake/Output Summary (Last 24 hours) at 2022 1147  Last data filed at 2022 0800  Gross per 24 hour   Intake 243 86 ml   Output 1400 ml   Net -1156 14 ml       Invasive Devices  Report    Peripheral Intravenous Line            Peripheral IV 22 Right;Upper Arm <1 day    Peripheral IV 22 Dorsal (posterior); Right Forearm <1 day                Physical Exam  GEN: NAD, alert and oriented x 3, well appearing  SKIN: dry without significant lesions or rashes  HEENT: NCAT, PERRL, EOMs intact  NECK: No JVD appreciated  CARDIOVASCULAR: RRR, normal S1, S2 without murmurs, rubs, or gallops appreciated  LUNGS: Clear to auscultation bilaterally without wheezes, rhonchi, or rales  ABDOMEN: Soft, nontender, nondistended  EXTREMITIES/VASCULAR: perfused without clubbing, cyanosis, or LE edema b/l  PSYCH: Normal mood and affect  NEURO: CN ll-Xll grossly intact      Lab Results: I have personally reviewed pertinent lab results  Results from last 7 days   Lab Units 22  0450 22  0557 22  0836   WBC Thousand/uL 6 87 8 99 9 78   HEMOGLOBIN g/dL 13 0 14 7 15 2   HEMATOCRIT % 40 7 44 0 46 4*   PLATELETS Thousands/uL 229 236 278     Results from last 7 days   Lab Units 22  0449 22  0516 22  0557   POTASSIUM mmol/L 4 4 4 1 4 1   CHLORIDE mmol/L 103 102 102   CO2 mmol/L 28 27 26   BUN mg/dL 16 20 17   CREATININE mg/dL 0 88 0 92 0 92   CALCIUM mg/dL 9 1 9 0 8 8     Results from last 7 days   Lab Units 22  0836   INR  1 12   PTT seconds 30     Results from last 7 days   Lab Units 22  0449 22  0557 22  0836   MAGNESIUM mg/dL 2 1 1 8 2 0       Imaging: I have personally reviewed pertinent reports  ECHO: Results for orders placed during the hospital encounter of 21    Echo complete with contrast if indicated    Narrative  [facility], [address] [facility]  [phone]    Transthoracic Echocardiogram  2D, M-mode, Doppler, and Color Doppler    Study date:  2021    Patient: Jennifer Almanza  MR number: [id number]  Account number: [de-identified]  : 1940  Age: 79 years  Gender: Female  Status: Outpatient  Location: [facility]  Height: 65 in  Weight: 123 lb  BP: 134/ 82 mmHg    Indications: Atrial fibrillation      Diagnoses: I48 0 - Atrial fibrillation    Sonographer:  [facility]  Primary Physician:  Leatha Chacko  Referring Physician:  Daisy Vallecillo MD  Group:  [facility]  Interpreting Physician:  Daisy Vallecillo MD    SUMMARY    LEFT VENTRICLE:  Systolic function was mildly reduced  Ejection fraction was estimated to be 50 %  There was mild diffuse hypokinesis  Left ventricular diastolic function parameters were normal     RIGHT VENTRICLE:  The size was normal   Systolic function was normal     MITRAL VALVE:  There was mild regurgitation  TRICUSPID VALVE:  There was mild regurgitation  Estimated peak PA pressure was at least 50 mmHg  PULMONIC VALVE:  There was trace regurgitation  HISTORY: PRIOR HISTORY: Pulmonary Embolism, Former smoker, Atrial Fibrillation  PROCEDURE: The study was performed in the [address] [place] [address]  This was a routine study  The transthoracic approach was used  The study included complete 2D imaging, M-mode, complete spectral Doppler, and color Doppler  The  heart rate was 64 bpm, at the start of the study  Images were obtained from the parasternal, apical, subcostal, and suprasternal notch acoustic windows  Image quality was adequate  LEFT VENTRICLE: Size was normal  Systolic function was mildly reduced  Ejection fraction was estimated to be 50 %  There was mild diffuse hypokinesis  Wall thickness was normal  No evidence of apical thrombus  DOPPLER: Left ventricular  diastolic function parameters were normal     RIGHT VENTRICLE: The size was normal  Systolic function was normal  Wall thickness was normal     LEFT ATRIUM: Size was normal     RIGHT ATRIUM: Size was normal     MITRAL VALVE: Valve structure was normal  There was normal leaflet separation  DOPPLER: The transmitral velocity was within the normal range  There was no evidence for stenosis  There was mild regurgitation  AORTIC VALVE: The valve was trileaflet  Leaflets exhibited normal thickness and normal cuspal separation   DOPPLER: Transaortic velocity was within the normal range  There was no evidence for stenosis  There was no significant  regurgitation  TRICUSPID VALVE: The valve structure was normal  There was normal leaflet separation  DOPPLER: The transtricuspid velocity was within the normal range  There was no evidence for stenosis  There was mild regurgitation  Estimated peak PA  pressure was at least 50 mmHg  PULMONIC VALVE: Leaflets exhibited normal thickness, no calcification, and normal cuspal separation  DOPPLER: The transpulmonic velocity was within the normal range  There was trace regurgitation  PERICARDIUM: There was no pericardial effusion  The pericardium was normal in appearance  AORTA: The root exhibited normal size  SYSTEMIC VEINS: IVC: The inferior vena cava was normal in size  SYSTEM MEASUREMENT TABLES    2D  %FS: 26 19 %  Ao Diam: 3 19 cm  EDV(Teich): 78 28 ml  EF(Teich): 51 76 %  ESV(Teich): 37 76 ml  IVSd: 0 94 cm  LA Area: 16 34 cm2  LA Diam: 3 3 cm  LVEDV MOD A4C: 65 73 ml  LVEF MOD A4C: 40 94 %  LVESV MOD A4C: 38 82 ml  LVIDd: 4 19 cm  LVIDs: 3 09 cm  LVLd A4C: 6 67 cm  LVLs A4C: 6 15 cm  LVPWd: 0 92 cm  RA Area: 14 8 cm2  RVIDd: 3 92 cm  SV MOD A4C: 26 91 ml  SV(Teich): 40 52 ml    CW  AV MaxPG: 3 75 mmHg  AV Vmax: 0 97 m/s  MR Vmax: 4 4 m/s  MR maxP 52 mmHg  TR MaxP 5 mmHg  TR Vmax: 3 45 m/s    MM  TAPSE: 2 24 cm    PW  E' Sept: 0 08 m/s  E/E' Sept: 8 63  LVOT Vmax: 0 79 m/s  LVOT maxP 5 mmHg  MV A Javon: 0 66 m/s  MV Dec Jerauld: 3 95 m/s2  MV DecT: 171 74 ms  MV E Javon: 0 68 m/s  MV E/A Ratio: 1 03  MV PHT: 49 8 ms  MVA By PHT: 4 42 cm2    [employer] Accredited Echocardiography Laboratory    Prepared and electronically signed by    Vandana Rooney MD  Signed 2021 20:12:35      NUCLEAR STRESS TEST 2019: SUMMARY:  -  Stress results: Duration of exercise was 4 min and 30 sec  Target heart rate was achieved  There was no chest pain during stress  -  ECG conclusions:  The stress ECG was negative for ischemia and normal  Arrhythmia during stress: isolated atrial premature beats and isolated premature ventricular beats  -  Perfusion imaging: There were no perfusion defects   -  Gated SPECT: The calculated left ventricular ejection fraction was 57 %  Left ventricular ejection fraction was within normal limits by visual estimate  There was no diagnostic evidence for left ventricular regional abnormality      IMPRESSIONS: Normal study after maximal exercise  Myocardial perfusion imaging was normal at rest and with stress   Left ventricular systolic function was normal       EKG:  Atrial fibrillation, atrial flutter, and sinus bradycardia                EVENT MONITOR 2/2022:        TELEMETRY:  Currently normal sinus rhythm, converted earlier this admission      VTE Prophylaxis: Eliquis

## 2022-04-28 NOTE — OCCUPATIONAL THERAPY NOTE
Occupational Therapy Evaluation     Patient Name: Lamond Lefort  WNLBC'J Date: 4/28/2022  Problem List  Principal Problem:    Atrial fibrillation [facility])  Active Problems:    Essential hypertension    Fall    Closed fracture of left wrist    Past Medical History  Past Medical History:   Diagnosis Date    Atrial fibrillation ([address] )     Hypertension      Past Surgical History  Past Surgical History:   Procedure Laterality Date    APPENDECTOMY      BREAST SURGERY      BX - Benign     CATARACT EXTRACTION, BILATERAL      DILATION AND CURETTAGE OF UTERUS      LAPAROSCOPY FOR ECTOPIC PREGNANCY      OTHER SURGICAL HISTORY      surgical excision of tubal pregnancy          04/28/22 0933   OT Last Visit   OT Visit Date 04/28/22   Note Type   Note type Evaluation   Restrictions/Precautions   Weight Bearing Precautions Per Order Yes   LUE Weight Bearing Per Order NWB   Braces or Orthoses Splint   Other Precautions WBS; Chair Alarm; Bed Alarm; Fall Risk;Telemetry   Pain Assessment   Pain Assessment Tool 0-10   Pain Score No Pain   Home Living   [facility] of [address] [place] [address] One level;Stairs to enter with rails; Laundry in basement  (6 augusto w r handrail)   Bathroom Shower/Tub Walk-in shower   Bathroom Toilet Raised   Bathroom Equipment Grab bars in shower;Built-in shower seat;Grab bars around toilet   Bathroom Accessibility Accessible   Prior Function   Level of [place] Independent with ADLs and functional mobility   Lives With Spouse   Receives Help From Family   ADL Assistance Independent   IADLs Independent   Falls in the last 6 months 1 to 4  (4 per pt report)   Vocational Retired   Lifestyle   Autonomy pta, pt was I w ADL/IADL, no AD mobility, +   R hand dominant    Reciprocal Relationships spouse who who drives locally but had CVA 1/22  Reports her dtr will be staying with them for four days, but is from [facility]       Service to Others retired RN   Intrinsic Gratification walking and reading Psychosocial   Psychosocial (WDL) WDL   Subjective   Subjective "I'm doing these exercises so my shoulder doesn't get stiff"   ADL   Where Assessed Edge of bed   Eating Assistance 5  Supervision/Setup   Grooming Assistance 5  Supervision/Setup   UB Bathing Assistance 4  Minimal Assistance   LB [address] 4  Minimal Assistance   Additional Comments 2' current NWB in LUE restrictions   Bed Mobility   Supine to [address]   Additional items HOB elevated; Increased time required   Additional Comments found supine in bed, left in chair w all needs in reach  Transfers   Sit to Stand 5  Supervision   Additional items Assist x 1; Increased time required;Verbal cues   Stand to Sit 5  Supervision   Additional items Assist x 1; Increased time required;Verbal cues   Additional Comments no AD, G safety awareness  Functional Mobility   Functional Mobility 4  Minimal assistance   Additional Comments CGA, household distance mob  no AD    Balance   Static Sitting Fair +   Dynamic Sitting Fair   Static Standing Fair   Dynamic Standing Fair -   Ambulatory Poor +   Activity Tolerance   Activity Tolerance Patient tolerated treatment well   Medical Staff Made Aware DPT Vane 2' pts med complexity, comorbidities and regression from baseline      Nurse Made Aware ok per RN   RUE Assessment   RUE Assessment WFL   LUE Assessment   LUE Assessment X  (currently NWB LUE (wrist) 2' fx  splinted at this time)   LUE Overall AROM   L Shoulder Flexion WFL   L Elbow Flexion WFL   L Elbow Extension WFL   Hand Function   Gross Motor Coordination Functional  (withstanding current LUE limitation )   Fine Motor Coordination Functional  (withstanding current LUE limitation)   Cognition   Overall Cognitive Status [facility]   Arousal/Participation Alert; Responsive; Cooperative   Attention Within functional limits   Orientation Level Oriented X4   Memory Within functional limits   Following Commands Follows all commands and directions without difficulty   Comments pleasant and cooperative, G safety awareness and recall of current limitations    Assessment   Limitation Decreased ADL status; Decreased endurance;Decreased high-level ADLs; Decreased self-care trans   Prognosis Good   Assessment Pt is a [de-identified] y o  female admitted 4/27/22 with atrial fibrillation  Initially admitted to [facility] with falls/dizziness  Found to have afib, advised to undergo ablation with EP- transferred to [facility] for EP evaluation  Pt w active OT eval and treat orders at this time, is NWB in LUE w splint 2' wrist fx  PMH includes  has a past medical history of Atrial fibrillation ([address] ) and Hypertension  Pt lives w spouse in a 1 SH with 6 JASEN (R sided handrail), reports walk-in shower with built in seat and grab bars, raised toilet with grab bars  Pta, pt was independent w/ ADL/IADL and functional mobility, was driving and was not using any DME at baseline  Currently, pt is min A for both UB and LB ADLs 2' current limitations with LUE  Completed transfers with S w/o AD, FM with CGA, w/o AD  Pt is limited at this time 2* decreased endurance/activtiy tolerance, decreased ADL/High-level ADL status, decreased self-care trans,  and is a fall risk  This impacts pt's ability to complete UB and LB dressing and bathing, toileting, transfers, functional mobility, community mobility, home and health maintenance, and safe engagement in typical daily routine  The patient's raw score on the AM-PAC Daily Activity inpatient short form is 19, standardized score is 40 22, greater than 39 4  Patients at this level are likely to benefit from discharge to home  Please refer to the recommendation of the Occupational Therapist for safe discharge planning   From OT standpoint, pt should D/C to home with home OT when medically stable  Pt will benefit from continued acute OT services 3-5x/wk for 10-14 days to meet goals  Goals   Patient Goals to get back home    LTG Time Frame 10-14   Long Term Goal #1 see below   Plan   Treatment Interventions ADL retraining;Functional transfer training; Endurance training;Patient/family training;Equipment evaluation/education; Compensatory technique education; Activityengagement; Energy conservation   Goal Expiration Date 05/12/22   OT Frequency 2-3x/wk   Recommendation   OT Discharge Recommendation Home with home health rehabilitation   OT - OK to Discharge Yes   AM-PAC Daily Activity Inpatient   Lower Body Dressing 3   Bathing 3   Toileting 3   Upper Body Dressing 3   Grooming 3   Eating 4   Daily Activity Raw Score 19   Daily Activity Standardized Score (Calc for Raw Score >=11) 40 22   AM-PAC Applied Cognition Inpatient   Following a Speech/Presentation 4   Understanding Ordinary Conversation 4   Taking Medications 4   Remembering Where Things Are Placed or Put Away 4   Remembering List of 4-5 Errands 4   Taking Care of Complicated Tasks 4   Applied Cognition Raw Score 24   Applied Cognition Standardized Score 62 21   Modified [place] Scale   Modified [place] Scale 3       Pt will complete functional mobility with Mod I using appropriate DME as needed  Pt will complete UB dressing and bathing with Mod I using appropriate DME as needed  Pt will complete LB dressing and bathing with Mod I using appropriate DME as needed  Pt will complete transfers with Mod I using appropriate DME as needed  Pt will complete toileting with Mod I using appropriate DME as needed  Pt will complete home maintenance task with Mod I using appropriate DME as needed  Pt will utilize energy conservation techniques throughout functional activity/ADL s/p skilled education  Pt will demonstrate increased safety awareness during functional tasks/ADL's s/p skilled education       Pt will increase activity tolerance to 30 minutes in order to complete ADL's/ functional tasks, using appropriate DME as needed         Belén Berman, JUSTYNA, OTR/L

## 2022-04-28 NOTE — PLAN OF CARE
Problem: OCCUPATIONAL THERAPY ADULT  Goal: Performs self-care activities at highest level of function for planned discharge setting  See evaluation for individualized goals  Description: Treatment Interventions: ADL retraining,Functional transfer training,Endurance training,Patient/family training,Equipment evaluation/education,Compensatory technique education,Activityengagement,Energy conservation          See flowsheet documentation for full assessment, interventions and recommendations  Note: Limitation: Decreased ADL status,Decreased endurance,Decreased high-level ADLs,Decreased self-care trans  Prognosis: Good  Assessment: Pt is a [de-identified] y o  female admitted 4/27/22 with atrial fibrillation  Initially admitted to [facility] with falls/dizziness  Found to have afib, advised to undergo ablation with EP- transferred to [facility] for EP evaluation  Pt w active OT eval and treat orders at this time, is NWB in LUE w splint 2' wrist fx  PMH includes  has a past medical history of Atrial fibrillation ([address] ) and Hypertension  Pt lives w spouse in a 1 SH with 6 JASEN (R sided handrail), reports walk-in shower with built in seat and grab bars, raised toilet with grab bars  Pta, pt was independent w/ ADL/IADL and functional mobility, was driving and was not using any DME at baseline  Currently, pt is min A for both UB and LB ADLs 2' current limitations with LUE  Completed transfers with S w/o AD, FM with CGA, w/o AD  Pt is limited at this time 2* decreased endurance/activtiy tolerance, decreased ADL/High-level ADL status, decreased self-care trans,  and is a fall risk  This impacts pt's ability to complete UB and LB dressing and bathing, toileting, transfers, functional mobility, community mobility, home and health maintenance, and safe engagement in typical daily routine  The patient's raw score on the AM-PAC Daily Activity inpatient short form is 19, standardized score is 40 22, greater than 39 4   Patients at this level are likely to benefit from discharge to home  Please refer to the recommendation of the Occupational Therapist for safe discharge planning  From OT standpoint, pt should D/C to home with home OT when medically stable  Pt will benefit from continued acute OT services 3-5x/wk for 10-14 days to meet goals  OT Discharge Recommendation: Home with home health rehabilitation  OT - OK to Discharge:  Yes

## 2022-04-28 NOTE — PLAN OF CARE
Problem: PHYSICAL THERAPY ADULT  Goal: Performs mobility at highest level of function for planned discharge setting  See evaluation for individualized goals  Description: Treatment/Interventions: Functional transfer training,LE strengthening/ROM,Therapeutic exercise,Endurance training,Patient/family training,Equipment eval/education,Bed mobility,Gait training,OT,Spoke to nursing,Elevations  Equipment Recommended:  (may require SPC for d/c- plan to assess)       See flowsheet documentation for full assessment, interventions and recommendations  Note: Prognosis: Good  Problem List: Decreased endurance,Impaired balance,Decreased mobility,Orthopedic restrictions  Assessment: PT completed evaluation of [de-identified]year old female admitted to \[facility]\"" on 4/27/2022 as a transfer from [facility] where she initially presented on 4/24 with multiple falls (2 secondary to dizziness)  Patient was identified to have L wrist fracture and was placed in splint and is NWB L UE  Diagnosis this admission includes afib (unsuccessful DCCV on 3/29, second DCCV aborted on 4/22 when she was in NSR), patient is pending EP consult for possible ablation  Current status instabilities include continuous O2/HR monitoring, NWB status of L UE, falls risk, bed/chair alarms, and a regression in functional status from baseline  Per patient, prior to this admission she resided with her spouse in a one level home (6 JASEN w/ R HR, FF to laundry in basement)  At her baseline she is I with mobility (no use of AD), ADLs, and iADLs  Patient denies local support besides that of her spouse which is limited  Patient is primary   Patient reports her daughter will be coming to stay for 4 days  Current impairments include decreased balance and activity tolerance, gait deviations, and limited use of L UE to perform ADLS/iADLS   During PT evaluation patient was able to perform bed mobility and sit<-->stand transfers S level  + lightheadedness upon standing which did not completely subside but did improve  She ambulated 30 feet x 2 with contact guard assistance for occasional steadying  Patient did not have overt LOB however walked timid and admits feeling unsteady  Plan to trial use of SPC in future treatment sessions  Patient does not feel she needs it but is agreeable to try  She would need SPC issued to her at time of d/c if appropriate  PT d/c recommendation is for home with family assist PRN and f/u by home PT (outpatient PT is not accessible to patient at this time being that she cannot drive with L wrist fracture and is primary  for her family)  Recommend home PT for balance in setting of reported "4 falls"  Patient will continue to benefit from continued skilled PT this admission to achieve maximal function and safety  PT Discharge Recommendation: (S) Home with home health rehabilitation          See flowsheet documentation for full assessment

## 2022-04-28 NOTE — ASSESSMENT & PLAN NOTE
· Patient presents to the ED with complaints of multiple falls; brought in by EMS after patient fell and complained of left wrist pain/swelling  Patient reports multiple falls lately due to being dizzy    · Was to be seen by cardiology to consider possible cardioversion outpatient this week  · Monitor on telemetry - in/out of afib briefly overnight   · Transferred here for EP evaluation as above

## 2022-04-28 NOTE — ANESTHESIA PREPROCEDURE EVALUATION
Procedure:  Cardiac eps/afib ablation (N/A Chest)  Cardiac eps/aflutter ablation (N/A Chest)    Relevant Problems   CARDIO   (+) Atrial fibrillation (HCC)   (+) Essential hypertension   (+) Hyperlipidemia      MUSCULOSKELETAL   (+) Arthritis      NEURO/PSYCH   (+) History of osteoporosis      Other   (+) Closed fracture of left wrist       Left Ventricle: Left ventricular cavity size is normal  Wall thickness is normal  The left ventricular ejection fraction is 50%  Systolic function is low normal  Wall motion is normal     Atrial Septum: No patent foramen ovale confirmed at rest by color flow Doppler and saline contrast     Left Atrial Appendage: There is no thrombus    Mitral Valve: There is mild annular calcification  There is mild regurgitation  Physical Exam    Airway    Mallampati score: I  TM Distance: >3 FB  Neck ROM: full     Dental   No notable dental hx     Cardiovascular  Cardiovascular exam normal    Pulmonary  Pulmonary exam normal     Other Findings        Anesthesia Plan  ASA Score- 3     Anesthesia Type- general with ASA Monitors  Additional Monitors:   Airway Plan: ETT  Plan Factors-    Chart reviewed  EKG reviewed  Induction- intravenous  Postoperative Plan-   Planned trial extubation    Informed Consent- Anesthetic plan and risks discussed with patient  I personally reviewed this patient with the CRNA  Discussed and agreed on the Anesthesia Plan with the CRNA  Paco Acevedo

## 2022-04-28 NOTE — UTILIZATION REVIEW
Initial Clinical Review    Admission: Date/Time/Statement:   Admission Orders (From admission, onward)     Ordered        04/27/22 1627  Inpatient Admission  Once                      Orders Placed This Encounter   Procedures    Inpatient Admission     Standing Status:   Standing     Number of Occurrences:   1     Order Specific Question:   Level of Care     Answer:   Level 2 Stepdown / HOT [14]     Order Specific Question:   Estimated length of stay     Answer:   More than 2 Midnights     Order Specific Question:   Certification     Answer:   I certify that inpatient services are medically necessary for this patient for a duration of greater than two midnights  See H&P and MD Progress Notes for additional information about the patient's course of treatment  Initial Presentation: [de-identified] y o  female transferred from [facility] to [facility] fro a higher level of care  PMH of afib  Pt initially presented to [facility] on 04/24 d/t fall and dizziness at home  Patient with known afib on eliquis, had been considered for cardioversion after experiencing symptoms after decreasing amiodarone and discontinuing metoprolol   Found to have episodes of rapid afib and recommended to undergo ablation with electrophysiology   Currently on amiodarone drip and remains on eliquis 2 5 mg BID   From the fall, she has a left distal radial fracture that is currently splinted and will need casting upon discharge   Per Orthopedics, NWB to the left arm  Transferred to [facility] for EP consultation for possible ablation  Currently, pt is in a-flutter with HR- 60's  Reports mild nausea d/t pain on L wrist  On wrist splint, NWB  Plan: Inpatient admission for evaluation and treatment of atrial fibrillation, Closed fracture for left wrist: cont Eliquis, cont amiodarone drip  EP consulted  Monitor on telemetry  Keep on wrist spilt, NWB  Hold BP meds  Date: 04/28   Day 2:   IM Notes: Pt currently in NSR   Continues to reports dizziness when changing positions  EP consulted- plan for ablation today  Keep NPO  PE: Pt is AAO x 3  No focal deficit present  Left wrist in splint  Normal heart rate and rhythm  Normal breath sounds      ED Triage Vitals   Temperature Pulse Respirations Blood Pressure SpO2   04/27/22 1615 04/27/22 1615 04/27/22 1615 04/27/22 1615 04/27/22 1615   98 3 °F (36 8 °C) 104 16 155/98 95 %      Temp Source Heart Rate Source Patient Position - Orthostatic VS BP Location FiO2 (%)   04/27/22 1900 04/27/22 1900 04/27/22 1900 04/27/22 1900 --   Oral Monitor Lying Left arm       Pain Score       04/27/22 1630       No Pain          Wt Readings from Last 1 Encounters:   04/28/22 54 1 kg (119 lb 3 2 oz)     Additional Vital Signs:   Date/Time Temp Pulse Resp BP MAP (mmHg) SpO2 O2 Device Patient Position - Orthostatic VS   04/28/22 0802 -- -- -- -- -- 95 % None (Room air) --   04/28/22 06:18:20 97 6 °F (36 4 °C) 58 21 116/66 83 97 % -- --   04/28/22 02:56:13 97 7 °F (36 5 °C) 61 17 116/66 83 96 % -- Lying   04/28/22 0000 98 6 °F (37 °C) 64 18 127/86 100 95 % None (Room air) Lying   04/27/22 19:00:35 98 4 °F (36 9 °C) 101 18 129/86 100 97 % None (Room air) Lying     Pertinent Labs/Diagnostic Test Results:   04/24 EKG result: Atrial fibrillation with RVR rate of 101 normal axis and intervals no acute ST elevations or depressions     Splint application      Results from last 7 days   Lab Units 04/28/22  0450 04/25/22  0557 04/24/22  0836   WBC Thousand/uL 6 87 8 99 9 78   HEMOGLOBIN g/dL 13 0 14 7 15 2   HEMATOCRIT % 40 7 44 0 46 4*   PLATELETS Thousands/uL 229 236 278   NEUTROS ABS Thousands/µL  --   --  7 62         Results from last 7 days   Lab Units 04/28/22  0449 04/27/22  0516 04/25/22  0557 04/24/22  0836   SODIUM mmol/L  --  136 135* 138   POTASSIUM mmol/L  --  4 1 4 1 4 4   CHLORIDE mmol/L  --  102 102 103   CO2 mmol/L  --  27 26 27   ANION GAP mmol/L  --  7 7 8   BUN mg/dL  --  20 17 21   CREATININE mg/dL  --  0 92 0 92 1 12   EGFR ml/min/1 73sq m  --  58 58 46   CALCIUM mg/dL  --  9 0 8 8 9 4   MAGNESIUM mg/dL 2 1  --  1 8 2 0   PHOSPHORUS mg/dL  --   --  3 9  --      Results from last 7 days   Lab Units 04/25/22  0557 04/24/22  0836   AST U/L 14 17   ALT U/L 18 24   ALK PHOS U/L 71 80   TOTAL PROTEIN g/dL 6 8 7 6   ALBUMIN g/dL 3 5 4 2   TOTAL BILIRUBIN mg/dL 1 13* 0 53         Results from last 7 days   Lab Units 04/27/22  0516 04/25/22  0557 04/24/22  0836   GLUCOSE RANDOM mg/dL 111 114 120       Results from last 7 days   Lab Units 04/24/22  1249 04/24/22  1108 04/24/22  0836   HS TNI 0HR ng/L  --   --  5   HS TNI 2HR ng/L  --  7  --    HSTNI D2 ng/L  --  2  --    HS TNI 4HR ng/L 15  --   --    HSTNI D4 ng/L 10  --   --          Results from last 7 days   Lab Units 04/24/22  0836   PROTIME seconds 14 0   INR  1 12   PTT seconds 30         Past Medical History:   Diagnosis Date    Atrial fibrillation ([facility] )     Hypertension      Present on Admission:   Atrial fibrillation ([facility] )   Fall   Essential hypertension   Closed fracture of left wrist      Admitting Diagnosis: A-fib ([place] [address] ) [I48 [id number]]  Age/Sex: [de-identified] y o  female  Admission Orders:  Scheduled Medications:  apixaban, 2 5 mg, Oral, BID  senna-docusate sodium, 1 tablet, Oral, BID      Continuous IV Infusions:  amiodarone, 0 5 mg/min, Intravenous, Continuous      PRN Meds:  acetaminophen, 650 mg, Oral, Q6H PRN 04/27 x 1  ondansetron, 4 mg, Intravenous, Q6H PRN  oxyCODONE, 2 5 mg, Oral, Q4H PRN  oxyCODONE, 5 mg, Oral, Q4H PRN        IP CONSULT TO ELECTROPHYSIOLOGY    Network Utilization Review Department  ATTENTION: Please call with any questions or concerns to [phone] and carefully listen to the prompts so that you are directed to the right person  All voicemails are confidential   Arslan Cadena all requests for admission clinical reviews, approved or denied determinations and any other requests to dedicated fax number below belonging to the campus where the patient is receiving treatment   List of dedicated fax numbers for the Facilities:  [address] [facility] [address] DENIALS (Administrative/Medical Necessity) [phone]   [address] (Maternity/NICU/Pediatrics) [phone]   [address]  [address] [place] [address] [id number] [place] [address] [address] [place] [address] [facility] [phone]

## 2022-04-28 NOTE — PHYSICAL THERAPY NOTE
Physical Therapy Evaluation     Patient's Name: Madhu Thibodeaux    Admitting Diagnosis  A-fib [facility]) [I66 91]    Problem List  Patient Active Problem List   Diagnosis    Psoriasis    Screening for skin condition    Essential hypertension    Dupuytren's contracture of both hands    Arthritis    Conductive hearing loss, bilateral    Hyperlipidemia    History of osteoporosis    Knee mass, left    Fall    Atrial fibrillation ([facility] [address] )    Closed fracture of left wrist       Past Medical History  Past Medical History:   Diagnosis Date    Atrial fibrillation ([facility] [address] )     Hypertension        Past Surgical History  Past Surgical History:   Procedure Laterality Date    APPENDECTOMY      BREAST SURGERY      BX - Benign     CATARACT EXTRACTION, BILATERAL      DILATION AND CURETTAGE OF UTERUS      LAPAROSCOPY FOR ECTOPIC PREGNANCY      OTHER SURGICAL HISTORY      surgical excision of tubal pregnancy         04/28/22 0934   PT Last Visit   PT Visit Date 04/28/22   Note Type   Note type Evaluation   Pain Assessment   Pain Assessment Tool 0-10   Pain Score No Pain   Restrictions/Precautions   Weight Bearing Precautions Per Order Yes   LUE Weight Bearing Per Order NWB   Braces or Orthoses Splint   Other Precautions Telemetry;Multiple lines;WBS; Bed Alarm; Chair Alarm   Home Living   Type of [address] [place] [address] One level; Laundry in basement;Stairs to enter with rails  (6 jasen)   Bathroom Shower/Tub Walk-in shower   Bathroom Toilet Raised   Bathroom Equipment Built-in shower seat;Grab bars in shower;Grab bars around toilet;Hand-held shower   [address]   (does not use at baseline)   Additional Comments  Per patient, prior to this admission she resided with her spouse in a one level home (6 JASEN w/ R HR, FF to laundry in basement)  At her baseline she is I with mobility (no use of AD), ADLs, and iADLs  Patient denies local support besides that of her spouse which is limited  Patient is primary   Patient reports her daughter will be coming to stay for 4 days  Prior Function   Level of [place] Independent with ADLs and functional mobility   Lives With Spouse   Receives Help From Family   ADL Assistance Independent   IADLs Independent   Falls in the last 6 months 1 to 4  (4)   Vocational Retired   General   Additional Pertinent History Admitted to -B as transfer from [facility] where she initially presented on 4/24 with multiple falls (2 secondary to dizziness)  Patient was identified to have L wrist fracture and was placed in splint and is NWB L UE  Diagnosis this admission includes afib (unsuccessful DCCV on 3/29, second DCCV aborted on 4/22 when she was in NSR), patient is pending EP consult for possible ablation  Family/Caregiver Present No   Cognition   Overall Cognitive Status WFL   Arousal/Participation Alert   Attention Within functional limits   Orientation Level Oriented X4   Memory Within functional limits   Following Commands Follows all commands and directions without difficulty   Comments pleasant, cooperative   Subjective   Subjective "I want to have my procedure overwith"   RUE Assessment   RUE Assessment WFL   LUE Assessment   LUE Assessment X  (immobilzed in splint )   RLE Assessment   RLE Assessment WFL   LLE Assessment   LLE Assessment WFL   Bed Mobility   Supine to Sit 5  Supervision   Additional items HOB elevated   Sit to Supine Unable to assess   Additional Comments patient in chair with alarm active post eval    Transfers   Sit to Stand 5  Supervision   Additional items Assist x 1  (to manage DASH + IV pole)   Stand to Sit 5  Supervision   Additional items Assist x 1  (to manage DASH + IV pole)   Ambulation/Elevation   Gait pattern Short stride; Excessively slow;Decreased foot clearance;Narrow GARCIA   Gait Assistance 4  Minimal assist   Additional items Assist x 1  (contact guard; 2nd person for lines )   Assistive Device None  (tactile steading at patient's hips )   Distance 30 feet x 2   Stair Management Assistance Not tested   Ambulation/Elevation Additional Comments She ambulated 30 feet x 2 with contact guard assistance for occasional steadying  Patient did not have overt LOB however walked timid and admits feeling unsteady  Plan to trial use of SPC in future treatment sessions  Patient does not feel she needs it but is agreeable to try  She would need SPC issued to her at time of d/c if appropriate  Balance   Static Sitting Normal   Static Standing Fair   Ambulatory Fair -   Endurance Deficit   Endurance Deficit Yes   Endurance Deficit Description lightheaded upon standing which subsided but did not complete go away while ambulating, HR in 60s at rest and while mobilizing    Activity Tolerance   Activity Tolerance Patient tolerated treatment well   Medical Staff Made Aware This high complexity evaluation was performed with an occupational therapist due to the patient's co-morbidities, clinically unstable presentation, and present impairments which are a regression from the patient's baseline  Nurse Made Aware hitesh to see per RN Mary Jose   Assessment   Prognosis Good   Problem List Decreased endurance; Impaired balance;Decreased mobility;Orthopedic restrictions   Assessment PT completed evaluation of [de-identified]year old female admitted to [facility] on 4/27/2022 as a transfer from [facility] where she initially presented on 4/24 with multiple falls (2 secondary to dizziness)  Patient was identified to have L wrist fracture and was placed in splint and is NWB L UE  Diagnosis this admission includes afib (unsuccessful DCCV on 3/29, second DCCV aborted on 4/22 when she was in NSR), patient is pending EP consult for possible ablation  Current status instabilities include continuous O2/HR monitoring, NWB status of L UE, falls risk, bed/chair alarms, and a regression in functional status from baseline   Per patient, prior to this admission she resided with her spouse in a one level home (6 JASEN w/ R HR, FF to laundry in basement)  At her baseline she is I with mobility (no use of AD), ADLs, and iADLs  Patient denies local support besides that of her spouse which is limited  Patient is primary   Patient reports her daughter will be coming to stay for 4 days  Current impairments include decreased balance and activity tolerance, gait deviations, and limited use of L UE to perform ADLS/iADLS  During PT evaluation patient was able to perform bed mobility and sit<-->stand transfers S level  + lightheadedness upon standing which did not completely subside but did improve  She ambulated 30 feet x 2 with contact guard assistance for occasional steadying  Patient did not have overt LOB however walked timid and admits feeling unsteady  Plan to trial use of SPC in future treatment sessions  Patient does not feel she needs it but is agreeable to try  She would need SPC issued to her at time of d/c if appropriate  PT d/c recommendation is for home with family assist PRN and f/u by home PT (outpatient PT is not accessible to patient at this time being that she cannot drive with L wrist fracture and is primary  for her family)  Recommend home PT for balance in setting of reported "4 falls"  Patient will continue to benefit from continued skilled PT this admission to achieve maximal function and safety      Goals   Patient Goals to have her procedure done   LTG Expiration Date 05/12/22   Long Term Goal #1 1) Perform bed mobility mod-I to participate in frequent repositioning and improve skin integrity; 2) Perform functional transfers mod-I to promote I with toileting and OOB mobility; 3) Ambulate 200 feet mod-I with least restrictive device to participate in household and community level mobility; 4) Improve balance by 1 grade to reduce risk for falls; 5) Navigate 12 steps S level in order to safely navigate multiple floors at home   PT Treatment Day 0   Plan Treatment/Interventions Functional transfer training;LE strengthening/ROM; Therapeutic exercise; Endurance training;Patient/family training;Equipment eval/education; Bed mobility;Gait training;OT;Spoke to nursing;Elevations   PT Frequency 3-5x/wk   Recommendation   PT Discharge Recommendation Home with home health rehabilitation   Equipment Recommended   (may require SPC for d/c- plan to assess)   AM-PAC Basic Mobility Inpatient   Turning in Bed Without Bedrails 4   Lying on Back to Sitting on Edge of Flat Bed 4   Moving Bed to Chair 3   Standing Up From Chair 4   Walk in Room 3   Climb 3-5 Stairs 3   Basic Mobility Inpatient Raw Score 21   Basic Mobility Standardized Score 45 55   Highest Level Of Mobility   -[facility] Goal 6: Walk 10 steps or more       The patient's AM-PAC Basic Mobility Inpatient Standardized Score is greater than 42 9, suggesting this patient may benefit from discharge to home  Please also refer to the recommendation of the Physical Therapist for safe discharge planning        Lina Lock, PT, DPT

## 2022-04-28 NOTE — ASSESSMENT & PLAN NOTE
· Initially presented to [place] [facility] on 4/24/22 after multiple falls related to dizziness, suspected related to afib  · Transferred here for an EP consultation for possible ablation  · On transfer here, in atrial flutter with heart rate in the 60s  · Echo from 7/2021 with EF 50%, mild diffuse hypokinesis  · S/p unsuccessful DCCV on 03/29  Patient has had recurrent symptomatic atrial fibrillation/flutter despite treatment with oral amiodarone  · Second DCCV was aborted on 04/22 when she was in sinus bradycardia with heart rate in the 40s    · Continue Eliquis 2 5 mg b i d  (age, weight)  · Continue amiodarone infusion for rate/rhythm control  · Currently in NSR  · Patient continues to endorse dizziness when changing positions   · EP consulted ; plan for ablation later today  · Keep NPO, resume diet post-procedure

## 2022-04-28 NOTE — CASE MANAGEMENT
Case Management Assessment & Discharge Planning Note    Patient name Gisselle James  Location [facility] 501/[facility] 936-85 MRN [id number]  : 1940 Date 2022       Current Admission Date: 2022  Current Admission Diagnosis:Atrial fibrillation [facility])   Patient Active Problem List    Diagnosis Date Noted    Closed fracture of left wrist 2022    Fall 2022    Atrial fibrillation ([address] ) 2022    History of osteoporosis 2021    Knee mass, left 2021    Essential hypertension 2020    Dupuytren's contracture of both hands 2020    Arthritis 2020    Screening for skin condition 2018    Conductive hearing loss, bilateral 2017    Psoriasis 2014    Hyperlipidemia 2014      LOS (days): 1  Geometric Mean LOS (GMLOS) (days): 1 90  Days to GMLOS:1 1     OBJECTIVE:  PATIENT READMITTED [address]  of Unplanned Readmission Score: 9         Current admission status: Inpatient       Preferred Pharmacy:   [facility], [address]  Phone: [phone] Fax: [phone] # [facility], [address] [address]  Phone: [phone] Fax: [phone]    Primary Care Provider: Latrice Prakash DO    Primary Insurance: [facility]  Secondary Insurance:     ASSESSMENT:  Amy 26 Proxies    There are no active Health Care Proxies on file  Readmission Root Cause  30 Day Readmission: No (Elias Castillo)    Patient Information  Admitted from[de-identified] Home  Mental Status: Alert  During Assessment patient was accompanied by: Not accompanied during assessment  Assessment information provided by[de-identified] Patient  Primary Caregiver: Self  Support Systems: Spouse/significant other  Home entry access options   Select all that apply : Stairs  Number of steps to enter home : 6  Do the steps have railings?: No  Type of Current Residence: [address] [place] [address]  Living Arrangements: Lives w/ Spouse/significant other  Is patient a ?: No    Activities of Daily Living Prior to Admission  Functional Status: Independent  Completes ADLs independently?: Yes  Ambulates independently?: Yes  Does patient use assisted devices?: No  Does patient currently own DME?: No  Does patient have a history of Outpatient Therapy (PT/OT)?: No  Does the patient have a history of Short-Term Rehab?: No  Does patient have a history of HHC?: No  Does patient currently have [facility]?: No         Patient Information Continued  Income Source: Pension/correction  Does patient have prescription coverage?: Yes  Does patient receive dialysis treatments?: No  Does patient have a history of substance abuse?: No         Means of Transportation  Means of Transport to Appts[de-identified] Drives Self        DISCHARGE DETAILS:    Discharge planning discussed with[de-identified] Pt  Freedom of Choice: Yes  Comments - Freedom of Choice: Cm to f/u for d/c needs

## 2022-04-29 LAB
ABO GROUP BLD: NORMAL
ANION GAP SERPL CALCULATED.3IONS-SCNC: 5 MMOL/L (ref 4–13)
APTT PPP: 43 SECONDS (ref 23–37)
BUN SERPL-MCNC: 20 MG/DL (ref 5–25)
CALCIUM SERPL-MCNC: 7.9 MG/DL (ref 8.3–10.1)
CHLORIDE SERPL-SCNC: 111 MMOL/L (ref 100–108)
CO2 SERPL-SCNC: 23 MMOL/L (ref 21–32)
CREAT SERPL-MCNC: 0.73 MG/DL (ref 0.6–1.3)
ERYTHROCYTE [DISTWIDTH] IN BLOOD BY AUTOMATED COUNT: 13.4 % (ref 11.6–15.1)
GFR SERPL CREATININE-BSD FRML MDRD: 78 ML/MIN/1.73SQ M
GLUCOSE SERPL-MCNC: 121 MG/DL (ref 65–140)
HCT VFR BLD AUTO: 30.5 % (ref 34.8–46.1)
HGB BLD-MCNC: 10.4 G/DL (ref 11.5–15.4)
HGB BLD-MCNC: 9.6 G/DL (ref 11.5–15.4)
HGB BLD-MCNC: 9.6 G/DL (ref 11.5–15.4)
HGB BLD-MCNC: 9.7 G/DL (ref 11.5–15.4)
INR PPP: 1.11 (ref 0.84–1.19)
INR PPP: 1.21 (ref 0.84–1.19)
MAGNESIUM SERPL-MCNC: 1.9 MG/DL (ref 1.6–2.6)
MCH RBC QN AUTO: 30 PG (ref 26.8–34.3)
MCHC RBC AUTO-ENTMCNC: 31.8 G/DL (ref 31.4–37.4)
MCV RBC AUTO: 94 FL (ref 82–98)
PHOSPHATE SERPL-MCNC: 3.7 MG/DL (ref 2.3–4.1)
PLATELET # BLD AUTO: 217 THOUSANDS/UL (ref 149–390)
PMV BLD AUTO: 9.5 FL (ref 8.9–12.7)
POTASSIUM SERPL-SCNC: 4 MMOL/L (ref 3.5–5.3)
PROTHROMBIN TIME: 13.8 SECONDS (ref 11.6–14.5)
PROTHROMBIN TIME: 14.8 SECONDS (ref 11.6–14.5)
RBC # BLD AUTO: 3.23 MILLION/UL (ref 3.81–5.12)
RH BLD: NEGATIVE
SL CV LV EF: 60
SODIUM SERPL-SCNC: 139 MMOL/L (ref 136–145)
WBC # BLD AUTO: 7.75 THOUSAND/UL (ref 4.31–10.16)

## 2022-04-29 PROCEDURE — 83735 ASSAY OF MAGNESIUM: CPT | Performed by: PHYSICIAN ASSISTANT

## 2022-04-29 PROCEDURE — 93312 ECHO TRANSESOPHAGEAL: CPT | Performed by: INTERNAL MEDICINE

## 2022-04-29 PROCEDURE — 85018 HEMOGLOBIN: CPT | Performed by: PHYSICIAN ASSISTANT

## 2022-04-29 PROCEDURE — 99232 SBSQ HOSP IP/OBS MODERATE 35: CPT

## 2022-04-29 PROCEDURE — 80048 BASIC METABOLIC PNL TOTAL CA: CPT | Performed by: PHYSICIAN ASSISTANT

## 2022-04-29 PROCEDURE — 85730 THROMBOPLASTIN TIME PARTIAL: CPT | Performed by: ANESTHESIOLOGY

## 2022-04-29 PROCEDURE — 85610 PROTHROMBIN TIME: CPT | Performed by: PHYSICIAN ASSISTANT

## 2022-04-29 PROCEDURE — 84100 ASSAY OF PHOSPHORUS: CPT | Performed by: PHYSICIAN ASSISTANT

## 2022-04-29 PROCEDURE — 85027 COMPLETE CBC AUTOMATED: CPT | Performed by: PHYSICIAN ASSISTANT

## 2022-04-29 PROCEDURE — 85610 PROTHROMBIN TIME: CPT | Performed by: ANESTHESIOLOGY

## 2022-04-29 PROCEDURE — 99233 SBSQ HOSP IP/OBS HIGH 50: CPT | Performed by: ANESTHESIOLOGY

## 2022-04-29 PROCEDURE — 93325 DOPPLER ECHO COLOR FLOW MAPG: CPT | Performed by: INTERNAL MEDICINE

## 2022-04-29 PROCEDURE — 97116 GAIT TRAINING THERAPY: CPT

## 2022-04-29 PROCEDURE — 99232 SBSQ HOSP IP/OBS MODERATE 35: CPT | Performed by: SURGERY

## 2022-04-29 PROCEDURE — 93321 DOPPLER ECHO F-UP/LMTD STD: CPT | Performed by: INTERNAL MEDICINE

## 2022-04-29 PROCEDURE — 93005 ELECTROCARDIOGRAM TRACING: CPT

## 2022-04-29 PROCEDURE — 99233 SBSQ HOSP IP/OBS HIGH 50: CPT | Performed by: INTERNAL MEDICINE

## 2022-04-29 PROCEDURE — NC001 PR NO CHARGE: Performed by: RADIOLOGY

## 2022-04-29 PROCEDURE — NC001 PR NO CHARGE: Performed by: SURGERY

## 2022-04-29 RX ORDER — ALBUMIN, HUMAN INJ 5% 5 %
12.5 SOLUTION INTRAVENOUS ONCE
Status: COMPLETED | OUTPATIENT
Start: 2022-04-29 | End: 2022-04-29

## 2022-04-29 RX ORDER — ALBUMIN, HUMAN INJ 5% 5 %
SOLUTION INTRAVENOUS
Status: COMPLETED
Start: 2022-04-29 | End: 2022-04-29

## 2022-04-29 RX ORDER — AMIODARONE HYDROCHLORIDE 200 MG/1
200 TABLET ORAL
Status: DISCONTINUED | OUTPATIENT
Start: 2022-04-30 | End: 2022-05-03 | Stop reason: HOSPADM

## 2022-04-29 RX ORDER — POLYETHYLENE GLYCOL 3350 17 G/17G
17 POWDER, FOR SOLUTION ORAL ONCE
Status: COMPLETED | OUTPATIENT
Start: 2022-04-29 | End: 2022-04-29

## 2022-04-29 RX ADMIN — SODIUM CHLORIDE, SODIUM GLUCONATE, SODIUM ACETATE, POTASSIUM CHLORIDE, MAGNESIUM CHLORIDE, SODIUM PHOSPHATE, DIBASIC, AND POTASSIUM PHOSPHATE 50 ML/HR: .53; .5; .37; .037; .03; .012; .00082 INJECTION, SOLUTION INTRAVENOUS at 22:10

## 2022-04-29 RX ADMIN — ALBUMIN (HUMAN) 12.5 G: 12.5 INJECTION, SOLUTION INTRAVENOUS at 04:17

## 2022-04-29 RX ADMIN — HEPARIN SODIUM 12 UNITS/KG/HR: 10000 INJECTION, SOLUTION INTRAVENOUS; SUBCUTANEOUS at 13:00

## 2022-04-29 RX ADMIN — PANTOPRAZOLE SODIUM 40 MG: 40 TABLET, DELAYED RELEASE ORAL at 06:14

## 2022-04-29 RX ADMIN — SENNOSIDES AND DOCUSATE SODIUM 1 TABLET: 50; 8.6 TABLET ORAL at 17:05

## 2022-04-29 RX ADMIN — POLYETHYLENE GLYCOL 3350 17 G: 17 POWDER, FOR SOLUTION ORAL at 13:46

## 2022-04-29 RX ADMIN — SODIUM CHLORIDE, SODIUM GLUCONATE, SODIUM ACETATE, POTASSIUM CHLORIDE, MAGNESIUM CHLORIDE, SODIUM PHOSPHATE, DIBASIC, AND POTASSIUM PHOSPHATE 125 ML/HR: .53; .5; .37; .037; .03; .012; .00082 INJECTION, SOLUTION INTRAVENOUS at 00:00

## 2022-04-29 RX ADMIN — ALBUMIN, HUMAN INJ 5% 12.5 G: 5 SOLUTION at 04:17

## 2022-04-29 RX ADMIN — SENNOSIDES AND DOCUSATE SODIUM 1 TABLET: 50; 8.6 TABLET ORAL at 08:43

## 2022-04-29 RX ADMIN — AMIODARONE HYDROCHLORIDE 0.5 MG/MIN: 50 INJECTION, SOLUTION INTRAVENOUS at 01:04

## 2022-04-29 RX ADMIN — SODIUM CHLORIDE, SODIUM GLUCONATE, SODIUM ACETATE, POTASSIUM CHLORIDE, MAGNESIUM CHLORIDE, SODIUM PHOSPHATE, DIBASIC, AND POTASSIUM PHOSPHATE 125 ML/HR: .53; .5; .37; .037; .03; .012; .00082 INJECTION, SOLUTION INTRAVENOUS at 06:14

## 2022-04-29 NOTE — PROGRESS NOTES
Progress Note - Vascular Surgery   Madhu Thibodeaux [de-identified] y o  female MRN: [id number]  Unit/Bed#: [facility] 756-37 Encounter: [id number]    Assessment:  46-year-old female with rectus sheath hematoma with active extravasation and concern for retroperitoneal hematoma status post cardiac ablation 4/28 now status post IR angiogram with embolization of right inferior epigastric    Remains borderline hypotensive with maps in the mid 60s overnight  Otherwise asymptomatic  Hemoglobin 9 7 from 10 4 from 12 0    Plan:  -continue trend hemoglobin   -continue to hold anticoagulation   -if patient were to show signs of bleeding, drop in hemoglobin, worsening hypotension would recommend repeat CTA to reassess for active extravasation/enlarging RP hematoma    Subjective/Objective       Subjective:  Patient seen examined at bedside  Denies nausea, vomiting, fever, chills  States her pain in her abdomen is much improved  Denies dizziness, lightheadedness, shortness of breath or chest pain  Objective:     Blood pressure (!) 91/49, pulse 71, temperature 98 9 °F (37 2 °C), temperature source Oral, resp  rate 14, height 5' 5" (1 651 m), weight 59 4 kg (130 lb 15 3 oz), SpO2 92 %  ,Body mass index is 21 79 kg/m²  Intake/Output Summary (Last 24 hours) at 4/29/2022 0733  Last data filed at 4/29/2022 0600  Gross per 24 hour   Intake 1825 95 ml   Output 1000 ml   Net 825 95 ml       Invasive Devices  Report    Peripheral Intravenous Line            Peripheral IV 04/27/22 Right;Upper Arm 1 day    Peripheral IV 04/28/22 Dorsal (posterior); Right Forearm <1 day    Peripheral IV 04/28/22 Right Hand <1 day          Drain            Urethral Catheter 16 Fr  <1 day                Physical Exam:   General: NAD  Head: normocephalic, atraumatic  CV: Pulse regular  Lungs: no conversational dyspnea  Abdomen: soft, non distended, minimally tender right-sided abdomen, no guarding or rebound, no abdominal ecchymosis or flank ecchymosis  Extremities: EDDIE motor sensory intact, bilateral groin access site clean dry intact without evidence of hematoma, palpable DP is bilaterally  Neuro: awake, alert, answers questions appropriately      Lab, Imaging and other studies:I have personally reviewed pertinent lab results      VTE Pharmacologic Prophylaxis: Sequential compression device (Venodyne)   VTE Mechanical Prophylaxis: sequential compression device

## 2022-04-29 NOTE — ASSESSMENT & PLAN NOTE
52-year-old female with a past medical history of AFib on anticoagulation who presents with dizziness and a fall  Patient was admitted to [facility] was found to have episodes of rapid AFib and was ultimately recommended to undergo atrial fibrillation ablation  She was transferred to [facility] and was seen in consultation by electrophysiology  Patient underwent atrial fibrillation ablation using cryoablation 4/28      Plan:  · AC and amio per EP  · Monitor rhythm on telemetry   · Colchicine PRN for pericardial pain  · Tylenol and oxycodone PRN for access site pain  · IV hydration due to dye loads

## 2022-04-29 NOTE — ASSESSMENT & PLAN NOTE
After the procedure, she was noted to be hypotensive with some abdominal pain  She was taken down for CT scan which revealed rectus sheath hematoma with active extravasation and concern for retroperitoneal hematoma therefore she was taken to the IR suite for angiography  Gelfoam and coil embolization performed to stasis R inferior epigastric artery  The patient presents to the ICU after IR       Plan:  · q6 hemoglobin checks  · Transfuse as needed with hemoglobin goal greater than 7 0  · Trend abdominal and neurovascular exam

## 2022-04-29 NOTE — CONSULTS
Consultation - Vascular Surgery   Orlando Elias [de-identified] y o  female MRN: [id number]  Unit/Bed#: BE CATH LAB ROOM Encounter: [id number]    Assessment/Plan     Assessment:  13-year-old female with rectus sheath hematoma with active extravasation and concern for retroperitoneal hematoma status post cardiac ablation this afternoon  Plan:  -stat IR consult for embolization of rectus sheath hematoma given active extravasation  -serial hemoglobins  -monitor in ICU  -abdominal binder  -if patient were to show signs of bleeding following angiogram would recommend a CTA with delays to further evaluate    Plan discussed with cardiology team as well as Interventional Radiology  History of Present Illness     HPI:  Orlando Elias is a [de-identified] y o  female  with history of atrial fibrillation was admitted for cardiac ablation on 04/28  Following the procedure this afternoon she was noted to have increasing abdominal pain  A CT abdomen pelvis was performed concerning for rectus sheath hematoma on the right with active extravasation as well as retroperitoneal hematoma without definitive extravasation  Vascular surgery consult was obtained for the above  Currently the patient complains of abdominal pain directly over the rectus sheath hematoma  She denies any back pain  Hemoglobin is 12 from 13 this morning  Of note the patient takes Eliquis last dose this morning  She received heparin and protamine in the cath lab  Following the procedure the patient was noted to be hypertensive  She received hydralazine and her subsequent blood pressures were noted to be significantly lower with systolics around 847  Inpatient consult to Vascular Surgery  Consult performed by: Jerry Castellano DO  Consult ordered by: Abdi López MD          Review of Systems   Constitutional: Negative  HENT: Negative  Respiratory: Negative  Cardiovascular: Negative  Gastrointestinal: Positive for abdominal pain     Genitourinary: Negative  Musculoskeletal: Negative  Skin: Negative  Neurological: Negative  Hematological: Negative  Psychiatric/Behavioral: Negative  Historical Information   Past Medical History:   Diagnosis Date    Atrial fibrillation ([address] )     Hypertension      Past Surgical History:   Procedure Laterality Date    APPENDECTOMY      BREAST SURGERY      BX - Benign     CATARACT EXTRACTION, BILATERAL      DILATION AND CURETTAGE OF UTERUS      LAPAROSCOPY FOR ECTOPIC PREGNANCY      OTHER SURGICAL HISTORY      surgical excision of tubal pregnancy      Social History   Social History     Substance and Sexual Activity   Alcohol Use Yes    Comment: social      Social History     Substance and Sexual Activity   Drug Use No     E-Cigarette/Vaping    E-Cigarette Use Never User      E-Cigarette/Vaping Substances    Nicotine No     THC No     CBD No     Flavoring No     Other No     Unknown No      Social History     Tobacco Use   Smoking Status Former Smoker    Packs/day: 0 50    Years: 20 00    Pack years: 10 00    Quit date: 2006    Years since quittin 3   Smokeless Tobacco Never Used     Family History:   Family History   Problem Relation Age of Onset    Hypertension Mother     Ovarian cancer Mother     Pneumonia Father        Meds/Allergies   PTA meds:   Prior to Admission Medications   Prescriptions Last Dose Informant Patient Reported? Taking? Eliquis 2 5 MG   No No   Sig: TAKE 1 TABLET TWICE A DAY   NEED APPOINTMENT WITH DR Cinthia Church FOR FURTHER REFILLS   Glucosamine-Chondroit-Vit C-Mn (GLUCOSAMINE 1500 COMPLEX PO)  Self Yes No   Sig: Take 1 capsule by mouth daily   amiodarone 200 mg tablet   No No   Sig: Take 1 tablet (200 mg total) by mouth daily   triamcinolone (KENALOG) 0 5 % ointment  Self Yes No   Sig: Apply topically as needed        Facility-Administered Medications: None     No Known Allergies    Objective   First Vitals:   Blood Pressure: 155/98 (22 1615)  Pulse: 104 (04/27/22 1615)  Temperature: 98 3 °F (36 8 °C) (04/27/22 1615)  Temp Source: Oral (04/27/22 1900)  Respirations: 16 (04/27/22 1615)  Height: 5' 5" (165 1 cm) (04/28/22 1430)  Weight - Scale: 54 1 kg (119 lb 3 2 oz) (04/28/22 0300)  SpO2: 95 % (04/27/22 1615)    Current Vitals:   Blood Pressure: 98/55 (04/28/22 2015)  Pulse: 72 (04/28/22 2015)  Temperature: 98 °F (36 7 °C) (04/28/22 1900)  Temp Source: Oral (04/28/22 0618)  Respirations: 22 (04/28/22 2015)  Height: 5' 5" (165 1 cm) (04/28/22 1430)  Weight - Scale: 54 kg (119 lb) (04/28/22 1430)  SpO2: 94 % (04/28/22 2015)      Intake/Output Summary (Last 24 hours) at 4/28/2022 2038  Last data filed at 4/28/2022 1630  Gross per 24 hour   Intake 373 86 ml   Output 550 ml   Net -176 14 ml       Invasive Devices  Report    Peripheral Intravenous Line            Peripheral IV 04/27/22 Right;Upper Arm <1 day    Peripheral IV 04/28/22 Dorsal (posterior); Right Forearm <1 day    Peripheral IV 04/28/22 Right Hand <1 day                Physical Exam  Constitutional:       General: She is not in acute distress  Appearance: Normal appearance  She is not ill-appearing or toxic-appearing  HENT:      Head: Normocephalic and atraumatic  Cardiovascular:      Rate and Rhythm: Normal rate and regular rhythm  Pulses: Normal pulses  Pulmonary:      Effort: Pulmonary effort is normal  No respiratory distress  Abdominal:      Comments: Soft, nondistended, tender palpation and right abdomen, bulge and right abdomen over rectus abdominus muscle  No guarding rebound or rigidity  There is no flank ecchymosis  Right groin site is clean dry intact  Musculoskeletal:         General: Normal range of motion  Skin:     General: Skin is warm  Capillary Refill: Capillary refill takes less than 2 seconds  Neurological:      General: No focal deficit present  Mental Status: She is alert and oriented to person, place, and time     Psychiatric:         Mood and Affect: Mood normal          Behavior: Behavior normal          Lab Results: I have personally reviewed pertinent lab results  Imaging: I have personally reviewed pertinent reports  EKG, Pathology, and Other Studies: I have personally reviewed pertinent reports

## 2022-04-29 NOTE — CONSULTS
Interventional Radiology  Consultation 2022     Consults  Noemi Simmonds   1940   [id number]      Assessment/Plan:  [de-identified]year old female s/p ablation via right femoral vein for paroxysmal atrial fibrillation  Now with right rectus sheath hematoma with active arterial bleed  Plan for IR embolization  Medical Problems             Problem List     * (Principal) Atrial fibrillation ([address] )    Psoriasis    Screening for skin condition    Essential hypertension    Dupuytren's contracture of both hands    Arthritis    Conductive hearing loss, bilateral    Hyperlipidemia    History of osteoporosis    Knee mass, left    Fall    Closed fracture of left wrist    Rectus sheath hematoma    H/O cardiac atrial fib ablation                  Subjective:     Patient ID: Noemi Simmonds is a [de-identified] y o  female  History of Present Illness  [de-identified]year old female s/p ablation via right femoral vein for paroxysmal atrial fibrillation  Now with right rectus sheath hematoma with active arterial bleed  Plan for IR embolization      Review of Systems  as above    Past Medical History:   Diagnosis Date    Atrial fibrillation ([facility] [address] )     Hypertension         Past Surgical History:   Procedure Laterality Date    APPENDECTOMY      BREAST SURGERY      BX - Benign     CATARACT EXTRACTION, BILATERAL      DILATION AND CURETTAGE OF UTERUS      LAPAROSCOPY FOR ECTOPIC PREGNANCY      OTHER SURGICAL HISTORY      surgical excision of tubal pregnancy         Social History     Tobacco Use   Smoking Status Former Smoker    Packs/day: 0 50    Years: 20 00    Pack years: 10 00    Quit date: 2006    Years since quittin 3   Smokeless Tobacco Never Used        Social History     Substance and Sexual Activity   Alcohol Use Yes    Comment: social         Social History     Substance and Sexual Activity   Drug Use No        No Known Allergies    Current Facility-Administered Medications   Medication Dose Route Frequency Provider Last Rate Last Admin    acetaminophen (TYLENOL) tablet 650 mg  650 mg Oral Q6H PRN Elisabet Guidry MD   650 mg at 04/27/22 2341    amiodarone (CORDARONE) 900 mg in dextrose 5 % 500 mL infusion  0 5 mg/min Intravenous Continuous Elisabet Guidry MD 16 7 mL/hr at 04/29/22 0104 0 5 mg/min at 04/29/22 0104    benzonatate (TESSALON PERLES) capsule 100 mg  100 mg Oral TID PRN Amanda Arias PA-C        colchicine (COLCRYS) tablet 0 6 mg  0 6 mg Oral BID PRN Rhett Dominguez PA-C        iohexol (OMNIPAQUE) 350 MG/ML injection (SINGLE-DOSE) 100 mL  100 mL Intravenous Once in imaging Wilhemfaustino Suárez MD        multi-electrolyte (PLASMALYTE-A/ISOLYTE-S PH 7 4) IV solution  125 mL/hr Intravenous Continuous Shaista Ivy PA-C 125 mL/hr at 04/29/22 0614 125 mL/hr at 04/29/22 0614    ondansetron (ZOFRAN) injection 4 mg  4 mg Intravenous Q6H PRN Elisabet Guidry MD        oxyCODONE (ROXICODONE) IR tablet 2 5 mg  2 5 mg Oral Q4H PRN Elisabet Guidry MD        oxyCODONE (ROXICODONE) IR tablet 5 mg  5 mg Oral Q4H PRN Elisabet Guidry MD        pantoprazole (PROTONIX) EC tablet 40 mg  40 mg Oral Early Morning Antonio Dominguez PA-C   40 mg at 04/29/22 7590    senna-docusate sodium (SENOKOT S) 8 6-50 mg per tablet 1 tablet  1 tablet Oral BID Elisabet Guidry MD   1 tablet at 04/28/22 0817          Objective:    Vitals:    04/29/22 0405 04/29/22 0430 04/29/22 0500 04/29/22 0600   BP: (!) 87/51 99/55 93/50 (!) 91/49   Pulse: 72 71 70 71   Resp:   15 14   Temp:       TempSrc:       SpO2:  92% 92% 92%   Weight:    59 4 kg (130 lb 15 3 oz)   Height:            Physical Exam  Not performed    No results found for: BNP   Lab Results   Component Value Date    WBC 7 75 04/29/2022    HGB 9 7 (L) 04/29/2022    HCT 30 5 (L) 04/29/2022    MCV 94 04/29/2022     04/29/2022     Lab Results   Component Value Date    INR 1 21 (H) 04/29/2022    INR 1 12 04/24/2022    INR 1 16 03/25/2022    PROTIME 14 8 (H) 04/29/2022    PROTIME 14 0 04/24/2022    PROTIME 14 3 03/25/2022     Lab Results   Component Value Date    PTT 30 04/24/2022         I have personally reviewed pertinent imaging and laboratory results  Code Status: Level 1 - Full Code  Advance Directive and Living Will:      Power of :    POLST:      IR has been consulted to evaluate the patient, determine the appropriate procedure, and whether or not a procedure can and should be performed  Thank you for allowing me to participate in the care of Lauren Dasilva  Please don't hesitate to call, text, email, or TigerText with any questions  This text is generated with voice recognition software  There may be translation, syntax,  or grammatical errors  If you have any questions, please contact the dictating provider

## 2022-04-29 NOTE — ASSESSMENT & PLAN NOTE
· Patient with recurrent falls likely secondary to cardiac arrhythmia  · PT and OT consultation  · Determine placement at time of discharge

## 2022-04-29 NOTE — CASE MANAGEMENT
Case Management Discharge Planning Note    Patient name Mabel Ross  Location [address] 036-72 MRN [id number]  : 1940 Date 2022       Current Admission Date: 2022  Current Admission Diagnosis:Atrial fibrillation [facility])   Patient Active Problem List    Diagnosis Date Noted    Rectus sheath hematoma 2022    H/O cardiac atrial fib ablation 2022    Closed fracture of left wrist 2022    Fall 2022    Atrial fibrillation ([address] ) 2022    History of osteoporosis 2021    Knee mass, left 2021    Essential hypertension 2020    Dupuytren's contracture of both hands 2020    Arthritis 2020    Screening for skin condition 2018    Conductive hearing loss, bilateral 2017    Psoriasis 2014    Hyperlipidemia 2014      LOS (days): 2  Geometric Mean LOS (GMLOS) (days): 4 30  Days to GMLOS:2 5     OBJECTIVE:  Risk of Unplanned Readmission Score: 16         Current admission status: Inpatient   Preferred Pharmacy:   [address] [place] [address], [address] [address]  Phone: [phone] Fax: [phone] # [facility] [place], [facility] [address]  Phone: [phone] Fax: [phone]    Primary Care Provider: Augie Day DO    Primary Insurance: [address] [facility]  Secondary Insurance:     DISCHARGE DETAILS:    A post acute care recommendation was made by your care team for [address]  Discussed Freedom of Choice with patient    Choice is to make a new referral   Referral via [address] [place] [address],  [phone]         Is the patient interested in [address] at discharge?: Yes  [address] requested[de-identified] Ahmet Lamb Name[de-identified] [facility] Provider[de-identified] PCP  [address] [facility] Needed[de-identified] Post-Op Care and Assessment,Evaluate Functional Status and Safety  Homebound Criteria Met[de-identified] Requires the Assistance of Another Person for Safe Ambulation or to Leave the Home,Uses an Assist Device (i e  cane, walker, etc)  Supporting Clincal Findings[de-identified] Fatigues Easliy in Short Distances,Limited Endurance                      Discharge Destination Plan[de-identified] Home with [address] unable to accept  Spoke with pt, agreeable to blanket referrals    Same entered in [facility]

## 2022-04-29 NOTE — PROGRESS NOTES
Cardiology Progress Note - Enrique Kumari [de-identified] y o  female MRN: [id number]    Unit/Bed#: [facility] 517-01 Encounter: [id number]      Assessment & Plan:    Rectus sheath hematoma   -developed a spontaneous rectus sheath hematoma with blush from a right epigastric artery, unrelated to venous access  -s/p IR embolization of the right inferior epigastric artery  -hemoglobin trended down to 9 7 from 13 0 yesterday morning  -plan to recheck hemoglobin this afternoon and if stable will start a heparin drip    Atrial fibrillation (HCC)  -history of paroxysmal atrial fibrillation and flutter  -had episodes of rapid ventricular response despite amiodarone therapy  -failed cardioversion on 3-29 and 4-22  -underwent atrial fibrillation and atrial flutter ablation yesterday  -currently on amiodarone 0 5 mg/min and colchicine 0 6 mg b i d   -on Eliquis 2 5 mg b i d  At home, currently holding due to rectus sheath hematoma    Essential hypertension  -not on any antihypertensives at home    Fall    Closed fracture of left wrist    Plan:  -can discontinue amiodarone drip and transition back to p o  amiodarone 200 mg daily  - hemoglobin remains stable, agree with starting heparin drip, monitor hemoglobin closely    Subjective:   Patient abdominal pain overnight and CT showed an acute rectus sheath hematoma  She underwent an angiogram with embolization of the right inferior epigastric artery with IR  Patient reports feeling better this morning  She still has some lower abdominal discomfort, but significantly improved compared to overnight  Denies any discomfort at her bilateral groin access sites  Denies chest pain, shortness of breath, abdominal pain, nausea, vomiting, fever, chills, headache, dizziness or palpitations  Objective:     Vitals: Blood pressure 125/58, pulse 82, temperature 98 °F (36 7 °C), temperature source Oral, resp  rate (!) 28, height 5' 5" (1 651 m), weight 59 4 kg (130 lb 15 3 oz), SpO2 96 %  , Body mass index is 21 79 kg/m² ,   Orthostatic Blood Pressures      Most Recent Value   Blood Pressure 125/58 filed at 04/29/2022 1000   Patient Position - Orthostatic VS Lying filed at 04/28/2022 0256            Intake/Output Summary (Last 24 hours) at 4/29/2022 1101  Last data filed at 4/29/2022 0900  Gross per 24 hour   Intake 2327 3 ml   Output 1200 ml   Net 1127 3 ml           Physical Exam:    GEN: Benny Shivam appears well, alert and oriented x 3, pleasant and cooperative   HEENT: anicteric, mucous membranes moist  NECK: no jvd, carotid bruits   HEART: regular rhythm, normal S1 and S2, no murmurs, clicks, gallops or rubs   LUNGS: clear to auscultation bilaterally; no wheezes, rales, or rhonchi   ABDOMEN: normal bowel sounds, soft, no tenderness, no distention  EXTREMITIES: peripheral pulses normal; no clubbing, cyanosis, or edema, left wrist in cast  NEURO: no focal findings   SKIN: normal without suspicious lesions on exposed skin      Current Facility-Administered Medications:     acetaminophen (TYLENOL) tablet 650 mg, 650 mg, Oral, Q6H PRN, Teresa Santos MD, 650 mg at 04/27/22 2341    amiodarone (CORDARONE) 900 mg in dextrose 5 % 500 mL infusion, 0 5 mg/min, Intravenous, Continuous, Teresa Santos MD, Last Rate: 16 7 mL/hr at 04/29/22 0104, 0 5 mg/min at 04/29/22 0104    benzonatate (TESSALON PERLES) capsule 100 mg, 100 mg, Oral, TID PRN, Antonio Dominguez PA-C    colchicine (COLCRYS) tablet 0 6 mg, 0 6 mg, Oral, BID PRN, Antonio Dominguez PA-C    iohexol (OMNIPAQUE) 350 MG/ML injection (SINGLE-DOSE) 100 mL, 100 mL, Intravenous, Once in imaging, Eduarda Landin MD    multi-electrolyte (PLASMALYTE-A/ISOLYTE-S PH 7 4) IV solution, 50 mL/hr, Intravenous, Continuous, Jose Watts PA-C, Last Rate: 50 mL/hr at 04/29/22 0841, 50 mL/hr at 04/29/22 0841    ondansetron (ZOFRAN) injection 4 mg, 4 mg, Intravenous, Q6H PRN, Teresa Santos MD    oxyCODONE (ROXICODONE) IR tablet 2 5 mg, 2 5 mg, Oral, Q4H PRN, Teresa Santos, MD    oxyCODONE (ROXICODONE) IR tablet 5 mg, 5 mg, Oral, Q4H PRN, Diamond Rush MD    pantoprazole (PROTONIX) EC tablet 40 mg, 40 mg, Oral, Early Morning, Antonio FriendRACHEL, 40 mg at 04/29/22 5619    senna-docusate sodium (SENOKOT S) 8 6-50 mg per tablet 1 tablet, 1 tablet, Oral, BID, Diamond Rush MD, 1 tablet at 04/29/22 0843    Labs & Results:    No results found for: CKTOTAL, CKMB, CKMBINDEX, TROPONINI    Lab Results   Component Value Date    GLUCOSE 85 04/29/2015    CALCIUM 7 9 (L) 04/29/2022     04/29/2015    K 4 0 04/29/2022    CO2 23 04/29/2022     (H) 04/29/2022    BUN 20 04/29/2022    CREATININE 0 73 04/29/2022       Lab Results   Component Value Date    WBC 7 75 04/29/2022    HGB 9 7 (L) 04/29/2022    HCT 30 5 (L) 04/29/2022    MCV 94 04/29/2022     04/29/2022     Results from last 7 days   Lab Units 04/29/22  0440   INR  1 21*       Lab Results   Component Value Date    CHOL 241 04/29/2015     Lab Results   Component Value Date     11/12/2021    HDL 92 (H) 04/25/2019     Lab Results   Component Value Date    LDLCALC 106 (H) 11/12/2021    LDLCALC 106 (H) 04/25/2019     Lab Results   Component Value Date    TRIG 55 11/12/2021    TRIG 64 04/25/2019       Lab Results   Component Value Date    ALT 18 04/25/2022    AST 14 04/25/2022         EKG personally reviewed by )Fariba Aldrich MD  No acute changes   TELE: No significant arrhythmias seen on telemetry review

## 2022-04-29 NOTE — ASSESSMENT & PLAN NOTE
Xrays of left wrist show left distal radius fracture without significant displacement  At this time, conservative management is recommended  Maintain splint on left upper extremity  Nonweightbearing left upper extremity  Continue elevation and application of ice on left upper extremity  Plan:  · Nonweightbearing left upper extremity  · Maintain splint   · PT/OT eval  · Patient should follow-up outpatient with orthopedics upon discharge

## 2022-04-29 NOTE — RESPIRATORY THERAPY NOTE
RT Protocol Note  Severo Brand [de-identified] y o  female MRN: [id number]  Unit/Bed#: [facility] 517-01 Encounter: [id number]    Assessment    Principal Problem:    Atrial fibrillation [facility])  Active Problems:    Essential hypertension    Fall    Closed fracture of left wrist    Rectus sheath hematoma    H/O cardiac atrial fib ablation      Home Pulmonary Medications:  N/A       Past Medical History:   Diagnosis Date    Atrial fibrillation ([address] )     Hypertension      Social History     Socioeconomic History    Marital status: /Civil Union     Spouse name: Not on file    Number of children: Not on file    Years of education: Not on file    Highest education level: Not on file   Occupational History    Not on file   Tobacco Use    Smoking status: Former Smoker     Packs/day: 0 50     Years: 20 00     Pack years: 10 00     Quit date: 2006     Years since quittin 3    Smokeless tobacco: Never Used   Vaping Use    Vaping Use: Never used   Substance and Sexual Activity    Alcohol use: Yes     Comment: social     Drug use: No    Sexual activity: Not Currently   Other Topics Concern    Not on file   Social History Narrative    History of advance directive      Social Determinants of Health     Financial Resource Strain: Not on file   Food Insecurity: Not on file   Transportation Needs: Not on file   Physical Activity: Sufficiently Active    Days of Exercise per Week: 5 days    Minutes of Exercise per Session: 40 min   Stress: Stress Concern Present    Feeling of Stress : To some extent   Social Connections: Not on file   Intimate Partner Violence: Not on file   Housing Stability: Not on file       Subjective         Objective    Physical Exam:   Assessment Type: (P) Assess only  General Appearance: (P) Sleeping  Respiratory Pattern: (P) Normal  Chest Assessment: (P) Chest expansion symmetrical  Bilateral Breath Sounds: (P) Clear    Vitals:  Blood pressure 98/54, pulse 76, temperature 98 1 °F (36 7 °C), resp  rate 16, height 5' 5" (1 651 m), weight 54 kg (119 lb), SpO2 92 %  Imaging and other studies: I have personally reviewed pertinent reports  Plan    Respiratory Plan: (P) Discontinue Protocol        Resp Comments: (P) Pt lungs clear, no pulm hx   DC protocol

## 2022-04-29 NOTE — PROGRESS NOTES
[address] [facility]  Progress Note - Ples Libel 1940, [de-identified] y o  female MRN: [id number]  Unit/Bed#: [facility] 863-79 Encounter: [id number]  Primary Care Provider: Frantz Farr DO   Date and time admitted to hospital: 4/27/2022  4:11 PM    * Atrial fibrillation [facility])  Assessment & Plan  27-year-old female with a past medical history of AFib on anticoagulation who presents with dizziness and a fall  Patient was admitted to [facility] was found to have episodes of rapid AFib and was ultimately recommended to undergo atrial fibrillation ablation  She was transferred to [facility] and was seen in consultation by electrophysiology  Patient underwent atrial fibrillation ablation using cryoablation 4/28  Plan:  · AC and amio per EP  · Monitor rhythm on telemetry   · Colchicine PRN for pericardial pain  · Tylenol and oxycodone PRN for access site pain  · IV hydration due to dye loads    Rectus sheath hematoma  Assessment & Plan  After the procedure, she was noted to be hypotensive with some abdominal pain  She was taken down for CT scan which revealed rectus sheath hematoma with active extravasation and concern for retroperitoneal hematoma therefore she was taken to the IR suite for angiography  Gelfoam and coil embolization performed to stasis R inferior epigastric artery  The patient presents to the ICU after IR       Plan:  · q6 hemoglobin checks  · Transfuse as needed with hemoglobin goal greater than 7 0  · Trend abdominal and neurovascular exam    H/O cardiac atrial fib ablation  Assessment & Plan  · Appreciate EP recommendation    Essential hypertension  Assessment & Plan  · Holding oral antihypertensive medications for now  · Will resume when able  · Hydralazine PRN    Fall  Assessment & Plan  · Patient with recurrent falls likely secondary to cardiac arrhythmia  · PT and OT consultation  · Determine placement at time of discharge    Closed fracture of left wrist  Assessment & Plan  Xrays of left wrist show left distal radius fracture without significant displacement  At this time, conservative management is recommended  Maintain splint on left upper extremity  Nonweightbearing left upper extremity  Continue elevation and application of ice on left upper extremity  Plan:  · Nonweightbearing left upper extremity  · Maintain splint   · PT/OT eval  · Patient should follow-up outpatient with orthopedics upon discharge        ----------------------------------------------------------------------------------------  HPI/24hr events: No events overnight    Patient appropriate for transfer out of the ICU today?: Patient does not meet criteria for referral to the ICU Follow-Up Clinic; referral has not been made  Disposition: Transfer to Stepdown Level 2  Code Status: Level 1 - Full Code  ---------------------------------------------------------------------------------------  SUBJECTIVE  C/o being cold  Review of Systems   Respiratory: Negative for chest tightness and shortness of breath  Cardiovascular: Negative for chest pain and palpitations  Gastrointestinal: Negative for nausea and vomiting  Musculoskeletal: Positive for joint swelling     All other systems reviewed and are negative     ---------------------------------------------------------------------------------------  OBJECTIVE    Vitals   Vitals:    22   BP: 123/62 120/67 118/66 115/61   BP Location:       Pulse: 72 72 72 72   Resp: (!) 40 (!) 28 (!) 30 (!) 37   Temp:    98 1 °F (36 7 °C)   TempSrc:       SpO2: 99% 98% 99% 99%   Weight:       Height:         Temp (24hrs), Av 8 °F (36 6 °C), Min:97 6 °F (36 4 °C), Max:98 1 °F (36 7 °C)  Current: Temperature: 98 1 °F (36 7 °C)    Respiratory:  SpO2: SpO2: 99 %, SpO2 Activity: SpO2 Activity: At Rest, SpO2 Device: O2 Device: Nasal cannula  Nasal Cannula O2 Flow Rate (L/min): 2 L/min    Physical Exam  Vitals reviewed  Constitutional:       General: She is awake  Interventions: Nasal cannula in place  Cardiovascular:      Rate and Rhythm: Normal rate and regular rhythm  Pulses:           Dorsalis pedis pulses are detected w/ Doppler on the right side and 1+ on the left side  Heart sounds: No murmur heard  No friction rub  Pulmonary:      Effort: Pulmonary effort is normal       Breath sounds: Normal breath sounds  No wheezing, rhonchi or rales  Genitourinary:     Comments: +Tejeda  Neurological:      General: No focal deficit present  Mental Status: She is alert and oriented to person, place, and time  Mental status is at baseline  Laboratory and Diagnostics:  Results from last 7 days   Lab Units 04/28/22  1950 04/28/22  0450 04/25/22  0557 04/24/22  0836   WBC Thousand/uL 8 28 6 87 8 99 9 78   HEMOGLOBIN g/dL 12 0 13 0 14 7 15 2   HEMATOCRIT % 38 1 40 7 44 0 46 4*   PLATELETS Thousands/uL 229 229 236 278   NEUTROS PCT % 85*  --   --  79*   MONOS PCT % 3*  --   --  9     Results from last 7 days   Lab Units 04/28/22  0449 04/27/22  0516 04/25/22  0557 04/24/22  0836   SODIUM mmol/L 136 136 135* 138   POTASSIUM mmol/L 4 4 4 1 4 1 4 4   CHLORIDE mmol/L 103 102 102 103   CO2 mmol/L 28 27 26 27   ANION GAP mmol/L 5 7 7 8   BUN mg/dL 16 20 17 21   CREATININE mg/dL 0 88 0 92 0 92 1 12   CALCIUM mg/dL 9 1 9 0 8 8 9 4   GLUCOSE RANDOM mg/dL 101 111 114 120   ALT U/L  --   --  18 24   AST U/L  --   --  14 17   ALK PHOS U/L  --   --  71 80   ALBUMIN g/dL  --   --  3 5 4 2   TOTAL BILIRUBIN mg/dL  --   --  1 13* 0 53     Results from last 7 days   Lab Units 04/28/22 0449 04/25/22  0557 04/24/22  0836   MAGNESIUM mg/dL 2 1 1 8 2 0   PHOSPHORUS mg/dL  --  3 9  --       Results from last 7 days   Lab Units 04/24/22  0836   INR  1 12   PTT seconds 30      Intake and Output  I/O       04/27 0701  04/28 0700 04/28 0701 04/29 0700    P  O  120 0    I V  (mL/kg) 123 9 (2 3) 250 (4 6)    Total Intake(mL/kg) 243 9 (4 5) 250 (4 6)    Urine (mL/kg/hr) 1400     Total Output 1400     Net -1156 1 +250          Unmeasured Urine Occurrence 1 x           Height and Weights   Height: 5' 5" (165 1 cm)     Body mass index is 19 8 kg/m²  Weight (last 2 days)     Date/Time Weight    04/28/22 1430 54 (119)    04/28/22 0300 54 1 (119 2)            Nutrition       Diet Orders   (From admission, onward)             Start     Ordered    04/28/22 2310  Diet NPO; Sips with meds  Diet effective now        References:    Nutrtion Support Algorithm Enteral vs  Parenteral   Question Answer Comment   Diet Type NPO    NPO Except: Sips with meds    RD to adjust diet per protocol?  Yes        04/28/22 2309                  Active Medications  Scheduled Meds:  Current Facility-Administered Medications   Medication Dose Route Frequency Provider Last Rate    acetaminophen  650 mg Oral Q6H PRN Shannon Carrillo MD      amiodarone  0 5 mg/min Intravenous Continuous Shannon Carrillo MD Stopped (04/28/22 1512)    benzonatate  100 mg Oral TID PRN Aysha Hoyt PA-C      colchicine  0 6 mg Oral BID PRN Alex Dominguez PA-C      iohexol  100 mL Intravenous Once in imaging Ivis Bailey MD      multi-electrolyte  125 mL/hr Intravenous Continuous Vilma Gottlieb PA-C      ondansetron  4 mg Intravenous Q6H PRN Shannon Carrillo MD      oxyCODONE  2 5 mg Oral Q4H PRN Shannon MD Lorena      oxyCODONE  5 mg Oral Q4H PRN [place] RemedMD doris      pantoprazole  40 mg Oral Early Morning Antonio Dominguez PA-C      senna-docusate sodium  1 tablet Oral BID Shannon Carrillo MD       Continuous Infusions:  amiodarone, 0 5 mg/min, Last Rate: Stopped (04/28/22 1512)  multi-electrolyte, 125 mL/hr      PRN Meds:   acetaminophen, 650 mg, Q6H PRN  benzonatate, 100 mg, TID PRN  colchicine, 0 6 mg, BID PRN  iohexol, 100 mL, Once in imaging  ondansetron, 4 mg, Q6H PRN  oxyCODONE, 2 5 mg, Q4H PRN  oxyCODONE, 5 mg, Q4H PRN        Invasive Devices Review  Invasive Devices Report    Peripheral Intravenous Line            Peripheral IV 04/27/22 Right;Upper Arm 1 day    Peripheral IV 04/28/22 Dorsal (posterior); Right Forearm <1 day    Peripheral IV 04/28/22 Right Hand <1 day          Drain            Urethral Catheter 16 Fr  <1 day              Rationale for remaining devices: remove figueroa  ---------------------------------------------------------------------------------------  Care Time Delivered:   No Critical Care time spent     Albertina Nicole PA-C     Portions of the record may have been created with voice recognition software  Occasional wrong word or "sound a like" substitutions may have occurred due to the inherent limitations of voice recognition software    Read the chart carefully and recognize, using context, where substitutions have occurred

## 2022-04-29 NOTE — PERIOPERATIVE NURSING NOTE
Pt c/o right lower abdominal pain, pain slowing worsening, right lower abdomen tender, firm raised area noted, EP lap on call provider SAVI Benjamin notified at 5858, at bedside [place] at [address], bps elevated, orders for stat ct scan, medication and lab work, pt taken to ct, [place] [address], pain continues intermittently, bp low, physician aware, orders for bladderscan, scanned for 370mls at 2000, MD aware, no further orders, vascular surgery consulted by SAVI Kc at 2010, Dr Mahad Espinoza (vascular) at bedside to evaluate pt, interventional radiology consulted, Dr Tenzin Warren at bedside, T&S lab sent, ivf bolus started, pressures remain 90s-100s/40s-50s, MD aware, IR  RNs at bedside, pt moved to IR at 2052  Report called to [facility], RN in ICU

## 2022-04-29 NOTE — PROGRESS NOTES
Progress Note -Interventional Radiology NP  Alicia Wynne [de-identified] y o  female MRN: [id number]  Unit/Bed#: [facility] 781-41 Encounter: [id number]    Assessment/Plan    Gisel Camacho, [de-identified] female who presented with a rectus sheath hematoma s/p cardiac ablation yesterday  Patient is now s/p right inferior epigastric artery gelfoam and coil embolization  Patients Hgb was 12 0 trended down to 9 7  Now 9 6  Recommend continuing to trend/monitor Hgb    Please contact IR with and questions or concerns for bleeding    Subjective:  Patient sitting in bedside chair  Patient tolerating clear liquid diet  Reports abdominal discomfort and tenderness with palpation  Patient Active Problem List   Diagnosis    Psoriasis    Screening for skin condition    Essential hypertension    Dupuytren's contracture of both hands    Arthritis    Conductive hearing loss, bilateral    Hyperlipidemia    History of osteoporosis    Knee mass, left    Fall    Atrial fibrillation ([address] )    Closed fracture of left wrist    Rectus sheath hematoma    H/O cardiac atrial fib ablation          Objective:    Vitals:  /58   Pulse 82   Temp 98 °F (36 7 °C) (Oral)   Resp (!) 28   Ht 5' 5" (1 651 m)   Wt 59 4 kg (130 lb 15 3 oz)   SpO2 96%   BMI 21 79 kg/m²   Body mass index is 21 79 kg/m²  Weight (last 2 days)     Date/Time Weight    04/29/22 0600 59 4 (130 95)    04/28/22 1430 54 (119)    04/28/22 0300 54 1 (119 2)          I/Os:    Intake/Output Summary (Last 24 hours) at 4/29/2022 1141  Last data filed at 4/29/2022 0900  Gross per 24 hour   Intake 2327 3 ml   Output 1200 ml   Net 1127 3 ml       Invasive Devices  Report    Peripheral Intravenous Line            Peripheral IV 04/27/22 Right;Upper Arm 1 day    Peripheral IV 04/28/22 Dorsal (posterior); Right Forearm 1 day    Peripheral IV 04/28/22 Right Hand <1 day                Physical Exam:  Physical Exam  HENT:      Head: Normocephalic        Nose: Nose normal       Mouth/Throat: Mouth: Mucous membranes are moist    Cardiovascular:      Rate and Rhythm: Normal rate  Pulses: Normal pulses  Pulmonary:      Effort: Pulmonary effort is normal    Abdominal:      General: Bowel sounds are normal       Tenderness: There is abdominal tenderness  Musculoskeletal:      Cervical back: Normal range of motion  Comments: Left forearm/hand splint - CNS intact   Skin:     General: Skin is warm and dry  Capillary Refill: Capillary refill takes less than 2 seconds  Comments: Bilateral groin sites C/D/I soft   Neurological:      Mental Status: She is alert and oriented to person, place, and time  Psychiatric:         Mood and Affect: Mood normal          Behavior: Behavior normal          Thought Content:  Thought content normal          Judgment: Judgment normal                       Lab Results and Cultures:   CBC with diff:   Lab Results   Component Value Date    WBC 7 75 04/29/2022    HGB 9 6 (L) 04/29/2022    HCT 30 5 (L) 04/29/2022    MCV 94 04/29/2022     04/29/2022    MCH 30 0 04/29/2022    MCHC 31 8 04/29/2022    RDW 13 4 04/29/2022    MPV 9 5 04/29/2022    NRBC 0 04/28/2022      BMP/CMP:  Lab Results   Component Value Date     04/29/2015    K 4 0 04/29/2022    K 4 5 04/29/2015     (H) 04/29/2022     04/29/2015    CO2 23 04/29/2022    CO2 28 3 04/29/2015    ANIONGAP 8 7 04/29/2015    BUN 20 04/29/2022    BUN 13 04/29/2015    CREATININE 0 73 04/29/2022    CREATININE 0 7 04/29/2015    GLUCOSE 85 04/29/2015    CALCIUM 7 9 (L) 04/29/2022    CALCIUM 9 1 04/29/2015    AST 14 04/25/2022    AST 15 04/29/2015    ALT 18 04/25/2022    ALT 21 04/29/2015    ALKPHOS 71 04/25/2022    ALKPHOS 54 04/29/2015    PROT 7 1 04/29/2015    BILITOT 0 6 04/29/2015    EGFR 78 04/29/2022   ,     Coags:   Lab Results   Component Value Date    PTT 30 04/24/2022    INR 1 21 (H) 04/29/2022   ,   Results from last 7 days   Lab Units 04/29/22  0440 04/24/22  0836   PTT seconds  -- 30   INR  1 21* 1 12        Lipid Panel:   Lab Results   Component Value Date    CHOL 241 04/29/2015     Lab Results   Component Value Date     11/12/2021     Lab Results   Component Value Date     11/12/2021     Lab Results   Component Value Date    LDLCALC 106 (H) 11/12/2021     Lab Results   Component Value Date    TRIG 55 11/12/2021       HgbA1c: No results found for: HGBA1C    Blood Culture: No results found for: BLOODCX,   Urinalysis:   Lab Results   Component Value Date    COLORU Yellow 04/25/2019    COLORU Yellow 04/29/2015    CLARITYU Clear 04/25/2019    CLARITYU Clear 04/29/2015    SPECGRAV 1 016 04/25/2019    SPECGRAV 1 015 04/29/2015    PHUR 6 5 04/25/2019    PHUR 7 0 05/31/2018    PHUR 7 0 04/29/2015    LEUKOCYTESUR Small (A) 04/25/2019    LEUKOCYTESUR Trace (A) 04/29/2015    NITRITE Negative 04/25/2019    NITRITE Negative 04/29/2015    PROTEINUA Negative 04/29/2015    GLUCOSEU Negative 04/25/2019    GLUCOSEU Negative 04/29/2015    KETONESU Negative 04/25/2019    KETONESU Negative 04/29/2015    BILIRUBINUR Negative 04/25/2019    BILIRUBINUR Negative 04/29/2015    BLOODU Negative 04/25/2019    BLOODU Negative 04/29/2015   ,   Urine Culture: No results found for: URINECX,   Wound Culure:  No results found for: WOUNDCULT    VTE Pharmacologic Prophylaxis: Sequential compression device Naty Nguyen       Thank you for allowing me to participate in the care of Efraín Isidro  Please don't hesitate to call, text, email, or TigerText with any questions  This text is generated with voice recognition software  There may be translation, syntax,  or grammatical errors  If you have any questions, please contact the dictating provider

## 2022-04-29 NOTE — BRIEF OP NOTE (RAD/CATH)
INTERVENTIONAL RADIOLOGY PROCEDURE NOTE    Date: 4/28/2022    Procedure: Right inferior epigastric artery embolization    Preoperative diagnosis:   1  Rectus sheath hematoma, initial encounter    2  Atrial fibrillation, unspecified type ([facility] [address] )         Postoperative diagnosis: Same  Surgeon: Kelli Stapleton MD     Assistant: None  No qualified resident was available  Blood loss: Minimal    Specimens: None     Findings: No active bleeding seen from the right inferior epigastric artery  Gelfoam and coil embolization performed to stasis  Complications: None immediate      Anesthesia: conscious sedation

## 2022-04-29 NOTE — PHYSICAL THERAPY NOTE
Physical Therapy Treatment Note    Patient's Name: Noemi Simmonds  : 22 1347   PT Last Visit   PT Visit Date 22   Pain Assessment   Pain Assessment Tool 0-10   Pain Score No Pain   Restrictions/Precautions   Weight Bearing Precautions Per Order Yes   LUE Weight Bearing Per Order NWB   Braces or Orthoses Splint   Other Precautions WBS; Multiple lines;Telemetry; Fall Risk   General   Chart Reviewed Yes   Response to Previous Treatment Patient with no complaints from previous session  Family/Caregiver Present No   Subjective   Subjective Pt agreeable to mobilize  Bed Mobility   Supine to Sit Unable to assess   Sit to Supine Unable to assess   Additional Comments Pt greeted in chair  Transfers   Sit to Stand 5  Supervision   Additional items Verbal cues  (cues for transferring w/ SPC)   Stand to Sit 5  Supervision   Additional Comments no AD + SPC   Ambulation/Elevation   Gait pattern Excessively slow; Short stride  (increased lateral sway w/o AD)   Gait Assistance   (cga w/o AD, CS w/ SPC)   Additional items Verbal cues  (demo provided re: correct use of [facility])   Assistive Device None;SPC   Distance 70' + 100'   Stair Management Assistance 4  Minimal assist   Additional items Assist x 1;Verbal cues; Tactile cues   Stair Management Technique One rail R   Number of Stairs 1  (up/down step stool w/ handle in hallway)   Balance   Static Sitting Normal   Dynamic Sitting Fair   Static Standing Fair   Dynamic Standing Fair -   Ambulatory Fair -   Endurance Deficit   Endurance Deficit Yes   Endurance Deficit Description weakness, fatigue   Activity Tolerance   Activity Tolerance Patient tolerated treatment well   Medical Staff Made Aware nsg   Nurse Made Aware yes   Assessment   Prognosis Good   Problem List Decreased endurance; Impaired balance;Decreased mobility;Orthopedic restrictions;Pain   Assessment Pt seen for PT session w/ focus on gait training   Gait training initially w/o AD; pt w/ unsteady gait, consistently grabbing for rails in hallway  After seated rest provided demo re: use of SPC  Pt more steady w/ SPC + able to ambulate further  [facility] issued to pt at correct height  Pt pleased w/ progress  Continue to recommend HHPT upon d/c    Barriers to Discharge Inaccessible home environment   Goals   Patient Goals to walk   PT Treatment Day 1   Plan   Treatment/Interventions Functional transfer training;LE strengthening/ROM; Elevations; Therapeutic exercise; Endurance training;Patient/family training;Equipment eval/education; Bed mobility;Gait training; Compensatory technique education;Spoke to nursing   Progress Progressing toward goals   PT Frequency 3-5x/wk   Recommendation   PT Discharge Recommendation Home with home health rehabilitation   Equipment Recommended   (issued [facility])   AM-PAC Basic Mobility Inpatient   Turning in Bed Without Bedrails 4   Lying on Back to Sitting on Edge of Flat Bed 4   Moving Bed to Chair 3   Standing Up From Chair 4   Walk in Room 3   Climb 3-5 Stairs 3   Basic Mobility Inpatient Raw Score 21   Basic Mobility Standardized Score 45 55   Highest Level Of Mobility   JH-HLM Goal 6: Walk 10 steps or more   JH-HLM Highest Level of Mobility 7: Walk 25 feet or more   JH-HLM Goal Achieved Yes   Education   Education Provided Mobility training;Assistive device   Patient Demonstrates acceptance/verbal understanding;Reinforcement needed   End of Consult   Patient Position at End of Consult Bedside chair; All needs within reach  (all lines in tact, BLE elevated, L hand elevated on pillow)   End of Consult Comments nsg at bedside     Mago Morrison, PT, DPT

## 2022-04-29 NOTE — SEDATION DOCUMENTATION
Angiogram with embolization performed by Dr Jorden Teran  Procedure tolerated well, VSS  IR Procedure Bedrest Start Time is 7187

## 2022-04-29 NOTE — UTILIZATION REVIEW
Notification of Discharge   This is a Notification of Discharge from our facility [address]  Please be advised that this patient has been discharge from our facility  Below you will find the admission and discharge date and time including the patients disposition  UTILIZATION REVIEW CONTACT:  Marilynn Carrillo  Utilization   Network Utilization Review Department  Phone: [phone] x carefully listen to the prompts  All voicemails are confidential   Email: [email or web address]     PHYSICIAN ADVISORY SERVICES:  FOR IHCH-AF-CWWW REVIEW - MEDICAL NECESSITY DENIAL  Phone: [phone]  Fax: [phone]  Email: [email or web address]     PRESENTATION DATE: 4/24/2022  7:48 AM  OBERVATION ADMISSION DATE:04/24/22   INPATIENT ADMISSION DATE: 4/25/22  2:13 PM   DISCHARGE DATE: 4/27/2022  3:17 PM  DISPOSITION: [address] [facility]      IMPORTANT INFORMATION:  Send all requests for admission clinical reviews, approved or denied determinations and any other requests to dedicated fax number below belonging to the campus where the patient is receiving treatment   List of dedicated fax numbers:  [address] [place] [address] DENIALS (Administrative/Medical Necessity) [phone]   [address] (Maternity/NICU/Pediatrics) [phone]   [facility] [phone]   [facility] [phone]   Lucia Gust [phone]   [address] [place] [address],[address] [phone]   [facility]  [phone]   [address] [place] [address], [address]  [address] [facility] [address] [facility] [phone]

## 2022-04-29 NOTE — ASSESSMENT & PLAN NOTE
60-year-old female with a past medical history of AFib on anticoagulation who presents with dizziness and a fall  Patient was admitted to [facility] was found to have episodes of rapid AFib and was ultimately recommended to undergo atrial fibrillation ablation  She was transferred to [facility] and was seen in consultation by electrophysiology  Patient underwent atrial fibrillation ablation using cryoablation 4/28      Plan:  · AC and amio per EP  · Monitor rhythm on telemetry   · Colchicine PRN for pericardial pain  · Tylenol and oxycodone PRN for access site pain  · IV hydration due to dye loads

## 2022-04-29 NOTE — PLAN OF CARE
Problem: PHYSICAL THERAPY ADULT  Goal: Performs mobility at highest level of function for planned discharge setting  See evaluation for individualized goals  Description: Treatment/Interventions: Functional transfer training,LE strengthening/ROM,Therapeutic exercise,Endurance training,Patient/family training,Equipment eval/education,Bed mobility,Gait training,OT,Spoke to nursing,Elevations  Equipment Recommended:  (may require SPC for d/c- plan to assess)       See flowsheet documentation for full assessment, interventions and recommendations  Outcome: Progressing  Note: Prognosis: Good  Problem List: Decreased endurance,Impaired balance,Decreased mobility,Orthopedic restrictions,Pain  Assessment: Pt seen for PT session w/ focus on gait training  Gait training initially w/o AD; pt w/ unsteady gait, consistently grabbing for rails in hallway  After seated rest provided demo re: use of SPC  Pt more steady w/ SPC + able to ambulate further  [address] issued to pt at correct height  Pt pleased w/ progress  Continue to recommend HHPT upon d/c   Barriers to Discharge: Inaccessible home environment        PT Discharge Recommendation: Home with home health rehabilitation          See flowsheet documentation for full assessment

## 2022-04-30 PROBLEM — D62 ACUTE BLOOD LOSS ANEMIA: Status: ACTIVE | Noted: 2022-04-30

## 2022-04-30 LAB
ANION GAP SERPL CALCULATED.3IONS-SCNC: 1 MMOL/L (ref 4–13)
APTT PPP: 49 SECONDS (ref 23–37)
APTT PPP: 73 SECONDS (ref 23–37)
APTT PPP: 82 SECONDS (ref 23–37)
ATRIAL RATE: 71 BPM
ATRIAL RATE: 79 BPM
BUN SERPL-MCNC: 14 MG/DL (ref 5–25)
CALCIUM SERPL-MCNC: 8.2 MG/DL (ref 8.3–10.1)
CHLORIDE SERPL-SCNC: 110 MMOL/L (ref 100–108)
CO2 SERPL-SCNC: 29 MMOL/L (ref 21–32)
CREAT SERPL-MCNC: 0.77 MG/DL (ref 0.6–1.3)
ERYTHROCYTE [DISTWIDTH] IN BLOOD BY AUTOMATED COUNT: 13.4 % (ref 11.6–15.1)
GFR SERPL CREATININE-BSD FRML MDRD: 73 ML/MIN/1.73SQ M
GLUCOSE SERPL-MCNC: 92 MG/DL (ref 65–140)
HCT VFR BLD AUTO: 26.4 % (ref 34.8–46.1)
HGB BLD-MCNC: 10.1 G/DL (ref 11.5–15.4)
HGB BLD-MCNC: 8.5 G/DL (ref 11.5–15.4)
HGB BLD-MCNC: 9.2 G/DL (ref 11.5–15.4)
MAGNESIUM SERPL-MCNC: 2.1 MG/DL (ref 1.6–2.6)
MCH RBC QN AUTO: 30.2 PG (ref 26.8–34.3)
MCHC RBC AUTO-ENTMCNC: 32.2 G/DL (ref 31.4–37.4)
MCV RBC AUTO: 94 FL (ref 82–98)
P AXIS: 11 DEGREES
P AXIS: 32 DEGREES
PHOSPHATE SERPL-MCNC: 2.3 MG/DL (ref 2.3–4.1)
PLATELET # BLD AUTO: 198 THOUSANDS/UL (ref 149–390)
PMV BLD AUTO: 9 FL (ref 8.9–12.7)
POTASSIUM SERPL-SCNC: 3.9 MMOL/L (ref 3.5–5.3)
PR INTERVAL: 146 MS
PR INTERVAL: 154 MS
QRS AXIS: 48 DEGREES
QRS AXIS: 50 DEGREES
QRSD INTERVAL: 80 MS
QRSD INTERVAL: 92 MS
QT INTERVAL: 466 MS
QT INTERVAL: 504 MS
QTC INTERVAL: 506 MS
QTC INTERVAL: 578 MS
RBC # BLD AUTO: 2.81 MILLION/UL (ref 3.81–5.12)
SODIUM SERPL-SCNC: 140 MMOL/L (ref 136–145)
T WAVE AXIS: 249 DEGREES
T WAVE AXIS: 61 DEGREES
VENTRICULAR RATE: 71 BPM
VENTRICULAR RATE: 79 BPM
WBC # BLD AUTO: 7.38 THOUSAND/UL (ref 4.31–10.16)

## 2022-04-30 PROCEDURE — 93010 ELECTROCARDIOGRAM REPORT: CPT | Performed by: INTERNAL MEDICINE

## 2022-04-30 PROCEDURE — 85730 THROMBOPLASTIN TIME PARTIAL: CPT | Performed by: ANESTHESIOLOGY

## 2022-04-30 PROCEDURE — 83735 ASSAY OF MAGNESIUM: CPT | Performed by: PHYSICIAN ASSISTANT

## 2022-04-30 PROCEDURE — 99233 SBSQ HOSP IP/OBS HIGH 50: CPT | Performed by: INTERNAL MEDICINE

## 2022-04-30 PROCEDURE — 85730 THROMBOPLASTIN TIME PARTIAL: CPT | Performed by: NURSE PRACTITIONER

## 2022-04-30 PROCEDURE — 85027 COMPLETE CBC AUTOMATED: CPT | Performed by: PHYSICIAN ASSISTANT

## 2022-04-30 PROCEDURE — 99233 SBSQ HOSP IP/OBS HIGH 50: CPT | Performed by: ANESTHESIOLOGY

## 2022-04-30 PROCEDURE — 84100 ASSAY OF PHOSPHORUS: CPT | Performed by: PHYSICIAN ASSISTANT

## 2022-04-30 PROCEDURE — 85018 HEMOGLOBIN: CPT | Performed by: NURSE PRACTITIONER

## 2022-04-30 PROCEDURE — 80048 BASIC METABOLIC PNL TOTAL CA: CPT | Performed by: PHYSICIAN ASSISTANT

## 2022-04-30 RX ORDER — MAGNESIUM SULFATE HEPTAHYDRATE 40 MG/ML
2 INJECTION, SOLUTION INTRAVENOUS ONCE
Status: COMPLETED | OUTPATIENT
Start: 2022-04-30 | End: 2022-04-30

## 2022-04-30 RX ORDER — POTASSIUM CHLORIDE 20 MEQ/1
20 TABLET, EXTENDED RELEASE ORAL ONCE
Status: COMPLETED | OUTPATIENT
Start: 2022-04-30 | End: 2022-04-30

## 2022-04-30 RX ORDER — BISACODYL 10 MG
10 SUPPOSITORY, RECTAL RECTAL DAILY PRN
Status: DISCONTINUED | OUTPATIENT
Start: 2022-04-30 | End: 2022-05-03 | Stop reason: HOSPADM

## 2022-04-30 RX ORDER — POLYETHYLENE GLYCOL 3350 17 G/17G
17 POWDER, FOR SOLUTION ORAL DAILY
Status: DISCONTINUED | OUTPATIENT
Start: 2022-04-30 | End: 2022-05-03 | Stop reason: HOSPADM

## 2022-04-30 RX ADMIN — ONDANSETRON 4 MG: 2 INJECTION INTRAMUSCULAR; INTRAVENOUS at 10:43

## 2022-04-30 RX ADMIN — PANTOPRAZOLE SODIUM 40 MG: 40 TABLET, DELAYED RELEASE ORAL at 05:22

## 2022-04-30 RX ADMIN — HEPARIN SODIUM 16 UNITS/KG/HR: 10000 INJECTION, SOLUTION INTRAVENOUS; SUBCUTANEOUS at 14:02

## 2022-04-30 RX ADMIN — AMIODARONE HYDROCHLORIDE 200 MG: 200 TABLET ORAL at 08:24

## 2022-04-30 RX ADMIN — BISACODYL 10 MG: 10 SUPPOSITORY RECTAL at 12:49

## 2022-04-30 RX ADMIN — POLYETHYLENE GLYCOL 3350 17 G: 17 POWDER, FOR SOLUTION ORAL at 10:58

## 2022-04-30 RX ADMIN — POTASSIUM CHLORIDE 20 MEQ: 1500 TABLET, EXTENDED RELEASE ORAL at 09:34

## 2022-04-30 RX ADMIN — SENNOSIDES AND DOCUSATE SODIUM 1 TABLET: 50; 8.6 TABLET ORAL at 19:20

## 2022-04-30 RX ADMIN — SENNOSIDES AND DOCUSATE SODIUM 1 TABLET: 50; 8.6 TABLET ORAL at 08:24

## 2022-04-30 RX ADMIN — MAGNESIUM SULFATE HEPTAHYDRATE 2 G: 40 INJECTION, SOLUTION INTRAVENOUS at 09:35

## 2022-04-30 NOTE — ASSESSMENT & PLAN NOTE
· Patient with recurrent falls likely secondary to cardiac arrhythmias  · 4/24/22 CT head: No acute intracranial abnormality  Microangiopathic changes      Plan:   · PT and OT consultation  · Can consider gerontology consult   · Determine placement at time of discharge

## 2022-04-30 NOTE — DISCHARGE INSTRUCTIONS
Embolization   AMBULATORY CARE:   What you need to know about embolization: Embolization is a procedure to create a clot, or block, in a blood vessel  This stops blood from flowing to the area  The procedure may be used to treat many conditions  It can help stop heavy bleeding (hemorrhage), or prevent an aneurysm from rupturing  An abnormal connection between arteries can be removed  Embolization can stop blood flow to a tumor, such as a uterine fibroid or a cancer tumor  Chemotherapy medicine may be given during an embolization to treat a cancer tumor  This is called chemoembolization  How to prepare for the procedure: Embolization is sometimes done as an emergency procedure  This means you will not have time to prepare  For an embolization that is not an emergency, the following are general guidelines for how to prepare:  · Tell your provider about all your allergies  This includes if you have ever had an allergic reaction to contrast liquid, anesthesia, or antibiotics  You may be told not to eat or drink anything after midnight the night before your procedure  Arrange to have someone drive you home  The person should stay with you to help you and watch for problems that may develop  · Give your provider a list of your medicines  Include all medicines and supplements you take  You may need to stop taking blood thinners or aspirin several days before your procedure  This will help decrease your risk for bleeding  Do not stop taking medicines unless your healthcare provider tells you to stop  Your provider will tell you which medicines to take or not take on the day of your procedure  · You may need blood tests to check how well your blood clots and to check your kidney function  Depending on the reason for this procedure, you may an MRI, ultrasound, x-ray, or CT scan   These pictures will help your healthcare provider examine the area to be worked on      · If you are a woman, tell your provider if you know or think you might be pregnant  You may not be able to have certain tests because they may harm an unborn baby  Your provider may need to take extra precautions for other tests  What will happen during the procedure:   · You may be given general anesthesia to keep you asleep and pain-free  You may instead be given moderate sedation  This means you will be awake during the procedure, but you should not feel any pain  Your provider will put numbing medicine on your skin where the procedure will be done  A small incision will be made over an artery  A catheter (thin tube) will be guided into the artery  Contrast liquid will be used to help your healthcare provider see your arteries more easily  · Your provider will use a type of x-ray that gives a moving picture of the arteries  This will help him or her move the catheter into the right place  The catheter is moved up until it reaches the correct artery  Your provider will put medicine or a material into the artery to slow or stop blood from flowing  This may be a coil, foam, beads, a plug, or liquid  The liquid may also contain material that is larger than blood cells  · Your provider will remove the catheter  Pressure will be used to stop any bleeding that happens  The incision area does not need to be closed with stitches  It will be small and close on its own  It will be covered with a bandage to keep it from becoming infected  What to expect after the procedure:   · You may have pain for a few days  Depending on the reason you had this procedure, you may also have a headache or cramps  You may have pain, bleeding, or bruising where the catheter went into your leg  All of these symptoms are normal and should get better soon  You may be given pain medicine through your IV or a pump  A pump allows you to control when the pain medicine is given  · You should expect to stay in the hospital at least overnight   If you had this procedure to treat heavy bleeding, it may take 24 hours to know if the bleeding stopped  · Healthcare providers will help you walk around after your procedure  This will help prevent blood clots  Do not get up until healthcare providers say it is okay  They may want you to lie in one position for a certain amount of time  When they say it is okay to walk, they will help you stand and walk safely  Risks of embolization: You may bleed more than expected or develop an infection  The area being treated may be damaged during the procedure  Your artery may be damaged from the catheter, or you may develop a blood clot  Your kidneys may be damaged from the contrast liquid  The material being put into the artery may go to the wrong place  This can stop blood flow to healthy tissue  The procedure may not work, or it may not relieve your symptoms  Call your doctor or specialist if:   · You have a fever higher than 100 4°F (38°C)  · You have a fever, pain, and nausea that last longer than 3 days  · You suddenly have severe abdominal pain  · You cannot urinate, or you urinate very little  · You have signs of an infection at the catheter site, such as red streaks, pain, or swelling  · You have new or worsening pain  · You have questions or concerns about your condition or care  Medicines: You may need any of the following:  · NSAIDs help decrease swelling and pain or fever  This medicine is available with or without a doctor's order  NSAIDs can cause stomach bleeding or kidney problems in certain people  If you take blood thinner medicine, always ask your healthcare provider if NSAIDs are safe for you  Always read the medicine label and follow directions  · Prescription pain medicine may be given  Ask your healthcare provider how to take this medicine safely  Some prescription pain medicines contain acetaminophen   Do not take other medicines that contain acetaminophen without talking to your healthcare provider  Too much acetaminophen may cause liver damage  Prescription pain medicine may cause constipation  Ask your healthcare provider how to prevent or treat constipation  · Take your medicine as directed  Contact your healthcare provider if you think your medicine is not helping or if you have side effects  Tell him or her if you are allergic to any medicine  Keep a list of the medicines, vitamins, and herbs you take  Include the amounts, and when and why you take them  Bring the list or the pill bottles to follow-up visits  Carry your medicine list with you in case of an emergency  Self-care:   · Rest as needed  Rest and sleep will help your body heal      · Follow your healthcare provider's instructions for activity  He or she will tell you when it is okay to return to your normal activities and to start driving  He or she may want you to wait 1 to 2 weeks to return to work  · Care for the catheter site as directed  It is okay to shower after the procedure  You will only have a small cut in your skin from where the catheter went into your leg  Check the catheter site for signs of infection, including red streaks, pain, and swelling  · Treat symptoms of postembolization syndrome  This syndrome is common after an embolization procedure  It usually starts within 72 hours of the procedure and may last a few days  The main symptoms are fever, pain, and nausea  You will probably be able to manage your symptoms at home  Acetaminophen or an NSAID, such as ibuprofen, can reduce a fever and pain  You may need to eat lightly to manage nausea  Drink more liquids for the first week after the procedure to prevent dehydration  Follow up with your doctor or specialist as directed: You may need to have more tests to check if the procedure worked  Write down your questions so you remember to ask them during your visits    © Copyright [employer] 2020 Information is for End User's use only and may not be sold, redistributed or otherwise used for commercial purposes  All illustrations and images included in CareNotes® are the copyrighted property of [employer]  or Mannie Hernandez  The above information is an  only  It is not intended as medical advice for individual conditions or treatments  Talk to your doctor, nurse or pharmacist before following any medical regimen to see if it is safe and effective for you  ABLATION INSTRUCTIONS    PLEASE NOTE THE FOLLOWING MEDICATION RECOMMENDATIONS:  - continue amiodarone 200 mg daily (this dose will likely be adjusted at your post ablation follow up appointment)  - continue Eliquis 2 5 mg twice daily         RESTRICTIONS:  No heavy lifting or strenuous activity for one week  No soaking in a bath tub/hot tub/swimming pool for one week or until groin heals  You may shower - please let soap and water run over the groins, no scrubbing, and pat the area dry  Please place band-aid on groins daily for up to five days, but you may remove sooner if no issues are noted  If you notice ongoing bleeding, swelling, or firm lumps in groin near ablation incision, please contact Dr Ha Human' office - [phone]

## 2022-04-30 NOTE — PLAN OF CARE
Problem: MOBILITY - ADULT  Goal: Maintain or return to baseline ADL function  Description: INTERVENTIONS:  -  Assess patient's ability to carry out ADLs; assess patient's baseline for ADL function and identify physical deficits which impact ability to perform ADLs (bathing, care of mouth/teeth, toileting, grooming, dressing, etc )  - Assess/evaluate cause of self-care deficits   - Assess range of motion  - Assess patient's mobility; develop plan if impaired  - Assess patient's need for assistive devices and provide as appropriate  - Encourage maximum independence but intervene and supervise when necessary  - Involve family in performance of ADLs  - Assess for home care needs following discharge   - Consider OT consult to assist with ADL evaluation and planning for discharge  - Provide patient education as appropriate  Outcome: Progressing  Goal: Maintains/Returns to pre admission functional level  Description: INTERVENTIONS:  - Perform BMAT or MOVE assessment daily    - Set and communicate daily mobility goal to care team and patient/family/caregiver  - Collaborate with rehabilitation services on mobility goals if consulted  - Perform Range of Motion 3 times a day  - Reposition patient every 2 hours    - Dangle patient 3 times a day  - Stand patient 3 times a day  - Ambulate patient 3 times a day  - Out of bed to chair 3 times a day   - Out of bed for meals 3 times a day  - Out of bed for toileting  - Record patient progress and toleration of activity level   Outcome: Progressing     Problem: CARDIOVASCULAR - ADULT  Goal: Maintains optimal cardiac output and hemodynamic stability  Description: INTERVENTIONS:  - Monitor I/O, vital signs and rhythm  - Monitor for S/S and trends of decreased cardiac output  - Administer and titrate ordered vasoactive medications to optimize hemodynamic stability  - Assess quality of pulses, skin color and temperature  - Assess for signs of decreased coronary artery perfusion  - Instruct patient to report change in severity of symptoms  Outcome: Progressing  Goal: Absence of cardiac dysrhythmias or at baseline rhythm  Description: INTERVENTIONS:  - Continuous cardiac monitoring, vital signs, obtain 12 lead EKG if ordered  - Administer antiarrhythmic and heart rate control medications as ordered  - Monitor electrolytes and administer replacement therapy as ordered  Outcome: Progressing     Problem: Potential for Falls  Goal: Patient will remain free of falls  Description: INTERVENTIONS:  - Educate patient/family on patient safety including physical limitations  - Instruct patient to call for assistance with activity   - Consult OT/PT to assist with strengthening/mobility   - Keep Call bell within reach  - Keep bed low and locked with side rails adjusted as appropriate  - Keep care items and personal belongings within reach  - Initiate and maintain comfort rounds  - Make Fall Risk Sign visible to staff  - Offer Toileting every 2 Hours, in advance of need  - Initiate/Maintain bed alarm  - Obtain necessary fall risk management equipment  - Apply yellow socks and bracelet for high fall risk patients  - Consider moving patient to room near nurses station  Outcome: Progressing     Problem: Prexisting or High Potential for Compromised Skin Integrity  Goal: Skin integrity is maintained or improved  Description: INTERVENTIONS:  - Identify patients at risk for skin breakdown  - Assess and monitor skin integrity  - Assess and monitor nutrition and hydration status  - Monitor labs   - Assess for incontinence   - Turn and reposition patient  - Assist with mobility/ambulation  - Relieve pressure over bony prominences  - Avoid friction and shearing  - Provide appropriate hygiene as needed including keeping skin clean and dry  - Evaluate need for skin moisturizer/barrier cream  - Collaborate with interdisciplinary team   - Patient/family teaching  - Consider wound care consult   Outcome: Progressing

## 2022-04-30 NOTE — ASSESSMENT & PLAN NOTE
· Following ablation, patient was noted to be hypotensive with abdominal pain  · 4/28/22 CT A/P with contrast: Large right abdominal rectus sheath hematoma containing a focus of contrast blush concerning for active bleed  Smaller right-sided retroperitoneal hemorrhage tracking medial and anterior to the psoas muscle, within the right paracolic gutter, and along the inferomedial right hepatic lobe  ·  4/29/22 IR: right inferior epigastric artery gelfoam and coil embolization      Plan:  · Currently on serial hemoglobin checks q6h  · Check once in afternoon today   · Transfuse as needed with hemoglobin goal greater than 7 0  · Trend abdominal and neurovascular exam  · Repeat STAT CT with acute drop in hemoglobin with associated changes in hemodynamics concerning for re-bleeding

## 2022-04-30 NOTE — ASSESSMENT & PLAN NOTE
· Secondary to fall as described above  · 4/24/22 XR Left wrist: There is a fracture of the distal left radius, involving the articular surface, without significant displacement      Plan:  · Nonweightbearing left upper extremity  · Maintain splint   · PT/OT  · Pain control:  · Tylenol 650mg q6h PRN  · Oxycodone 2 5-5mg q4h PRN   · Patient should follow-up outpatient with orthopedics upon discharge  · Orthopedics consulted and signed off

## 2022-04-30 NOTE — ASSESSMENT & PLAN NOTE
· BP currently low-normal/WNL    Plan:   · Not on home antihypertensive agents  · Maintain SBP < 180  · Maintain MAP > 65  · Continue VS per ICU protocol

## 2022-04-30 NOTE — PROGRESS NOTES
[address] [facility]  Progress Note - Severo Brand 1940, [de-identified] y o  female MRN: [id number]  Unit/Bed#: [facility] 828-28 Encounter: [id number]  Primary Care Provider: Sugar Lyn DO   Date and time admitted to hospital: 4/27/2022  4:11 PM    * Atrial fibrillation [facility])  Assessment & Plan  · Pre-existing atrial fibrillation with new episodes of a fib with RVR and atrial flutter despite amiodarone therapy  · Failed cardioversion on 3/29/22 and 4/22/22  · 4/28/22 cryoablation, now in NSR     Plan:  · Amiodarone PO 200mg daily   · Anticoagulation with heparin drip   · Tentative plan for transition back to [facility] today per EP  · Monitor rhythm on telemetry   · Optimize electrolytes: K > 4 0, Mag > 2 0   · Pain control:  · Colchicine 0 6mg BID PRN for pericardial pain  · Tylenol 650mg q6h PRN  · Oxycodone 2 5-5mg q4h PRN  · EP consulted  · Plan for outpatient follow up in 4 weeks     Rectus sheath hematoma  Assessment & Plan  · Following ablation, patient was noted to be hypotensive with abdominal pain  · 4/28/22 CT A/P with contrast: Large right abdominal rectus sheath hematoma containing a focus of contrast blush concerning for active bleed  Smaller right-sided retroperitoneal hemorrhage tracking medial and anterior to the psoas muscle, within the right paracolic gutter, and along the inferomedial right hepatic lobe  ·  4/29/22 IR: right inferior epigastric artery gelfoam and coil embolization      Plan:  · Currently on serial hemoglobin checks q6h  · Check once in afternoon today   · Transfuse as needed with hemoglobin goal greater than 7 0  · Trend abdominal and neurovascular exam  · Repeat STAT CT with acute drop in hemoglobin with associated changes in hemodynamics concerning for re-bleeding    H/O cardiac atrial fib ablation  Assessment & Plan  · See plan above  · EP consulted, appreciate recommendations     Essential hypertension  Assessment & Plan  · BP currently low-normal/WNL    Plan:   · Not on home antihypertensive agents  · Maintain SBP < 180  · Maintain MAP > 65  · Continue VS per ICU protocol     Fall  Assessment & Plan  · Patient with recurrent falls likely secondary to cardiac arrhythmias  · 4/24/22 CT head: No acute intracranial abnormality  Microangiopathic changes  Plan:   · PT and OT consultation  · Can consider gerontology consult   · Determine placement at time of discharge    Closed fracture of left wrist  Assessment & Plan  · Secondary to fall as described above  · 4/24/22 XR Left wrist: There is a fracture of the distal left radius, involving the articular surface, without significant displacement  Plan:  · Nonweightbearing left upper extremity  · Maintain splint   · PT/OT  · Pain control:  · Tylenol 650mg q6h PRN  · Oxycodone 2 5-5mg q4h PRN   · Patient should follow-up outpatient with orthopedics upon discharge  · Orthopedics consulted and signed off           ----------------------------------------------------------------------------------------  HPI/24hr events: No acute issues overnight  Hemoglobin stable on serial checks  Patient appropriate for transfer out of the ICU today?: Patient does not meet criteria for referral to the ICU Follow-Up Clinic; referral has not been made  Disposition: Transfer to Med Surg with Telemetry vs discharge based on PT/OT recommendations   Code Status: Level 1 - Full Code  ---------------------------------------------------------------------------------------  SUBJECTIVE  "I'm doing okay"     Review of Systems   Constitutional: Positive for fatigue  Respiratory: Negative for shortness of breath  Cardiovascular: Negative for chest pain  Musculoskeletal: Negative for arthralgias       Review of systems was reviewed and negative unless stated above in HPI/24-hour events   ---------------------------------------------------------------------------------------  OBJECTIVE    Vitals   Vitals:    04/29/22 4002 22 0000 22 0100 22 0200   BP:  103/58  97/54   BP Location:       Pulse: 68 67 67 66   Resp: 19 19 21 14   Temp:       TempSrc:       SpO2: 95% 94% 95% 95%   Weight:       Height:         Temp (24hrs), Av 1 °F (36 7 °C), Min:97 8 °F (36 6 °C), Max:98 9 °F (37 2 °C)  Current: Temperature: 98 °F (36 7 °C)          Respiratory:  SpO2: SpO2: 95 %, SpO2 Activity: SpO2 Activity: At Rest, SpO2 Device: O2 Device: None (Room air)    Physical Exam  Constitutional:       General: She is sleeping  She is not in acute distress  Appearance: Normal appearance  HENT:      Head: Normocephalic and atraumatic  Mouth/Throat:      Mouth: Mucous membranes are pale  Eyes:      Conjunctiva/sclera: Conjunctivae normal       Pupils: Pupils are equal, round, and reactive to light  Cardiovascular:      Rate and Rhythm: Normal rate and regular rhythm  Pulses:           Radial pulses are 2+ on the right side and 2+ on the left side  Dorsalis pedis pulses are 1+ on the right side and 1+ on the left side  Heart sounds: Normal heart sounds  Pulmonary:      Effort: Pulmonary effort is normal       Breath sounds: Examination of the right-lower field reveals decreased breath sounds  Examination of the left-lower field reveals decreased breath sounds  Decreased breath sounds present  Chest:       Abdominal:      Palpations: Abdomen is soft  Tenderness: There is no abdominal tenderness  Musculoskeletal:      Right lower leg: No edema  Left lower leg: No edema  Comments: Left wrist splint in place   Skin:     General: Skin is warm and dry  Capillary Refill: Capillary refill takes less than 2 seconds  Neurological:      General: No focal deficit present  Mental Status: She is easily aroused  GCS: GCS eye subscore is 4  GCS verbal subscore is 5  GCS motor subscore is 6  Motor: Weakness present        Comments: Generalized weakness              Laboratory and Diagnostics:  Results from last 7 days   Lab Units 04/30/22  0124 04/29/22  1706 04/29/22  1118 04/29/22  0440 04/29/22  0105 04/28/22  1950 04/28/22  0450 04/25/22  0557 04/25/22  0557 04/24/22  0836 04/24/22  0836   WBC Thousand/uL  --   --   --  7 75  --  8 28 6 87  --  8 99  --  9 78   HEMOGLOBIN g/dL 9 2* 9 6* 9 6* 9 7* 10 4* 12 0 13 0   < > 14 7   < > 15 2   HEMATOCRIT %  --   --   --  30 5*  --  38 1 40 7  --  44 0  --  46 4*   PLATELETS Thousands/uL  --   --   --  217  --  229 229  --  236  --  278   NEUTROS PCT %  --   --   --   --   --  85*  --   --   --   --  79*   MONOS PCT %  --   --   --   --   --  3*  --   --   --   --  9    < > = values in this interval not displayed  Results from last 7 days   Lab Units 04/29/22 0440 04/28/22 0449 04/27/22  0516 04/25/22  0557 04/24/22  0836   SODIUM mmol/L 139 136 136 135* 138   POTASSIUM mmol/L 4 0 4 4 4 1 4 1 4 4   CHLORIDE mmol/L 111* 103 102 102 103   CO2 mmol/L 23 28 27 26 27   ANION GAP mmol/L 5 5 7 7 8   BUN mg/dL 20 16 20 17 21   CREATININE mg/dL 0 73 0 88 0 92 0 92 1 12   CALCIUM mg/dL 7 9* 9 1 9 0 8 8 9 4   GLUCOSE RANDOM mg/dL 121 101 111 114 120   ALT U/L  --   --   --  18 24   AST U/L  --   --   --  14 17   ALK PHOS U/L  --   --   --  71 80   ALBUMIN g/dL  --   --   --  3 5 4 2   TOTAL BILIRUBIN mg/dL  --   --   --  1 13* 0 53     Results from last 7 days   Lab Units 04/29/22  0440 04/28/22  0449 04/25/22  0557 04/24/22  0836   MAGNESIUM mg/dL 1 9 2 1 1 8 2 0   PHOSPHORUS mg/dL 3 7  --  3 9  --       Results from last 7 days   Lab Units 04/30/22  0146 04/29/22  1928 04/29/22  0440 04/24/22  0836   INR   --  1 11 1 21* 1 12   PTT seconds 49* 43*  --  30        EKG: NSR   Imaging: I have personally reviewed pertinent reports  and I have personally reviewed pertinent films in PACS    Intake and Output  I/O       04/28 0701 04/29 0700 04/29 0701 04/30 0700    P  O  240 500    I V  (mL/kg) 1336 (22 5) 1410 2 (23 7)    IV Piggyback 250     Total Intake(mL/kg) 1826 (30 7) 1910 2 (32 2)    Urine (mL/kg/hr) 1000 (0 7) 1100 (0 8)    Stool  0    Total Output 1000 1100    Net +826 +810 2          Unmeasured Urine Occurrence  1 x    Unmeasured Stool Occurrence  1 x            Height and Weights   Height: 5' 5" (165 1 cm)     Body mass index is 21 79 kg/m²  Weight (last 2 days)     Date/Time Weight    04/29/22 0600 59 4 (130 95)    04/28/22 1430 54 (119)    04/28/22 0300 54 1 (119 2)            Nutrition       Diet Orders   (From admission, onward)             Start     Ordered    04/29/22 1742  Diet Cardiovascular; Cardiac  Diet effective now        References:    Nutrtion Support Algorithm Enteral vs  Parenteral   Question Answer Comment   Diet Type Cardiovascular    Cardiac Cardiac    RD to adjust diet per protocol?  Yes        04/29/22 1741                  Active Medications  Scheduled Meds:  Current Facility-Administered Medications   Medication Dose Route Frequency Provider Last Rate    acetaminophen  650 mg Oral Q6H PRN Kaylene Sicard, MD      amiodarone  200 mg Oral Daily With Breakfast Kia Tillman MD      benzonatate  100 mg Oral TID PRN Sharron Liu PA-C      colchicine  0 6 mg Oral BID PRN Chaz Dominguez PA-C      heparin (porcine)  3-20 Units/kg/hr (Order-Specific) Intravenous Titrated [place]ar [place] 16 Units/kg/hr (04/30/22 0227)    iohexol  100 mL Intravenous Once in imaging Nano Lubin MD      multi-electrolyte  50 mL/hr Intravenous Continuous Bernardimabel Jones PA-C 50 mL/hr (04/29/22 2210)    ondansetron  4 mg Intravenous Q6H PRN Kaylene Sicard, MD      oxyCODONE  2 5 mg Oral Q4H PRN Kaylene Sicard, MD      oxyCODONE  5 mg Oral Q4H PRN Kaylene Sicard, MD      pantoprazole  40 mg Oral Early Morning Antonio Dominguez PA-C      senna-docusate sodium  1 tablet Oral BID Kaylene Sicard, MD       Continuous Infusions:  heparin (porcine), 3-20 Units/kg/hr (Order-Specific), Last Rate: 16 Units/kg/hr (04/30/22 6850)  multi-electrolyte, 50 mL/hr, Last Rate: 50 mL/hr (04/29/22 2600)      PRN Meds:   acetaminophen, 650 mg, Q6H PRN  benzonatate, 100 mg, TID PRN  colchicine, 0 6 mg, BID PRN  iohexol, 100 mL, Once in imaging  ondansetron, 4 mg, Q6H PRN  oxyCODONE, 2 5 mg, Q4H PRN  oxyCODONE, 5 mg, Q4H PRN        Invasive Devices Review  Invasive Devices  Report    Peripheral Intravenous Line            Peripheral IV 04/28/22 Dorsal (posterior); Right Forearm 1 day    Peripheral IV 04/28/22 Right Hand 1 day                Rationale for remaining devices: N/A  ---------------------------------------------------------------------------------------  Advance Directive and Living Will:      Power of :    POLST:    ---------------------------------------------------------------------------------------  Care Time Delivered:   No Critical Care time spent       DAVID Lee      Portions of the record may have been created with voice recognition software  Occasional wrong word or "sound a like" substitutions may have occurred due to the inherent limitations of voice recognition software    Read the chart carefully and recognize, using context, where substitutions have occurred

## 2022-04-30 NOTE — PROGRESS NOTES
Progress Note - Electrophysiology-Cardiology (EP)   Orlando Even [de-identified] y o  female MRN: [id number]  Unit/Bed#: [facility] 517-01 Encounter: [id number]      Assessment:  1  Symptomatic paroxysmal atrial fibrillation and flutter with periods of rapid ventricular response, with breakthrough despite amiodarone --- status post cryoablation with pulmonary vein isolation and typical flutter line 4/28/2022              A ) prior event monitor 01/2022 showing 71% atrial fibrillation burden, average heart rate 108 beats per minute              B ) cardioversion attempted 3/29/2022 however was unsuccessful after multiple attempts; started on amiodarone at that time              C ) repeat cardioversion scheduled 4/22/2022 however canceled as patient presented with sinus bradycardia              D ) maintained on amiodarone and Eliquis as an outpatient  2  Spontaneous right epigastric artery bleed with subsequent rectus sheath hematoma, status post IR embolization 4/28/2022   A ) hemoglobin 8 5 today (slightly decreased from 9 2)   B ) currently on IV heparin  3  Low-normal LV systolic function with EF 50% per LUIS EDUARDO 03/2022  4  Tachy-veronica syndrome, 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  5  Presyncope with subsequent fall and left distal radius fracture      Plan:  She remains in sinus rhythm, and is hemodynamically stable  Her hemoglobin dropped slightly to 8 5 today, likely equilibrating following arterial bleed requiring embolization  Recommend continuing IV heparin today, can consider changing back to Eliquis once her hemoglobin is stable over a 24 hour period  Discussed this plan with the Critical Care Team     There was concern for tachy-veronica syndrome, however fortunately her rate has remained stable in sinus rhythm  She is currently maintained on amiodarone 200 mg daily, 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  Can continue this regimen for now, and we can make a more definitive plan upon discharge      Primary team will manage patient's constipation, and she feels that is contributing to her nausea this morning  Subjective/Objective   Chief Complaint:  Nausea    Subjective: At the time of our examination today, she reports nausea with dry heaving  She reports that it started suddenly this morning, she feels that is due to her ongoing constipation that has been present since last Sunday  She is otherwise stable from a cardiac standpoint, denies chest pain or pressure, shortness of breath, dizziness, lightheadedness, or palpitations  Objective:     Vitals: /55   Pulse 64   Temp 98 9 °F (37 2 °C) (Oral)   Resp 16   Ht 5' 5" (1 651 m)   Wt 58 3 kg (128 lb 8 oz)   SpO2 95%   BMI 21 38 kg/m²   Vitals:    04/29/22 0600 04/30/22 0541   Weight: 59 4 kg (130 lb 15 3 oz) 58 3 kg (128 lb 8 oz)     Orthostatic Blood Pressures      Most Recent Value   Blood Pressure 102/55 filed at 04/30/2022 0600   Patient Position - Orthostatic VS Lying filed at 04/29/2022 2000            Intake/Output Summary (Last 24 hours) at 4/30/2022 0832  Last data filed at 4/30/2022 0600  Gross per 24 hour   Intake 1837 32 ml   Output 900 ml   Net 937 32 ml       Invasive Devices  Report    Peripheral Intravenous Line            Peripheral IV 04/28/22 Dorsal (posterior); Right Forearm 1 day    Peripheral IV 04/28/22 Right Hand 1 day                            Scheduled Meds:  Current Facility-Administered Medications   Medication Dose Route Frequency Provider Last Rate    acetaminophen  650 mg Oral Q6H PRN Kaylene Sicard, MD      amiodarone  200 mg Oral Daily With Breakfast Kia Tillman MD      benzonatate  100 mg Oral TID PRN Sharron Liu PA-C      colchicine  0 6 mg Oral BID PRN Sharron Liu PA-C      heparin (porcine)  3-20 Units/kg/hr (Order-Specific) Intravenous Titrated DAVID Hayes 16 Units/kg/hr (04/30/22 0227)    iohexol  100 mL Intravenous [facility] in [facility]      ondansetron  4 mg Intravenous Q6H PRN Kaylene Sicard, MD      oxyCODONE  2 5 mg Oral Q4H PRN Mya Heard MD      oxyCODONE  5 mg Oral Q4H PRN Mya Heard MD      pantoprazole  40 mg Oral Early Morning Antonio Friend, RACHEL cruz-docusate sodium  1 tablet Oral BID Mya Heard MD       Continuous Infusions:heparin (porcine), 3-20 Units/kg/hr (Order-Specific), Last Rate: 16 Units/kg/hr (04/30/22 0227)      PRN Meds:   acetaminophen    benzonatate    colchicine    iohexol    ondansetron    oxyCODONE    oxyCODONE    Review of Systems   Constitutional: Negative for fever and malaise/fatigue  Cardiovascular: Negative for chest pain, dyspnea on exertion, irregular heartbeat, leg swelling, near-syncope, orthopnea, palpitations, paroxysmal nocturnal dyspnea and syncope  Gastrointestinal: Negative for nausea  All other systems reviewed and are negative  Physical Exam:   GEN: NAD, alert and oriented x 3, well appearing  SKIN: dry without significant lesions or rashes  HEENT: NCAT, PERRL, EOMs intact  NECK: No JVD appreciated  CARDIOVASCULAR: RRR, normal S1, S2 without murmurs, rubs, or gallops appreciated  LUNGS: Clear to auscultation bilaterally without wheezes, rhonchi, or rales  ABDOMEN: Soft, nontender, nondistended  EXTREMITIES/VASCULAR: perfused without clubbing, cyanosis, or LE edema b/l  PSYCH: Normal mood and affect  NEURO: CN ll-Xll grossly intact                Lab Results: I have personally reviewed pertinent lab results  Results from last 7 days   Lab Units 04/30/22  0523 04/30/22  0124 04/29/22  1706 04/29/22  1118 04/29/22  0440 04/29/22  0105 04/28/22  1950   WBC Thousand/uL 7 38  --   --   --  7 75  --  8 28   HEMOGLOBIN g/dL 8 5* 9 2* 9 6*   < > 9 7*   < > 12 0   HEMATOCRIT % 26 4*  --   --   --  30 5*  --  38 1   PLATELETS Thousands/uL 198  --   --   --  217  --  229    < > = values in this interval not displayed       Results from last 7 days   Lab Units 04/30/22  0523 04/29/22  0440 04/28/22  0449   POTASSIUM mmol/L 3 9 4 0 4  4   CHLORIDE mmol/L 110* 111* 103   CO2 mmol/L 29 23 28   BUN mg/dL 14 20 16   CREATININE mg/dL 0 77 0 73 0 88   CALCIUM mg/dL 8 2* 7 9* 9 1     Results from last 7 days   Lab Units 22  0146 22  1928 22  0440 22  0836   INR   --  1 11 1 21* 1 12   PTT seconds 49* 43*  --  30     Results from last 7 days   Lab Units 22  0523 22  0440 22  0449   MAGNESIUM mg/dL 2 1 1 9 2 1         Imaging: I have personally reviewed pertinent reports  Results for orders placed during the hospital encounter of 21    Echo complete with contrast if indicated    Narrative  [place] [address], [address] [facility]  [phone]    Transthoracic Echocardiogram  2D, M-mode, Doppler, and Color Doppler    Study date:  2021    Patient: Neda Leyden  MR number: [id number]  Account number: [de-identified]  : 1940  Age: 79 years  Gender: Female  Status: Outpatient  Location: [facility]  Height: 65 in  Weight: 123 lb  BP: 134/ 82 mmHg    Indications: Atrial fibrillation  Diagnoses: I48 0 - Atrial fibrillation    Sonographer:  NAHUN Domingo  Primary Physician:  Mary Jane Olea  Referring Physician:  Anitha Short MD  Group:  [facility]  Interpreting Physician:  Anitha Short MD    SUMMARY    LEFT VENTRICLE:  Systolic function was mildly reduced  Ejection fraction was estimated to be 50 %  There was mild diffuse hypokinesis  Left ventricular diastolic function parameters were normal     RIGHT VENTRICLE:  The size was normal   Systolic function was normal     MITRAL VALVE:  There was mild regurgitation  TRICUSPID VALVE:  There was mild regurgitation  Estimated peak PA pressure was at least 50 mmHg  PULMONIC VALVE:  There was trace regurgitation  HISTORY: PRIOR HISTORY: Pulmonary Embolism, Former smoker, Atrial Fibrillation      PROCEDURE: The study was performed in the [facility] [place]  This was a routine study  The transthoracic approach was used  The study included complete 2D imaging, M-mode, complete spectral Doppler, and color Doppler  The  heart rate was 64 bpm, at the start of the study  Images were obtained from the parasternal, apical, subcostal, and suprasternal notch acoustic windows  Image quality was adequate  LEFT VENTRICLE: Size was normal  Systolic function was mildly reduced  Ejection fraction was estimated to be 50 %  There was mild diffuse hypokinesis  Wall thickness was normal  No evidence of apical thrombus  DOPPLER: Left ventricular  diastolic function parameters were normal     RIGHT VENTRICLE: The size was normal  Systolic function was normal  Wall thickness was normal     LEFT ATRIUM: Size was normal     RIGHT ATRIUM: Size was normal     MITRAL VALVE: Valve structure was normal  There was normal leaflet separation  DOPPLER: The transmitral velocity was within the normal range  There was no evidence for stenosis  There was mild regurgitation  AORTIC VALVE: The valve was trileaflet  Leaflets exhibited normal thickness and normal cuspal separation  DOPPLER: Transaortic velocity was within the normal range  There was no evidence for stenosis  There was no significant  regurgitation  TRICUSPID VALVE: The valve structure was normal  There was normal leaflet separation  DOPPLER: The transtricuspid velocity was within the normal range  There was no evidence for stenosis  There was mild regurgitation  Estimated peak PA  pressure was at least 50 mmHg  PULMONIC VALVE: Leaflets exhibited normal thickness, no calcification, and normal cuspal separation  DOPPLER: The transpulmonic velocity was within the normal range  There was trace regurgitation  PERICARDIUM: There was no pericardial effusion  The pericardium was normal in appearance  AORTA: The root exhibited normal size  SYSTEMIC VEINS: IVC: The inferior vena cava was normal in size      SYSTEM MEASUREMENT TABLES    2D  %FS: 26 19 %  Ao Diam: 3 19 cm  EDV(Teich): 78 28 ml  EF(Teich): 51 76 %  ESV(Teich): 37 76 ml  IVSd: 0 94 cm  LA Area: 16 34 cm2  LA Diam: 3 3 cm  LVEDV MOD A4C: 65 73 ml  LVEF MOD A4C: 40 94 %  LVESV MOD A4C: 38 82 ml  LVIDd: 4 19 cm  LVIDs: 3 09 cm  LVLd A4C: 6 67 cm  LVLs A4C: 6 15 cm  LVPWd: 0 92 cm  RA Area: 14 8 cm2  RVIDd: 3 92 cm  SV MOD A4C: 26 91 ml  SV(Teich): 40 52 ml    CW  AV MaxPG: 3 75 mmHg  AV Vmax: 0 97 m/s  MR Vmax: 4 4 m/s  MR maxP 52 mmHg  TR MaxP 5 mmHg  TR Vmax: 3 45 m/s    MM  TAPSE: 2 24 cm    PW  E' Sept: 0 08 m/s  E/E' Sept: 8 63  LVOT Vmax: 0 79 m/s  LVOT maxP 5 mmHg  MV A Javon: 0 66 m/s  MV Dec Garfield: 3 95 m/s2  MV DecT: 171 74 ms  MV E Javon: 0 68 m/s  MV E/A Ratio: 1 03  MV PHT: 49 8 ms  MVA By PHT: 4 42 cm2    [employer] Accredited Echocardiography Laboratory    Prepared and electronically signed by    Luisito Colon MD  Signed 2021 20:12:35      EKG/TELEMETRY:  Normal sinus rhythm without significant arrhythmias noted      VTE Pharmacologic Prophylaxis: Heparin gtt

## 2022-04-30 NOTE — ASSESSMENT & PLAN NOTE
· Pre-existing atrial fibrillation with new episodes of a fib with RVR and atrial flutter despite amiodarone therapy  · Failed cardioversion on 3/29/22 and 4/22/22  · 4/28/22 cryoablation, now in NSR     Plan:  · Amiodarone PO 200mg daily   · Anticoagulation with heparin drip   · Tentative plan for transition back to [facility] today per EP  · Monitor rhythm on telemetry   · Optimize electrolytes: K > 4 0, Mag > 2 0   · Pain control:  · Colchicine 0 6mg BID PRN for pericardial pain  · Tylenol 650mg q6h PRN  · Oxycodone 2 5-5mg q4h PRN  · EP consulted  · Plan for outpatient follow up in 4 weeks

## 2022-05-01 PROBLEM — K03.7 DISCOLORATION OF TOOTH: Status: ACTIVE | Noted: 2022-05-01

## 2022-05-01 LAB
ANION GAP SERPL CALCULATED.3IONS-SCNC: 4 MMOL/L (ref 4–13)
APTT PPP: 83 SECONDS (ref 23–37)
BASOPHILS # BLD AUTO: 0.03 THOUSANDS/ΜL (ref 0–0.1)
BASOPHILS NFR BLD AUTO: 1 % (ref 0–1)
BUN SERPL-MCNC: 16 MG/DL (ref 5–25)
CALCIUM SERPL-MCNC: 8.2 MG/DL (ref 8.3–10.1)
CHLORIDE SERPL-SCNC: 111 MMOL/L (ref 100–108)
CO2 SERPL-SCNC: 27 MMOL/L (ref 21–32)
CREAT SERPL-MCNC: 0.86 MG/DL (ref 0.6–1.3)
EOSINOPHIL # BLD AUTO: 0.16 THOUSAND/ΜL (ref 0–0.61)
EOSINOPHIL NFR BLD AUTO: 3 % (ref 0–6)
ERYTHROCYTE [DISTWIDTH] IN BLOOD BY AUTOMATED COUNT: 13.6 % (ref 11.6–15.1)
GFR SERPL CREATININE-BSD FRML MDRD: 64 ML/MIN/1.73SQ M
GLUCOSE SERPL-MCNC: 93 MG/DL (ref 65–140)
HCT VFR BLD AUTO: 27.7 % (ref 34.8–46.1)
HGB BLD-MCNC: 8.8 G/DL (ref 11.5–15.4)
IMM GRANULOCYTES # BLD AUTO: 0.02 THOUSAND/UL (ref 0–0.2)
IMM GRANULOCYTES NFR BLD AUTO: 0 % (ref 0–2)
LYMPHOCYTES # BLD AUTO: 1.74 THOUSANDS/ΜL (ref 0.6–4.47)
LYMPHOCYTES NFR BLD AUTO: 27 % (ref 14–44)
MAGNESIUM SERPL-MCNC: 2.3 MG/DL (ref 1.6–2.6)
MCH RBC QN AUTO: 30.4 PG (ref 26.8–34.3)
MCHC RBC AUTO-ENTMCNC: 31.8 G/DL (ref 31.4–37.4)
MCV RBC AUTO: 96 FL (ref 82–98)
MONOCYTES # BLD AUTO: 0.93 THOUSAND/ΜL (ref 0.17–1.22)
MONOCYTES NFR BLD AUTO: 14 % (ref 4–12)
NEUTROPHILS # BLD AUTO: 3.56 THOUSANDS/ΜL (ref 1.85–7.62)
NEUTS SEG NFR BLD AUTO: 55 % (ref 43–75)
NRBC BLD AUTO-RTO: 0 /100 WBCS
PHOSPHATE SERPL-MCNC: 2.6 MG/DL (ref 2.3–4.1)
PLATELET # BLD AUTO: 210 THOUSANDS/UL (ref 149–390)
PMV BLD AUTO: 9.3 FL (ref 8.9–12.7)
POTASSIUM SERPL-SCNC: 4.1 MMOL/L (ref 3.5–5.3)
RBC # BLD AUTO: 2.89 MILLION/UL (ref 3.81–5.12)
SODIUM SERPL-SCNC: 142 MMOL/L (ref 136–145)
WBC # BLD AUTO: 6.44 THOUSAND/UL (ref 4.31–10.16)

## 2022-05-01 PROCEDURE — 85730 THROMBOPLASTIN TIME PARTIAL: CPT | Performed by: INTERNAL MEDICINE

## 2022-05-01 PROCEDURE — 80048 BASIC METABOLIC PNL TOTAL CA: CPT | Performed by: NURSE PRACTITIONER

## 2022-05-01 PROCEDURE — 85025 COMPLETE CBC W/AUTO DIFF WBC: CPT | Performed by: NURSE PRACTITIONER

## 2022-05-01 PROCEDURE — 99232 SBSQ HOSP IP/OBS MODERATE 35: CPT | Performed by: INTERNAL MEDICINE

## 2022-05-01 PROCEDURE — 84100 ASSAY OF PHOSPHORUS: CPT | Performed by: NURSE PRACTITIONER

## 2022-05-01 PROCEDURE — 83735 ASSAY OF MAGNESIUM: CPT | Performed by: NURSE PRACTITIONER

## 2022-05-01 RX ADMIN — POLYETHYLENE GLYCOL 3350 17 G: 17 POWDER, FOR SOLUTION ORAL at 08:17

## 2022-05-01 RX ADMIN — APIXABAN 2.5 MG: 2.5 TABLET, FILM COATED ORAL at 21:22

## 2022-05-01 RX ADMIN — SENNOSIDES AND DOCUSATE SODIUM 1 TABLET: 50; 8.6 TABLET ORAL at 17:13

## 2022-05-01 RX ADMIN — SENNOSIDES AND DOCUSATE SODIUM 1 TABLET: 50; 8.6 TABLET ORAL at 08:17

## 2022-05-01 RX ADMIN — AMIODARONE HYDROCHLORIDE 200 MG: 200 TABLET ORAL at 08:17

## 2022-05-01 RX ADMIN — APIXABAN 2.5 MG: 2.5 TABLET, FILM COATED ORAL at 11:19

## 2022-05-01 NOTE — ASSESSMENT & PLAN NOTE
· Patient complaining that she noticed that her lower tooth is discolored  She states that she has not seen his tooth or herself earlier in fact sheet she has been admitted to the hospital but she noticed today that her tooth was discolored  She states she was recently the test and to clean  Patient states that her daughter noticed when she arrived from out of town also  Patient complains and she thinks this may have been done today anesthesia and wanted to have this documented in her chart  As explained the patient is unclear how anesthesia could affect the coloration tooth, but patient still insisted

## 2022-05-01 NOTE — PROGRESS NOTES
Progress Note - Electrophysiology-Cardiology (EP)   Genesis Rosales [de-identified] y o  female MRN: [id number]  Unit/Bed#: [facility] 502-01 Encounter: [id number]      Assessment:  1  Symptomatic paroxysmal atrial fibrillation and flutter with periods of rapid ventricular response, with breakthrough despite amiodarone --- status post cryoablation with pulmonary vein isolation and typical flutter line 4/28/2022              A ) prior event monitor 01/2022 showing 71% atrial fibrillation burden, average heart rate 108 beats per minute              B ) cardioversion attempted 3/29/2022 however was unsuccessful after multiple attempts; started on amiodarone at that time              C ) repeat cardioversion scheduled 4/22/2022 however canceled as patient presented with sinus bradycardia              D ) maintained on amiodarone and Eliquis as an outpatient  2  Spontaneous right epigastric artery bleed with subsequent rectus sheath hematoma, status post IR embolization 4/28/2022              A ) hemoglobin 8 8 today  (<--10 1 <--8 5 <--9 2)              B ) currently on IV heparin  3  Low-normal LV systolic function with EF 50% per LUIS EDUARDO 03/2022  4  Tachy-veronica syndrome, 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  5  Presyncope with subsequent fall and left distal radius fracture      Plan:  Most recent hemoglobin 8 8 (<--10 1 <--8 5 <--9 2), which appears to be stable  Thus, it is okay to discontinue IV heparin and instead start oral Eliquis  Would prefer that she remain in the hospital an additional 24 hours to ensure that her labs remained stable on Eliquis, with potential discharge tomorrow  She remains in normal sinus rhythm, no significant bradycardia noted  Her rates are generally in the 60s to 70s  Continue amiodarone 200 mg daily, she is 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 at this time  Subjective/Objective   Chief Complaint:  No acute complaints    Subjective:  She is currently feeling well, she had a bowel movement which has helped tremendously    She admits to fatigue  She is no longer nauseated  She also denies cardiac complaints, including chest pain or pressure, palpitations, or shortness of breath  Objective:     Vitals: /59 (BP Location: Left arm)   Pulse 69   Temp 97 8 °F (36 6 °C) (Oral)   Resp 20   Ht 5' 5" (1 651 m)   Wt 57 6 kg (126 lb 15 8 oz)   SpO2 96%   BMI 21 13 kg/m²   Vitals:    04/30/22 0541 05/01/22 0600   Weight: 58 3 kg (128 lb 8 oz) 57 6 kg (126 lb 15 8 oz)     Orthostatic Blood Pressures      Most Recent Value   Blood Pressure 105/59 filed at 05/01/2022 0707   Patient Position - Orthostatic VS Lying filed at 05/01/2022 0707            Intake/Output Summary (Last 24 hours) at 5/1/2022 0730  Last data filed at 4/30/2022 1805  Gross per 24 hour   Intake 1050 4 ml   Output 0 ml   Net 1050 4 ml       Invasive Devices  Report    Peripheral Intravenous Line            Peripheral IV 04/28/22 Dorsal (posterior); Right Forearm 2 days    Peripheral IV 04/28/22 Right Hand 2 days                            Scheduled Meds:  Current Facility-Administered Medications   Medication Dose Route Frequency Provider Last Rate    acetaminophen  650 mg Oral Q6H PRN Ayleen Spironello V, CRNP      amiodarone  200 mg Oral Daily With Breakfast Ayleen Spironello V, CRNP      benzonatate  100 mg Oral TID PRN Batool Median V, CRNP      bisacodyl  10 mg Rectal Daily PRN Ayleen Spironello V, CRNP      colchicine  0 6 mg Oral BID PRN Ayleen Spironello V, CRNP      heparin (porcine)  3-20 Units/kg/hr (Order-Specific) Intravenous Titrated Ayleen Spironello V, CRNP 16 Units/kg/hr (04/30/22 1402)    iohexol  100 mL Intravenous Once in imaging Batool Median V, CRNP      ondansetron  4 mg Intravenous Q6H PRN Ayleen Spironello V, CRNP      pantoprazole  40 mg Oral Early Morning Ayleen Spironello V, CRNP      polyethylene glycol  17 g Oral Daily Ayleen Spironello V, CRNP      senna-docusate sodium  1 tablet Oral BID Josy Actis Spironello V, CRNP       Continuous Infusions:heparin (porcine), 3-20 Units/kg/hr (Order-Specific), Last Rate: 16 Units/kg/hr (04/30/22 1402)      PRN Meds:   acetaminophen    benzonatate    bisacodyl    colchicine    iohexol    ondansetron    Review of Systems   Constitutional: Positive for malaise/fatigue  Negative for fever  Cardiovascular: Negative for chest pain, dyspnea on exertion, irregular heartbeat, leg swelling, near-syncope, orthopnea, palpitations, paroxysmal nocturnal dyspnea and syncope  All other systems reviewed and are negative  Physical Exam:   GEN: NAD, alert and oriented x 3, well appearing  SKIN: dry without significant lesions or rashes  HEENT: NCAT, PERRL, EOMs intact  NECK: No JVD appreciated  CARDIOVASCULAR: RRR, normal S1, S2 without murmurs, rubs, or gallops appreciated  LUNGS: Clear to auscultation bilaterally without wheezes, rhonchi, or rales  ABDOMEN: Soft, nontender, nondistended  EXTREMITIES/VASCULAR: perfused without clubbing, cyanosis, or LE edema b/l  PSYCH: Normal mood and affect  NEURO: CN ll-Xll grossly intact                Lab Results: I have personally reviewed pertinent lab results  Results from last 7 days   Lab Units 05/01/22  0433 04/30/22  1359 04/30/22  0523 04/29/22  1118 04/29/22  0440   WBC Thousand/uL 6 44  --  7 38  --  7 75   HEMOGLOBIN g/dL 8 8* 10 1* 8 5*   < > 9 7*   HEMATOCRIT % 27 7*  --  26 4*  --  30 5*   PLATELETS Thousands/uL 210  --  198  --  217    < > = values in this interval not displayed       Results from last 7 days   Lab Units 05/01/22  0433 04/30/22  0523 04/29/22  0440   POTASSIUM mmol/L 4 1 3 9 4 0   CHLORIDE mmol/L 111* 110* 111*   CO2 mmol/L 27 29 23   BUN mg/dL 16 14 20   CREATININE mg/dL 0 86 0 77 0 73   CALCIUM mg/dL 8 2* 8 2* 7 9*     Results from last 7 days   Lab Units 05/01/22  0433 04/30/22  1359 04/30/22  3946 04/30/22  0146 04/29/22  1928 04/29/22  0440 04/24/22  0836 04/24/22 0836   INR   --   --   --   -- 1  11 1 21*  --  1 12   PTT seconds 83* 82* 73*   < > 43*  --    < > 30    < > = values in this interval not displayed  Results from last 7 days   Lab Units 22  0433 22  0523 22  0440   MAGNESIUM mg/dL 2 3 2 1 1 9         Imaging: I have personally reviewed pertinent reports  Results for orders placed during the hospital encounter of 21    Echo complete with contrast if indicated    Narrative  [address] [facility]  [phone]    Transthoracic Echocardiogram  2D, M-mode, Doppler, and Color Doppler    Study date:  2021    Patient: Raj Wright  MR number: [id number]  Account number: [de-identified]  : 1940  Age: 79 years  Gender: Female  Status: Outpatient  Location: [facility]  Height: 65 in  Weight: 123 lb  BP: 134/ 82 mmHg    Indications: Atrial fibrillation  Diagnoses: I48 0 - Atrial fibrillation    Sonographer:  NAHUN Michaels  Primary Physician:  Ronal Allen  Referring Physician:  Manoj Medel MD  Group:  [facility]  Interpreting Physician:  Manoj Medel MD    SUMMARY    LEFT VENTRICLE:  Systolic function was mildly reduced  Ejection fraction was estimated to be 50 %  There was mild diffuse hypokinesis  Left ventricular diastolic function parameters were normal     RIGHT VENTRICLE:  The size was normal   Systolic function was normal     MITRAL VALVE:  There was mild regurgitation  TRICUSPID VALVE:  There was mild regurgitation  Estimated peak PA pressure was at least 50 mmHg  PULMONIC VALVE:  There was trace regurgitation  HISTORY: PRIOR HISTORY: Pulmonary Embolism, Former smoker, Atrial Fibrillation  PROCEDURE: The study was performed in the [address] [place] [address]  This was a routine study  The transthoracic approach was used  The study included complete 2D imaging, M-mode, complete spectral Doppler, and color Doppler   The  heart rate was 64 bpm, at the start of the study  Images were obtained from the parasternal, apical, subcostal, and suprasternal notch acoustic windows  Image quality was adequate  LEFT VENTRICLE: Size was normal  Systolic function was mildly reduced  Ejection fraction was estimated to be 50 %  There was mild diffuse hypokinesis  Wall thickness was normal  No evidence of apical thrombus  DOPPLER: Left ventricular  diastolic function parameters were normal     RIGHT VENTRICLE: The size was normal  Systolic function was normal  Wall thickness was normal     LEFT ATRIUM: Size was normal     RIGHT ATRIUM: Size was normal     MITRAL VALVE: Valve structure was normal  There was normal leaflet separation  DOPPLER: The transmitral velocity was within the normal range  There was no evidence for stenosis  There was mild regurgitation  AORTIC VALVE: The valve was trileaflet  Leaflets exhibited normal thickness and normal cuspal separation  DOPPLER: Transaortic velocity was within the normal range  There was no evidence for stenosis  There was no significant  regurgitation  TRICUSPID VALVE: The valve structure was normal  There was normal leaflet separation  DOPPLER: The transtricuspid velocity was within the normal range  There was no evidence for stenosis  There was mild regurgitation  Estimated peak PA  pressure was at least 50 mmHg  PULMONIC VALVE: Leaflets exhibited normal thickness, no calcification, and normal cuspal separation  DOPPLER: The transpulmonic velocity was within the normal range  There was trace regurgitation  PERICARDIUM: There was no pericardial effusion  The pericardium was normal in appearance  AORTA: The root exhibited normal size  SYSTEMIC VEINS: IVC: The inferior vena cava was normal in size      SYSTEM MEASUREMENT TABLES    2D  %FS: 26 19 %  Ao Diam: 3 19 cm  EDV(Teich): 78 28 ml  EF(Teich): 51 76 %  ESV(Teich): 37 76 ml  IVSd: 0 94 cm  LA Area: 16 34 cm2  LA Diam: 3 3 cm  LVEDV MOD A4C: 65 73 ml  LVEF MOD A4C: 40 94 %  LVESV MOD A4C: 38 82 ml  LVIDd: 4 19 cm  LVIDs: 3 09 cm  LVLd A4C: 6 67 cm  LVLs A4C: 6 15 cm  LVPWd: 0 92 cm  RA Area: 14 8 cm2  RVIDd: 3 92 cm  SV MOD A4C: 26 91 ml  SV(Teich): 40 52 ml    CW  AV MaxPG: 3 75 mmHg  AV Vmax: 0 97 m/s  MR Vmax: 4 4 m/s  MR maxP 52 mmHg  TR MaxP 5 mmHg  TR Vmax: 3 45 m/s    MM  TAPSE: 2 24 cm    PW  E' Sept: 0 08 m/s  E/E' Sept: 8 63  LVOT Vmax: 0 79 m/s  LVOT maxP 5 mmHg  MV A Javon: 0 66 m/s  MV Dec [place]: 3 95 m/s2  MV DecT: 171 74 ms  MV E Javon: 0 68 m/s  MV E/A Ratio: 1 03  MV PHT: 49 8 ms  MVA By PHT: 4 42 cm2    [facility] Accredited Echocardiography [facility]    Prepared and electronically signed by    Tobi Jiménez MD  Signed 2021 20:12:35      EKG/TELEMETRY:  Normal sinus rhythm with heart rates in the 60s to 70s, no significant bradycardia or other arrhythmias noted      VTE Pharmacologic Prophylaxis: Heparin gtt

## 2022-05-01 NOTE — ASSESSMENT & PLAN NOTE
· Secondary to retroperitoneal bleed rectal sheath hematoma secondary to postop complication from ablation  · Moderate hemoglobin  · Transfuse to keep above 7

## 2022-05-01 NOTE — ASSESSMENT & PLAN NOTE
· Following ablation, patient was noted to be hypotensive with abdominal pain  · 4/28/22 CT A/P with contrast: Large right abdominal rectus sheath hematoma containing a focus of contrast blush concerning for active bleed  Smaller right-sided retroperitoneal hemorrhage tracking medial and anterior to the psoas muscle, within the right paracolic gutter, and along the inferomedial right hepatic lobe    ·  4/29/22 IR: right inferior epigastric artery gelfoam and coil embolization  · Repeat STAT CT with acute drop in hemoglobin with associated changes in hemodynamics concerning for re-bleeding

## 2022-05-01 NOTE — ASSESSMENT & PLAN NOTE
· Initially presented to [place] on 4/24/22 after multiple falls related to dizziness, suspected related to afib  · Transferred here for an EP consultation for possible ablation  · On transfer here, in atrial flutter with heart rate in the 60s  · Echo from 7/2021 with EF 50%, mild diffuse hypokinesis  · S/p unsuccessful DCCV on 03/29  Patient has had recurrent symptomatic atrial fibrillation/flutter despite treatment with oral amiodarone  · Second DCCV was aborted on 04/22 when she was in sinus bradycardia with heart rate in the 40s    · Continue amiodarone infusion for rate/rhythm control  · Restart Eliquis per cardio  · EP following

## 2022-05-01 NOTE — PROGRESS NOTES
[address] [facility]  Progress Note - Carlos Christiesture 1940, [de-identified] y o  female MRN: [id number]  Unit/Bed#: [facility] 502-01 Encounter: [id number]  Primary Care Provider: Effie Cochran DO   Date and time admitted to hospital: 4/27/2022  4:11 PM    * Atrial fibrillation [facility])  Assessment & Plan  · Initially presented to [facility] on 4/24/22 after multiple falls related to dizziness, suspected related to afib  · Transferred here for an EP consultation for possible ablation  · On transfer here, in atrial flutter with heart rate in the 60s  · Echo from 7/2021 with EF 50%, mild diffuse hypokinesis  · S/p unsuccessful DCCV on 03/29  Patient has had recurrent symptomatic atrial fibrillation/flutter despite treatment with oral amiodarone  · Second DCCV was aborted on 04/22 when she was in sinus bradycardia with heart rate in the 40s  · Continue amiodarone infusion for rate/rhythm control  · Restart Eliquis per cardio  · EP following      Rectus sheath hematoma  Assessment & Plan  · Following ablation, patient was noted to be hypotensive with abdominal pain  · 4/28/22 CT A/P with contrast: Large right abdominal rectus sheath hematoma containing a focus of contrast blush concerning for active bleed  Smaller right-sided retroperitoneal hemorrhage tracking medial and anterior to the psoas muscle, within the right paracolic gutter, and along the inferomedial right hepatic lobe  ·  4/29/22 IR: right inferior epigastric artery gelfoam and coil embolization  · Repeat STAT CT with acute drop in hemoglobin with associated changes in hemodynamics concerning for re-bleeding       Acute blood loss anemia  Assessment & Plan  · Secondary to retroperitoneal bleed rectal sheath hematoma secondary to postop complication from ablation  · Moderate hemoglobin  · Transfuse to keep above 7    Discoloration of tooth  Assessment & Plan  · Patient complaining that she noticed that her lower tooth is discolored    She states that she has not seen his tooth or herself earlier in fact sheet she has been admitted to the hospital but she noticed today that her tooth was discolored  She states she was recently the test and to clean  Patient states that her daughter noticed when she arrived from out of town also  Patient complains and she thinks this may have been done today anesthesia and wanted to have this documented in her chart  As explained the patient is unclear how anesthesia could affect the coloration tooth, but patient still insisted  H/O cardiac atrial fib ablation  Assessment & Plan  · EP following    Closed fracture of left wrist  Assessment & Plan  · Present on admission, secondary to fall  Seen by Orthopedics, conservative management  · Wrist splint, nonweightbearing  · Follow-up in orthopedic office as outpatient for repeat imaging and casting     Fall  Assessment & Plan  · Patient presents to the ED with complaints of multiple falls; brought in by EMS after patient fell and complained of left wrist pain/swelling  Patient reports multiple falls lately due to being dizzy  · Was to be seen by cardiology to consider possible cardioversion outpatient this week  · Monitor on telemetry - in/out of afib briefly overnight   · Transferred here for EP evaluation as above  · Fall likely exacerbated due to arrhythmia      Essential hypertension  Assessment & Plan  · Bps meds are currently on hold  · BP is acceptable at this time  · Can adjust meds post ablation when more stable      VTE Pharmacologic Prophylaxis: VTE Score: 5 Moderate Risk (Score 3-4) - Pharmacological DVT Prophylaxis Ordered: heparin drip  Time Spent for Care: 30 minutes  More than 50% of total time spent on counseling and coordination of care as described above      Current Length of Stay: 4 day(s)  Current Patient Status: Inpatient   Certification Statement: The patient will continue to require additional inpatient hospital stay due to ACute blood loss anemia  Discharge Plan: Anticipate discharge in 24-48 hrs to home  Code Status: Level 1 - Full Code    Subjective:   Patient seen for follow-up for atrial fibrillation ablation and acute blood loss anemia secondary to rectus sheath hematoma  Patient has no new complaints states that her pain surgical sites improved  Complaining of lightheadedness also that this comes and goes she says  In addition patient complaining of discoloration of her lower teeth  She is insisting this was due to anesthesia for the procedure  Objective:     Vitals:   Temp (24hrs), Av 3 °F (36 8 °C), Min:97 7 °F (36 5 °C), Max:98 9 °F (37 2 °C)    Temp:  [97 7 °F (36 5 °C)-98 9 °F (37 2 °C)] 97 7 °F (36 5 °C)  HR:  [68-77] 69  Resp:  [18-23] 18  BP: (105-150)/(57-74) 129/63  SpO2:  [95 %-98 %] 98 %  Body mass index is 21 13 kg/m²  Input and Output Summary (last 24 hours): Intake/Output Summary (Last 24 hours) at 2022 1220  Last data filed at 2022 1100  Gross per 24 hour   Intake 557 6 ml   Output 0 ml   Net 557 6 ml       Physical Exam:   Physical Exam  Vitals and nursing note reviewed  Constitutional:       General: She is not in acute distress  Appearance: She is well-developed  HENT:      Head: Normocephalic and atraumatic  Eyes:      Conjunctiva/sclera: Conjunctivae normal    Cardiovascular:      Rate and Rhythm: Normal rate and regular rhythm  Heart sounds: No murmur heard  Pulmonary:      Effort: Pulmonary effort is normal  No respiratory distress  Breath sounds: Normal breath sounds  Abdominal:      Palpations: Abdomen is soft  Tenderness: There is no abdominal tenderness  Musculoskeletal:      Cervical back: Neck supple  Comments: Left arm in splint   Skin:     General: Skin is warm and dry  Neurological:      Mental Status: She is alert           Additional Data:     Labs:  Results from last 7 days   Lab Units 22  0433   WBC Thousand/uL 6 44   HEMOGLOBIN g/dL 8 8*   HEMATOCRIT % 27 7*   PLATELETS Thousands/uL 210   NEUTROS PCT % 55   LYMPHS PCT % 27   MONOS PCT % 14*   EOS PCT % 3     Results from last 7 days   Lab Units 05/01/22  0433 04/27/22  0516 04/25/22  0557   SODIUM mmol/L 142   < > 135*   POTASSIUM mmol/L 4 1   < > 4 1   CHLORIDE mmol/L 111*   < > 102   CO2 mmol/L 27   < > 26   BUN mg/dL 16   < > 17   CREATININE mg/dL 0 86   < > 0 92   ANION GAP mmol/L 4   < > 7   CALCIUM mg/dL 8 2*   < > 8 8   ALBUMIN g/dL  --   --  3 5   TOTAL BILIRUBIN mg/dL  --   --  1 13*   ALK PHOS U/L  --   --  71   ALT U/L  --   --  18   AST U/L  --   --  14   GLUCOSE RANDOM mg/dL 93   < > 114    < > = values in this interval not displayed  Results from last 7 days   Lab Units 04/29/22  1928   INR  1 11                   Lines/Drains:  Invasive Devices  Report    Peripheral Intravenous Line            Peripheral IV 04/28/22 Dorsal (posterior); Right Forearm 3 days    Peripheral IV 04/28/22 Right Hand 2 days                      Imaging: Reviewed radiology reports from this admission including: abdominal/pelvic CT    Recent Cultures (last 7 days):         Last 24 Hours Medication List:   Current Facility-Administered Medications   Medication Dose Route Frequency Provider Last Rate    acetaminophen  650 mg Oral Q6H PRN DAVID Chowdary      amiodarone  200 mg Oral Daily With Breakfast DAVID Reddy      apixaban  2 5 mg Oral BID Eleanor Ramirez PA-C      benzonatate  100 mg Oral TID PRN DAVID Chowdary      bisacodyl  10 mg Rectal Daily PRN Ayleen Ledezma V, DAVID      colchicine  0 6 mg Oral BID PRN DAVID Reddy      iohexol  100 mL Intravenous Once in imaging DAVID Chowdary      ondansetron  4 mg Intravenous Q6H PRN Ayleen Ledezma V, DAVID      pantoprazole  40 mg Oral Early Morning Ayleen Ledezma V, DAVID      polyethylene glycol  17 g Oral Daily DAVID Reddy      senna-docusate sodium  1 tablet Oral BID DAVID Min          Today, Patient Was Seen By: Prabhu Teixeira MD    **Please Note: This note may have been constructed using a voice recognition system  **

## 2022-05-01 NOTE — ASSESSMENT & PLAN NOTE
· Patient presents to the ED with complaints of multiple falls; brought in by EMS after patient fell and complained of left wrist pain/swelling  Patient reports multiple falls lately due to being dizzy    · Was to be seen by cardiology to consider possible cardioversion outpatient this week  · Monitor on telemetry - in/out of afib briefly overnight   · Transferred here for EP evaluation as above  · Fall likely exacerbated due to arrhythmia

## 2022-05-02 ENCOUNTER — APPOINTMENT (INPATIENT)
Dept: NON INVASIVE DIAGNOSTICS | Facility: HOSPITAL | Age: 82
DRG: 271 | End: 2022-05-02
Payer: COMMERCIAL

## 2022-05-02 LAB
ANION GAP SERPL CALCULATED.3IONS-SCNC: 3 MMOL/L (ref 4–13)
APTT PPP: 31 SECONDS (ref 23–37)
BUN SERPL-MCNC: 16 MG/DL (ref 5–25)
CALCIUM SERPL-MCNC: 8.7 MG/DL (ref 8.3–10.1)
CHLORIDE SERPL-SCNC: 108 MMOL/L (ref 100–108)
CO2 SERPL-SCNC: 28 MMOL/L (ref 21–32)
CREAT SERPL-MCNC: 0.87 MG/DL (ref 0.6–1.3)
ERYTHROCYTE [DISTWIDTH] IN BLOOD BY AUTOMATED COUNT: 13.2 % (ref 11.6–15.1)
GFR SERPL CREATININE-BSD FRML MDRD: 63 ML/MIN/1.73SQ M
GLUCOSE SERPL-MCNC: 89 MG/DL (ref 65–140)
HCT VFR BLD AUTO: 30.7 % (ref 34.8–46.1)
HGB BLD-MCNC: 9.6 G/DL (ref 11.5–15.4)
MCH RBC QN AUTO: 30.3 PG (ref 26.8–34.3)
MCHC RBC AUTO-ENTMCNC: 31.3 G/DL (ref 31.4–37.4)
MCV RBC AUTO: 97 FL (ref 82–98)
PLATELET # BLD AUTO: 223 THOUSANDS/UL (ref 149–390)
PMV BLD AUTO: 9 FL (ref 8.9–12.7)
POTASSIUM SERPL-SCNC: 4 MMOL/L (ref 3.5–5.3)
RBC # BLD AUTO: 3.17 MILLION/UL (ref 3.81–5.12)
SODIUM SERPL-SCNC: 139 MMOL/L (ref 136–145)
WBC # BLD AUTO: 6.57 THOUSAND/UL (ref 4.31–10.16)

## 2022-05-02 PROCEDURE — NC001 PR NO CHARGE: Performed by: INTERNAL MEDICINE

## 2022-05-02 PROCEDURE — 99232 SBSQ HOSP IP/OBS MODERATE 35: CPT | Performed by: PHYSICIAN ASSISTANT

## 2022-05-02 PROCEDURE — 80048 BASIC METABOLIC PNL TOTAL CA: CPT | Performed by: INTERNAL MEDICINE

## 2022-05-02 PROCEDURE — 99232 SBSQ HOSP IP/OBS MODERATE 35: CPT | Performed by: INTERNAL MEDICINE

## 2022-05-02 PROCEDURE — 93926 LOWER EXTREMITY STUDY: CPT

## 2022-05-02 PROCEDURE — 97530 THERAPEUTIC ACTIVITIES: CPT

## 2022-05-02 PROCEDURE — 97116 GAIT TRAINING THERAPY: CPT

## 2022-05-02 PROCEDURE — 93926 LOWER EXTREMITY STUDY: CPT | Performed by: SURGERY

## 2022-05-02 PROCEDURE — 85730 THROMBOPLASTIN TIME PARTIAL: CPT | Performed by: INTERNAL MEDICINE

## 2022-05-02 PROCEDURE — 85027 COMPLETE CBC AUTOMATED: CPT | Performed by: INTERNAL MEDICINE

## 2022-05-02 RX ORDER — OXYCODONE HYDROCHLORIDE AND ACETAMINOPHEN 5; 325 MG/1; MG/1
1 TABLET ORAL EVERY 6 HOURS PRN
Status: DISCONTINUED | OUTPATIENT
Start: 2022-05-02 | End: 2022-05-03 | Stop reason: HOSPADM

## 2022-05-02 RX ORDER — OXYCODONE HYDROCHLORIDE AND ACETAMINOPHEN 5; 325 MG/1; MG/1
2 TABLET ORAL EVERY 6 HOURS PRN
Status: DISCONTINUED | OUTPATIENT
Start: 2022-05-02 | End: 2022-05-03 | Stop reason: HOSPADM

## 2022-05-02 RX ADMIN — APIXABAN 2.5 MG: 2.5 TABLET, FILM COATED ORAL at 17:39

## 2022-05-02 RX ADMIN — SENNOSIDES AND DOCUSATE SODIUM 1 TABLET: 50; 8.6 TABLET ORAL at 08:29

## 2022-05-02 RX ADMIN — SENNOSIDES AND DOCUSATE SODIUM 1 TABLET: 50; 8.6 TABLET ORAL at 17:39

## 2022-05-02 RX ADMIN — APIXABAN 2.5 MG: 2.5 TABLET, FILM COATED ORAL at 08:29

## 2022-05-02 RX ADMIN — AMIODARONE HYDROCHLORIDE 200 MG: 200 TABLET ORAL at 08:29

## 2022-05-02 NOTE — ASSESSMENT & PLAN NOTE
· Patient presents to the ED with complaints of multiple falls; brought in by EMS after patient fell and complained of left wrist pain/swelling  Patient reports multiple falls lately due to being dizzy    · Was to be seen by cardiology to consider possible cardioversion outpatient this week  · Transferred here for EP evaluation as above  · Fall likely exacerbated due to arrhythmia  · PT OT

## 2022-05-02 NOTE — PLAN OF CARE
Problem: PHYSICAL THERAPY ADULT  Goal: Performs mobility at highest level of function for planned discharge setting  See evaluation for individualized goals  Description: Treatment/Interventions: Functional transfer training,LE strengthening/ROM,Therapeutic exercise,Endurance training,Patient/family training,Equipment eval/education,Bed mobility,Gait training,OT,Spoke to nursing,Elevations  Equipment Recommended:  (may require SPC for d/c- plan to assess)       See flowsheet documentation for full assessment, interventions and recommendations  Outcome: Progressing  Note: Prognosis: Good  Problem List: Decreased strength,Decreased endurance,Impaired balance,Decreased mobility,Pain  Assessment: Pt seen for PT session this afternoon with a focus on gait, endurance, stair training, and HEP instruction  Pt has made good progress towards mobility goals, ambulating increased distances this session  At this time, pt is able to perform transfers and ambulate community distances with [facility] at the supervision level  Pt with no lateral sway with ambulation this session; also demonstrating good carryover of sequencing with SPC from previous session  Continued stair training today, during which pt was able to simulate entrance to home with BEE after, but otherwise steady  Reviewed seated therex program with pt, all of which she was able to complete with good technique  Pt would benefit from continued acute skilled PT to improve overall safety and independence with functional mobility  Continuing to recommend HHPT upon d/c    Barriers to Discharge: Inaccessible home environment        PT Discharge Recommendation: Home with home health rehabilitation          See flowsheet documentation for full assessment

## 2022-05-02 NOTE — ASSESSMENT & PLAN NOTE
· Following ablation, patient was noted to be hypotensive with abdominal pain  · 4/28/22 CT A/P with contrast: Large right abdominal rectus sheath hematoma containing a focus of contrast blush concerning for active bleed  Smaller right-sided retroperitoneal hemorrhage tracking medial and anterior to the psoas muscle, within the right paracolic gutter, and along the inferomedial right hepatic lobe  ·  4/29/22 IR: right inferior epigastric artery gelfoam and coil embolization  · Repeat STAT CT with acute drop in hemoglobin with associated changes in hemodynamics concerning for re-bleeding  · 5/2/2022:  The patient seems mobile painful or mass and procedure site from IR embolization  Will get vascular pseudoaneurysm study    IR notified, will evaluate  · Evaluated by IR, hematoma and received vascular study for pseudoaneurysm, negative

## 2022-05-02 NOTE — ASSESSMENT & PLAN NOTE
· Secondary to retroperitoneal bleed rectal sheath hematoma secondary to postop complication from ablation  · Moderate hemoglobin  · Transfuse to keep above 7  · [facility]

## 2022-05-02 NOTE — PROGRESS NOTES
Progress Note - Electrophysiology-Cardiology (EP)   Enrique Kumari [de-identified] y o  female MRN: [id number]  Unit/Bed#: [facility] 502-01 Encounter: [id number]      Assessment:  1  Symptomatic paroxysmal atrial fibrillation and flutter with periods of rapid ventricular response, with breakthrough despite amiodarone --- status post cryoablation with pulmonary vein isolation and typical flutter line 4/28/2022              A ) prior event monitor 01/2022 showing 71% atrial fibrillation burden, average heart rate 108 beats per minute              B ) cardioversion attempted 3/29/2022 however was unsuccessful after multiple attempts; started on amiodarone at that time              C ) repeat cardioversion scheduled 4/22/2022 however canceled as patient presented with sinus bradycardia              D ) maintained on amiodarone and Eliquis as an outpatient  2  Spontaneous right epigastric artery bleed with subsequent rectus sheath hematoma, status post IR embolization 4/28/2022              A ) hemoglobin 9 6 today (slightly increased from 8 8)              B ) previously on IV heparin, now transitioned back to Eliquis with stable INR  3  Low-normal LV systolic function with EF 50% per LUIS EDUARDO 03/2022  4  Tachy-veronica syndrome, 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  5  Presyncope with subsequent fall and left distal radius fracture      Plan:  She is doing well from an arrhythmia standpoint, she is maintaining sinus rhythm in the 60s and 70s without significant bradycardia  She reported dizziness this morning, similar to what she experienced at home  Telemetry confirmed that she is in sinus rhythm without arrhythmias, thus an arrhythmia was not cause of these symptoms  Recommend that she continue amiodarone 200 mg daily along with Eliquis 2 5 mg twice daily  Further medication adjustments can be made as an outpatient, and our office will contact her upon discharge to arrange a 4-6 week follow-up appointment    Otherwise, she should remain off of AV eliud agents given her history of bradycardia  She is concerned about her elevated blood pressure this morning  She has a known history of hypertension, in the remote past she was well controlled on losartan however more recently this was discontinued in favor of Toprol-XL after she was diagnosed with atrial fibrillation  At this time, she can be restarted on losartan for better blood pressure control but will defer to primary team      Groin access sites from her ablation were well healed, no evidence of hematoma or ongoing bleeding  She is concerned about her left femoral arterial access site from her IR embolization, as there was a small tender lump noted  Will reach out to the primary team regarding this, possibly IR can re-evaluate this site to see if further imaging is warranted  She is otherwise stable for discharge from an EP standpoint, all questions were answered  She would benefit from PT/OT evaluation, as she notes that she has not ambulated much throughout her admission  Subjective/Objective   Chief Complaint: dizziness     Subjective:  She admitted to feeling mild dizziness/lightheadedness this morning, similar to the symptoms she experienced prior to admission  She has some soreness in her left groin, and would like her sites to be evaluated  She is also concerned regarding her blood pressure, as it has been consistently elevated today  She admits that she has not been ambulating for the past several days, and would likely benefit from physical therapy        Objective:     Vitals: /83   Pulse 74   Temp 98 7 °F (37 1 °C) (Oral)   Resp 18   Ht 5' 5" (1 651 m)   Wt 56 kg (123 lb 7 3 oz)   SpO2 98%   BMI 20 54 kg/m²   Vitals:    05/01/22 0600 05/02/22 0600   Weight: 57 6 kg (126 lb 15 8 oz) 56 kg (123 lb 7 3 oz)     Orthostatic Blood Pressures      Most Recent Value   Blood Pressure 162/83 filed at 05/02/2022 0700   Patient Position - Orthostatic VS Lying filed at 05/01/2022 1046            Intake/Output Summary (Last 24 hours) at 5/2/2022 0917  Last data filed at 5/2/2022 0700  Gross per 24 hour   Intake 1260 ml   Output --   Net 1260 ml       Invasive Devices  Report    Peripheral Intravenous Line            Peripheral IV 04/28/22 Dorsal (posterior); Right Forearm 3 days    Peripheral IV 04/28/22 Right Hand 3 days                            Scheduled Meds:  Current Facility-Administered Medications   Medication Dose Route Frequency Provider Last Rate    acetaminophen  650 mg Oral Q6H PRN Gala Alpers V, CRNP      amiodarone  200 mg Oral Daily With Breakfast Ayleen Spironello V, CRNP      apixaban  2 5 mg Oral BID Kyleigh Mis, PA-C      benzonatate  100 mg Oral TID PRN Gala Alpers V, CRNP      bisacodyl  10 mg Rectal Daily PRN Ayleen Spironello V, CRNP      colchicine  0 6 mg Oral BID PRN Ayleen Spironello V, CRNP      iohexol  100 mL Intravenous Once in imaging Benjia Alpers V, CRNP      ondansetron  4 mg Intravenous Q6H PRN Ayleen Spironello V, CRNP      pantoprazole  40 mg Oral Early Morning Ayleen Spironello V, CRNP      polyethylene glycol  17 g Oral Daily Ayleen Spironello V, CRNP      senna-docusate sodium  1 tablet Oral BID Ayleen Spironello V, CRNP       Continuous Infusions:   PRN Meds:   acetaminophen    benzonatate    bisacodyl    colchicine    iohexol    ondansetron    Review of Systems   Constitutional: Negative for fever and malaise/fatigue  Cardiovascular: Negative for chest pain, dyspnea on exertion, irregular heartbeat, leg swelling, near-syncope, orthopnea, palpitations, paroxysmal nocturnal dyspnea and syncope  Neurological: Positive for dizziness           Physical Exam:   GEN: NAD, alert and oriented x 3, well appearing  SKIN: dry without significant lesions or rashes  HEENT: NCAT, PERRL, EOMs intact  NECK: No JVD appreciated  CARDIOVASCULAR: RRR, normal S1, S2 without murmurs, rubs, or gallops appreciated  LUNGS: Clear to auscultation bilaterally without wheezes, rhonchi, or rales  ABDOMEN: Soft, nontender, nondistended  EXTREMITIES/VASCULAR: perfused without clubbing, cyanosis, or LE edema b/l  PSYCH: Normal mood and affect  NEURO: CN ll-Xll grossly intact                Lab Results: I have personally reviewed pertinent lab results  Results from last 7 days   Lab Units 22  0436 22  0433 22  1359 22  0523 22  0523   WBC Thousand/uL 6 57 6 44  --   --  7 38   HEMOGLOBIN g/dL 9 6* 8 8* 10 1*   < > 8 5*   HEMATOCRIT % 30 7* 27 7*  --   --  26 4*   PLATELETS Thousands/uL 223 210  --   --  198    < > = values in this interval not displayed  Results from last 7 days   Lab Units 22  0436 22  0433 22  0523   POTASSIUM mmol/L 4 0 4 1 3 9   CHLORIDE mmol/L 108 111* 110*   CO2 mmol/L 28 27 29   BUN mg/dL 16 16 14   CREATININE mg/dL 0 87 0 86 0 77   CALCIUM mg/dL 8 7 8 2* 8 2*     Results from last 7 days   Lab Units 22  0436 22  0433 22  1359 22  0146 22  1928 22  0440   INR   --   --   --   --  1 11 1 21*   PTT seconds 31 83* 82*   < > 43*  --     < > = values in this interval not displayed  Results from last 7 days   Lab Units 22  0433 22  0523 22  0440   MAGNESIUM mg/dL 2 3 2 1 1 9         Imaging: I have personally reviewed pertinent reports      Results for orders placed during the hospital encounter of 21    Echo complete with contrast if indicated    Narrative  [facility] [address], [address] [facility]  [phone]    Transthoracic Echocardiogram  2D, M-mode, Doppler, and Color Doppler    Study date:  2021    Patient: Giuseppe Diaz  MR number: [id number]  Account number: [de-identified]  : 1940  Age: 79 years  Gender: Female  Status: Outpatient  Location: [facility]  Height: 65 in  Weight: 123 lb  BP: 134/ 82 mmHg    Indications: Atrial fibrillation  Diagnoses: I48 0 - Atrial fibrillation    Sonographer:  NAHUN Mcclendon  Primary Physician:  Mike Norris  Referring Physician:  Erwin Rosales MD  Group:  [facility]  Interpreting Physician:  Erwin Rosales MD    SUMMARY    LEFT VENTRICLE:  Systolic function was mildly reduced  Ejection fraction was estimated to be 50 %  There was mild diffuse hypokinesis  Left ventricular diastolic function parameters were normal     RIGHT VENTRICLE:  The size was normal   Systolic function was normal     MITRAL VALVE:  There was mild regurgitation  TRICUSPID VALVE:  There was mild regurgitation  Estimated peak PA pressure was at least 50 mmHg  PULMONIC VALVE:  There was trace regurgitation  HISTORY: PRIOR HISTORY: Pulmonary Embolism, Former smoker, Atrial Fibrillation  PROCEDURE: The study was performed in the [address] [place] [address]  This was a routine study  The transthoracic approach was used  The study included complete 2D imaging, M-mode, complete spectral Doppler, and color Doppler  The  heart rate was 64 bpm, at the start of the study  Images were obtained from the parasternal, apical, subcostal, and suprasternal notch acoustic windows  Image quality was adequate  LEFT VENTRICLE: Size was normal  Systolic function was mildly reduced  Ejection fraction was estimated to be 50 %  There was mild diffuse hypokinesis  Wall thickness was normal  No evidence of apical thrombus  DOPPLER: Left ventricular  diastolic function parameters were normal     RIGHT VENTRICLE: The size was normal  Systolic function was normal  Wall thickness was normal     LEFT ATRIUM: Size was normal     RIGHT ATRIUM: Size was normal     MITRAL VALVE: Valve structure was normal  There was normal leaflet separation  DOPPLER: The transmitral velocity was within the normal range  There was no evidence for stenosis  There was mild regurgitation      AORTIC VALVE: The valve was trileaflet  Leaflets exhibited normal thickness and normal cuspal separation  DOPPLER: Transaortic velocity was within the normal range  There was no evidence for stenosis  There was no significant  regurgitation  TRICUSPID VALVE: The valve structure was normal  There was normal leaflet separation  DOPPLER: The transtricuspid velocity was within the normal range  There was no evidence for stenosis  There was mild regurgitation  Estimated peak PA  pressure was at least 50 mmHg  PULMONIC VALVE: Leaflets exhibited normal thickness, no calcification, and normal cuspal separation  DOPPLER: The transpulmonic velocity was within the normal range  There was trace regurgitation  PERICARDIUM: There was no pericardial effusion  The pericardium was normal in appearance  AORTA: The root exhibited normal size  SYSTEMIC VEINS: IVC: The inferior vena cava was normal in size      SYSTEM MEASUREMENT TABLES    2D  %FS: 26 19 %  Ao Diam: 3 19 cm  EDV(Teich): 78 28 ml  EF(Teich): 51 76 %  ESV(Teich): 37 76 ml  IVSd: 0 94 cm  LA Area: 16 34 cm2  LA Diam: 3 3 cm  LVEDV MOD A4C: 65 73 ml  LVEF MOD A4C: 40 94 %  LVESV MOD A4C: 38 82 ml  LVIDd: 4 19 cm  LVIDs: 3 09 cm  LVLd A4C: 6 67 cm  LVLs A4C: 6 15 cm  LVPWd: 0 92 cm  RA Area: 14 8 cm2  RVIDd: 3 92 cm  SV MOD A4C: 26 91 ml  SV(Teich): 40 52 ml    CW  AV MaxPG: 3 75 mmHg  AV Vmax: 0 97 m/s  MR Vmax: 4 4 m/s  MR maxP 52 mmHg  TR MaxP 5 mmHg  TR Vmax: 3 45 m/s    MM  TAPSE: 2 24 cm    PW  E' Sept: 0 08 m/s  E/E' Sept: 8 63  LVOT Vmax: 0 79 m/s  LVOT maxP 5 mmHg  MV A Javon: 0 66 m/s  MV Dec [place]: 3 95 m/s2  MV DecT: 171 74 ms  MV E Javon: 0 68 m/s  MV E/A Ratio: 1 03  MV PHT: 49 8 ms  MVA By PHT: 4 42 cm2    [employer] Accredited Echocardiography Laboratory    Prepared and electronically signed by    Luisito Colon MD  Signed 2021 20:12:35      EKG/TELEMETRY:  Normal sinus rhythm with heart rates in the 60s 70s, no significant bradycardia or other arrhythmias noted      VTE Pharmacologic Prophylaxis: Eliquis

## 2022-05-02 NOTE — PROGRESS NOTES
[facility]  Progress Note - Madhu Thibodeaux 1940, [de-identified] y o  female MRN: [id number]  Unit/Bed#: [facility] 502-01 Encounter: [id number]  Primary Care Provider: Lucio Madrigal DO   Date and time admitted to hospital: 4/27/2022  4:11 PM    * Atrial fibrillation [facility])  Assessment & Plan  · Initially presented to [facility] on 4/24/22 after multiple falls related to dizziness, suspected related to afib  · Transferred here for an EP consultation for possible ablation  · On transfer here, in atrial flutter with heart rate in the 60s  · Echo from 7/2021 with EF 50%, mild diffuse hypokinesis  · S/p unsuccessful DCCV on 03/29  Patient has had recurrent symptomatic atrial fibrillation/flutter despite treatment with oral amiodarone  · Second DCCV was aborted on 04/22 when she was in sinus bradycardia with heart rate in the 40s  · Continue amiodarone infusion for rate/rhythm control  · Continue with Eliquis  · EP following  · Will downgrade to telemetry      Rectus sheath hematoma  Assessment & Plan  · Following ablation, patient was noted to be hypotensive with abdominal pain  · 4/28/22 CT A/P with contrast: Large right abdominal rectus sheath hematoma containing a focus of contrast blush concerning for active bleed  Smaller right-sided retroperitoneal hemorrhage tracking medial and anterior to the psoas muscle, within the right paracolic gutter, and along the inferomedial right hepatic lobe  ·  4/29/22 IR: right inferior epigastric artery gelfoam and coil embolization  · Repeat STAT CT with acute drop in hemoglobin with associated changes in hemodynamics concerning for re-bleeding  · 5/2/2022:  The patient seems mobile painful or mass and procedure site from IR embolization  Will get vascular pseudoaneurysm study    IR notified, will evaluate       Acute blood loss anemia  Assessment & Plan  · Secondary to retroperitoneal bleed rectal sheath hematoma secondary to postop complication from ablation  · Moderate hemoglobin  · Transfuse to keep above 7  · 5/2/2022: Hemoglobing stable on AC    Discoloration of tooth  Assessment & Plan  · Patient complaining that she noticed that her lower tooth is discolored  She states that she has not seen his tooth or herself earlier in fact sheet she has been admitted to the hospital but she noticed today that her tooth was discolored  She states she was recently the test and to clean  Patient states that her daughter noticed when she arrived from out of town also  Patient complains and she thinks this may have been done today anesthesia and wanted to have this documented in her chart  As explained the patient is unclear how anesthesia could affect the coloration tooth, but patient still insisted  · Discussed with Anesthesia team Will, evaluate    H/O cardiac atrial fib ablation  Assessment & Plan  · EP following    Closed fracture of left wrist  Assessment & Plan  · Present on admission, secondary to fall  Seen by Orthopedics, conservative management  · Wrist splint, nonweightbearing  · Follow-up in orthopedic office as outpatient for repeat imaging and casting   · Pain regimen increased Tylenol and Percocet p r n  Fall  Assessment & Plan  · Patient presents to the ED with complaints of multiple falls; brought in by EMS after patient fell and complained of left wrist pain/swelling  Patient reports multiple falls lately due to being dizzy  · Was to be seen by cardiology to consider possible cardioversion outpatient this week  · Transferred here for EP evaluation as above  · Fall likely exacerbated due to arrhythmia  · PT OT    Essential hypertension  Assessment & Plan  · Bps meds are currently on hold  · BP is acceptable at this time  · Can adjust meds post ablation when more stable      VTE Pharmacologic Prophylaxis: VTE Score: 5 Moderate Risk (Score 3-4) - Pharmacological DVT Prophylaxis Ordered: heparin drip  Time Spent for Care: 30 minutes  More than 50% of total time spent on counseling and coordination of care as described above  Current Length of Stay: 5 day(s)  Current Patient Status: Inpatient   Certification Statement: The patient will continue to require additional inpatient hospital stay due to  [facility] [address] blood loss anemia  Discharge Plan: Anticipate discharge in 24-48 hrs to home  Code Status: Level 1 - Full Code    Subjective:   Patient seen for follow-up for atrial fibrillation ablation and acute blood loss anemia secondary to rectus sheath hematoma  Patient complaining of painful a mass at the procedure site  Hemoglobin stable on anticoagulation  Requesting for physical therapy and occupational therapy to see  In addition complaining of pain on her left hand on where the fracture was    Objective:     Vitals:   Temp (24hrs), Av 2 °F (36 8 °C), Min:97 7 °F (36 5 °C), Max:98 7 °F (37 1 °C)    Temp:  [97 7 °F (36 5 °C)-98 7 °F (37 1 °C)] 98 4 °F (36 9 °C)  HR:  [67-82] 78  Resp:  [18-20] 19  BP: (138-171)/(70-83) 165/82  SpO2:  [96 %-98 %] 98 %  Body mass index is 20 54 kg/m²  Input and Output Summary (last 24 hours): Intake/Output Summary (Last 24 hours) at 2022 1309  Last data filed at 2022 1224  Gross per 24 hour   Intake 1440 ml   Output --   Net 1440 ml       Physical Exam:   Physical Exam  Vitals and nursing note reviewed  Constitutional:       General: She is not in acute distress  Appearance: She is well-developed  HENT:      Head: Normocephalic and atraumatic  Eyes:      Conjunctiva/sclera: Conjunctivae normal    Cardiovascular:      Rate and Rhythm: Normal rate and regular rhythm  Heart sounds: No murmur heard  Pulmonary:      Effort: Pulmonary effort is normal  No respiratory distress  Breath sounds: Normal breath sounds  Abdominal:      Palpations: Abdomen is soft  Tenderness: There is no abdominal tenderness     Genitourinary:     Comments: Left inguinal tender palpable mass   Musculoskeletal:         General: Signs of injury present  Cervical back: Neck supple  Comments: Left arm in splint   Skin:     General: Skin is warm and dry  Capillary Refill: Capillary refill takes less than 2 seconds  Neurological:      General: No focal deficit present  Mental Status: She is alert  Additional Data:     Labs:  Results from last 7 days   Lab Units 05/02/22  0436 05/01/22  0433 05/01/22  0433   WBC Thousand/uL 6 57   < > 6 44   HEMOGLOBIN g/dL 9 6*   < > 8 8*   HEMATOCRIT % 30 7*   < > 27 7*   PLATELETS Thousands/uL 223   < > 210   NEUTROS PCT %  --   --  55   LYMPHS PCT %  --   --  27   MONOS PCT %  --   --  14*   EOS PCT %  --   --  3    < > = values in this interval not displayed  Results from last 7 days   Lab Units 05/02/22  0436   SODIUM mmol/L 139   POTASSIUM mmol/L 4 0   CHLORIDE mmol/L 108   CO2 mmol/L 28   BUN mg/dL 16   CREATININE mg/dL 0 87   ANION GAP mmol/L 3*   CALCIUM mg/dL 8 7   GLUCOSE RANDOM mg/dL 89     Results from last 7 days   Lab Units 04/29/22  1928   INR  1 11                   Lines/Drains:  Invasive Devices  Report    Peripheral Intravenous Line            Peripheral IV 04/28/22 Dorsal (posterior); Right Forearm 4 days    Peripheral IV 04/28/22 Right Hand 3 days                      Imaging: Reviewed radiology reports from this admission including: abdominal/pelvic CT    Recent Cultures (last 7 days):         Last 24 Hours Medication List:   Current Facility-Administered Medications   Medication Dose Route Frequency Provider Last Rate    acetaminophen  650 mg Oral Q6H PRN Ayleen Spironello ANTHONY JONESNP      amiodarone  200 mg Oral Daily With Breakfast DAVID Reddy      apixaban  2 5 mg Oral BID Kaylyn Raymundo PA-C      benzonatate  100 mg Oral TID PRN Vicci Muta ANTHONY JONESNP      bisacodyl  10 mg Rectal Daily PRN DAVID Reddy      colchicine  0 6 mg Oral BID PRN Vicci MutaANTHONYNP  iohexol  100 mL Intravenous Once in imaging [employer] V, CRNP      ondansetron  4 mg Intravenous Q6H PRN [employer], CRNP      oxyCODONE-acetaminophen  1 tablet Oral Q6H PRN Naheed Rowell MD      oxyCODONE-acetaminophen  2 tablet Oral Q6H PRN Naheed Rowell MD      pantoprazole  40 mg Oral Early Morning DAVID Reddy      polyethylene glycol  17 g Oral Daily DAVID Reddy      senna-docusate sodium  1 tablet Oral BID [employer], CRNP          Today, Patient Was Seen By: Naheed Rowell MD    **Please Note: This note may have been constructed using a voice recognition system  **

## 2022-05-02 NOTE — RESTORATIVE TECHNICIAN NOTE
Restorative Technician Note      Patient Name: Patrick Junior     Note Type: Mobility  Activity Performed: Ambulated

## 2022-05-02 NOTE — QUICK NOTE
Called due to patient concern with left access site for embolization on 4/28/22  Left groin site examined  Small, quarter size hematoma noted  Skin outlined with marker by myself  Informed patient I do not have concerns that she is actively bleeding from groin site  Her hgb has been stable  No ecchymosis  Slightly tender to touch  Informed patient to monitor site       Laurel Andres PA-C

## 2022-05-02 NOTE — CASE MANAGEMENT
Case Management Discharge Planning Note    Patient name Gertrude Ivey  Location [address] 502/[facility]HP 999-60 MRN [id number]  : 1940 Date 2022       Current Admission Date: 2022  Current Admission Diagnosis:Atrial fibrillation [facility])   Patient Active Problem List    Diagnosis Date Noted    Discoloration of tooth 2022    Acute blood loss anemia 2022    Rectus sheath hematoma 2022    H/O cardiac atrial fib ablation 2022    Closed fracture of left wrist 2022    Fall 2022    Atrial fibrillation ([address] ) 2022    History of osteoporosis 2021    Knee mass, left 2021    Essential hypertension 2020    Dupuytren's contracture of both hands 2020    Arthritis 2020    Screening for skin condition 2018    Conductive hearing loss, bilateral 2017    Psoriasis 2014    Hyperlipidemia 2014      LOS (days): 5  Geometric Mean LOS (GMLOS) (days): 4 30  Days to GMLOS:-0 5     OBJECTIVE:  Risk of Unplanned Readmission Score: 12         Current admission status: Inpatient   Preferred Pharmacy:   [address] [place] [address], [address] [address]  Phone: [phone] Fax: [phone] [address], [address] [place] [address]  Phone: [phone] Fax: [phone]    Primary Care Provider: Arielle Baum DO    Primary Insurance: Kenia Meeks [facility] REP  Secondary Insurance:     DISCHARGE DETAILS:                                [address]         Is the patient interested in [facility] at discharge?: Yes  [address] requested[de-identified] Ahmet Lamb Name[de-identified] [address] Provider[de-identified] PCP  [address] Needed[de-identified] Post-Op Care and Assessment,Evaluate Functional Status and Safety  Homebound Criteria Met[de-identified] Requires the Assistance of Another Person for Safe Ambulation or to Leave the Home  Supporting Clincal Findings[de-identified] Fatigues Easliy in Short Distances,Limited Endurance         Other Referral/Resources/Interventions Provided:  Interventions: [facility]         Treatment Team Recommendation: Home with [address] [place] [address]  Discharge Destination Plan[de-identified] Home with Dilshad at Discharge : [facility]

## 2022-05-02 NOTE — ASSESSMENT & PLAN NOTE
· Patient complaining that she noticed that her lower tooth is discolored  She states that she has not seen his tooth or herself earlier in fact sheet she has been admitted to the hospital but she noticed today that her tooth was discolored  She states she was recently the test and to clean  Patient states that her daughter noticed when she arrived from out of town also  Patient complains and she thinks this may have been done today anesthesia and wanted to have this documented in her chart  As explained the patient is unclear how anesthesia could affect the coloration tooth, but patient still insisted    · Evaluated by anesthesia, rec follow up with dentist for tooth discoloration

## 2022-05-02 NOTE — PROGRESS NOTES
Spoke with Mrs Brandon Kilgore about concern for a discolored tooth  She is s/p GA on 4/28 and noticed new discoloration of her tooth on 5/1 and is concerned this may be related to anesthesia  She recently saw her dentist and did not have tooth discoloration then  She has a brown discoloration of tooth #25  The tooth does not show any evidence of trauma, chipping, or looseness  Intubation was easy and atraumatic  She had a bite block in place during her LUIS EDUARDO  None of the medications she received intra-procedure are associated with tooth discoloration  Unsure what may have caused the discoloration of her tooth but it is unlikely that it is anesthesia related  Recommended that she speak with her dentist when she is discharged from the hospital and she was agreeable to that plan

## 2022-05-02 NOTE — CASE MANAGEMENT
Case Management Progress Note    Patient name Orlando Elias  Location [address] 502/PPHP 553-90 MRN [id number]  : 1940 Date 2022       LOS (days): 5  Geometric Mean LOS (GMLOS) (days): 4 30  Days to GMLOS:-0 5        OBJECTIVE:        Current admission status: Inpatient  Preferred Pharmacy:   [facility], [address] [address]  Phone: [phone] Fax: [phone] [address], [address] [address]  Phone: [phone] Fax: [phone]    Primary Care Provider: Ardean Severe, DO    Primary Insurance: [facility]  Secondary Insurance:     PROGRESS NOTE: Discussed patient with Dr Dulce Maria Nation who anticipates discharge tomorrow, 5/3/22  [facility] and spoke to Intake who reports they accept the referral for SN and PT  Met with patient to discuss anticipated discharge tomorrow and Antonio Baltazar with Jenae Baltazar  Informed RN Manager of patient's concern about discolored tooth  Patient says she has new pain in Left hand since yesterday and wants to be sure to have ortho followup at [facility]  RN and MD were informed

## 2022-05-02 NOTE — ASSESSMENT & PLAN NOTE
· Initially presented to [facility] on 4/24/22 after multiple falls related to dizziness, suspected related to afib  · Transferred here for an EP consultation for possible ablation  · On transfer here, in atrial flutter with heart rate in the 60s  · Echo from 7/2021 with EF 50%, mild diffuse hypokinesis  · S/p unsuccessful DCCV on 03/29  Patient has had recurrent symptomatic atrial fibrillation/flutter despite treatment with oral amiodarone  · Second DCCV was aborted on 04/22 when she was in sinus bradycardia with heart rate in the 40s    · Continue amiodarone infusion for rate/rhythm control  · Continue with Eliquis  · EP following  · Will downgrade to telemetry

## 2022-05-02 NOTE — ASSESSMENT & PLAN NOTE
· Present on admission, secondary to fall  Seen by Orthopedics, conservative management  · Wrist splint, nonweightbearing  · Follow-up in orthopedic office as outpatient for repeat imaging and casting   · Pain regimen Tylenol and Percocet p r n

## 2022-05-02 NOTE — PHYSICAL THERAPY NOTE
Physical Therapy Treatment Note    Patient's Name: Severo Brand  : 22 1338   PT Last Visit   PT Visit Date 22   Note Type   Note Type Treatment   Pain Assessment   Pain Assessment Tool 0-10   Pain Score No Pain   Restrictions/Precautions   Weight Bearing Precautions Per Order Yes   LUE Weight Bearing Per Order NWB   Braces or Orthoses Splint   Other Precautions WBS; Telemetry; Fall Risk   General   Chart Reviewed Yes   Family/Caregiver Present No   Cognition   Overall Cognitive Status WFL   Attention Within functional limits   Orientation Level Oriented X4   Memory Within functional limits   Following Commands Follows all commands and directions without difficulty   Comments pt demonstrates good carryover of WBing restrictions during mobility   Bed Mobility   Supine to Sit 6  Modified independent   Transfers   Sit to Stand 5  Supervision   Additional items Increased time required   Stand to Sit 5  Supervision   Additional items Increased time required   Additional Comments transfers without AD--> transitioning to [facility] once in standing   Ambulation/Elevation   Gait pattern Step through pattern;Excessively slow   Gait Assistance 5  Supervision   Assistive Device [facility]   Distance 240ft - steady, no episodes of LOB   Stair Management Assistance 5  Supervision   Additional items Assist x 1;Verbal cues   Stair Management Technique One rail R;Foreward  (alternating up, step to down)   Number of Stairs 7   Ambulation/Elevation Additional Comments pt demonstrating good sequencing with SPC   Balance   Static Sitting Normal   Dynamic Sitting Good   Static Standing Fair +   Dynamic Standing Fair   Ambulatory Fair -   Activity Tolerance   Activity Tolerance Patient tolerated treatment well   Nurse Made Aware ok to see per RN   Exercises   Knee AROM Long Arc Quad Sitting;10 reps;AROM; Bilateral   Ankle Pumps Sitting;20 reps;AROM; Bilateral  (heel raises)   Marching Sitting;10 reps;AROM; Bilateral Assessment   Prognosis Good   Problem List Decreased strength;Decreased endurance; Impaired balance;Decreased mobility;Pain   Assessment Pt seen for PT session this afternoon with a focus on gait, endurance, stair training, and HEP instruction  Pt has made good progress towards mobility goals, ambulating increased distances this session  At this time, pt is able to perform transfers and ambulate community distances with [facility] at the supervision level  Pt with no lateral sway with ambulation this session; also demonstrating good carryover of sequencing with SPC from previous session  Continued stair training today, during which pt was able to simulate entrance to home with BEE after, but otherwise steady  Reviewed seated therex program with pt, all of which she was able to complete with good technique  Pt would benefit from continued acute skilled PT to improve overall safety and independence with functional mobility  Continuing to recommend HHPT upon d/c  Goals   Patient Goals to go home   STG Expiration Date 05/07/22   PT Treatment Day 2   Plan   Treatment/Interventions Functional transfer training;LE strengthening/ROM; Therapeutic exercise;Elevations; Endurance training;Equipment eval/education; Bed mobility;Gait training;Spoke to nursing   Progress Progressing toward goals   PT Frequency 3-5x/wk   Recommendation   PT Discharge Recommendation Home with home health rehabilitation   Equipment Recommended   [facility])   AM-PAC Basic Mobility Inpatient   Turning in Bed Without Bedrails 4   Lying on Back to Sitting on Edge of Flat Bed 4   Moving Bed to Chair 4   Standing Up From Chair 4   Walk in Room 3   Climb 3-5 Stairs 3   Basic Mobility Inpatient Raw Score 22   Basic Mobility Standardized Score 47 4   Highest Level Of Mobility   JH-HLM Goal 7: Walk 25 feet or more   JH-HLM Highest Level of Mobility 7: Walk 25 feet or more   JH-HLM Goal Achieved Yes   Education   Education Provided Mobility training;Assistive device;Home exercise program   Patient Demonstrates acceptance/verbal understanding   End of Consult   Patient Position at End of Consult Bedside chair; All needs within reach  (LUE elevated on pillow)       Berl Distance, PT, DPT

## 2022-05-03 ENCOUNTER — TELEPHONE (OUTPATIENT)
Dept: OBGYN CLINIC | Facility: HOSPITAL | Age: 82
End: 2022-05-03

## 2022-05-03 VITALS
HEART RATE: 61 BPM | TEMPERATURE: 97.9 F | DIASTOLIC BLOOD PRESSURE: 61 MMHG | HEIGHT: 65 IN | BODY MASS INDEX: 19.91 KG/M2 | SYSTOLIC BLOOD PRESSURE: 131 MMHG | RESPIRATION RATE: 18 BRPM | WEIGHT: 119.49 LBS | OXYGEN SATURATION: 97 %

## 2022-05-03 DIAGNOSIS — I48.0 PAROXYSMAL ATRIAL FIBRILLATION (HCC): ICD-10-CM

## 2022-05-03 PROCEDURE — 99239 HOSP IP/OBS DSCHRG MGMT >30: CPT | Performed by: INTERNAL MEDICINE

## 2022-05-03 PROCEDURE — 97530 THERAPEUTIC ACTIVITIES: CPT

## 2022-05-03 PROCEDURE — 97116 GAIT TRAINING THERAPY: CPT

## 2022-05-03 PROCEDURE — 97112 NEUROMUSCULAR REEDUCATION: CPT

## 2022-05-03 RX ORDER — AMIODARONE HYDROCHLORIDE 200 MG/1
200 TABLET ORAL DAILY
Qty: 30 TABLET | Refills: 0 | Status: SHIPPED | OUTPATIENT
Start: 2022-05-03 | End: 2022-05-19

## 2022-05-03 RX ORDER — AMIODARONE HYDROCHLORIDE 200 MG/1
200 TABLET ORAL DAILY
Qty: 30 TABLET | Refills: 0 | Status: SHIPPED | OUTPATIENT
Start: 2022-05-03 | End: 2022-05-03 | Stop reason: SDUPTHER

## 2022-05-03 RX ORDER — AMIODARONE HYDROCHLORIDE 200 MG/1
200 TABLET ORAL DAILY
Qty: 30 TABLET | Refills: 0 | Status: SHIPPED | OUTPATIENT
Start: 2022-05-03 | End: 2022-05-03 | Stop reason: HOSPADM

## 2022-05-03 RX ADMIN — AMIODARONE HYDROCHLORIDE 200 MG: 200 TABLET ORAL at 09:01

## 2022-05-03 RX ADMIN — APIXABAN 2.5 MG: 2.5 TABLET, FILM COATED ORAL at 09:01

## 2022-05-03 RX ADMIN — SENNOSIDES AND DOCUSATE SODIUM 1 TABLET: 50; 8.6 TABLET ORAL at 09:01

## 2022-05-03 RX ADMIN — ACETAMINOPHEN 650 MG: 325 TABLET, FILM COATED ORAL at 09:01

## 2022-05-03 NOTE — PLAN OF CARE
Problem: PHYSICAL THERAPY ADULT  Goal: Performs mobility at highest level of function for planned discharge setting  See evaluation for individualized goals  Description: Treatment/Interventions: Functional transfer training,LE strengthening/ROM,Therapeutic exercise,Endurance training,Patient/family training,Equipment eval/education,Bed mobility,Gait training,OT,Spoke to nursing,Elevations  Equipment Recommended:  (may require SPC for d/c- plan to assess)       See flowsheet documentation for full assessment, interventions and recommendations  Outcome: Completed  Note: Prognosis: Excellent  Problem List: Pain  Assessment: The pt  is ambulating far beyond community distances without difficulty  Adjusted the [facility] for her with improved comfort  She was readily able to manuever around obstacles in the chavez as well as within a busy environment  She was able to demonstrate picking up objects from low and high in the closet as well  She was educated on home safety and continued mobility at home  She has no further skilled therapy needs at this time  Barriers to Discharge: None        PT Discharge Recommendation: No rehabilitation needs          See flowsheet documentation for full assessment

## 2022-05-03 NOTE — PLAN OF CARE
Problem: MOBILITY - ADULT  Goal: Maintain or return to baseline ADL function  Description: INTERVENTIONS:  -  Assess patient's ability to carry out ADLs; assess patient's baseline for ADL function and identify physical deficits which impact ability to perform ADLs (bathing, care of mouth/teeth, toileting, grooming, dressing, etc )  - Assess/evaluate cause of self-care deficits   - Assess range of motion  - Assess patient's mobility; develop plan if impaired  - Assess patient's need for assistive devices and provide as appropriate  - Encourage maximum independence but intervene and supervise when necessary  - Involve family in performance of ADLs  - Assess for home care needs following discharge   - Consider OT consult to assist with ADL evaluation and planning for discharge  - Provide patient education as appropriate  Outcome: Adequate for Discharge  Goal: Maintains/Returns to pre admission functional level  Description: INTERVENTIONS:  - Perform BMAT or MOVE assessment daily    - Set and communicate daily mobility goal to care team and patient/family/caregiver  - Collaborate with rehabilitation services on mobility goals if consulted  - Perform Range of Motion 3 times a day  - Reposition patient every 2 hours    - Dangle patient 3 times a day  - Stand patient 3 times a day  - Ambulate patient 3 times a day  - Out of bed to chair 3 times a day   - Out of bed for meals 3 times a day  - Out of bed for toileting  - Record patient progress and toleration of activity level   Outcome: Adequate for Discharge     Problem: CARDIOVASCULAR - ADULT  Goal: Maintains optimal cardiac output and hemodynamic stability  Description: INTERVENTIONS:  - Monitor I/O, vital signs and rhythm  - Monitor for S/S and trends of decreased cardiac output  - Administer and titrate ordered vasoactive medications to optimize hemodynamic stability  - Assess quality of pulses, skin color and temperature  - Assess for signs of decreased coronary artery perfusion  - Instruct patient to report change in severity of symptoms  Outcome: Adequate for Discharge  Goal: Absence of cardiac dysrhythmias or at baseline rhythm  Description: INTERVENTIONS:  - Continuous cardiac monitoring, vital signs, obtain 12 lead EKG if ordered  - Administer antiarrhythmic and heart rate control medications as ordered  - Monitor electrolytes and administer replacement therapy as ordered  Outcome: Adequate for Discharge     Problem: Potential for Falls  Goal: Patient will remain free of falls  Description: INTERVENTIONS:  - Educate patient/family on patient safety including physical limitations  - Instruct patient to call for assistance with activity   - Consult OT/PT to assist with strengthening/mobility   - Keep Call bell within reach  - Keep bed low and locked with side rails adjusted as appropriate  - Keep care items and personal belongings within reach  - Initiate and maintain comfort rounds  - Make Fall Risk Sign visible to staff  - Offer Toileting every 2 Hours, in advance of need  - Initiate/Maintain alarm  - Obtain necessary fall risk management equipment:   - Apply yellow socks and bracelet for high fall risk patients  - Consider moving patient to room near nurses station  Outcome: Adequate for Discharge     Problem: Prexisting or High Potential for Compromised Skin Integrity  Goal: Skin integrity is maintained or improved  Description: INTERVENTIONS:  - Identify patients at risk for skin breakdown  - Assess and monitor skin integrity  - Assess and monitor nutrition and hydration status  - Monitor labs   - Assess for incontinence   - Turn and reposition patient  - Assist with mobility/ambulation  - Relieve pressure over bony prominences  - Avoid friction and shearing  - Provide appropriate hygiene as needed including keeping skin clean and dry  - Evaluate need for skin moisturizer/barrier cream  - Collaborate with interdisciplinary team   - Patient/family teaching  - Consider wound care consult   Outcome: Adequate for Discharge

## 2022-05-03 NOTE — TELEPHONE ENCOUNTER
Force on request sent to [facility],  Please advise if the following patient can be forced onto the schedule:    Patient: Raymond Obando    : [phone]    MRN: [id number]    Call back #:     Insurance: [facility]    Reason for appointment: ER on 22  Left wrist fracture advised to follow up with orthopedics  Patient had complications and was in hospital longer than anticipated  Being discharge today and needs follow up for Left wrist fracture  Requested doctor/location: Luis/Akanksha      Thank you

## 2022-05-03 NOTE — CASE MANAGEMENT
Case Management Discharge Planning Note    Patient name Adi Tena  Location [address] 502/PPHP 909-24 MRN [id number]  : 1940 Date 5/3/2022       Current Admission Date: 2022  Current Admission Diagnosis:Atrial fibrillation [facility])   Patient Active Problem List    Diagnosis Date Noted    Discoloration of tooth 2022    Acute blood loss anemia 2022    Rectus sheath hematoma 2022    H/O cardiac atrial fib ablation 2022    Closed fracture of left wrist 2022    Fall 2022    Atrial fibrillation ([address] ) 2022    History of osteoporosis 2021    Knee mass, left 2021    Essential hypertension 2020    Dupuytren's contracture of both hands 2020    Arthritis 2020    Screening for skin condition 2018    Conductive hearing loss, bilateral 2017    Psoriasis 2014    Hyperlipidemia 2014      LOS (days): 6  Geometric Mean LOS (GMLOS) (days): 4 30  Days to GMLOS:-1 5     OBJECTIVE:  Risk of Unplanned Readmission Score: 13         Current admission status: Inpatient   Preferred Pharmacy:   [address] [place] [address], [address] [address]  Phone: [phone] Fax: [phone] [facility], [address]  [facility] [address]  Phone: [phone] Fax: [phone]    [address] [place] [address],[address], [address] [facility]  Phone: [phone] Fax: [phone]    Primary Care Provider: Sukumar Delvalle DO    Primary Insurance: [facility]  Secondary Insurance:     DISCHARGE DETAILS:    The patient is cleared for discharged home today  [facility] has accepted the patient and will make [facility] visit on 22  Faxed AVS to [facility] AT [facility]

## 2022-05-03 NOTE — TELEPHONE ENCOUNTER
Patient Erling Moritz contacting office today [phone] to advise she was just discharged from the hospital and will need a [facility]

## 2022-05-03 NOTE — PHYSICAL THERAPY NOTE
Physical Therapy Progress Note     05/03/22 0930   PT Last Visit   PT Visit Date 05/03/22   Note Type   Note Type Treatment   Pain Assessment   Pain Assessment Tool 0-10   Pain Score 3   Pain Location/Orientation Orientation: Left; Location: Arm  (Wrist )   Hospital Pain Intervention(s) Repositioned; Ambulation/increased activity; Emotional support   Restrictions/Precautions   LUE Weight Bearing Per Order NWB   Braces or Orthoses Splint   Other Precautions WBS;Pain   Subjective   Subjective The pt  notes that she is doing well, and that she is eager to return home  Transfers   Sit to Stand 6  Modified independent   Stand to Sit 6  Modified independent   Ambulation/Elevation   Gait pattern Excessively slow; Inconsistent jeanette   Gait Assistance 6  Modified independent   Assistive Device [facility]   Distance 640 feet  Balance   Static Sitting Normal   Dynamic Sitting Normal   Static Standing Good   Ambulatory Fair +   Activity Tolerance   Activity Tolerance Patient tolerated treatment well   Exercises   Balance training  Picking up objects from the floor and up high  Assessment   Prognosis Excellent   Problem List Pain   Assessment The pt  is ambulating far beyond community distances without difficulty  Adjusted the [facility] for her with improved comfort  She was readily able to manuever around obstacles in the chavez as well as within a busy environment  She was able to demonstrate picking up objects from low and high in the closet as well  She was educated on home safety and continued mobility at home  She has no further skilled therapy needs at this time  Barriers to Discharge None   Goals   Patient Goals To go home  STG Expiration Date 05/07/22   PT Treatment Day 3   Plan   Treatment/Interventions Functional transfer training;LE strengthening/ROM; Elevations; Therapeutic exercise; Endurance training;Patient/family training;Bed mobility;Gait training   Progress Discontinue PT   Recommendation   PT Discharge Recommendation No rehabilitation needs   AM-PAC Basic Mobility Inpatient   Turning in Bed Without Bedrails 4   Lying on Back to Sitting on Edge of Flat Bed 4   Moving Bed to Chair 4   Standing Up From Chair 4   Walk in Room 4   Climb 3-5 Stairs 4   Basic Mobility Inpatient Raw Score 24   Basic Mobility Standardized Score 57 68   Highest Level Of Mobility   JH-HLM Goal 8: Walk 250 feet or more   JH-HLM Highest Level of Mobility 8: Walk 250 feet ot more   JH-HLM Goal Achieved Yes     An AM-PAC Basic Mobility standardized score less than 40 78 suggests the patient may benefit from discharge to post-acute rehab services      Sanket Myers PTA

## 2022-05-03 NOTE — PLAN OF CARE
Reviewed    Problem: MOBILITY - ADULT  Goal: Maintain or return to baseline ADL function  Description: INTERVENTIONS:  -  Assess patient's ability to carry out ADLs; assess patient's baseline for ADL function and identify physical deficits which impact ability to perform ADLs (bathing, care of mouth/teeth, toileting, grooming, dressing, etc )  - Assess/evaluate cause of self-care deficits   - Assess range of motion  - Assess patient's mobility; develop plan if impaired  - Assess patient's need for assistive devices and provide as appropriate  - Encourage maximum independence but intervene and supervise when necessary  - Involve family in performance of ADLs  - Assess for home care needs following discharge   - Consider OT consult to assist with ADL evaluation and planning for discharge  - Provide patient education as appropriate  Outcome: Progressing  Goal: Maintains/Returns to pre admission functional level  Description: INTERVENTIONS:  - Set and communicate daily mobility goal to care team and patient/family/caregiver     - Collaborate with rehabilitation services on mobility goals if consulted  - Out of bed for toileting  - Record patient progress and toleration of activity level   Outcome: Progressing     Problem: CARDIOVASCULAR - ADULT  Goal: Maintains optimal cardiac output and hemodynamic stability  Description: INTERVENTIONS:  - Monitor I/O, vital signs and rhythm  - Monitor for S/S and trends of decreased cardiac output  - Administer and titrate ordered vasoactive medications to optimize hemodynamic stability  - Assess quality of pulses, skin color and temperature  - Assess for signs of decreased coronary artery perfusion  - Instruct patient to report change in severity of symptoms  Outcome: Progressing  Goal: Absence of cardiac dysrhythmias or at baseline rhythm  Description: INTERVENTIONS:  - Continuous cardiac monitoring, vital signs, obtain 12 lead EKG if ordered  - Administer antiarrhythmic and heart rate control medications as ordered  - Monitor electrolytes and administer replacement therapy as ordered  Outcome: Progressing     Problem: Potential for Falls  Goal: Patient will remain free of falls  Description: INTERVENTIONS:  - Educate patient/family on patient safety including physical limitations  - Instruct patient to call for assistance with activity   - Consult OT/PT to assist with strengthening/mobility   - Keep Call bell within reach  - Keep bed low and locked with side rails adjusted as appropriate  - Keep care items and personal belongings within reach  - Initiate and maintain comfort rounds  - Make Fall Risk Sign visible to staff  - Offer Toileting every 4 Hours, in advance of need  - Apply yellow socks and bracelet for high fall risk patients  - Consider moving patient to room near nurses station  Outcome: Progressing     Problem: Prexisting or High Potential for Compromised Skin Integrity  Goal: Skin integrity is maintained or improved  Description: INTERVENTIONS:  - Identify patients at risk for skin breakdown  - Assess and monitor skin integrity  - Assess and monitor nutrition and hydration status  - Monitor labs   - Assess for incontinence   - Turn and reposition patient  - Assist with mobility/ambulation  - Relieve pressure over bony prominences  - Avoid friction and shearing  - Provide appropriate hygiene as needed including keeping skin clean and dry  - Evaluate need for skin moisturizer/barrier cream  - Collaborate with interdisciplinary team   - Patient/family teaching  - Consider wound care consult   Outcome: Progressing

## 2022-05-03 NOTE — PLAN OF CARE
Problem: MOBILITY - ADULT  Goal: Maintain or return to baseline ADL function  Description: INTERVENTIONS:  -  Assess patient's ability to carry out ADLs; assess patient's baseline for ADL function and identify physical deficits which impact ability to perform ADLs (bathing, care of mouth/teeth, toileting, grooming, dressing, etc )  - Assess/evaluate cause of self-care deficits   - Assess range of motion  - Assess patient's mobility; develop plan if impaired  - Assess patient's need for assistive devices and provide as appropriate  - Encourage maximum independence but intervene and supervise when necessary  - Involve family in performance of ADLs  - Assess for home care needs following discharge   - Consider OT consult to assist with ADL evaluation and planning for discharge  - Provide patient education as appropriate  Outcome: Progressing  Goal: Maintains/Returns to pre admission functional level  Description: INTERVENTIONS:  - Perform BMAT or MOVE assessment daily    - Set and communicate daily mobility goal to care team and patient/family/caregiver  - Collaborate with rehabilitation services on mobility goals if consulted  - Perform Range of Motion 3 times a day  - Reposition patient every 2 hours    - Dangle patient 2 times a day  - Stand patient 2 times a day  - Ambulate patient 2 times a day  - Out of bed to chair 2 times a day   - Out of bed for meals 2 times a day  - Out of bed for toileting  - Record patient progress and toleration of activity level   Outcome: Progressing     Problem: CARDIOVASCULAR - ADULT  Goal: Maintains optimal cardiac output and hemodynamic stability  Description: INTERVENTIONS:  - Monitor I/O, vital signs and rhythm  - Monitor for S/S and trends of decreased cardiac output  - Administer and titrate ordered vasoactive medications to optimize hemodynamic stability  - Assess quality of pulses, skin color and temperature  - Assess for signs of decreased coronary artery perfusion  - Instruct patient to report change in severity of symptoms  Outcome: Progressing  Goal: Absence of cardiac dysrhythmias or at baseline rhythm  Description: INTERVENTIONS:  - Continuous cardiac monitoring, vital signs, obtain 12 lead EKG if ordered  - Administer antiarrhythmic and heart rate control medications as ordered  - Monitor electrolytes and administer replacement therapy as ordered  Outcome: Progressing     Problem: Potential for Falls  Goal: Patient will remain free of falls  Description: INTERVENTIONS:  - Educate patient/family on patient safety including physical limitations  - Instruct patient to call for assistance with activity   - Consult OT/PT to assist with strengthening/mobility   - Keep Call bell within reach  - Keep bed low and locked with side rails adjusted as appropriate  - Keep care items and personal belongings within reach  - Initiate and maintain comfort rounds  - Make Fall Risk Sign visible to staff  - Offer Toileting every 2 Hours, in advance of need  - Initiate/Maintain alarm  - Obtain necessary fall risk management equipment:   - Apply yellow socks and bracelet for high fall risk patients  - Consider moving patient to room near nurses station  Outcome: Progressing     Problem: Prexisting or High Potential for Compromised Skin Integrity  Goal: Skin integrity is maintained or improved  Description: INTERVENTIONS:  - Identify patients at risk for skin breakdown  - Assess and monitor skin integrity  - Assess and monitor nutrition and hydration status  - Monitor labs   - Assess for incontinence   - Turn and reposition patient  - Assist with mobility/ambulation  - Relieve pressure over bony prominences  - Avoid friction and shearing  - Provide appropriate hygiene as needed including keeping skin clean and dry  - Evaluate need for skin moisturizer/barrier cream  - Collaborate with interdisciplinary team   - Patient/family teaching  - Consider wound care consult   Outcome: Progressing

## 2022-05-04 ENCOUNTER — TRANSITIONAL CARE MANAGEMENT (OUTPATIENT)
Dept: INTERNAL MEDICINE CLINIC | Facility: CLINIC | Age: 82
End: 2022-05-04

## 2022-05-04 RX ORDER — AMIODARONE HYDROCHLORIDE 200 MG/1
200 TABLET ORAL DAILY
Qty: 30 TABLET | Refills: 0 | Status: SHIPPED | OUTPATIENT
Start: 2022-05-04 | End: 2022-05-19 | Stop reason: SDUPTHER

## 2022-05-04 NOTE — QUICK NOTE
Received message from patient care regarding patient's amiodarone  She is requesting those [facility]  Case management discussed with patient that he cannot [facility]  Medicaid amiodarone was [facility] to [facility] pharmacy  [facility] received a script for amiodarone could not fill because there was an old prescription that was already filled as another pharmacy, [facility]  [facility] store pharmacy was called and discussed again and they confirmed this  Will attempt to [facility] as, but patient has already filled this prescription for [facility] store form see another pharmacy

## 2022-05-04 NOTE — UTILIZATION REVIEW
Notification of Discharge   This is a Notification of Discharge from our facility [address]  Please be advised that this patient has been discharge from our facility  Below you will find the admission and discharge date and time including the patients disposition  UTILIZATION REVIEW CONTACT:  Kaycee Arenas  Utilization   Network Utilization Review Department  Phone: [phone] x carefully listen to the prompts  All voicemails are confidential   Email: [email or web address]     PHYSICIAN ADVISORY SERVICES:  FOR FWEQ-MR-YVJC REVIEW - MEDICAL NECESSITY DENIAL  Phone: [phone]  Fax: [phone]  Email: [email or web address]     PRESENTATION DATE: 4/27/2022  4:11 PM  OBERVATION ADMISSION DATE:   INPATIENT ADMISSION DATE: 4/27/22  4:11 PM   DISCHARGE DATE: 5/3/2022  3:30 PM  DISPOSITION: Home with [place] with [address] INFORMATION:  Send all requests for admission clinical reviews, approved or denied determinations and any other requests to dedicated fax number below belonging to the campus where the patient is receiving treatment   List of dedicated fax numbers:  [address] [place] [address] DENIALS (Administrative/Medical Necessity) [phone]   [address] (Maternity/NICU/Pediatrics) [phone]   [facility] [phone]   [address] [facility] [phone]   [address] [place] [address] [phone]   [address] [place] [address] [address] [place] [address],[address] [phone]   [facility]  [phone]   [address] [place] [address], [facility]  [address] [facility] [address] [facility] [phone]

## 2022-05-06 ENCOUNTER — OFFICE VISIT (OUTPATIENT)
Dept: OBGYN CLINIC | Facility: CLINIC | Age: 82
End: 2022-05-06
Payer: COMMERCIAL

## 2022-05-06 ENCOUNTER — TELEPHONE (OUTPATIENT)
Dept: RADIOLOGY | Facility: HOSPITAL | Age: 82
End: 2022-05-06

## 2022-05-06 VITALS
DIASTOLIC BLOOD PRESSURE: 76 MMHG | BODY MASS INDEX: 19.99 KG/M2 | HEART RATE: 80 BPM | HEIGHT: 65 IN | WEIGHT: 120 LBS | SYSTOLIC BLOOD PRESSURE: 145 MMHG

## 2022-05-06 DIAGNOSIS — S52.572A OTHER CLOSED INTRA-ARTICULAR FRACTURE OF DISTAL END OF LEFT RADIUS, INITIAL ENCOUNTER: Primary | ICD-10-CM

## 2022-05-06 PROCEDURE — 1111F DSCHRG MED/CURRENT MED MERGE: CPT | Performed by: FAMILY MEDICINE

## 2022-05-06 PROCEDURE — 99204 OFFICE O/P NEW MOD 45 MIN: CPT | Performed by: FAMILY MEDICINE

## 2022-05-06 NOTE — PROGRESS NOTES
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  Physical exam noted for mild tenderness distal radius at the dorsal and volar aspect of the radiocarpal joint and mild tenderness at the snuffbox  She has limited range of motion of the wrist with extension and flexion  She has limited thumb CMC joint range of motion  She has normal sensation and radial pulse in the hand  Clinical impression is that she is symptomatic from acute fracture with aggravated arthritis  I discussed treatment in form of stabilization/protection  Surgery is not warranted at this time  I will provide with cock-up splint to be worn at most times and may remove for hygiene purposes  She will follow up in 3 weeks at which point we will repeat x-rays of left wrist and she will be re-evaluated  Subjective:   Patient ID: Noemi Simmonds is a [de-identified] y o  female  Chief Complaint   Patient presents with    Left Wrist - Fracture, Pain       81-year-old right-hand-dominant female presents for evaluation of left wrist pain from fall injury at home on 04/24/2022  She fell backward landing on her outstretched hand  She had pain described as sudden in onset, generalized to the wrist, nonradiating, achy and throbbing, worse with movement, and improved with resting  She immediately applied ice  After few hours there was associated swelling and pain worsened    She was taken to the emergency room where x-ray evaluation was noted for fracture of the wrist   At the time she was found to be and AFib with RVR  She was stabilized in short-arm splint and hospitalized  She reports that after few days of being in the splint intense pain subsided  She states for past few days she has been feeling good and has not needed to take any medication for pain  Pain described as mild intensity and achy and sore  Wrist Pain  This is a new problem  The current episode started 1 to 4 weeks ago  The problem occurs daily  The problem has been gradually improving  Associated symptoms include arthralgias  Pertinent negatives include no abdominal pain, chest pain, chills, fever, joint swelling, numbness, rash, sore throat or weakness  Exacerbated by: Wrist use  She has tried rest and immobilization for the symptoms  The treatment provided mild relief  The following portions of the patient's history were reviewed and updated as appropriate: She  has a past medical history of Atrial fibrillation ([address] ) and Hypertension  She  has a past surgical history that includes Appendectomy; Breast surgery; Dilation and curettage of uterus; Other surgical history; Laparoscopy for ectopic pregnancy; Cataract extraction, bilateral; Cardiac electrophysiology procedure (N/A, 4/28/2022); Cardiac electrophysiology procedure (N/A, 4/28/2022); and IR embolization (specify vessel or site) (4/28/2022)  Her family history includes Hypertension in her mother; Ovarian cancer in her mother; Pneumonia in her father  She  reports that she quit smoking about 16 years ago  She has a 10 00 pack-year smoking history  She has never used smokeless tobacco  She reports current alcohol use  She reports that she does not use drugs  She has No Known Allergies       Review of Systems   Constitutional: Negative for chills and fever  HENT: Negative for sore throat  Eyes: Negative for visual disturbance  Respiratory: Negative for shortness of breath  Cardiovascular: Negative for chest pain  Gastrointestinal: Negative for abdominal pain  Genitourinary: Negative for flank pain  Musculoskeletal: Positive for arthralgias  Negative for joint swelling  Skin: Negative for rash and wound  Neurological: Negative for weakness and numbness  Hematological: Bruises/bleeds easily  Psychiatric/Behavioral: Negative for self-injury  Objective:  Vitals:    05/06/22 1408   BP: 145/76   Pulse: 80   Weight: 54 4 kg (120 lb)   Height: 5' 5" (1 651 m)     Left Hand Exam     Muscle Strength   The patient has normal left wrist strength  Tests   Finkelstein's test: negative    Other   Sensation: normal  Pulse: present          Observations     Left Wrist/Hand   Negative for deformity  Tenderness     Left Wrist/Hand   Tenderness in the [facility] and scaphoid  Additional Tenderness Details  Left  -radiocarpal dorsum and volar aspects    Active Range of Motion     Left Wrist   Wrist flexion: 30 degrees with pain  Wrist extension: 20 degrees with pain    Additional Active Range of Motion Details  Limited range of motion of left thumb CMC    Strength/Myotome Testing     Left Wrist/Hand   Normal wrist strength     (2nd hand position)     Trial 1: 5    Tests     Left Wrist/Hand   Positive distal radial-ulnar joint stress  Negative AIN OK sign, Finkelstein's and TFCC load  Physical Exam  Vitals and nursing note reviewed  Constitutional:       General: She is not in acute distress  Appearance: She is well-developed  She is not ill-appearing or diaphoretic  HENT:      Head: Normocephalic  Right Ear: External ear normal       Left Ear: External ear normal    Eyes:      Conjunctiva/sclera: Conjunctivae normal    Neck:      Trachea: No tracheal deviation  Cardiovascular:      Rate and Rhythm: Normal rate  Pulmonary:      Effort: Pulmonary effort is normal  No respiratory distress  Abdominal:      General: There is no distension  Musculoskeletal:         General: Tenderness present  No swelling, deformity or signs of injury  Left hand: No deformity  Skin:     General: Skin is warm and dry  Coloration: Skin is not jaundiced or pale  Neurological:      Mental Status: She is alert and oriented to person, place, and time  Psychiatric:         Mood and Affect: Mood normal          Behavior: Behavior normal          Thought Content: Thought content normal          Judgment: Judgment normal          I have personally reviewed pertinent films in PACS and my interpretation is Nondisplaced distal radial fracture

## 2022-05-09 ENCOUNTER — TELEPHONE (OUTPATIENT)
Dept: CARDIOLOGY CLINIC | Facility: CLINIC | Age: 82
End: 2022-05-09

## 2022-05-09 NOTE — TELEPHONE ENCOUNTER
Called pt but phone goes straight to voicemail  Left message for pt to cb to ask for further info on her symptoms  Pt had recent ablation was released on 5/3/22  Please advise

## 2022-05-09 NOTE — TELEPHONE ENCOUNTER
Received a call from [address] [place] [address] from [address]  Pt care coordinator she would like for Dr Kenan Draper to get back to the pt before her appt pt is having dizziness  They think its due to meds  They are worried because pt has had a reoccurrence of falls and the dont want her to be dizzy and be at risk of falling  I called pt but goes to voice mail  Please advise  Pt has appt for 5/17/22 but they feel she has too much dizziness  Please advise

## 2022-05-09 NOTE — TELEPHONE ENCOUNTER
I called patient to schedule an appointment w/EP this week but patient wanted to know what this appointment was for stating she did not think it was related to her ablation  Patient also stated she is looking for a local cardiologist  Patient would like a call from clinical to explain to her why she needs this appointment  No appt was scheduled at this time w/EP

## 2022-05-09 NOTE — TELEPHONE ENCOUNTER
Patient left a vm  She  was in [facility] for Ablation which she states there were complications  She was discharged on Tuesday and is on Amiodarone and Eliquis  She states that she still is feeling dizzy and unsteady       [phone]

## 2022-05-09 NOTE — TELEPHONE ENCOUNTER
Patient declined appointment with Dr Jannet García or one of his PA's  Patient is scheduled with Damion Rosales PA-C 5/17/2022

## 2022-05-10 ENCOUNTER — TELEPHONE (OUTPATIENT)
Dept: INTERNAL MEDICINE CLINIC | Facility: CLINIC | Age: 82
End: 2022-05-10

## 2022-05-10 NOTE — TELEPHONE ENCOUNTER
Spoke with patient and informed her that her BP is good, dizziness does not seem to be from heart  Patient denies ear or neck pain  Patient was advised to see PCP  Patient does not want to see PCP because she feels confident that the dizziness is coming from Amiodarone because she has read up on the medication and it states dizziness can be a side affect of the medication  Patient states she is frustrated because no one is listening to her

## 2022-05-10 NOTE — TELEPHONE ENCOUNTER
PT discharged from [facility] 4/27/22   Needs Trans of Care Appt      Please call to schedule     Jose Guadalupe Khalil # [phone]

## 2022-05-11 NOTE — TELEPHONE ENCOUNTER
Patient is scheduled w/Dr Arely Harris on Friday, 5/13 @ 4pm   Antonio's schedule for today and Friday is at [address] [place] [address]

## 2022-05-13 ENCOUNTER — OFFICE VISIT (OUTPATIENT)
Dept: CARDIOLOGY CLINIC | Facility: CLINIC | Age: 82
End: 2022-05-13
Payer: COMMERCIAL

## 2022-05-13 VITALS
HEART RATE: 75 BPM | HEIGHT: 65 IN | DIASTOLIC BLOOD PRESSURE: 80 MMHG | SYSTOLIC BLOOD PRESSURE: 134 MMHG | WEIGHT: 120 LBS | BODY MASS INDEX: 19.99 KG/M2

## 2022-05-13 DIAGNOSIS — I48.0 PAROXYSMAL ATRIAL FIBRILLATION (HCC): Primary | ICD-10-CM

## 2022-05-13 PROCEDURE — 93000 ELECTROCARDIOGRAM COMPLETE: CPT | Performed by: INTERNAL MEDICINE

## 2022-05-13 PROCEDURE — 99213 OFFICE O/P EST LOW 20 MIN: CPT | Performed by: INTERNAL MEDICINE

## 2022-05-13 NOTE — PROGRESS NOTES
HEART AND VASCULAR  CARDIAC ELECTROPHYSIOLOGY   HEART RHYTHM CENTER  [facility]    Outpatient Follow-up  Today's Date: 05/13/22        Patient name: Noemi Simmonds  YOB: 1940  Sex: female         Chief Complaint: f/u afib       ASSESSMENT:  Problem List Items Addressed This Visit        Cardiovascular and Mediastinum    Atrial fibrillation [facility]) - Primary    Relevant Orders    POCT ECG          [de-identified] yo female  1)  Post afib/flutter ablation 4/28/22 for refractory afib w RVR despite dccv and amiodarone  Since ablation no recurrence  Remains on Amiodarone 200mg daily  2) Spontaneous Rectus sheath hematoma from HTN post op from ablation, embolized in IR  3) Dizzyness, when gets up  BP is normal  HR 70s  4) IR femoral access she had extrusion of the angioseal plug  U/s neg for pseudoaneurysm  Swelling and TTP improved  PLAN:  1) Decrease amiodarone to 100mg daily  Stop in 3 months  Would resume only if afib recurs  2) f/u w Dr Shyam Campbell This Encounter   Procedures    POCT ECG     There are no discontinued medications    HPI/Subjective:       [de-identified] yo female  1)  Post afib/flutter ablation 4/28/22 for refractory afib w RVR despite dccv and amiodarone  Since ablation no recurrence  Remains on Amiodarone 200mg daily  2) Spontaneous Rectus sheath hematoma from HTN post op from ablation, embolized in IR  3) Dizzyness, when gets up  BP is normal  HR 70s  4) IR femoral access she had extrusion of the angioseal plug  U/s neg for pseudoaneurysm  Swelling and TTP improved  Jonathon Edouard is well appearing, improving since her hospitalizatin  She just wanted to see someone in f/u for her afib and check her groins, abdomen etc  She gets dizzy when she gets up quickly  No recurrent afib         Please note HPI is listed by problem with with update following it, it is copied again in the assessment above and reflects medical decision making as well  Complete 12 point ROS reviewed and otherwise non pertinent or negative except as per HPI pertinent positives in Cardiovascular and Respiratory emphasized  Please see paper chart for outpatient clinic patients where the patient completed the 12 point ROS survey  Past Medical History:   Diagnosis Date    Atrial fibrillation ([facility] [address] )     Hypertension        No Known Allergies  I reviewed the Home Medication list and Allergies in the chart  Scheduled Meds:  Current Outpatient Medications   Medication Sig Dispense Refill    amiodarone 200 mg tablet Take 1 tablet (200 mg total) by mouth daily 30 tablet 0    Eliquis 2 5 MG TAKE 1 TABLET TWICE A DAY  NEED APPOINTMENT WITH DR Torsten Rosales FOR FURTHER REFILLS 90 tablet 7    Glucosamine-Chondroit-Vit C-Mn (GLUCOSAMINE 1500 COMPLEX PO) Take 1 capsule by mouth daily      triamcinolone (KENALOG) 0 5 % ointment Apply topically as needed        amiodarone 200 mg tablet Take 1 tablet (200 mg total) by mouth daily (Patient not taking: Reported on 2022) 30 tablet 0     No current facility-administered medications for this visit       PRN Meds:         Family History   Problem Relation Age of Onset    Hypertension Mother     Ovarian cancer Mother     Pneumonia Father        Social History     Socioeconomic History    Marital status: /Civil Union     Spouse name: Not on file    Number of children: Not on file    Years of education: Not on file    Highest education level: Not on file   Occupational History    Not on file   Tobacco Use    Smoking status: Former Smoker     Packs/day: 0 50     Years: 20 00     Pack years: 10 00     Quit date: 2006     Years since quittin 3    Smokeless tobacco: Never Used   Vaping Use    Vaping Use: Never used   Substance and Sexual Activity    Alcohol use: Not Currently     Comment: social     Drug use: No    Sexual activity: Not Currently Other Topics Concern    Not on file   Social History Narrative    History of advance directive      Social Determinants of Health     Financial Resource Strain: Not on file   Food Insecurity: Not on file   Transportation Needs: Not on file   Physical Activity: Sufficiently Active    Days of Exercise per Week: 5 days    Minutes of Exercise per Session: 40 min   Stress: Stress Concern Present    Feeling of Stress : To some extent   Social Connections: Not on file   Intimate Partner Violence: Not on file   Housing Stability: Not on file         OBJECTIVE:    /80 (BP Location: Left arm, Patient Position: Sitting, Cuff Size: Standard)   Pulse 75   Ht 5' 5" (1 651 m)   Wt 54 4 kg (120 lb)   BMI 19 97 kg/m²   Vitals:    05/13/22 1610   Weight: 54 4 kg (120 lb)     GEN: No acute distress, Alert and oriented, well appearing  HEENT:External ears normal, oral pharynx clear, mucous membranes moist  EYES: Pupils equal, sclera anicteric, midline, normal conjuctiva  NECK: No JVD, supple, no obvious masses or thryomegaly or goiter  CARDIOVASCULAR:  RRR, No murmur, rub, gallops S1,S2  LUNGS: Clear To auscultation bilaterally, normal effort, no rales, rhonchi, crackles   ABDOMEN:  nondistended,  without obvious organomegaly or ascites  EXTREMITIES/VASCULAR:  No edema  warm an well perfused  PSYCH: Normal Affect,  linear speech pattern without evidence of psychosis  NEURO: Grossly intact, moving all extremiteis equal, face symmetric, alert and responsive, no obvious focal defecits   GAIT:  Ambulates normally without difficulty  HEME: No bleeding, bruising, petechia, purpura SKIN: No significant rashes on visibile skin, warm, no diaphoresis or pallor  Left groin w/o hematoma, mild swelling, no discharge  Abdomen is bruised and slightly swollen on right, no hematoma     Lab Results:       LABS:      Chemistry        Component Value Date/Time     04/29/2015 0726    K 4 0 05/02/2022 0436    K 4 5 04/29/2015 0726  05/02/2022 0436     04/29/2015 0726    CO2 28 05/02/2022 0436    CO2 28 3 04/29/2015 0726    BUN 16 05/02/2022 0436    BUN 13 04/29/2015 0726    CREATININE 0 87 05/02/2022 0436    CREATININE 0 7 04/29/2015 0726        Component Value Date/Time    CALCIUM 8 7 05/02/2022 0436    CALCIUM 9 1 04/29/2015 0726    ALKPHOS 71 04/25/2022 0557    ALKPHOS 54 04/29/2015 0726    AST 14 04/25/2022 0557    AST 15 04/29/2015 0726    ALT 18 04/25/2022 0557    ALT 21 04/29/2015 0726    BILITOT 0 6 04/29/2015 0726            Lab Results   Component Value Date    CHOL 241 04/29/2015     Lab Results   Component Value Date     11/12/2021    HDL 92 (H) 04/25/2019     (H) 05/24/2017     Lab Results   Component Value Date    LDLCALC 106 (H) 11/12/2021    LDLCALC 106 (H) 04/25/2019    LDLCALC 92 05/24/2017     Lab Results   Component Value Date    TRIG 55 11/12/2021    TRIG 64 04/25/2019    TRIG 52 05/24/2017     No results found for: CHOLHDL    IMAGING: XR wrist 3+ views LEFT    Result Date: 4/24/2022  Narrative: LEFT WRIST INDICATION:   wrist injury  COMPARISON:  None VIEWS:  XR WRIST 3+ VW LEFT FINDINGS: There is a fracture of the distal left radius, involving the articular surface, without significant displacement  Severe degenerative changes with chronic subluxation of the 1st carpal metacarpal [place]) articulation  No lytic or blastic osseous lesion  Suggestion of mild left wrist soft tissue swelling  Impression: There is a fracture of the distal left radius, involving the articular surface, without significant displacement  The study was marked in [facility] for immediate notification  Workstation performed: [id number]     CT head without contrast    Result Date: 4/24/2022  Narrative: CT BRAIN - WITHOUT CONTRAST INDICATION:   fall on eliquis  COMPARISON:  None  TECHNIQUE:  CT examination of the brain was performed    In addition to axial images, sagittal and coronal 2D reformatted images were created and submitted for interpretation  Radiation dose length product (DLP) for this visit:  762 mGy-cm   This examination, like all CT scans performed in the [facility], was performed utilizing techniques to minimize radiation dose exposure, including the use of iterative reconstruction and automated exposure control  IMAGE QUALITY:  Diagnostic  FINDINGS: PARENCHYMA: Decreased attenuation is noted in periventricular and subcortical white matter demonstrating an appearance that is statistically most likely to represent mild microangiopathic change  No CT signs of acute infarction  No intracranial mass, mass effect or midline shift  No acute parenchymal hemorrhage  Intracranial atherosclerotic calcification is noted  VENTRICLES AND EXTRA-AXIAL SPACES:  Normal for the patient's age  VISUALIZED ORBITS AND PARANASAL SINUSES:  Unremarkable  CALVARIUM AND EXTRACRANIAL SOFT TISSUES:  Normal      Impression: No acute intracranial abnormality  Microangiopathic changes  Workstation performed: [id number]     Cardiac ep lab eps/ablations    Result Date: 2022  Narrative: [address] [place] [address], [address] [place] [address] Rd PT NAME: Demetria Damon MRN: [id number] : 1940 PERFORMING/ATTENDING PHY: Carlos Schuster REFERRING PHY: Dr Banks Webb: 22 PREOPERATIVE DIAGNOSIS: Atrial fibrillation paroxysmal PROCEDURES PERFORMED 1  Atrial fibrillation ablation using Cryoablation  2  Cavotricuspid Isthmus Right atrial flutter ablation 3  3-D map with NAVX 4  Intracardiac Echocardiography ICE  5  Left atrial pacing and recording  ANESTHESIA General anesthesia  PREOPERATIVE MEDICATIONS: None INFORMED CONSENT: Risks, benefits, and alternatives to atrial fibrillation ablation and associated procedures discussed   The patient understood risks include but not limited to death, esophageal injury, phrenic nerve injury (diaphragm), stroke, bleeding and vascular complication, and bleeding around the heart  DESCRIPTION OF PROCEDURE The patient was draped in normal sterile fashion  An 6 Salvadorean sheathwas placed in the right femoral vein, and a 7F sheath was placed in right femoral vein  An 8 Western Carmita long sheath was placed in the left femoral vein, a 7 Salvadorean sheath was placed in the left femoral vein  A coronary sinus catheter was placed under  3-D mapping  3-D mapping and fluoroscopy to create right atrial map, bundle of His recording and RV recording and LA Recording  Single transseptal puncture was performed  The patient had been heparinized with a weight based heparin bolus before transeptal and repeat bolus to to maintain -350s while in the left atrium  A BRK1 was placed through an SLO and intracardiac ICE was used to guide transseptal   BRK1 was pushed through the fossa ovalis and we entered the left atrium without difficulty  A angie pigtail wire was wired up to the left superior pulmonary vein and the Cryoablation 11F sheath was then brought into the left atrium, dilator removed  The Achieve Spiral Catheter and cryoballoon were placed in the Left Atrium  Cryoablation was performed by placing the Achieve catheter in a pulmonary vein, then the balloon was inflated and contrast venography showed occlusion and antral position of the balloon  Cryoablation was performed at 2-4 minutes  If inadequate temperatures were reached we stopped freeze in less than a minute  LEFT SUPERIOR PULMONARY VEIN (LSPV): 1) 120s; maximum neg 41 degrees Celsius  Thaw time: 7s  Time to effect (time to isolation): n/a 2) 180s; maximum neg 44 degrees Celsius  Thaw time: 6s  Time to effect (time to isolation): n/a 3) 180s; maximum neg 42 degrees Celsius  Thaw time: 9s  Time to effect (time to isolation): n/a LEFT INFERIOR PULMONARY VEIN (LIPV) 1) 180s; maximum neg 40 degrees Celsius   Thaw time:5s Time to effect (time to isolation): 25s 2) 180s; maximum neg 40 degrees Celsius  Thaw time: 7sTime to effect (time to isolation): n/a RIGHT INFERIOR PULMONARY VEIN (RIPV) 1) 150s; maximum neg 48degrees Celsius  Thaw time:13s Time to effect (time to isolation): 38s RIGHT SUPERIOR PULMONARY VEIN (RSPV) 1) 150s; maximum neg 57 degrees Celsius  Thaw time:13s Time to effect (time to isolation): 23s Esophageal probe was monitored, At no point in this case did esophageal temperature decrease less than 32 degrees  On the RIGHT sided veins care was taken to avoid phrenic injury by ensuring proximal position of balloon, and High output 20mA pacing at 1000ms from the SVC with diaphragm excursion measured throughout  At no point during this case did diaphragm excursion attenuate  Both right and left veins were isolated with bidirectional block achieved by pacing during sinus rhythm and confirming entrance and exit block  RIGHT ATRIAL CAVOTRICUSPID ISTHMUS (CT I) ATRIAL FLUTTER ABLATION: She has a history of atrial n empiric CTI flutter ablation was performed as additional lesion set to assist in ablation of atrial fibrillation  An Octopolar duodecapolar catheter was placed around the tricuspid annulus  Ablation was performed with a [place] Tacticath Catheter at [facility] on the CTI  We were pacing the CS at 600ms and looking for block during ablation  Ablation was performed at 15s lesion sets  Block was achieved, confirmed to be bidirectional block 170ms septal to lateral, 170ms lateral to septal, with characteristic block pattern going around the tricuspid annulus  Acheiving block was not difficult  We had done the initial flutter ablation before the transseptal and then rechecked after pulmonary vein isolation over 1 hour later and cavotricuspid isthmus block was still present   A complete EPS was performed:  EPS RESULTS: AH: 76ms HV: 41ms AV WENCHEBACH:500ms V PACING: No VA conduction COMPLICATIONS: None EBL: Minimal  CONTRAST: aniket After the case heparin was reversed with weight based protamine dose and sheaths were removed with manual pressure and hemostasis achieved  POSTOPERATIVE DIAGNOSIS: Same as preop  Successful ablation of atrial fibrillation with Bidirectional Block in 4 pulmonary veins   Successful Atrial Flutter ablation with bidirectional block achieved  The patient tolerated the procedure well and had no neurological deficits upon awaking  Intracardiac ICE confirmed no effusion at the end of the case  PLAN Postoperative monitoring overnight  Resume oral anticoagulation  Follow-up in approximately 4 weeks for  adjustment of rhythm control medications  Recommend ant-acid treatment for one month  VAS pseudoaneurysm study    Result Date: 5/2/2022  Narrative:  THE VASCULAR CENTER REPORT CLINICAL: Indications:  Patient presents with pain and swelling in the left groin area S/P IR Right inferior epigastric artery embolization via the left femoral artery  Operative History: 2022-04-28 Right IR Inferior Epigastric Artery Embolization Risk Factors The patient has history of Hyperlipidemia  FINDINGS:  Left                   PSV (cm/s)  Dist EIA                      107  Common Femoral Artery          78  Prox Profunda                 110  Prox SFA                       80  Mid SFA                        89     CONCLUSION:   Impression: No evidence of a pseudoaneurysm in the femoral artery  The common femoral vein is patent with no evidence of an arteriovenous fistula noted  SIGNATURE: Electronically Signed by: Mook Zimmerman on 2022-05-02 06:04:59 PM    CT abdomen pelvis w contrast    Result Date: 4/28/2022  Narrative: CT ABDOMEN AND PELVIS WITH IV CONTRAST INDICATION:   Assess for RP bleed, right femoral vein access site hematoma or active bleeding  COMPARISON:  CT chest/abdomen/pelvis 2/4/2022 TECHNIQUE:  CT examination of the abdomen and pelvis was performed   Axial, sagittal, and coronal 2D reformatted images were created from the source data and submitted for interpretation  Radiation dose length product (DLP) for this visit:  359 96 mGy-cm   This examination, like all CT scans performed in the [facility], was performed utilizing techniques to minimize radiation dose exposure, including the use of iterative  reconstruction and automated exposure control  IV Contrast:  85 mL of iodixanol (VISIPAQUE) Enteric Contrast:  Enteric contrast was not administered  FINDINGS: ABDOMEN LOWER CHEST:  Atelectatic changes are noted at the lung bases  LIVER/BILIARY TREE:  Unremarkable  GALLBLADDER:  Mildly distended gallbladder without apparent pericholecystic inflammation or wall thickening  SPLEEN:  Unremarkable  PANCREAS:  Unremarkable  ADRENAL GLANDS:  Unremarkable  KIDNEYS/URETERS:  Unremarkable  No hydronephrosis  STOMACH AND BOWEL:  Colonic diverticulosis APPENDIX:  No findings to suggest appendicitis  ABDOMINOPELVIC CAVITY:  There is right-sided retroperitoneal hemorrhage along the medial and anterior to the psoas muscle, in the right paracolic cutter and along medial inferior right hepatic lobe  VESSELS:  Atherosclerotic changes are present  No evidence of aneurysm  PELVIS REPRODUCTIVE ORGANS:  Unremarkable for patient's age  URINARY BLADDER:  Unremarkable  ABDOMINAL WALL/INGUINAL REGIONS:  There is large right rectus sheath hematoma containing focus of contrast blush seen on series 2 images 64-68)  The hematoma measures approximately 4 5 x 7 5 cm in maximum axial plane and 17 cm craniocaudally  OSSEOUS STRUCTURES:  S-shaped scoliosis with significant dextroscoliotic curvature at level L1-2  Degenerative change of lumbar spine  No acute or aggressive appearing osseous abnormality  Impression: 1  Large right abdominal rectus sheath hematoma containing a focus of contrast blush concerning for active bleed   2   Smaller right-sided retroperitoneal hemorrhage tracking medial and anterior to the psoas muscle, within the right paracolic gutter, and along the inferomedial right hepatic lobe  3   Markedly distended gallbladder without obvious pericholecystic inflammation or wall thickening  Consider further ultrasound assessment if acute cholecystitis is clinically suspected  4   Colonic diverticulosis  I personally discussed this study with Sky White on 4/28/2022 at 8:02 PM  Workstation performed: [id number]     IR embolization (specify vessel or site)    Result Date: 4/29/2022  Narrative: Procedure: 1  Ultrasound-guided left femoral artery access  2   Pelvic arteriogram  3   Right external iliac artery angiogram  4   Right inferior epigastric artery arteriogram  5   Right inferior mesenteric artery embolization  6   Vascular closure device  History: 81-year-old female status post cardiac ablation with right rectus sheath hematoma  Contrast: 100 mL of Omnipaque 350 Fluoro time: 14 5 minutes Sedation: Moderate conscious sedation was utilized under my direct supervision administered by trained independent provider  A total of 120 minutes sedation time was utilized  I was present at the initial dose of sedation medication  Technique/findings: Risks, benefits and alternatives were explained to the patient/patients relatives  Questions were answered  Written consent was documented  Once patient was in the radiology area a timeout was performed identifying patient and procedure  Patient was placed in the supine position on the fluoroscopy table  Bilateral groins were prepped and draped in the usual sterile fashion  The left common femoral artery was evaluated as a potential access site with ultrasound  The vessel was found to be patent with pulsatile flow  Lidocaine was administered to the skin and a small skin incision was made  Under real time ultrasound guidance with permanent recording and reporting, the left common femoral artery was accessed using the modified Seldinger technique   Static images of real time needle entry into the vessel were obtained  An 018 wire was advanced into the abdominal aorta through the needle  Transition sheath over the wire was not difficult  The 018 wire was then exchanged through a transition sheath for the floppy-tipped Bentson wire, the Bentson wire was advanced into the abdominal aorta  Transition sheath was exchanged for a 5 Western Carm[place] vascular sheath, was inserted and connected to a flush bag  Omni Flush catheter was advanced over the wire into the abdominal aorta  An pelvic arteriogram was performed  A glide catheter and wire combination were advanced into the external iliac artery  Interim was performed demonstrating the origin of the right inferior epigastric artery  A low-grade microcatheter and fathom wire were used to select the right inferior epigastric artery  Angiogram was performed demonstrating no active contrast extravasation  Given the clinical picture and known right rectus sheath hematoma with active contrast extravasation the decision to empirically embolize the artery was made  Gelfoam and nondetachable coils were deployed  Repeat angiogram demonstrated no flow distal to the embolization coils  All catheters were removed though the sheath  Groin access site angiogram was performed demonstrating suitable access site for groin closure device  Groin access site was closed with Mynx device  No Hematoma at the end of the procedure  Impression: Impression: 1  Technically successful right inferior epigastric artery embolization  Workstation performed: [id number]     Pre Ablation LUIS EDUARDO    Result Date: 4/29/2022  Narrative: Gabriela Sebastian  Left Ventricle: Left ventricular cavity size is normal  The left ventricular ejection fraction is 60% by visual estimation  Global longitudinal strain is normal    Left Atrium: The atrium is mildly dilated  There is no thrombus  There is no spontaneous echo contrast    Atrial Septum: There is no atrial septal defect  No patent foramen ovale    Left Atrial Appendage:  There is a windsock appearance  There is normal function  There is no thrombus  There is no mass  There is no spontaneous echo contrast    Mitral Valve: There is mild regurgitation    Tricuspid Valve: There is mild regurgitation  Cardiac testing:   Results for orders placed during the hospital encounter of 21    Echo complete with contrast if indicated    Narrative  [address] [facility]  [phone]    Transthoracic Echocardiogram  2D, M-mode, Doppler, and Color Doppler    Study date:  2021    Patient: Afia Rogers  MR number: [id number]  Account number: [de-identified]  : 1940  Age: 79 years  Gender: Female  Status: Outpatient  [facility]: [facility]  Height: 65 in  Weight: 123 lb  BP: 134/ 82 mmHg    Indications: Atrial fibrillation  Diagnoses: I48 0 - Atrial fibrillation    Sonographer:  NAHUN Morales  Primary Physician:  Lacy Rodas  Referring Physician:  Janine Cadena MD  Group:  [facility]  Interpreting Physician:  Janine Cadena MD    SUMMARY    LEFT VENTRICLE:  Systolic function was mildly reduced  Ejection fraction was estimated to be 50 %  There was mild diffuse hypokinesis  Left ventricular diastolic function parameters were normal     RIGHT VENTRICLE:  The size was normal   Systolic function was normal     MITRAL VALVE:  There was mild regurgitation  TRICUSPID VALVE:  There was mild regurgitation  Estimated peak PA pressure was at least 50 mmHg  PULMONIC VALVE:  There was trace regurgitation  HISTORY: PRIOR HISTORY: Pulmonary Embolism, Former smoker, Atrial Fibrillation  PROCEDURE: The study was performed in the [address] [place] [address]  This was a routine study  The transthoracic approach was used  The study included complete 2D imaging, M-mode, complete spectral Doppler, and color Doppler  The  heart rate was 64 bpm, at the start of the study   Images were obtained from the parasternal, apical, subcostal, and suprasternal notch acoustic windows  Image quality was adequate  LEFT VENTRICLE: Size was normal  Systolic function was mildly reduced  Ejection fraction was estimated to be 50 %  There was mild diffuse hypokinesis  Wall thickness was normal  No evidence of apical thrombus  DOPPLER: Left ventricular  diastolic function parameters were normal     RIGHT VENTRICLE: The size was normal  Systolic function was normal  Wall thickness was normal     LEFT ATRIUM: Size was normal     RIGHT ATRIUM: Size was normal     MITRAL VALVE: Valve structure was normal  There was normal leaflet separation  DOPPLER: The transmitral velocity was within the normal range  There was no evidence for stenosis  There was mild regurgitation  AORTIC VALVE: The valve was trileaflet  Leaflets exhibited normal thickness and normal cuspal separation  DOPPLER: Transaortic velocity was within the normal range  There was no evidence for stenosis  There was no significant  regurgitation  TRICUSPID VALVE: The valve structure was normal  There was normal leaflet separation  DOPPLER: The transtricuspid velocity was within the normal range  There was no evidence for stenosis  There was mild regurgitation  Estimated peak PA  pressure was at least 50 mmHg  PULMONIC VALVE: Leaflets exhibited normal thickness, no calcification, and normal cuspal separation  DOPPLER: The transpulmonic velocity was within the normal range  There was trace regurgitation  PERICARDIUM: There was no pericardial effusion  The pericardium was normal in appearance  AORTA: The root exhibited normal size  SYSTEMIC VEINS: IVC: The inferior vena cava was normal in size      SYSTEM MEASUREMENT TABLES    2D  %FS: 26 19 %  Ao Diam: 3 19 cm  EDV(Teich): 78 28 ml  EF(Teich): 51 76 %  ESV(Teich): 37 76 ml  IVSd: 0 94 cm  LA Area: 16 34 cm2  LA Diam: 3 3 cm  LVEDV MOD A4C: 65 73 ml  LVEF MOD A4C: 40 94 %  LVESV MOD A4C: 38 82 ml  LVIDd: 4 19 cm  LVIDs: 3 09 cm  LVLd A4C: 6 67 cm  LVLs A4C: 6 15 cm  LVPWd: 0 92 cm  RA Area: 14 8 cm2  RVIDd: 3 92 cm  SV MOD A4C: 26 91 ml  SV(Teich): 40 52 ml    CW  AV MaxPG: 3 75 mmHg  AV Vmax: 0 97 m/s  MR Vmax: 4 4 m/s  MR maxP 52 mmHg  TR MaxP 5 mmHg  TR Vmax: 3 45 m/s    MM  TAPSE: 2 24 cm    PW  E' Sept: 0 08 m/s  E/E' Sept: 8 63  LVOT Vmax: 0 79 m/s  LVOT maxP 5 mmHg  MV A Javon: 0 66 m/s  MV Dec [place]: 3 95 m/s2  MV DecT: 171 74 ms  MV E Javon: 0 68 m/s  MV E/A Ratio: 1 03  MV PHT: 49 8 ms  MVA By PHT: 4 42 cm2    [facility] Accredited Echocardiography Laboratory    Prepared and electronically signed by    Manoj Medel MD  Signed 2021 20:12:35    No results found for this or any previous visit  No results found for this or any previous visit  Results for orders placed during the hospital encounter of 19    NM myocardial perfusion spect (stress and/or rest)    Narrative   [address] [place] [address]  [phone]    Rest/Stress Gated SPECT Myocardial Perfusion Imaging After Exercise    Patient: Raj Wright  MR number: [id number]  Account number: [de-identified]  : 1940  Age: 69 years  Gender: Female  Status: Outpatient  [facility]: [facility]  Height: 65 in  Weight: 130 lb  BP: 128/ 80 mmHg    Allergies: NO KNOWN ALLERGIES    Diagnosis: R00 2 - Palpitations, R06 09 - Other forms of dyspnea    Primary Physician:  Tom Reyes DO  Interpreting Physician:  Manoj Medel MD  Referring Physician:  Manoj Medel MD  Technician:  Rosemary Rivas BS, BA, AART(N)  Group:  [facility]  Other:  Juan Alberto Zapata MS, CCT    INDICATIONS: Detection of CAD    HISTORY: The patient is a 69year old  female  Chest pain status: no chest pain  Other symptoms: dyspnea of recent onset and palpitations   Coronary artery disease risk factors: hypertension and post-menopausal state  Cardiovascular history: none significant  Medications: an antihypertensive agent  REST ECG: Sinus bradycardia  PROCEDURE: The study was performed in the the [address] [place] [address]  Treadmill exercise testing was performed, using the Josseline protocol  Gated SPECT myocardial perfusion imaging was performed after stress and at rest  Systolic  blood pressure was 128 mmHg, at the start of the study  Diastolic blood pressure was 80 mmHg, at the start of the study  The heart rate was 54 bpm, at the start of the study  Patient was not experiencing chest pain at the time of the  injection of the radiopharmaceutical     JOSSELINE PROTOCOL:  HR bpm SBP mmHg DBP mmHg Symptoms ST change Rhythm/conduct  Baseline 54 128 80 none none sinus veronica  Stage 1 111 150 96 -- -- sinus tach  Stage 2 126 180 80 moderate dyspnea -- sinus tach  Immediate 125 -- -- -- -- sinus tach  Recovery 1 100 160 70 -- -- sinus tach  Recovery 2 63 -- -- -- -- NSR  Recovery 3 58 -- -- -- -- sinus veronica  Recovery 5 61 126 70 -- -- NSR  No medications or fluids given  STRESS SUMMARY: Duration of exercise was 4 min and 30 sec  The patient exercised to protocol stage 2  Maximal work rate was 7 METs  Functional capacity was normal  Maximal heart rate during stress was 126 bpm ( 88 % of maximal predicted  heart rate)  The heart rate response to stress was exaggerated  There was normal resting blood pressure with an appropriate response to stress  The rate-pressure product for the peak heart rate and blood pressure was 15287  There was no  chest pain during stress  The stress test was terminated due to achievement of target heart rate and moderate dyspnea  The stress ECG was negative for ischemia and normal  Arrhythmia during stress: isolated atrial premature beats and  isolated premature ventricular beats      ISOTOPE ADMINISTRATION:  Resting isotope administration Stress isotope administration  Agent Sestamibi Sestamibi  Dose 10 37 mCi 32 1 mCi  Date 06/06/2019 06/06/2019  Injection-image interval 30 min 45 min    The radiopharmaceutical was injected one minute before the end of exercise  The radiopharmaceutical was injected at the peak effect of pharmacologic stress  MYOCARDIAL PERFUSION IMAGING:  The image quality was good  Rotating projection images reveal mild breast attenuation and mild subdiaphragmatic activity  Left ventricular size was normal     PERFUSION DEFECTS:  -  There were no perfusion defects  GATED SPECT:  The calculated left ventricular ejection fraction was 57 %  Left ventricular ejection fraction was within normal limits by visual estimate  There was no diagnostic evidence for left ventricular regional abnormality  SUMMARY:  -  Stress results: Duration of exercise was 4 min and 30 sec  Target heart rate was achieved  There was no chest pain during stress  -  ECG conclusions: The stress ECG was negative for ischemia and normal  Arrhythmia during stress: isolated atrial premature beats and isolated premature ventricular beats  -  Perfusion imaging: There were no perfusion defects   -  Gated SPECT: The calculated left ventricular ejection fraction was 57 %  Left ventricular ejection fraction was within normal limits by visual estimate  There was no diagnostic evidence for left ventricular regional abnormality  IMPRESSIONS: Normal study after maximal exercise  Myocardial perfusion imaging was normal at rest and with stress   Left ventricular systolic function was normal     Prepared and signed by    Lalitha Owens MD  Signed 06/06/2019 19:03:58          I reviewed and interpreted the following LABS/EKG/TELE/IMAGING and below is summary of my interpretation (if data available):      Current EKG and Rhythm Strip: NSR normal EKG

## 2022-05-19 ENCOUNTER — TELEPHONE (OUTPATIENT)
Dept: OBGYN CLINIC | Facility: CLINIC | Age: 82
End: 2022-05-19

## 2022-05-19 ENCOUNTER — OFFICE VISIT (OUTPATIENT)
Dept: CARDIOLOGY CLINIC | Facility: CLINIC | Age: 82
End: 2022-05-19
Payer: COMMERCIAL

## 2022-05-19 VITALS
BODY MASS INDEX: 19.83 KG/M2 | SYSTOLIC BLOOD PRESSURE: 130 MMHG | HEIGHT: 65 IN | WEIGHT: 119 LBS | RESPIRATION RATE: 16 BRPM | DIASTOLIC BLOOD PRESSURE: 80 MMHG | OXYGEN SATURATION: 95 % | HEART RATE: 68 BPM

## 2022-05-19 DIAGNOSIS — I34.0 NONRHEUMATIC MITRAL VALVE REGURGITATION: ICD-10-CM

## 2022-05-19 DIAGNOSIS — I48.11 LONGSTANDING PERSISTENT ATRIAL FIBRILLATION (HCC): Primary | ICD-10-CM

## 2022-05-19 DIAGNOSIS — Z79.01 ANTICOAGULANT LONG-TERM USE: ICD-10-CM

## 2022-05-19 DIAGNOSIS — I27.20 PULMONARY HYPERTENSION (HCC): ICD-10-CM

## 2022-05-19 DIAGNOSIS — I10 ESSENTIAL HYPERTENSION: ICD-10-CM

## 2022-05-19 DIAGNOSIS — S30.1XXA RECTUS SHEATH HEMATOMA, INITIAL ENCOUNTER: ICD-10-CM

## 2022-05-19 DIAGNOSIS — I51.89 DIASTOLIC DYSFUNCTION: ICD-10-CM

## 2022-05-19 DIAGNOSIS — I42.8 OTHER CARDIOMYOPATHY (HCC): ICD-10-CM

## 2022-05-19 PROCEDURE — 99214 OFFICE O/P EST MOD 30 MIN: CPT | Performed by: INTERNAL MEDICINE

## 2022-05-19 PROCEDURE — 1111F DSCHRG MED/CURRENT MED MERGE: CPT | Performed by: INTERNAL MEDICINE

## 2022-05-19 PROCEDURE — 3079F DIAST BP 80-89 MM HG: CPT | Performed by: INTERNAL MEDICINE

## 2022-05-19 PROCEDURE — 3075F SYST BP GE 130 - 139MM HG: CPT | Performed by: INTERNAL MEDICINE

## 2022-05-19 RX ORDER — AMIODARONE HYDROCHLORIDE 100 MG/1
100 TABLET ORAL DAILY
Qty: 90 TABLET | Refills: 2
Start: 2022-05-19 | End: 2022-05-19 | Stop reason: SDUPTHER

## 2022-05-19 RX ORDER — AMIODARONE HYDROCHLORIDE 100 MG/1
100 TABLET ORAL DAILY
Qty: 90 TABLET | Refills: 2
Start: 2022-05-19 | End: 2022-06-18

## 2022-05-19 NOTE — TELEPHONE ENCOUNTER
I called the patient regarding her request for Dr Jill Meng to evaluate her bilateral knees at her follow up appointment for her left wrist  I spoke with Lala Marino, Dr Jill Meng' MA, who advised that the patient schedule a separate appointment for evaluation of her bilateral kneesI left a message for the patient to call back to schedule that appointment

## 2022-05-19 NOTE — PROGRESS NOTES
CARDIOLOGY OFFICE VISIT  [facility]  [address], [address] [place] [address], [place], [facility] Alex Mccartney  Tel: [phone]      NAME: Lauren Dasilva  AGE: [de-identified] y o  SEX: female  : 1940   MRN: [id number]      Chief Complaint:  Chief Complaint   Patient presents with    Follow-up         History of Present Illness:   Patient comes for follow up s/p recent hospitalization at \[facility]\"" -    S/P Afib/flutter ablation 22 by Dr Shannan Gee for refractory Afib w RVR despite DCCV and amiodarone  Since ablation no recurrence  Remains on Amiodarone (200mg was recently decreased to 100 mg daily and plan is to stop on 2022)  Decision on Eliquis later  CHADS2-VASc = 4 (HTN, Age x 2, Female)  2  Spontaneous Rectus sheath hematoma from HTN post op from ablation, embolized in IR  3  IR femoral access had extrusion of the angioseal plug  U/S was neg for pseudoaneurysm  Today she denies chest pain / pressure, SOB, palpitations, lightheadedness, syncope, swelling feet, orthopnea, PND, claudication      Mild cardiomyopathy - Recent echocardiogram (2021) has shown mild diffuse hypokinesis with EF 50% down from 60% on last echo in 2019  Likely tachycardia related from PAF with RVR    Essential hypertension, DD -  Has been hypertensive for many years  Taking medications regularly  Denies lightheadedness, headache, medication side effects  Mixed hyperlipidemia -  Has had hyperlipidemia for many years  Taking statin regularly along with diet control  Denies myalgia  PCP closely monitoring the blood work      Mitral regurgitation, tricuspid regurgitation - mild, asymptomatic    Past Medical History:  Past Medical History:   Diagnosis Date    Atrial fibrillation ([address] )     Hypertension          Past Surgical History:  Past Surgical History:   Procedure Laterality Date    APPENDECTOMY      BREAST SURGERY      BX - Benign     CARDIAC ELECTROPHYSIOLOGY PROCEDURE N/A 2022    Procedure: Cardiac eps/afib ablation;  Surgeon: Katelyn Estrada MD;  [facility]: [facility]; Service: Cardiology    CARDIAC ELECTROPHYSIOLOGY PROCEDURE N/A 2022    Procedure: Cardiac eps/aflutter ablation;  Surgeon: Katelyn Estrada MD;  [facility]; Service: Cardiology    CATARACT EXTRACTION, BILATERAL      DILATION AND CURETTAGE OF UTERUS      IR EMBOLIZATION (SPECIFY VESSEL OR SITE)  2022    LAPAROSCOPY FOR ECTOPIC PREGNANCY      OTHER SURGICAL HISTORY      surgical excision of tubal pregnancy          Family History:  Family History   Problem Relation Age of Onset    Hypertension Mother     Ovarian cancer Mother     Pneumonia Father          Social History:  Social History     Socioeconomic History    Marital status: /Civil Union     Spouse name: None    Number of children: None    Years of education: None    Highest education level: None   Occupational History    None   Tobacco Use    Smoking status: Former Smoker     Packs/day: 0 50     Years: 20 00     Pack years: 10 00     Quit date: 2006     Years since quittin 3    Smokeless tobacco: Never Used   Vaping Use    Vaping Use: Never used   Substance and Sexual Activity    Alcohol use: Not Currently     Comment: social     Drug use: No    Sexual activity: Not Currently   Other Topics Concern    None   Social History Narrative    History of advance directive      Social Determinants of Health     Financial Resource Strain: Not on file   Food Insecurity: Not on file   Transportation Needs: Not on file   Physical Activity: Sufficiently Active    Days of Exercise per Week: 5 days    Minutes of Exercise per Session: 40 min   Stress: Stress Concern Present    Feeling of Stress :  To some extent   Social Connections: Not on file   Intimate Partner Violence: Not on file   Housing Stability: Not on file         Active Problems:  Patient Active Problem List   Diagnosis    Psoriasis    Screening for skin condition    Essential hypertension    Dupuytren's contracture of both hands    Arthritis    Conductive hearing loss, bilateral    Hyperlipidemia    History of osteoporosis    Knee mass, left    Fall    Atrial fibrillation (HCC)    Closed fracture of left wrist    Rectus sheath hematoma    H/O cardiac atrial fib ablation    Acute blood loss anemia    Discoloration of tooth         The following portions of the patient's history were reviewed and updated as appropriate: past medical history, past surgical history, past family history,  past social history, current medications, allergies and problem list       Review of Systems:  Constitutional: Denies fever, chills  Eyes: Denies eye redness, eye discharge  ENT: Denies hearing loss, sneezing, nasal discharge, sore throat   Respiratory: Denies cough, expectoration, shortness of breath  Cardiovascular: Denies chest pain, palpitations, lower extremity swelling  Gastrointestinal: Denies abdominal pain, nausea, vomiting, diarrhea  Genito-Urinary: Denies dysuria, incontinence  Musculoskeletal:  Left wrist pain+  Neurologic: Denies lightheadedness, syncope, headache, seizures  Endocrine: Denies polydipsia, temperature intolerance  Allergy and Immunology: Denies hives, insect bite sensitivity  Hematological and Lymphatic: Denies bleeding problems, swollen glands   Psychological: Denies depression, suicidal ideation, anxiety, panic  Dermatological: Denies pruritus, rash, skin lesion changes      Vitals:  Vitals:    05/19/22 1314   BP: 130/80   Pulse: 68   Resp: 16   SpO2: 95%       Body mass index is 19 8 kg/m²  Weight (last 2 days)     Date/Time Weight    05/19/22 1314 54 (119)            Physical Examination:  General: Patient is not in acute distress  Awake, alert, oriented in time, place and person  Responding to commands  Head: Normocephalic  Atraumatic  Eyes: Both pupils normal sized, round and reactive to light  Nonicteric  ENT: Normal external ear canals  Neck: Supple  JVP not raised  Trachea central  No thyromegaly  Lungs: Bilateral bronchovascular breath sounds with no crackles or rhonchi  Chest wall: No tenderness  Cardiovascular: RRR  S1 and S2 normal  No murmur, rub or gallop  Gastrointestinal: Abdomen soft, nontender  No guarding or rigidity  Liver and spleen not palpable  Bowel sounds present  Neurologic: Patient is awake, alert, oriented in time, place and person  Responding to commands  Moving all extremities  Integumentary:  No skin rash  Lymphatic: No cervical lymphadenopathy  Back: Symmetric  No CVA tenderness  Extremities: No clubbing, cyanosis or edema of legs    Wearing left wrist splint      Laboratory Results:  CBC with diff:   Lab Results   Component Value Date    WBC 6 57 05/02/2022    WBC 3 7 (L) 04/29/2015    RBC 3 17 (L) 05/02/2022    RBC 4 26 04/29/2015    HGB 9 6 (L) 05/02/2022    HGB 13 0 04/29/2015    HCT 30 7 (L) 05/02/2022    HCT 38 7 04/29/2015    MCV 97 05/02/2022    MCV 91 04/29/2015    MCH 30 3 05/02/2022    MCH 30 4 04/29/2015    RDW 13 2 05/02/2022    RDW 12 3 04/29/2015     05/02/2022     04/29/2015       CMP:  Lab Results   Component Value Date    CREATININE 0 87 05/02/2022    CREATININE 0 7 04/29/2015    BUN 16 05/02/2022    BUN 13 04/29/2015     04/29/2015    K 4 0 05/02/2022    K 4 5 04/29/2015     05/02/2022     04/29/2015    CO2 28 05/02/2022    CO2 28 3 04/29/2015    GLUCOSE 85 04/29/2015    PROT 7 1 04/29/2015    ALKPHOS 71 04/25/2022    ALKPHOS 54 04/29/2015    ALT 18 04/25/2022    ALT 21 04/29/2015    AST 14 04/25/2022    AST 15 04/29/2015       Lab Results   Component Value Date    MG 2 3 05/01/2022    PHOS 2 6 05/01/2022       Lipid Profile:   Lab Results   Component Value Date    CHOL 241 04/29/2015     Lab Results   Component Value Date     11/12/2021    HDL 92 (H) 04/25/2019     (H) 05/24/2017     Lab Results   Component Value Date    LDLCALC 106 (H) 2021    LDLCALC 106 (H) 2019    LDLCALC 92 2017     Lab Results   Component Value Date    TRIG 55 2021    TRIG 64 2019    TRIG 52 2017       Cardiac testing:   Results for orders placed during the hospital encounter of 21    Echo complete with contrast if indicated    Narrative  [address] [facility]  [phone]    Transthoracic Echocardiogram  2D, M-mode, Doppler, and Color Doppler    Study date:  2021    Patient: Estuardo Grimes  MR number: [id number]  Account number: [de-identified]  : 1940  Age: 79 years  Gender: Female  Status: Outpatient  Location: [facility]  Height: 65 in  Weight: 123 lb  BP: 134/ 82 mmHg    Indications: Atrial fibrillation  Diagnoses: I48 0 - Atrial fibrillation    Sonographer:  NAHUN Bazan  Primary Physician:  Christiane Kaur  Referring Physician:  Demetrice Brown MD  Group:  [facility]  Interpreting Physician:  Demetrice Brown MD    SUMMARY    LEFT VENTRICLE:  Systolic function was mildly reduced  Ejection fraction was estimated to be 50 %  There was mild diffuse hypokinesis  Left ventricular diastolic function parameters were normal     RIGHT VENTRICLE:  The size was normal   Systolic function was normal     MITRAL VALVE:  There was mild regurgitation  TRICUSPID VALVE:  There was mild regurgitation  Estimated peak PA pressure was at least 50 mmHg  PULMONIC VALVE:  There was trace regurgitation  HISTORY: PRIOR HISTORY: Pulmonary Embolism, Former smoker, Atrial Fibrillation  PROCEDURE: The study was performed in the [address] [place] [address]  This was a routine study  The transthoracic approach was used  The study included complete 2D imaging, M-mode, complete spectral Doppler, and color Doppler  The  heart rate was 64 bpm, at the start of the study   Images were obtained from the parasternal, apical, subcostal, and suprasternal notch acoustic windows  Image quality was adequate  LEFT VENTRICLE: Size was normal  Systolic function was mildly reduced  Ejection fraction was estimated to be 50 %  There was mild diffuse hypokinesis  Wall thickness was normal  No evidence of apical thrombus  DOPPLER: Left ventricular  diastolic function parameters were normal     RIGHT VENTRICLE: The size was normal  Systolic function was normal  Wall thickness was normal     LEFT ATRIUM: Size was normal     RIGHT ATRIUM: Size was normal     MITRAL VALVE: Valve structure was normal  There was normal leaflet separation  DOPPLER: The transmitral velocity was within the normal range  There was no evidence for stenosis  There was mild regurgitation  AORTIC VALVE: The valve was trileaflet  Leaflets exhibited normal thickness and normal cuspal separation  DOPPLER: Transaortic velocity was within the normal range  There was no evidence for stenosis  There was no significant  regurgitation  TRICUSPID VALVE: The valve structure was normal  There was normal leaflet separation  DOPPLER: The transtricuspid velocity was within the normal range  There was no evidence for stenosis  There was mild regurgitation  Estimated peak PA  pressure was at least 50 mmHg  PULMONIC VALVE: Leaflets exhibited normal thickness, no calcification, and normal cuspal separation  DOPPLER: The transpulmonic velocity was within the normal range  There was trace regurgitation  PERICARDIUM: There was no pericardial effusion  The pericardium was normal in appearance  AORTA: The root exhibited normal size  SYSTEMIC VEINS: IVC: The inferior vena cava was normal in size      SYSTEM MEASUREMENT TABLES    2D  %FS: 26 19 %  Ao Diam: 3 19 cm  EDV(Teich): 78 28 ml  EF(Teich): 51 76 %  ESV(Teich): 37 76 ml  IVSd: 0 94 cm  LA Area: 16 34 cm2  LA Diam: 3 3 cm  LVEDV MOD A4C: 65 73 ml  LVEF MOD A4C: 40 94 %  LVESV MOD A4C: 38 82 ml  LVIDd: 4 19 cm  LVIDs: 3 09 cm  LVLd A4C: 6 67 cm  LVLs A4C: 6 15 cm  LVPWd: 0 92 cm  RA Area: 14 8 cm2  RVIDd: 3 92 cm  SV MOD A4C: 26 91 ml  SV(Teich): 40 52 ml    CW  AV MaxPG: 3 75 mmHg  AV Vmax: 0 97 m/s  MR Vmax: 4 4 m/s  MR maxP 52 mmHg  TR MaxP 5 mmHg  TR Vmax: 3 45 m/s    MM  TAPSE: 2 24 cm    PW  E' Sept: 0 08 m/s  E/E' Sept: 8 63  LVOT Vmax: 0 79 m/s  LVOT maxP 5 mmHg  MV A Javon: 0 66 m/s  MV Dec [place]: 3 95 m/s2  MV DecT: 171 74 ms  MV E Javon: 0 68 m/s  MV E/A Ratio: 1 03  MV PHT: 49 8 ms  MVA By PHT: 4 42 cm2    [employer] Accredited Echocardiography Laboratory    Prepared and electronically signed by    Cathy Beltran MD  Signed 2021 20:12:35    No results found for this or any previous visit  No results found for this or any previous visit  No valid procedures specified  Results for orders placed during the hospital encounter of 19    NM myocardial perfusion spect (stress and/or rest)    Narrative   [address] [place] [address]  [phone]    Rest/Stress Gated SPECT Myocardial Perfusion Imaging After Exercise    Patient: Yuniel West  MR number: [id number]  Account number: [de-identified]  : 1940  Age: 69 years  Gender: Female  Status: Outpatient  Location: [facility]  Height: 65 in  Weight: 130 lb  BP: 128/ 80 mmHg    Allergies: NO KNOWN ALLERGIES    Diagnosis: R00 2 - Palpitations, R06 09 - Other forms of dyspnea    Primary Physician:  [facility]   Interpreting Physician:  Cathy Beltran MD  Referring Physician:  Cathy Beltran MD  Technician:  Christoph Restrepo BS, BA, AART(N)  Group:  [facility]  Other:  Tamir Jimenez MS, CCT    INDICATIONS: Detection of CAD    HISTORY: The patient is a 69year old  female  Chest pain status: no chest pain  Other symptoms: dyspnea of recent onset and palpitations   Coronary artery disease risk factors: hypertension and post-menopausal state  Cardiovascular history: none significant  Medications: an antihypertensive agent  REST ECG: Sinus bradycardia  PROCEDURE: The study was performed in the the [address] [place] [address]  Treadmill exercise testing was performed, using the Josseline protocol  Gated SPECT myocardial perfusion imaging was performed after stress and at rest  Systolic  blood pressure was 128 mmHg, at the start of the study  Diastolic blood pressure was 80 mmHg, at the start of the study  The heart rate was 54 bpm, at the start of the study  Patient was not experiencing chest pain at the time of the  injection of the radiopharmaceutical     JOSSELIEN PROTOCOL:  HR bpm SBP mmHg DBP mmHg Symptoms ST change Rhythm/conduct  Baseline 54 128 80 none none sinus veronica  Stage 1 111 150 96 -- -- sinus tach  Stage 2 126 180 80 moderate dyspnea -- sinus tach  Immediate 125 -- -- -- -- sinus tach  Recovery 1 100 160 70 -- -- sinus tach  Recovery 2 63 -- -- -- -- NSR  Recovery 3 58 -- -- -- -- sinus veronica  Recovery 5 61 126 70 -- -- NSR  No medications or fluids given  STRESS SUMMARY: Duration of exercise was 4 min and 30 sec  The patient exercised to protocol stage 2  Maximal work rate was 7 METs  Functional capacity was normal  Maximal heart rate during stress was 126 bpm ( 88 % of maximal predicted  heart rate)  The heart rate response to stress was exaggerated  There was normal resting blood pressure with an appropriate response to stress  The rate-pressure product for the peak heart rate and blood pressure was 08568  There was no  chest pain during stress  The stress test was terminated due to achievement of target heart rate and moderate dyspnea  The stress ECG was negative for ischemia and normal  Arrhythmia during stress: isolated atrial premature beats and  isolated premature ventricular beats      ISOTOPE ADMINISTRATION:  Resting isotope administration Stress isotope administration  Agent Sestamibi Sestamibi  Dose 10 37 mCi 32 1 mCi  Date 06/06/2019 06/06/2019  Injection-image interval 30 min 45 min    The radiopharmaceutical was injected one minute before the end of exercise  The radiopharmaceutical was injected at the peak effect of pharmacologic stress  MYOCARDIAL PERFUSION IMAGING:  The image quality was good  Rotating projection images reveal mild breast attenuation and mild subdiaphragmatic activity  Left ventricular size was normal     PERFUSION DEFECTS:  -  There were no perfusion defects  GATED SPECT:  The calculated left ventricular ejection fraction was 57 %  Left ventricular ejection fraction was within normal limits by visual estimate  There was no diagnostic evidence for left ventricular regional abnormality  SUMMARY:  -  Stress results: Duration of exercise was 4 min and 30 sec  Target heart rate was achieved  There was no chest pain during stress  -  ECG conclusions: The stress ECG was negative for ischemia and normal  Arrhythmia during stress: isolated atrial premature beats and isolated premature ventricular beats  -  Perfusion imaging: There were no perfusion defects   -  Gated SPECT: The calculated left ventricular ejection fraction was 57 %  Left ventricular ejection fraction was within normal limits by visual estimate  There was no diagnostic evidence for left ventricular regional abnormality  IMPRESSIONS: Normal study after maximal exercise  Myocardial perfusion imaging was normal at rest and with stress  Left ventricular systolic function was normal     Prepared and signed by    Demetrice Brown MD  Signed 06/06/2019 19:03:58      Medications:    Current Outpatient Medications:     amiodarone 100 mg tablet, Take 1 tablet (100 mg total) by mouth in the morning , Disp: 90 tablet, Rfl: 2    Eliquis 2 5 MG, TAKE 1 TABLET TWICE A DAY   NEED APPOINTMENT WITH DR Alexa Teague FOR FURTHER REFILLS, Disp: 90 tablet, Rfl: 7    Glucosamine-Chondroit-Vit C-Mn (GLUCOSAMINE 1500 COMPLEX PO), Take 1 capsule by mouth daily, Disp: , Rfl:       Allergies:  No Known Allergies      Assessment and Plan:  1  Persistent atrial fibrillation (HCC)  Currently in sinus rhythm  Continue Amiodarone 100 mg daily till August 1st and then discontinue it  Continue Eliquis  Will take a decision on discontinuing Eliquis later    2  Anticoagulant long-term use  Continue Eliquis  3  Rectus sheath hematoma, initial encounter  Recovered    4  Other cardiomyopathy ([address] )  EF 50%  Mild    5  Essential hypertension  BP stable without need of any antihypertensive medications at this time  Continue to monitor at home  Will reintroduce antihypertensive medications when BP goes above normal    6  Diastolic dysfunction  Tight BP control    7  Nonrheumatic mitral valve regurgitation  Followed up with serial echocardiograms    Recommend aggressive risk factor modification and therapeutic lifestyle changes  Low-salt, low-calorie, low-fat, low-cholesterol diet with regular exercise and to optimize weight  I will defer the ordering and monitoring of necessity lab studies to you, but I am available and happy to review and manage any of the data at your request in the future  Discussed concepts of atherosclerosis, including signs and symptoms of cardiac disease  Previous studies were reviewed  Safety measures were reviewed  Questions were entertained and answered  Patient was advised to report any problems requiring medical attention  Follow-up with PCP and appropriate specialist and lab work as discussed  Return for follow up visit as scheduled or earlier, if needed  Thank you for allowing me to participate in the care and evaluation of your patient  Should you have any questions, please feel free to contact me        Manoj Medel MD  3/36/0034,5:77 PM

## 2022-05-27 ENCOUNTER — APPOINTMENT (OUTPATIENT)
Dept: RADIOLOGY | Facility: CLINIC | Age: 82
End: 2022-05-27
Payer: COMMERCIAL

## 2022-05-27 ENCOUNTER — OFFICE VISIT (OUTPATIENT)
Dept: OBGYN CLINIC | Facility: CLINIC | Age: 82
End: 2022-05-27
Payer: COMMERCIAL

## 2022-05-27 VITALS
DIASTOLIC BLOOD PRESSURE: 79 MMHG | SYSTOLIC BLOOD PRESSURE: 127 MMHG | HEART RATE: 66 BPM | HEIGHT: 65 IN | WEIGHT: 119 LBS | BODY MASS INDEX: 19.83 KG/M2

## 2022-05-27 DIAGNOSIS — S52.572D OTHER CLOSED INTRA-ARTICULAR FRACTURE OF DISTAL END OF LEFT RADIUS WITH ROUTINE HEALING, SUBSEQUENT ENCOUNTER: Primary | ICD-10-CM

## 2022-05-27 DIAGNOSIS — S52.572A OTHER CLOSED INTRA-ARTICULAR FRACTURE OF DISTAL END OF LEFT RADIUS, INITIAL ENCOUNTER: ICD-10-CM

## 2022-05-27 PROCEDURE — 1111F DSCHRG MED/CURRENT MED MERGE: CPT | Performed by: FAMILY MEDICINE

## 2022-05-27 PROCEDURE — 73110 X-RAY EXAM OF WRIST: CPT

## 2022-05-27 PROCEDURE — 1160F RVW MEDS BY RX/DR IN RCRD: CPT | Performed by: FAMILY MEDICINE

## 2022-05-27 PROCEDURE — 99213 OFFICE O/P EST LOW 20 MIN: CPT | Performed by: FAMILY MEDICINE

## 2022-05-27 PROCEDURE — 1036F TOBACCO NON-USER: CPT | Performed by: FAMILY MEDICINE

## 2022-05-27 NOTE — PROGRESS NOTES
Assessment/Plan:  Assessment/Plan   Diagnoses and all orders for this visit:    Other closed intra-articular fracture of distal end of left radius with routine healing, subsequent encounter  -     XR wrist 3+ vw left; Future      27-year-old right-hand-dominant female who sustained fracture to left distal radius from fall injury at home on 04/24/2022  X-rays left wrist noted for healing of distal radius intra-articular fracture  Physical exam unremarkable for bony or soft tissue tenderness of the wrist   She demonstrates limited motion with extension  There is no pain with axial loading  She has normal radial pulse  Clinical impression is that she is improving well from her injury  She may discontinue cock-up wrist splint and return to full physical activity as tolerated  She will follow up with me as needed  Subjective:   Patient ID: Jean Watt is a [de-identified] y o  female  Chief Complaint   Patient presents with    Left Wrist - Fracture, Follow-up       27-year-old right-hand-dominant female following up for left distal radius fracture sustained from fall injury at home on 04/24/2022  She was last seen by me 3 weeks ago at which point she was placed in cock-up splint  She reports feeling much better since her last visit  She does remove the splint inability for hygiene purposes and during that time does range-of-motion exercises  She reports having pain described as generalized to the wrist, achy and sore, mild intensity, worse with twisting, and improved resting  She is no longer experiencing any sharp intense pain  Wrist Pain  This is a new problem  The current episode started more than 1 month ago  The problem occurs daily  The problem has been gradually improving  Associated symptoms include arthralgias  Pertinent negatives include no joint swelling, numbness or weakness  The symptoms are aggravated by twisting  She has tried rest and immobilization for the symptoms   The treatment provided significant relief  Review of Systems   Musculoskeletal: Positive for arthralgias  Negative for joint swelling  Neurological: Negative for weakness and numbness  Objective:  Vitals:    05/27/22 1055   BP: 127/79   Pulse: 66   Weight: 54 kg (119 lb)   Height: 5' 5" (1 651 m)     Left Hand Exam     Muscle Strength   The patient has normal left wrist strength  Tests   Finkelstein's test: negative    Other   Pulse: present          Tenderness     Left Wrist/Hand   No tenderness in the first dorsal compartment, second dorsal compartment, fifth dorsal compartment, sixth dorsal compartment, distal radioulnar joint, scaphoid and lunate  Active Range of Motion     Left Wrist   Wrist flexion: WFL  Wrist extension: 40 degrees     Strength/Myotome Testing     Left Wrist/Hand   Normal wrist strength    Tests     Left Wrist/Hand   Negative AIN OK sign, Finkelstein's and TFCC load  Physical Exam  Vitals and nursing note reviewed  Constitutional:       General: She is not in acute distress  Appearance: She is well-developed  She is not ill-appearing or diaphoretic  HENT:      Head: Normocephalic  Right Ear: External ear normal       Left Ear: External ear normal    Eyes:      Conjunctiva/sclera: Conjunctivae normal    Neck:      Trachea: No tracheal deviation  Cardiovascular:      Rate and Rhythm: Normal rate  Pulmonary:      Effort: Pulmonary effort is normal  No respiratory distress  Abdominal:      General: There is no distension  Musculoskeletal:         General: No swelling, tenderness, deformity or signs of injury  Skin:     General: Skin is warm and dry  Coloration: Skin is not jaundiced or pale  Neurological:      Mental Status: She is alert and oriented to person, place, and time  Psychiatric:         Mood and Affect: Mood normal          Behavior: Behavior normal          Thought Content:  Thought content normal          Judgment: Judgment normal          I have personally reviewed pertinent films in PACS and my interpretation is Healing distal radius intra-articular fracture

## 2022-06-06 ENCOUNTER — PATIENT MESSAGE (OUTPATIENT)
Dept: CARDIOLOGY CLINIC | Facility: CLINIC | Age: 82
End: 2022-06-06

## 2022-06-06 ENCOUNTER — OFFICE VISIT (OUTPATIENT)
Dept: OBGYN CLINIC | Facility: CLINIC | Age: 82
End: 2022-06-06
Payer: COMMERCIAL

## 2022-06-06 ENCOUNTER — TELEPHONE (OUTPATIENT)
Dept: CARDIOLOGY CLINIC | Facility: CLINIC | Age: 82
End: 2022-06-06

## 2022-06-06 VITALS
SYSTOLIC BLOOD PRESSURE: 145 MMHG | HEART RATE: 66 BPM | WEIGHT: 119 LBS | BODY MASS INDEX: 19.83 KG/M2 | HEIGHT: 65 IN | DIASTOLIC BLOOD PRESSURE: 82 MMHG

## 2022-06-06 DIAGNOSIS — R22.42 LUMP OF SKIN OF LOWER EXTREMITY, LEFT: Primary | ICD-10-CM

## 2022-06-06 DIAGNOSIS — M22.2X1 PATELLOFEMORAL SYNDROME OF RIGHT KNEE: ICD-10-CM

## 2022-06-06 PROCEDURE — 99213 OFFICE O/P EST LOW 20 MIN: CPT | Performed by: FAMILY MEDICINE

## 2022-06-06 NOTE — PROGRESS NOTES
Assessment/Plan:  Assessment/Plan   Diagnoses and all orders for this visit:    Lump of skin of lower extremity, left  -     US extremity soft tissue; Future    Patellofemoral syndrome of right knee  -     Brace        [de-identified]year-old female with left lower leg lump and right knee instability more than 6 months duration  Discussed with patient physical exam, radiographs, impression and plan  X-rays of the left knee noted for degenerative changes  Physical exam noted for soft tissue swelling anterior medial aspect of the proximal tibia  Swelling is approximately 2 cm in diameter  It is circumscribed, mildly mobile, mildly compressible, and nontender  She has intact extension and flexion of the knee  Right knee noted for full extension flexion to 130°  There is positive patellar inhibition and grind  Regards to her right knee she likely has abnormal patellofemoral mechanics so recommend patellar stabilizing knee brace to be worn during physical activity  In regards to her left knee there may be cyst of the proximal tibia  I will refer for ultrasound to evaluate for characteristics of the soft tissue swelling  She will follow up after getting ultrasound done  Subjective:   Patient ID: Cara Huerta is a [de-identified] y o  female  Chief Complaint   Patient presents with    Right Knee - Pain    Left Knee - Pain       50-year-old female presents for evaluation of left lower leg swelling and right knee instability more than 6 months duration  She denies any particular trauma or inciting event  She started noticing a swelling at the anterior medial aspect of the left proximal tibia  She denies any pain of the area  She does report feeling more unstable in the knee  Right knee has been more unstable and reports difficulty going up stairs leading with the right knee  She has pain in right knee described as localized to the anterior aspect, non radiating, worse with going up stairs, and improved with resting    She had x-rays of the left knee done which were noted for degenerative changes  Knee Pain  This is a new problem  The current episode started more than 1 month ago  The problem occurs daily  The problem has been unchanged  Associated symptoms include arthralgias and joint swelling  Pertinent negatives include no numbness or weakness  Exacerbated by: Stairs  She has tried rest for the symptoms  The treatment provided mild relief  Review of Systems   Musculoskeletal: Positive for arthralgias and joint swelling  Neurological: Negative for weakness and numbness  Objective:  Vitals:    06/06/22 1134   BP: 145/82   Pulse: 66   Weight: 54 kg (119 lb)   Height: 5' 5" (1 651 m)     Right Knee Exam     Muscle Strength   The patient has normal right knee strength  Range of Motion   Extension: normal   Flexion: 130     Other   Swelling: none      Left Knee Exam     Muscle Strength   The patient has normal left knee strength  Range of Motion   Extension: normal   Flexion: 130     Other   Swelling: mild    Comments:  Proximal tibia anteromedial             Physical Exam  Vitals and nursing note reviewed  Constitutional:       General: She is not in acute distress  Appearance: She is well-developed  She is not ill-appearing or diaphoretic  HENT:      Head: Normocephalic  Right Ear: External ear normal       Left Ear: External ear normal    Eyes:      Conjunctiva/sclera: Conjunctivae normal    Neck:      Trachea: No tracheal deviation  Cardiovascular:      Rate and Rhythm: Normal rate  Pulmonary:      Effort: Pulmonary effort is normal  No respiratory distress  Abdominal:      General: There is no distension  Musculoskeletal:         General: Swelling present  No tenderness, deformity or signs of injury  Skin:     General: Skin is warm and dry  Coloration: Skin is not jaundiced or pale  Neurological:      Mental Status: She is alert and oriented to person, place, and time  Psychiatric:         Mood and Affect: Mood normal          Behavior: Behavior normal          Thought Content: Thought content normal          Judgment: Judgment normal          I have personally reviewed pertinent films in PACS and my interpretation is Degenerative changes of left knee

## 2022-06-06 NOTE — TELEPHONE ENCOUNTER
----- Message from Micaela Sawyer  on behalf of Reyes Meraz sent at 6/6/2022  3:23 PM EDT -----  Regarding: tumeric  This message is being sent by Micaela Sawyer  on behalf of Lamond Lefort Dr Elisabeth Feather started me on 1000mg of tumeric  daily    Is this safe to take with eliyuan?

## 2022-06-16 NOTE — TELEPHONE ENCOUNTER
Pt stated why they were told to take turmeric from Dr Tobin Cue  Please advise if okay to take tumeric? Pt message:  After visit for broked wrist, he ordered it for arthritis pain and joint inflamation   No mention of contradiction of meds  Should I not take the tumeric?

## 2022-06-20 ENCOUNTER — HOSPITAL ENCOUNTER (OUTPATIENT)
Dept: ULTRASOUND IMAGING | Facility: CLINIC | Age: 82
Discharge: HOME | End: 2022-06-20
Payer: COMMERCIAL

## 2022-06-20 DIAGNOSIS — R22.42 LUMP OF SKIN OF LOWER EXTREMITY, LEFT: ICD-10-CM

## 2022-06-20 PROCEDURE — 76882 US LMTD JT/FCL EVL NVASC XTR: CPT

## 2022-06-30 ENCOUNTER — OFFICE VISIT (OUTPATIENT)
Dept: OBGYN CLINIC | Facility: CLINIC | Age: 82
End: 2022-06-30
Payer: COMMERCIAL

## 2022-06-30 VITALS
HEIGHT: 65 IN | DIASTOLIC BLOOD PRESSURE: 81 MMHG | SYSTOLIC BLOOD PRESSURE: 145 MMHG | BODY MASS INDEX: 19.83 KG/M2 | HEART RATE: 73 BPM | WEIGHT: 119 LBS

## 2022-06-30 DIAGNOSIS — M79.89 CYST OF SOFT TISSUE: ICD-10-CM

## 2022-06-30 DIAGNOSIS — M22.2X1 PATELLOFEMORAL SYNDROME OF RIGHT KNEE: Primary | ICD-10-CM

## 2022-06-30 PROCEDURE — 99213 OFFICE O/P EST LOW 20 MIN: CPT | Performed by: FAMILY MEDICINE

## 2022-06-30 PROCEDURE — 3079F DIAST BP 80-89 MM HG: CPT | Performed by: FAMILY MEDICINE

## 2022-06-30 PROCEDURE — 1160F RVW MEDS BY RX/DR IN RCRD: CPT | Performed by: FAMILY MEDICINE

## 2022-06-30 PROCEDURE — 1036F TOBACCO NON-USER: CPT | Performed by: FAMILY MEDICINE

## 2022-06-30 PROCEDURE — 3077F SYST BP >= 140 MM HG: CPT | Performed by: FAMILY MEDICINE

## 2022-06-30 NOTE — PROGRESS NOTES
Assessment/Plan:  Assessment/Plan   Diagnoses and all orders for this visit:    Patellofemoral syndrome of right knee    Cyst of soft tissue        81-year-old female with left lower leg lump and right knee instability more than 6 months duration  Discussed with patient ultrasound results impression plan  Ultrasound noted for avascular 2 4 x 1 3 x 2 0 cm cystic structure containing internal debris  Clinical impression is that cystic structure is nonaggressive/benign  Patient was advised that it would not cause any functional issue  She was given option to consider drainage/aspiration of the cyst which she declines at this time  She is to continue with full activity as tolerated  She will follow up as needed  Subjective:   Patient ID: Alicia Wynne is a [de-identified] y o  female  Chief Complaint   Patient presents with    Left Knee - Follow-up       81-year-old female following left lower leg lump and right knee stability more than 6 months duration  She was last seen by me 3 weeks ago which point she was referred for ultrasound left lower extremity  She was provided with patellar stabilizing knee brace for the right knee  She states that wearing knee brace helps with stability in the right knee and she has been able to be more functional   At 1st she had discomfort wearing the brace  She has pain described as localized to the anterior aspect of knee, non radiating, worse with stairs, and improved with resting  Walking and stairs are easier with wearing the brace  She does home exercises focused on stretching, hamstring and quad and hip strengthening  She denies any pain or instability of the left knee  The area of cyst is not painful nor tender to touch  Knee Pain  This is a new problem  The current episode started more than 1 month ago  The problem occurs daily  The problem has been gradually improving  Associated symptoms include arthralgias   Pertinent negatives include no joint swelling, numbness or weakness  Exacerbated by: Stairs  She has tried rest (Knee brace, home exercise) for the symptoms  The treatment provided mild relief  Review of Systems   Musculoskeletal: Positive for arthralgias  Negative for joint swelling  Neurological: Negative for weakness and numbness  Objective:  Vitals:    06/30/22 0855   BP: 145/81   Pulse: 73   Weight: 54 kg (119 lb)   Height: 5' 5" (1 651 m)     Ortho Exam    Physical Exam  Vitals and nursing note reviewed  Constitutional:       General: She is not in acute distress  Appearance: She is well-developed  She is not ill-appearing or diaphoretic  HENT:      Head: Normocephalic  Right Ear: External ear normal       Left Ear: External ear normal    Eyes:      Conjunctiva/sclera: Conjunctivae normal    Neck:      Trachea: No tracheal deviation  Cardiovascular:      Rate and Rhythm: Normal rate  Pulmonary:      Effort: Pulmonary effort is normal  No respiratory distress  Abdominal:      General: There is no distension  Musculoskeletal:         General: Swelling present  No tenderness, deformity or signs of injury  Skin:     General: Skin is warm and dry  Coloration: Skin is not jaundiced or pale  Neurological:      Mental Status: She is alert and oriented to person, place, and time  Psychiatric:         Mood and Affect: Mood normal          Behavior: Behavior normal          Thought Content: Thought content normal          Judgment: Judgment normal          I have personally reviewed pertinent films in PACS and my interpretation is fluid filled cystic structure

## 2022-08-17 NOTE — CONSULTS
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  She appears to have benefited from the amiodarone  She is now off the beta blocker  I recommended she have a permanent pacemaker  She wishes to think about it for 24 hours  HPI: Cardiologist Dr Cooper Arreola is a [de-identified]y o  year old female  whom I saw 2 days ago for elective cardioversion of atrial fibrillation  At that time she presented with sinus bradycardia and a heart rate of 41 and dizzy spells    I stop the beta-blocker and we sent her home on amiodarone 200 mg daily  She felt the atrial fibrillation come back yesterday  Last night she noticed her heart rate was back down to 51  While attempting to put on her pajamas she got lightheaded and fell back and hurt her wrist   Fortunately, it is apparently not broken  REVIEW OF SYSTEMS:  Otherwise negative  Constitutional:    Eyes:    HENT:     Respiratory:    Cardiovascular:    GI:    : Musculoskeletal:    Neurologic:            PMHX    Appendectomy  Breast surgery  History of hypertension      Family History:  No family history of cardiac disease    MEDS & ALLERGIES:    No current facility-administered medications for this encounter  No Known Allergies    OBJECTIVE:  Vitals:   Vitals:    04/24/22 1030   BP: 156/100   Pulse: 99   Resp: 14   Temp:    SpO2: 95%     There is no height or weight on file to calculate BMI      Systolic (61BUZ), XCZ:383 , Min:156 , UAM:945     Diastolic (76UCY), RBC:016, Min:100, Max:112    No intake or output data in the 24 hours ending 04/24/22 1118  Weight (last 2 days)     None Problem: Hip Replacement: Post Op Day 1  Goal: Activity/Safety  Outcome: Progressing Towards Goal  Note: Up with assistance    Goal: Nutrition/Diet  Outcome: Progressing Towards Goal  Note: Regulardiet    Goal: Medications  Outcome: Progressing Towards Goal  Note: Pain management Invasive Devices  Report    None                 PHYSICAL EXAMS:  Blood pressure 156/100 EKG  General:  Thin and healthy appearing  HEENT:  Pupils equal  Neck:  2+ carotids without bruits  Respiratory:  Lungs clear  Cardiovascular:  Irregular irregular rhythm at 1:01 a m   GI:  No organomegaly  Musculoskeletal:  Appears normal  Integument:  Normal  Neurologic:  Normal    LABORATORY RESULTS:      CBC with diff:   Results from last 7 days   Lab Units 04/24/22  0836   WBC Thousand/uL 9 78   HEMOGLOBIN g/dL 15 2   HEMATOCRIT % 46 4*   MCV fL 93   PLATELETS Thousands/uL 278   MCH pg 30 6   MCHC g/dL 32 8   RDW % 13 4   MPV fL 9 2   NRBC AUTO /100 WBCs 0       CMP:  Results from last 7 days   Lab Units 04/24/22  0836   POTASSIUM mmol/L 4 4   CHLORIDE mmol/L 103   CO2 mmol/L 27   BUN mg/dL 21   CREATININE mg/dL 1 12   CALCIUM mg/dL 9 4   AST U/L 17   ALT U/L 24   ALK PHOS U/L 80   EGFR ml/min/1 73sq m 46       BMP:  Results from last 7 days   Lab Units 04/24/22  0836   POTASSIUM mmol/L 4 4   CHLORIDE mmol/L 103   CO2 mmol/L 27   BUN mg/dL 21   CREATININE mg/dL 1 12   CALCIUM mg/dL 9 4            Results from last 7 days   Lab Units 04/24/22  0836   MAGNESIUM mg/dL 2 0             Results from last 7 days   Lab Units 04/24/22  0836   INR  1 12       Lipid Profile:   Lab Results   Component Value Date    CHOL 241 04/29/2015     Lab Results   Component Value Date     11/12/2021    HDL 92 (H) 04/25/2019     (H) 05/24/2017     Lab Results   Component Value Date    LDLCALC 106 (H) 11/12/2021    LDLCALC 106 (H) 04/25/2019    LDLCALC 92 05/24/2017     Lab Results   Component Value Date    TRIG 55 11/12/2021    TRIG 64 04/25/2019    TRIG 52 05/24/2017       Cardiac testing:   Results for orders placed during the hospital encounter of 07/08/21    Echo complete with contrast if indicated    Narrative  [address] [facility]  [phone]    Transthoracic Echocardiogram  2D, M-mode, Doppler, and Color Doppler    Study date:  2021    Patient: Radha Orantes  MR number: [id number]  Account number: [de-identified]  : 1940  Age: 79 years  Gender: Female  Status: Outpatient  Location: [facility]  Height: 65 in  Weight: 123 lb  BP: 134/ 82 mmHg    Indications: Atrial fibrillation  Diagnoses: I48 0 - Atrial fibrillation    Sonographer:  NAHUN Simon  Primary Physician:  Bernadette Marinelli  Referring Physician:  Magda Nagy MD  Group:  [facility]  Interpreting Physician:  Magda Nagy MD    SUMMARY    LEFT VENTRICLE:  Systolic function was mildly reduced  Ejection fraction was estimated to be 50 %  There was mild diffuse hypokinesis  Left ventricular diastolic function parameters were normal     RIGHT VENTRICLE:  The size was normal   Systolic function was normal     MITRAL VALVE:  There was mild regurgitation  TRICUSPID VALVE:  There was mild regurgitation  Estimated peak PA pressure was at least 50 mmHg  PULMONIC VALVE:  There was trace regurgitation  HISTORY: PRIOR HISTORY: Pulmonary Embolism, Former smoker, Atrial Fibrillation  PROCEDURE: The study was performed in the [address] [place] [address]  This was a routine study  The transthoracic approach was used  The study included complete 2D imaging, M-mode, complete spectral Doppler, and color Doppler  The  heart rate was 64 bpm, at the start of the study  Images were obtained from the parasternal, apical, subcostal, and suprasternal notch acoustic windows  Image quality was adequate  LEFT VENTRICLE: Size was normal  Systolic function was mildly reduced  Ejection fraction was estimated to be 50 %  There was mild diffuse hypokinesis  Wall thickness was normal  No evidence of apical thrombus   DOPPLER: Left ventricular  diastolic function parameters were normal     RIGHT VENTRICLE: The size was normal  Systolic function was normal  Wall thickness was normal     LEFT ATRIUM: Size was normal     RIGHT ATRIUM: Size was normal     MITRAL VALVE: Valve structure was normal  There was normal leaflet separation  DOPPLER: The transmitral velocity was within the normal range  There was no evidence for stenosis  There was mild regurgitation  AORTIC VALVE: The valve was trileaflet  Leaflets exhibited normal thickness and normal cuspal separation  DOPPLER: Transaortic velocity was within the normal range  There was no evidence for stenosis  There was no significant  regurgitation  TRICUSPID VALVE: The valve structure was normal  There was normal leaflet separation  DOPPLER: The transtricuspid velocity was within the normal range  There was no evidence for stenosis  There was mild regurgitation  Estimated peak PA  pressure was at least 50 mmHg  PULMONIC VALVE: Leaflets exhibited normal thickness, no calcification, and normal cuspal separation  DOPPLER: The transpulmonic velocity was within the normal range  There was trace regurgitation  PERICARDIUM: There was no pericardial effusion  The pericardium was normal in appearance  AORTA: The root exhibited normal size  SYSTEMIC VEINS: IVC: The inferior vena cava was normal in size      SYSTEM MEASUREMENT TABLES    2D  %FS: 26 19 %  Ao Diam: 3 19 cm  EDV(Teich): 78 28 ml  EF(Teich): 51 76 %  ESV(Teich): 37 76 ml  IVSd: 0 94 cm  LA Area: 16 34 cm2  LA Diam: 3 3 cm  LVEDV MOD A4C: 65 73 ml  LVEF MOD A4C: 40 94 %  LVESV MOD A4C: 38 82 ml  LVIDd: 4 19 cm  LVIDs: 3 09 cm  LVLd A4C: 6 67 cm  LVLs A4C: 6 15 cm  LVPWd: 0 92 cm  RA Area: 14 8 cm2  RVIDd: 3 92 cm  SV MOD A4C: 26 91 ml  SV(Teich): 40 52 ml    CW  AV MaxPG: 3 75 mmHg  AV Vmax: 0 97 m/s  MR Vmax: 4 4 m/s  MR maxP 52 mmHg  TR MaxP 5 mmHg  TR Vmax: 3 45 m/s    MM  TAPSE: 2 24 cm    PW  E' Sept: 0 08 m/s  E/E' Sept: 8 63  LVOT Vmax: 0 79 m/s  LVOT maxP 5 mmHg  MV A Javon: 0 66 m/s  MV Dec [place]: 3 95 m/s2  MV DecT: 171 74 ms  MV E Javon: 0 68 m/s  MV E/A Ratio: 1 03  MV PHT: 49 8 ms  MVA By PHT: 4 42 cm2    [employer] Accredited Echocardiography Laboratory    Prepared and electronically signed by    Kenna Bishop MD  Signed 2021 20:12:35    No results found for this or any previous visit  No valid procedures specified  Results for orders placed during the hospital encounter of 19    NM myocardial perfusion spect (stress and/or rest)    Narrative   [address] [place] [address]  [phone]    Rest/Stress Gated SPECT Myocardial Perfusion Imaging After Exercise    Patient: Jennifer Almanza  MR number: [id number]  Account number: [de-identified]  : 1940  Age: 69 years  Gender: Female  Status: Outpatient  Location: [facility]  Height: 65 in  Weight: 130 lb  BP: 128/ 80 mmHg    Allergies: NO KNOWN ALLERGIES    Diagnosis: R00 2 - Palpitations, R06 09 - Other forms of dyspnea    Primary Physician:  Valeria Crocker DO  Interpreting Physician:  Kenna Bishop MD  Referring Physician:  Kenna Bishop MD  Technician:  Bart CRUZ, BA, AART(N)  Group:  [facility]  Other:  Tawnya Hamilton MS, CCT    INDICATIONS: Detection of CAD    HISTORY: The patient is a 69year old  female  Chest pain status: no chest pain  Other symptoms: dyspnea of recent onset and palpitations  Coronary artery disease risk factors: hypertension and post-menopausal state  Cardiovascular history: none significant  Medications: an antihypertensive agent  REST ECG: Sinus bradycardia  PROCEDURE: The study was performed in the [address]  Treadmill exercise testing was performed, using the Freddie protocol  Gated SPECT myocardial perfusion imaging was performed after stress and at rest  Systolic  blood pressure was 128 mmHg, at the start of the study  Diastolic blood pressure was 80 mmHg, at the start of the study   The heart rate was 54 bpm, at the start of the study  Patient was not experiencing chest pain at the time of the  injection of the radiopharmaceutical     JOSSELINE PROTOCOL:  HR bpm SBP mmHg DBP mmHg Symptoms ST change Rhythm/conduct  Baseline 54 128 80 none none sinus veronica  Stage 1 111 150 96 -- -- sinus tach  Stage 2 126 180 80 moderate dyspnea -- sinus tach  Immediate 125 -- -- -- -- sinus tach  Recovery 1 100 160 70 -- -- sinus tach  Recovery 2 63 -- -- -- -- NSR  Recovery 3 58 -- -- -- -- sinus veronica  Recovery 5 61 126 70 -- -- NSR  No medications or fluids given  STRESS SUMMARY: Duration of exercise was 4 min and 30 sec  The patient exercised to protocol stage 2  Maximal work rate was 7 METs  Functional capacity was normal  Maximal heart rate during stress was 126 bpm ( 88 % of maximal predicted  heart rate)  The heart rate response to stress was exaggerated  There was normal resting blood pressure with an appropriate response to stress  The rate-pressure product for the peak heart rate and blood pressure was 94676  There was no  chest pain during stress  The stress test was terminated due to achievement of target heart rate and moderate dyspnea  The stress ECG was negative for ischemia and normal  Arrhythmia during stress: isolated atrial premature beats and  isolated premature ventricular beats  ISOTOPE ADMINISTRATION:  Resting isotope administration Stress isotope administration  Agent Sestamibi Sestamibi  Dose 10 37 mCi 32 1 mCi  Date 06/06/2019 06/06/2019  Injection-image interval 30 min 45 min    The radiopharmaceutical was injected one minute before the end of exercise  The radiopharmaceutical was injected at the peak effect of pharmacologic stress  MYOCARDIAL PERFUSION IMAGING:  The image quality was good  Rotating projection images reveal mild breast attenuation and mild subdiaphragmatic activity  Left ventricular size was normal     PERFUSION DEFECTS:  -  There were no perfusion defects      GATED SPECT:  The calculated left ventricular ejection fraction was 57 %  Left ventricular ejection fraction was within normal limits by visual estimate  There was no diagnostic evidence for left ventricular regional abnormality  SUMMARY:  -  Stress results: Duration of exercise was 4 min and 30 sec  Target heart rate was achieved  There was no chest pain during stress  -  ECG conclusions: The stress ECG was negative for ischemia and normal  Arrhythmia during stress: isolated atrial premature beats and isolated premature ventricular beats  -  Perfusion imaging: There were no perfusion defects   -  Gated SPECT: The calculated left ventricular ejection fraction was 57 %  Left ventricular ejection fraction was within normal limits by visual estimate  There was no diagnostic evidence for left ventricular regional abnormality  IMPRESSIONS: Normal study after maximal exercise  Myocardial perfusion imaging was normal at rest and with stress  Left ventricular systolic function was normal     Prepared and signed by    Yoko March MD  Signed 06/06/2019 19:03:58              EKG reviewed personally:   Atrial fibrillation with a rapid ventricular response  Nonspecific ST-T changes          Code Status: No Order        Derek Reed MD  2/46/0926,30:95 AM    Portions of the record may have been created with voice recognition software  Occasional wrong word or "sound a like" substitutions may have occurred due to the inherent limitations of voice recognition software  Read the chart carefully and recognize, using context, where substitutions have occurred

## 2022-08-22 ENCOUNTER — TELEPHONE (OUTPATIENT)
Dept: CARDIOLOGY CLINIC | Facility: CLINIC | Age: 82
End: 2022-08-22

## 2022-08-22 NOTE — TELEPHONE ENCOUNTER
----- Message from Denise Vilchis  on behalf of Barbie Limon sent at 8/22/2022 11:41 AM EDT -----  Regarding: Follow after Ablation:  How long do I take eliquis? I finished amiodarone 8/3   My heart has remained in sinus rhythm  I have monitoted my BP  ranging from 130's/80 to 117/70  pulse rate 73-78  Do I need an appointment with you or my PCP? This message is being sent by Denise Vilchis  on behalf of No Rocha      I finished the amiodarone 8/3   Heart rate 73-77 in sinus rhythm  's//70  Do I need a fpllow up appointment with cardiology or PCP

## 2022-08-25 NOTE — TELEPHONE ENCOUNTER
Called spoke to hardeep, relayed msg, please call back to set up appt as per Dr Yanely Adorno to discuss issues, questions

## 2022-09-20 ENCOUNTER — OFFICE VISIT (OUTPATIENT)
Dept: CARDIOLOGY CLINIC | Facility: CLINIC | Age: 82
End: 2022-09-20
Payer: COMMERCIAL

## 2022-09-20 VITALS
DIASTOLIC BLOOD PRESSURE: 80 MMHG | WEIGHT: 118 LBS | HEIGHT: 65 IN | RESPIRATION RATE: 16 BRPM | SYSTOLIC BLOOD PRESSURE: 122 MMHG | BODY MASS INDEX: 19.66 KG/M2

## 2022-09-20 DIAGNOSIS — I34.0 NONRHEUMATIC MITRAL VALVE REGURGITATION: ICD-10-CM

## 2022-09-20 DIAGNOSIS — I10 ESSENTIAL HYPERTENSION: Primary | ICD-10-CM

## 2022-09-20 DIAGNOSIS — I48.0 PAROXYSMAL ATRIAL FIBRILLATION (HCC): ICD-10-CM

## 2022-09-20 DIAGNOSIS — I36.1 NONRHEUMATIC TRICUSPID VALVE REGURGITATION: ICD-10-CM

## 2022-09-20 DIAGNOSIS — I42.8 OTHER CARDIOMYOPATHY (HCC): ICD-10-CM

## 2022-09-20 DIAGNOSIS — Z98.890 H/O CARDIAC RADIOFREQUENCY ABLATION: ICD-10-CM

## 2022-09-20 PROCEDURE — 1160F RVW MEDS BY RX/DR IN RCRD: CPT | Performed by: INTERNAL MEDICINE

## 2022-09-20 PROCEDURE — 99214 OFFICE O/P EST MOD 30 MIN: CPT | Performed by: INTERNAL MEDICINE

## 2022-09-20 PROCEDURE — 3079F DIAST BP 80-89 MM HG: CPT | Performed by: INTERNAL MEDICINE

## 2022-09-20 PROCEDURE — 3074F SYST BP LT 130 MM HG: CPT | Performed by: INTERNAL MEDICINE

## 2022-09-20 RX ORDER — LOSARTAN POTASSIUM 25 MG/1
25 TABLET ORAL DAILY
Qty: 90 TABLET | Refills: 3 | Status: SHIPPED | OUTPATIENT
Start: 2022-09-20

## 2022-09-20 NOTE — PROGRESS NOTES
CARDIOLOGY OFFICE VISIT  [facility]  [address], [facility], [address] [facility], [place], [address]  Tel: [phone]      NAME: Eduarda Laguerre  AGE: 81 y o  SEX: female  : 1940  MRN: [id number]      Chief Complaint:  Chief Complaint   Patient presents with    Follow-up         History of Present Illness:   Patient comes for follow up  States she is doing well from cardiac stand point and denies chest pain / pressure, SOB, palpitations, lightheadedness, syncope, swelling feet, orthopnea, PND, claudication  S/P Afib/flutter ablation 22 by Dr Rima Navarro for refractory Afib w RVR despite DCCV and amiodarone  She completed her Amiodarone course and stopped it  Since ablation no recurrence of Afib  She wants to stop Eliquis as well  S/P spontaneous rectus sheath hematoma from HTN post op from ablation, embolized in IR  IR femoral access had extrusion of the angioseal plug  U/S was neg for pseudoaneurysm       Mild cardiomyopathy - Recent echocardiogram (2021) had shown mild diffuse hypokinesis with EF 50% down from 60% on last echo in 2019   Likely tachycardia related from PAF with RVR which has since been ablated  Patient currently on losartan    Hypertension, diastolic dysfunction - has been hypertensive for many years  Taking losartan  Denies lightheadedness, headache, medication side effect    Mitral regurgitation, tricuspid regurgitation - mild, asymptomatic       Past Medical History:  Past Medical History:   Diagnosis Date    Atrial fibrillation ([address] )     Hypertension          Past Surgical History:  Past Surgical History:   Procedure Laterality Date    APPENDECTOMY      BREAST SURGERY      BX - Benign     CARDIAC ELECTROPHYSIOLOGY PROCEDURE N/A 2022    Procedure: Cardiac eps/afib ablation;  Surgeon: Yulisa Rodrigues MD;  [facility]: [facility];   Service: Cardiology    CARDIAC ELECTROPHYSIOLOGY PROCEDURE N/A 2022    Procedure: Cardiac eps/aflutter ablation;  Surgeon: David Vargas MD;  Location: [facility]; Service: Cardiology    CATARACT EXTRACTION, BILATERAL      DILATION AND CURETTAGE OF UTERUS      IR EMBOLIZATION (SPECIFY VESSEL OR SITE)  2022    LAPAROSCOPY FOR ECTOPIC PREGNANCY      OTHER SURGICAL HISTORY      surgical excision of tubal pregnancy          Family History:  Family History   Problem Relation Age of Onset    Hypertension Mother     Ovarian cancer Mother     Pneumonia Father          Social History:  Social History     Socioeconomic History    Marital status: /Civil Union     Spouse name: None    Number of children: None    Years of education: None    Highest education level: None   Occupational History    None   Tobacco Use    Smoking status: Former Smoker     Packs/day: 0 50     Years: 20 00     Pack years: 10 00     Quit date: 2006     Years since quittin 7    Smokeless tobacco: Never Used   Vaping Use    Vaping Use: Never used   Substance and Sexual Activity    Alcohol use: Not Currently     Comment: social     Drug use: No    Sexual activity: Not Currently   Other Topics Concern    None   Social History Narrative    History of advance directive      Social Determinants of Health     Financial Resource Strain: Not on file   Food Insecurity: Not on file   Transportation Needs: Not on file   Physical Activity: Sufficiently Active    Days of Exercise per Week: 5 days    Minutes of Exercise per Session: 40 min   Stress: Stress Concern Present    Feeling of Stress :  To some extent   Social Connections: Not on file   Intimate Partner Violence: Not on file   Housing Stability: Not on file         Active Problems:  Patient Active Problem List   Diagnosis    Psoriasis    Screening for skin condition    Essential hypertension    Dupuytren's contracture of both hands    Arthritis    Conductive hearing loss, bilateral    Hyperlipidemia    History of osteoporosis    Knee mass, left    Fall    Atrial fibrillation (HCC)    Closed fracture of left wrist    Rectus sheath hematoma    H/O cardiac atrial fib ablation    Acute blood loss anemia    Discoloration of tooth         The following portions of the patient's history were reviewed and updated as appropriate: past medical history, past surgical history, past family history,  past social history, current medications, allergies and problem list       Review of Systems:  Constitutional: Denies fever, chills  Eyes: Denies eye redness, eye discharge  ENT: Denies hearing loss, sneezing, nasal discharge, sore throat   Respiratory: Denies cough, expectoration, shortness of breath  Cardiovascular: Denies chest pain, palpitations, lower extremity swelling  Gastrointestinal: Denies abdominal pain, nausea, vomiting, diarrhea  Genito-Urinary: Denies dysuria, incontinence  Musculoskeletal: Denies back pain, joint pain, muscle pain  Neurologic: Denies lightheadedness, syncope, headache, seizures  Endocrine: Denies polydipsia, temperature intolerance  Allergy and Immunology: Denies hives, insect bite sensitivity  Hematological and Lymphatic: Denies bleeding problems, swollen glands   Psychological: Denies depression, suicidal ideation, anxiety, panic  Dermatological: Denies pruritus, rash, skin lesion changes      Vitals:  Vitals:    09/20/22 1620   BP: 122/80   Resp: 16       Body mass index is 19 64 kg/m²  Weight (last 2 days)     Date/Time Weight    09/20/22 1620 53 5 (118)            Physical Examination:  General: Patient is not in acute distress  Awake, alert, oriented in time, place and person  Responding to commands  Head: Normocephalic  Atraumatic  Eyes: Both pupils normal sized, round and reactive to light  Nonicteric  ENT: Normal external ear canals  Neck: Supple  JVP not raised   Trachea central  No thyromegaly  Lungs: Bilateral bronchovascular breath sounds with no crackles or rhonchi  Chest wall: No tenderness  Cardiovascular: RRR  S1 and S2 normal  No murmur, rub or gallop  Gastrointestinal: Abdomen soft, nontender  No guarding or rigidity  Liver and spleen not palpable  Bowel sounds present  Neurologic: Patient is awake, alert, oriented in time, place and person  Responding to commands  Moving all extremities  Integumentary:  No skin rash  Lymphatic: No cervical lymphadenopathy  Back: Symmetric  No CVA tenderness  Extremities: No clubbing, cyanosis or edema      Laboratory Results:  CBC with diff:   Lab Results   Component Value Date    WBC 6 57 2022    WBC 3 7 (L) 2015    RBC 3 17 (L) 2022    RBC 4 26 2015    HGB 9 6 (L) 2022    HGB 13 0 2015    HCT 30 7 (L) 2022    HCT 38 7 2015    MCV 97 2022    MCV 91 2015    MCH 30 3 2022    MCH 30 4 2015    RDW 13 2 2022    RDW 12 3 2015     2022     2015       CMP:  Lab Results   Component Value Date    CREATININE 0 87 2022    CREATININE 0 7 2015    BUN 16 2022    BUN 13 2015     2015    K 4 0 2022    K 4 5 2015     2022     2015    CO2 28 2022    CO2 28 3 2015    GLUCOSE 85 2015    PROT 7 1 2015    ALKPHOS 71 2022    ALKPHOS 54 2015    ALT 18 2022    ALT 21 2015    AST 14 2022    AST 15 2015       Lab Results   Component Value Date    MG 2 3 2022    PHOS 2 6 2022     Results for orders placed during the hospital encounter of 19    NM myocardial perfusion spect (stress and/or rest)    Narrative  [facility], [address] [facility]  [phone]    Rest/Stress Gated SPECT Myocardial Perfusion Imaging After Exercise    Patient: Svetlana Jc  MR number: [id number]  Account number: [de-identified]  : 1940  Age: 69 years  Gender: Female  Status: Outpatient  Location:   [facility]  Height: 65 in  Weight: 130 lb  BP: 128/ 80 mmHg    Allergies: NO KNOWN ALLERGIES    Diagnosis: R00 2 - Palpitations, R06 09 - Other forms of dyspnea    Primary Physician:  Toby Uribe DO  Interpreting Physician:  Claudia Wadsworth MD  Referring Physician:  Claudia Wadsworth MD  Technician:  Ady Morton BS, BA, AART(N)  Group:  [facility]  Other:  Lloyd Arriaga MS, CCT    INDICATIONS: Detection of CAD    HISTORY: The patient is a 69year old  female  Chest pain status: no chest pain  Other symptoms: dyspnea of recent onset and palpitations  Coronary artery disease risk factors: hypertension and post-menopausal state  Cardiovascular history: none significant  Medications: an antihypertensive agent  REST ECG: Sinus bradycardia  PROCEDURE: The study was performed in the [address]  Treadmill exercise testing was performed, using the Freddie protocol  Gated SPECT myocardial perfusion imaging was performed after stress and at rest  Systolic  blood pressure was 128 mmHg, at the start of the study  Diastolic blood pressure was 80 mmHg, at the start of the study  The heart rate was 54 bpm, at the start of the study  Patient was not experiencing chest pain at the time of the  injection of the radiopharmaceutical     FREDDIE PROTOCOL:  HR bpm SBP mmHg DBP mmHg Symptoms ST change Rhythm/conduct  Baseline 54 128 80 none none sinus veronica  Stage 1 111 150 96 -- -- sinus tach  Stage 2 126 180 80 moderate dyspnea -- sinus tach  Immediate 125 -- -- -- -- sinus tach  Recovery 1 100 160 70 -- -- sinus tach  Recovery 2 63 -- -- -- -- NSR  Recovery 3 58 -- -- -- -- sinus veronica  Recovery 5 61 126 70 -- -- NSR  No medications or fluids given  STRESS SUMMARY: Duration of exercise was 4 min and 30 sec  The patient exercised to protocol stage 2  Maximal work rate was 7 METs   Functional capacity was normal  Maximal heart rate during stress was 126 bpm ( 88 % of maximal predicted  heart rate)  The heart rate response to stress was exaggerated  There was normal resting blood pressure with an appropriate response to stress  The rate-pressure product for the peak heart rate and blood pressure was 35189  There was no  chest pain during stress  The stress test was terminated due to achievement of target heart rate and moderate dyspnea  The stress ECG was negative for ischemia and normal  Arrhythmia during stress: isolated atrial premature beats and  isolated premature ventricular beats  ISOTOPE ADMINISTRATION:  Resting isotope administration Stress isotope administration  Agent Sestamibi Sestamibi  Dose 10 37 mCi 32 1 mCi  Date 06/06/2019 06/06/2019  Injection-image interval 30 min 45 min    The radiopharmaceutical was injected one minute before the end of exercise  The radiopharmaceutical was injected at the peak effect of pharmacologic stress  MYOCARDIAL PERFUSION IMAGING:  The image quality was good  Rotating projection images reveal mild breast attenuation and mild subdiaphragmatic activity  Left ventricular size was normal     PERFUSION DEFECTS:  -  There were no perfusion defects  GATED SPECT:  The calculated left ventricular ejection fraction was 57 %  Left ventricular ejection fraction was within normal limits by visual estimate  There was no diagnostic evidence for left ventricular regional abnormality  SUMMARY:  -  Stress results: Duration of exercise was 4 min and 30 sec  Target heart rate was achieved  There was no chest pain during stress  -  ECG conclusions: The stress ECG was negative for ischemia and normal  Arrhythmia during stress: isolated atrial premature beats and isolated premature ventricular beats  -  Perfusion imaging: There were no perfusion defects   -  Gated SPECT: The calculated left ventricular ejection fraction was 57 %  Left ventricular ejection fraction was within normal limits by visual estimate   There was no diagnostic evidence for left ventricular regional abnormality  IMPRESSIONS: Normal study after maximal exercise  Myocardial perfusion imaging was normal at rest and with stress  Left ventricular systolic function was normal     Prepared and signed by    Jeni Lloyd MD  Signed 06/06/2019 19:03:58      Medications:    Current Outpatient Medications:     losartan (COZAAR) 25 mg tablet, Take 1 tablet (25 mg total) by mouth daily, Disp: 90 tablet, Rfl: 3    Glucosamine-Chondroit-Vit C-Mn (GLUCOSAMINE 1500 COMPLEX PO), Take 1 capsule by mouth daily (Patient not taking: Reported on 9/20/2022), Disp: , Rfl:       Allergies:  No Known Allergies      Assessment and Plan:  1  Essential hypertension  BP stable  Continue losartan  Continue to monitor BP at home and call if abnormal  - losartan (COZAAR) 25 mg tablet; Take 1 tablet (25 mg total) by mouth daily  Dispense: 90 tablet; Refill: 3    2  Paroxysmal atrial fibrillation ([address] )  H/O cardiac atrial fib ablation  Patient completed her course of Amiodarone and discontinued it  She wants to discontinue Eliquis as well  Risks benefits of doing this discussed in view of high chads-vasc score    Recommend aggressive risk factor modification and therapeutic lifestyle changes  Low-salt, low-calorie, low-fat, low-cholesterol diet with regular exercise and to optimize weight  I will defer the ordering and monitoring of necessity lab studies to you, but I am available and happy to review and manage any of the data at your request in the future  Discussed concepts of atherosclerosis, including signs and symptoms of cardiac disease  Previous studies were reviewed  Safety measures were reviewed  Questions were entertained and answered  Patient was advised to report any problems requiring medical attention  Follow-up with PCP and appropriate specialist and lab work as discussed  Return for follow up visit as scheduled or earlier, if needed    Thank you for allowing me to participate in the care and evaluation of your patient  Should you have any questions, please feel free to contact me        Nicole Carpio MD  4/71/7024,0:38 PM

## 2022-09-23 ENCOUNTER — HOSPITAL ENCOUNTER (OUTPATIENT)
Dept: BONE DENSITY | Facility: CLINIC | Age: 82
Discharge: HOME | End: 2022-09-23
Payer: COMMERCIAL

## 2022-09-23 ENCOUNTER — TELEPHONE (OUTPATIENT)
Dept: INTERNAL MEDICINE CLINIC | Facility: CLINIC | Age: 82
End: 2022-09-23

## 2022-09-23 DIAGNOSIS — Z87.39 HISTORY OF OSTEOPOROSIS: ICD-10-CM

## 2022-09-23 PROCEDURE — 77080 DXA BONE DENSITY AXIAL: CPT

## 2022-09-23 NOTE — TELEPHONE ENCOUNTER
----- Message from Liset Ceballos DO sent at 9/23/2022 11:09 AM EDT -----  Dexa scan shows low bone density (osteopenia) A daily intake of at least 1200 mg calcium and 1000 IU of Vitamin D, as well as weight bearing and muscle strengthening exercise and avoidance of tobacco and excessive alcohol intake are suggested

## 2022-09-26 ENCOUNTER — TELEPHONE (OUTPATIENT)
Dept: CARDIOLOGY CLINIC | Facility: CLINIC | Age: 82
End: 2022-09-26

## 2022-09-26 NOTE — TELEPHONE ENCOUNTER
----- Message from Otilia Miller  on behalf of Harmeet Matute sent at 9/26/2022  9:41 AM EDT -----  Regarding: follow up from office visit 9/20   This message is being sent by Otilia Miller  on behalf of Eusebio Calderon  I wasn't ask about syncopy or SOB  I still have these issues  I want the follow-up note to be amended to show   these issues are still patient problems    Thank you  Carmelita Botello

## 2022-09-28 ENCOUNTER — TELEPHONE (OUTPATIENT)
Dept: FAMILY MEDICINE CLINIC | Facility: CLINIC | Age: 82
End: 2022-09-28

## 2022-09-28 DIAGNOSIS — Z23 ENCOUNTER FOR IMMUNIZATION: Primary | ICD-10-CM

## 2022-09-29 NOTE — TELEPHONE ENCOUNTER
covid 19 vaccine has been ordered  Please notify pt that only pfizer vaccine is available for boosters

## 2022-09-30 NOTE — ANESTHESIA POSTPROCEDURE EVALUATION
Post-Op Assessment Note    CV Status:  Stable    Pain management: adequate     Mental Status:  Awake and sleepy   Hydration Status:  Euvolemic   PONV Controlled:  Controlled   Airway Patency:  Patent      Post Op Vitals Reviewed: Yes      Staff: CRNA, Anesthesiologist         No complications documented      /57 (03/29/22 0939)    Temp (!) 97 2 °F (36 2 °C) (03/29/22 0939)    Pulse 99 (03/29/22 0939)   Resp 16 (03/29/22 0939)    SpO2 94 % (03/29/22 0939) No

## 2022-11-11 ENCOUNTER — IMMUNIZATIONS (OUTPATIENT)
Dept: FAMILY MEDICINE CLINIC | Facility: CLINIC | Age: 82
End: 2022-11-11

## 2022-11-11 DIAGNOSIS — Z23 NEED FOR VACCINATION: Primary | ICD-10-CM

## 2022-11-18 ENCOUNTER — APPOINTMENT (OUTPATIENT)
Dept: LAB | Facility: CLINIC | Age: 82
End: 2022-11-18

## 2022-11-18 ENCOUNTER — OFFICE VISIT (OUTPATIENT)
Dept: INTERNAL MEDICINE CLINIC | Facility: CLINIC | Age: 82
End: 2022-11-18

## 2022-11-18 VITALS
DIASTOLIC BLOOD PRESSURE: 74 MMHG | SYSTOLIC BLOOD PRESSURE: 118 MMHG | TEMPERATURE: 97.9 F | WEIGHT: 120.7 LBS | HEART RATE: 82 BPM | HEIGHT: 65 IN | OXYGEN SATURATION: 99 % | BODY MASS INDEX: 20.11 KG/M2

## 2022-11-18 DIAGNOSIS — D64.9 ANEMIA, UNSPECIFIED TYPE: ICD-10-CM

## 2022-11-18 DIAGNOSIS — E78.5 DYSLIPIDEMIA: ICD-10-CM

## 2022-11-18 DIAGNOSIS — R26.81 UNSTEADY GAIT WHEN WALKING: ICD-10-CM

## 2022-11-18 DIAGNOSIS — I48.11 LONGSTANDING PERSISTENT ATRIAL FIBRILLATION (HCC): ICD-10-CM

## 2022-11-18 DIAGNOSIS — Z12.31 ENCOUNTER FOR SCREENING MAMMOGRAM FOR BREAST CANCER: Primary | ICD-10-CM

## 2022-11-18 DIAGNOSIS — Z00.00 ENCOUNTER FOR SUBSEQUENT ANNUAL WELLNESS VISIT (AWV) IN MEDICARE PATIENT: ICD-10-CM

## 2022-11-18 DIAGNOSIS — E44.1 MILD PROTEIN-CALORIE MALNUTRITION (HCC): ICD-10-CM

## 2022-11-18 DIAGNOSIS — I10 ESSENTIAL HYPERTENSION: ICD-10-CM

## 2022-11-18 LAB
BASOPHILS # BLD AUTO: 0.06 THOUSANDS/ÂΜL (ref 0–0.1)
BASOPHILS NFR BLD AUTO: 1 % (ref 0–1)
CHOLEST SERPL-MCNC: 234 MG/DL
EOSINOPHIL # BLD AUTO: 0.1 THOUSAND/ÂΜL (ref 0–0.61)
EOSINOPHIL NFR BLD AUTO: 2 % (ref 0–6)
ERYTHROCYTE [DISTWIDTH] IN BLOOD BY AUTOMATED COUNT: 13.9 % (ref 11.6–15.1)
HCT VFR BLD AUTO: 43.5 % (ref 34.8–46.1)
HDLC SERPL-MCNC: 101 MG/DL
HGB BLD-MCNC: 13.5 G/DL (ref 11.5–15.4)
IMM GRANULOCYTES # BLD AUTO: 0.01 THOUSAND/UL (ref 0–0.2)
IMM GRANULOCYTES NFR BLD AUTO: 0 % (ref 0–2)
LDLC SERPL CALC-MCNC: 120 MG/DL (ref 0–100)
LYMPHOCYTES # BLD AUTO: 1.86 THOUSANDS/ÂΜL (ref 0.6–4.47)
LYMPHOCYTES NFR BLD AUTO: 30 % (ref 14–44)
MCH RBC QN AUTO: 29.3 PG (ref 26.8–34.3)
MCHC RBC AUTO-ENTMCNC: 31 G/DL (ref 31.4–37.4)
MCV RBC AUTO: 95 FL (ref 82–98)
MONOCYTES # BLD AUTO: 0.82 THOUSAND/ÂΜL (ref 0.17–1.22)
MONOCYTES NFR BLD AUTO: 13 % (ref 4–12)
NEUTROPHILS # BLD AUTO: 3.43 THOUSANDS/ÂΜL (ref 1.85–7.62)
NEUTS SEG NFR BLD AUTO: 54 % (ref 43–75)
NRBC BLD AUTO-RTO: 0 /100 WBCS
PLATELET # BLD AUTO: 282 THOUSANDS/UL (ref 149–390)
PMV BLD AUTO: 9.2 FL (ref 8.9–12.7)
RBC # BLD AUTO: 4.6 MILLION/UL (ref 3.81–5.12)
TRIGL SERPL-MCNC: 66 MG/DL
WBC # BLD AUTO: 6.28 THOUSAND/UL (ref 4.31–10.16)

## 2022-11-18 RX ORDER — LOSARTAN POTASSIUM 25 MG/1
25 TABLET ORAL
Qty: 30 TABLET | Refills: 2 | Status: SHIPPED | OUTPATIENT
Start: 2022-11-18

## 2022-11-18 NOTE — PROGRESS NOTES
Assessment and Plan:     Problem List Items Addressed This Visit        Cardiovascular and Mediastinum    Essential hypertension    Atrial fibrillation (HCC)       Other    Mild protein-calorie malnutrition ([facility] [address] )   Other Visit Diagnoses     Encounter for screening mammogram for breast cancer    -  Primary    Relevant Orders    Mammo screening bilateral w 3d & cad    Encounter for subsequent annual wellness visit (AWV) in Medicare patient        Unsteady gait when walking        Relevant Orders    Complete VNG    Ambulatory Referral to Physical Therapy    Anemia, unspecified type        Relevant Orders    CBC and differential    Dyslipidemia        Relevant Orders    Lipid Panel with Direct LDL reflex        disequilibrium since march  S/p ct head that was unremarkable in April  No longer in Afib  off of amiodarone  Still no resolution of symptoms  possibly 2/2 vertigo  Will obtain vng  Pt would benefit gait and balance training  PT referral placed  Anemia was 2/2 acute blood loss from complication of her cardioversion over the summer  Will obtain utd cbc to monitor for resolution  protein malnuttrion is resolved  No longer in afib s/p cardioversion  No longer on anticoagulation  BP has been borderline without medication  Restarted losartan and now it is low danyelle  Pt monitors at home  Pt to take q48hrs  Depression Screening and Follow-up Plan: Patient was screened for depression during today's encounter  They screened negative with a PHQ-2 score of 0  Falls Plan of Care: balance, strength, and gait training instructions were provided and referral to physical therapy  Preventive health issues were discussed with patient, and age appropriate screening tests were ordered as noted in patient's After Visit Summary  Personalized health advice and appropriate referrals for health education or preventive services given if needed, as noted in patient's After Visit Summary       History of Present Illness:     Patient presents for a Medicare Wellness Visit    rports unsteady gait  Mild dizziness associated with it  No issue when seated or driving  When she is up on her feet walking she is unable to move head left and right without feeling as iff she is going to miss a step  Onset was march  Has had fall this month  Unable to play tenssis due to this issues  Needs hearing aids  Stopped using them a few months ago      Patient Care Team:  Raymundo Cheung DO as PCP - General (Family Medicine)  [facility], MD Jessica Landeros MD (Cardiology)     Review of Systems:     Review of Systems   Constitutional: Negative for unexpected weight change  Eyes: Positive for visual disturbance  Respiratory: Negative for shortness of breath  Cardiovascular: Negative for chest pain and leg swelling  Musculoskeletal: Positive for gait problem  Negative for arthralgias  Neurological: Positive for dizziness  Negative for headaches  Problem List:     Patient Active Problem List   Diagnosis   • Psoriasis   • Screening for skin condition   • Essential hypertension   • Dupuytren's contracture of both hands   • Arthritis   • Conductive hearing loss, bilateral   • Hyperlipidemia   • History of osteoporosis   • Knee mass, left   • Fall   • Atrial fibrillation ([address] )   • Closed fracture of left wrist   • Rectus sheath hematoma   • H/O cardiac atrial fib ablation   • Acute blood loss anemia   • Discoloration of tooth   • Mild protein-calorie malnutrition (HCC)      Past Medical and Surgical History:     Past Medical History:   Diagnosis Date   • Atrial fibrillation ([address] )    • Hypertension      Past Surgical History:   Procedure Laterality Date   • APPENDECTOMY     • BREAST SURGERY      BX - Benign    • CARDIAC ELECTROPHYSIOLOGY PROCEDURE N/A 4/28/2022    Procedure: Cardiac eps/afib ablation;  Surgeon: Cristian Moulton MD;  Location: [facility];   Service: Cardiology   • CARDIAC ELECTROPHYSIOLOGY PROCEDURE N/A 2022    Procedure: Cardiac eps/aflutter ablation;  Surgeon: Nestor Fisher MD;  Location: [facility]; Service: Cardiology   • CATARACT EXTRACTION, BILATERAL     • DILATION AND CURETTAGE OF UTERUS     • IR EMBOLIZATION (SPECIFY VESSEL OR SITE)  2022   • LAPAROSCOPY FOR ECTOPIC PREGNANCY     • OTHER SURGICAL HISTORY      surgical excision of tubal pregnancy       Family History:     Family History   Problem Relation Age of Onset   • Hypertension Mother    • Ovarian cancer Mother    • Pneumonia Father       Social History:     Social History     Socioeconomic History   • Marital status: /Civil Union     Spouse name: None   • Number of children: None   • Years of education: None   • Highest education level: None   Occupational History   • None   Tobacco Use   • Smoking status: Former     Packs/day: 0 50     Years: 20 00     Pack years: 10 00     Types: Cigarettes     Quit date: 2006     Years since quittin 8   • Smokeless tobacco: Never   Vaping Use   • Vaping Use: Never used   Substance and Sexual Activity   • Alcohol use:  Yes     Alcohol/week: 1 0 standard drink     Types: 1 Glasses of wine per week     Comment: social    • Drug use: No   • Sexual activity: Not Currently   Other Topics Concern   • None   Social History Narrative    History of advance directive      Social Determinants of Health     Financial Resource Strain: Not on file   Food Insecurity: Not on file   Transportation Needs: Not on file   Physical Activity: Not on file   Stress: Not on file   Social Connections: Not on file   Intimate Partner Violence: Not on file   Housing Stability: Not on file      Medications and Allergies:     Current Outpatient Medications   Medication Sig Dispense Refill   • Calcium Carb-Cholecalciferol (CALCIUM 1000 + D PO) Take by mouth     • Glucosamine-Chondroit-Vit C-Mn (GLUCOSAMINE 1500 COMPLEX PO) Take 1 capsule by mouth daily     • losartan (COZAAR) 25 mg tablet Take 1 tablet (25 mg total) by mouth daily 90 tablet 3     No current facility-administered medications for this visit  No Known Allergies   Immunizations:     Immunization History   Administered Date(s) Administered   • COVID-19 MODERNA VACC 0 25 ML IM BOOSTER 11/10/2021   • COVID-19 MODERNA VACC 0 5 ML IM 01/25/2021, 02/20/2021   • COVID-19 Pfizer Vac BIVALENT Elias-sucrose 12 Yr+ IM (BOOSTER ONLY) 11/11/2022   • H1N1, All Formulations 11/13/2009   • INFLUENZA 10/09/2014, 10/15/2015, 10/17/2016, 10/10/2017, 09/06/2018, 10/17/2021   • Influenza Split High Dose Preservative Free IM 10/22/2019   • Influenza, high dose seasonal 0 7 mL 10/20/2020, 09/30/2022   • Influenza, seasonal, injectable 10/11/2013   • Pneumococcal Conjugate 13-Valent 09/24/2018   • Pneumococcal Polysaccharide PPV23 1940, 12/09/2019      Health Maintenance:         Topic Date Due   • Breast Cancer Screening: Mammogram  05/18/2022         Topic Date Due   • Hepatitis B Vaccine (1 of 3 - 3-dose series) Never done   • COVID-19 Vaccine (4 - Booster for Razia Duenas series) 03/10/2022      Medicare Screening Tests and Risk Assessments:     Gissel Garcia is here for her Subsequent Wellness visit  Health Risk Assessment:   Patient rates overall health as good  Patient feels that their physical health rating is much worse  Patient is satisfied with their life  Eyesight was rated as slightly worse  Hearing was rated as same  Patient feels that their emotional and mental health rating is same  Patients states they are sometimes angry  Patient states they are sometimes unusually tired/fatigued  Pain experienced in the last 7 days has been some  Patient's pain rating has been 3/10  Patient states that she has experienced no weight loss or gain in last 6 months  Depression Screening:   PHQ-2 Score: 0      Fall Risk Screening:    In the past year, patient has experienced: history of falling in past year    Number of falls: 2 or more  Injured during fall?: Yes    Feels unsteady when standing or walking?: Yes    Worried about falling?: Yes      Urinary Incontinence Screening:   Patient has not leaked urine accidently in the last six months  Home Safety:  Patient has trouble with stairs inside or outside of their home  Patient has no working smoke alarms and has no working carbon monoxide detector  Home safety hazards include: none  Nutrition:   Current diet is Regular  Medications:   Patient is currently taking over-the-counter supplements  OTC medications include: see medication list  Patient is able to manage medications  Activities of Daily Living (ADLs)/Instrumental Activities of Daily Living (IADLs):   Walk and transfer into and out of bed and chair?: Yes  Dress and groom yourself?: Yes    Bathe or shower yourself?: Yes    Feed yourself? Yes  Do your laundry/housekeeping?: Yes  Manage your money, pay your bills and track your expenses?: Yes  Make your own meals?: Yes    Do your own shopping?: Yes    Previous Hospitalizations:   Any hospitalizations or ED visits within the last 12 months?: Yes    How many hospitalizations have you had in the last year?: 1-2    Advance Care Planning:   Living will: Yes    Durable POA for healthcare: Yes    Advanced directive: Yes      PREVENTIVE SCREENINGS      Cardiovascular Screening:    General: Screening Not Indicated and History Lipid Disorder      Diabetes Screening:     General: Screening Current      Breast Cancer Screening:     General: Screening Current      Cervical Cancer Screening:    General: Screening Not Indicated      Lung Cancer Screening:     General: Screening Not Indicated    Screening, Brief Intervention, and Referral to Treatment (SBIRT)    Screening  Typical number of drinks in a day: 1  Typical number of drinks in a week: 7  Interpretation: Low risk drinking behavior      AUDIT-C Screenin) How often did you have a drink containing alcohol in the past year? 4 or more times a week  2) How many drinks did you have on a typical day when you were drinking in the past year? 1 to 2  3) How often did you have 6 or more drinks on one occasion in the past year? never    AUDIT-C Score: 4  Interpretation: Score 3-12 (female): POSITIVE screen for alcohol misuse    AUDIT Screenin) How often during the last year have you found that you were not able to stop drinking once you had started? 0 - never  5) How often during the last year have you failed to do what was normally expected from you because of drinking? 0 - never  6) How often during the last year have you needed a first drink in the morning to get yourself going after a heavy drinking session? 0 - never  7) How often during the last year have you had a feeling of guilt or remorse after drinking? 0 - never  8) How often during the last year have you been unable to remember what happened the night before because you had been drinking? 0 - never  9) Have you or someone else been injured as a result of your drinking? 0 - no  10) Has a relative or friend or a doctor or another health worker been concerned about your drinking or suggested you cut down? 0 - no    AUDIT Score: 4  Interpretation: Low risk alcohol consumption    No results found  Physical Exam:     /74 (BP Location: Left arm, Patient Position: Sitting, Cuff Size: Standard) Comment: bp  Pulse 82   Temp 97 9 °F (36 6 °C) (Temporal) Comment: no  Ht 5' 5" (1 651 m)   Wt 54 7 kg (120 lb 11 2 oz)   SpO2 99%   BMI 20 09 kg/m²     Physical Exam  HENT:      Head: Normocephalic  Right Ear: External ear normal       Left Ear: External ear normal       Ears:      Comments: Clear effusion behind left tm      Mouth/Throat:      Pharynx: Oropharynx is clear  Eyes:      Extraocular Movements: Extraocular movements intact  Conjunctiva/sclera: Conjunctivae normal       Pupils: Pupils are equal, round, and reactive to light     Cardiovascular:      Rate and Rhythm: Normal rate and regular rhythm  Heart sounds: No murmur heard  Pulmonary:      Effort: Pulmonary effort is normal       Breath sounds: Normal breath sounds  Abdominal:      General: Bowel sounds are normal       Palpations: Abdomen is soft  Musculoskeletal:      Right lower leg: No edema  Left lower leg: No edema  Comments: Brace on right knee   Neurological:      Mental Status: She is alert and oriented to person, place, and time  Gait: Gait abnormal (slowed)                Dana Velarde DO

## 2022-11-18 NOTE — PATIENT INSTRUCTIONS
Preventing Falls: Exercises to Improve Balance, Flexibility, Strength, and Staying Power      Certain types of exercises may help make you less likely to fall  Try the ones below  Or do other exercises that your healthcare provider suggests  Depending on your health, you may need to start slowly  Don’t let that stop you  Even small amounts of exercise can help you  Be sure to talk to your healthcare provider before starting any exercise program     Improve Balance  Many types of exercise can help improve balance  Deven chi and yoga are good examples  Here’s another one to try  You can do it anytime and almost anywhere  · Stand next to a counter or solid support  · Push yourself up onto your tiptoes  · Hold for 5 seconds  If you start to lose your balance, hold on to the counter  · Rest and repeat 5 times  Work up to holding for 20 to 30 seconds, if you can  Increase Flexibility  Being more flexible makes it easier for you to move around safely  Try exercises like the seated hamstring stretch  · Sit in a chair and put one foot on a stool  · Straighten your leg and reach with both hands down either side of your leg  Reach as far down your leg as you can  · Hold for about 20 seconds  · Go back to the starting position  Then repeat 5 times  Switch legs  Build Strength  “Resistance” exercises help build strength  You can do them without equipment  Or you can use weights, elastic bands, or special machines  One such exercise is called the biceps curl  You can hold a 1 pound weight or even a can of soup  Do this exercise at least 3 times a week  Strive for every day  · Sit up straight in a chair  · Keep your elbow close to your body and your wrist straight  · Bend your arm, moving your hand up to your shoulder  Then slowly lower your arm  · Repeat 5 times  Switch to the other arm    Build Your Staying Power  “Aerobic” exercises make your heart and lungs stronger so you can keep moving longer   Walking and swimming are two of the best types of exercises you can do  Using a stationary bike is great, too  Find an aerobic exercise that you enjoy  Start slowly and build up  Even 5 minutes is helpful  Aim for a goal of 30 minutes, at least 3 times a week  You don’t have to do 30 minutes in one session  Break it up and walk a little throughout the day  More Helpful Tips  · Start easy  Slowly work up to doing more  · Talk with your healthcare provider about the best exercises for you  · Call senior centers or health clubs about exercise programs  · If needed, have a family member watch you walk every so often to check your stability  · Exercise with a friend  Choose an activity you both enjoy  · Try exercises that you can do anytime, anywhere  Here are two examples  Have someone with you when you first try these:  · Practice walking by placing one foot right in front of the other  · Stand up and sit down 10 times  Repeat this throughout the day

## 2022-11-21 ENCOUNTER — TELEPHONE (OUTPATIENT)
Dept: INTERNAL MEDICINE CLINIC | Facility: CLINIC | Age: 82
End: 2022-11-21

## 2022-11-21 NOTE — TELEPHONE ENCOUNTER
----- Message from Maris Howell DO sent at 11/21/2022  1:15 PM EST -----  Cholesterol has increased  Pt is to work on lowering this by decrease dairy, red meat, and added oil intake     Her CBC was ok

## 2022-12-06 ENCOUNTER — OFFICE VISIT (OUTPATIENT)
Dept: AUDIOLOGY | Age: 82
End: 2022-12-06

## 2022-12-06 DIAGNOSIS — R26.81 UNSTEADY GAIT WHEN WALKING: ICD-10-CM

## 2022-12-06 NOTE — PROGRESS NOTES
Videonystagmography (VNG) Evaluation    Name:  Germaine Jin  :  1940  Age:  81 y o  Date of Evaluation: 22     History: Dizziness  Reason for visit: Germaine Jin is seen today at the request of Dr Junior Clement for an evaluation of balance  Patient complains of unsteady gait and feeling like she veers to the right when walking  Patient noted symptoms started approximately 6 months ago  Tympanometry:   Right: Type A - normal middle ear pressure and compliance   Left: Type A - normal middle ear pressure and compliance    Oculomotor battery: * Could not calibrate eyes  Used default calibration      Gaze:          Right: Normal        Left: Normal         Up: Normal        Down: Normal      Tracking: Normal    Saccades: Abnormal Velocity     Optokinetic: Abnormal Gain  @ 40dps    Positioning/Positionals:     Lysle Blower:    Right: Negative      Left: Negative        Positionals:     Sitting: Normal    Supine: Normal    Head Right: Normal    Head Left: Normal    Body Right: Normal    Body Left[de-identified] Normal      Calorics:     Bithermal Caloric Irrigation: Normal      Recommendations: Consider vestibular physcial therapy evaluation and rehabilitation through [facility]  Follow up with managing physician        aDsha Ross , CCC-A  Clinical Audiologist

## 2022-12-08 ENCOUNTER — EVALUATION (OUTPATIENT)
Dept: PHYSICAL THERAPY | Facility: CLINIC | Age: 82
End: 2022-12-08

## 2022-12-08 DIAGNOSIS — R26.81 UNSTEADY GAIT WHEN WALKING: ICD-10-CM

## 2022-12-08 DIAGNOSIS — R26.81 GAIT INSTABILITY: Primary | ICD-10-CM

## 2022-12-08 DIAGNOSIS — R42 DYSEQUILIBRIUM: ICD-10-CM

## 2022-12-08 NOTE — PROGRESS NOTES
PT Evaluation     Today's date: 2022  Patient name: Jayce Tran  : 1940  MRN: [id number]  Referring provider: Fatoumata Adams  Dx:   Encounter Diagnosis     ICD-10-CM    1  Unsteady gait when walking  R26 81 Ambulatory Referral to Physical Therapy        Start Time:   Stop Time: 1515  Total time in clinic (min): 60 minutes    Assessment/Plan  Assessment details: Patient is a 81 y o  Female who presents to skilled outpatient PT with gait instability  Pt has sig PMHx of knee instability bilaterally, HTN, Afib and falls with major injury  Upon initial evaluation, she presents with LE weakness B/L (R>L), dec balance, dec righting reactions, pain in B/L knees, dec coordination, and gait dysfunction  She presents ambulating without AD noting dec stride length B/L, lateral deviation toward the R, and dec gait speed  Per testing pt is at an increased risk for falls  Her vestibular oculomotor exam was [facility]  Significant amount of time spent completing patient education on POC, balance system, HEP, and walking program   Pt would benefit from skilled physical therapy to improve above impairments and improve functional mobility independence and return to PLOF  Will see pt 2x/week  Impairments: Abnormal gait, Activity intolerance, Impaired balance, Impaired physical strength, Lacks appropriate HEP, Poor posture, Poor body mechanics, Pain with function and Abnormal movement  Understanding of Dx/Px/POC: Good  Prognosis: Good    Patient verbalized understanding of POC  Please contact me if you have any questions or recommendations  Thank you for the referral and the opportunity to share in [address] [place] [address],[address] care      Plan  Planned therapy interventions: ADL training, Balance, Balance/WB training, Body mechanics training, Coordination, Functional ROM exercises, Gait training, HEP, Motor coordination training, Neuromuscular re-education, Patient education, Postural training, Strengthening, Stretching, Therapeutic activities, Therapeutic exercises and Therapeutic training  Frequency: 2x/wk  Duration in weeks: 12  Plan of Care beginning date: 22  Plan of Care expiration date: 12 weeks - 3/2/2023  Treatment plan discussed with: Patient     Goals  Short Term Goals (4 weeks):    - Patient will improve mCTSIB position 4 by 2 9 seconds from 4 seconds to 9 seconds to facilitate improved balance  - Patient will be independent in basic HEP 2-3 weeks  - Patient will improve 5xSTS score by 2 3 seconds from 27 seconds to 24 7 seconds to promote improved LE functional strength needed for ADLs  - Patient will improve DHI to </=34 demonstrating mild-no handicap   -Patient will improve ABC score to >70% to demonstrate improved confidence with functional mobility       Long Term Goals (12 weeks):  - Patient will be independent in a comprehensive home exercise program  - Patient will be able to demonstrate HT in gait without veering  - Patient will report 50% reduction in near falls in order to improve safety with functional tasks and reduce his risk for falls  - Patient will report going on walks at least 3 days per week to promote independence and improved cardiovascular endurance  - Patient will report 50% reduction in near falls when ambulating on uneven terrain  -Patient will be able to tolerate 30 seconds with eyes closed on foam surface without any loss of balance demonstrating improvement in vestibular system  - Decrease time for 5xSTS to <15s to indicate improved LE strength/power and improving functional mobility capacity   - Increase FOTO to greater than predicted value  Additional goals to be added next week    Cut off score  All date taken from [facility] Neuro Section or Rehab Measures    Varner/64  MDC: 6 pts  Age Norms:  60-69: M - 54   F - 55  70-79: M - 47   F - 53  80-89: M - 48   F - 50 5xSTS: Raymon et al 2010  MDC: 2 3 sec  Age Norms:  60-69: 11 1 sec  70-79: 12 6 sec  80-89: 14 8 sec   TUG  MDC: 4 14 sec  Cut off score:  >13 5 sec community dwelling adults  >32 2 frail elderly  <20 I for basic transfers  >30 dependent on transfers 10 Meter Walk Test: Aura bowles 2011  MDC: 0 18 m/s  20-29: M - 1 35 m   F - 1 34 m  30-39: M - 1 43 m   F - 1 34 m  40-49: M - 1 43 m   F - 1 39 m  50-59: M - 1 43 m   F - 1 31 m  60-69: M - 1 34 m   F - 1 24 m  70-79: M - 1 26 m   F - 1 13 m  80-89: M - 0 97 m   F - 0 94 m    Household Ambulator < 0 4 m/s  Limited Community Ambulator 0 4 - 0 8 m/s  [employer] Ambulator 0 8 - 1 2 m/s  Safely cross the street > 1 2 m/s   FGA  MCID: 4 pts  Geriatrics/community < 22/30 fall risk  Geriatrics/community < 20/30 unexplained falls    DGI  MDC: vestibular - 4 pts  MDC: geriatric/community - 3 pts  Falls risk <19/24 mCTSIB  Norm: 20-60 yrs  Eyes open firm: norm sway 0 21-0 48  Eyes closed firm: norm sway 0 48-0 99  Eyes open foam: norm sway 0 38-0 71  Eyes closed foam: norm sway 0 70-2 22   6 Minute Walk Test  MDC: 190 98 ft  MCID: 164 ft    Age Norms  61-76: M - 1876 ft (571 80 m)  F - 1765 ft (537 98 m)  70-79: M - 1729 ft (527 00 m)  F - 1545 ft (470 92 m)  80-89: M - 1368 ft (416 97 m)  F - 1286 ft (391 97 m) ABC: [facility], 2003  <67% increased risk for falls      Subjective  History of Present Illness  - Mechanism of injury: Pt reports she has been having dizziness which she thought was due to Afib, however has been off medication and still feels dizzy  She has a sig PMHx of cataract surgery, Afib, HTN, R Medial & lateral meniscus, LCL tear, L LCL tear, wears braces regularly    She has had significant difficulty with new lenses  She has no dizziness when turning her head or driving or when sitting down  She describes dizziness as feeling off balance, and "not right " She broke her wrist in February and has felt she has not been as active, and has lost a lot of muscle  She has had multiple recent falls due to imbalance     - Primary AD: none  - Assist level at home: independent  Social History  - Steps to enter house: 6 JASEN HR  - Stairs in house: FF to basement  - Lives with: with  (KIRSTY)  Patient Goals  - Improve balance  - Return to playing tennis     Objective   UE MMT: WFL  LE MMT:  - R Hip Flexion: 3+/5   L Hip Flexion: 4-/5  - R Hip Abduction: 4-/5  L Hip Abduction: 4-/5  - R Knee Extension: 3+/5 painful L Knee Extension: 3+/5 painful  - R Knee Flexion: 3+/5 painful  L Knee Flexion: 3+/5   - R Ankle DF: 4/5   L Ankle DF: 4/5  - R Ankle PF: 4/5   L Ankle PF: 4/5    Sensation  - Light touch: WFL    Reflexes/Clonus  - Pratt's Reflex: -    Gait  - Abnormalities: dec gait speed, forward trunk flexion    Cervical Spine AROM:  - Flexion: WFL no pain  - Extension: WFL no pain  - R Rotation: WFL no pain  - L Rotation: WFL no pain  - R Lateral Flexion: WFL no pain  - L Lateral Flexion: WFL no pain    Integrity Testing  - mVBI: WFL  - Sharp Tena: WFL  - Alar Stability Test: WFL  - Posture: forward shoulders    Coordination Screen  - Dysmetria: WFL  - Dysdiadochokinesia: WFL    Oculomotor Screen  - Gaze Holding Nystagmus: H: Normal and V: Normal Dizziness: 0/10  - Spontaneous Nystagmus Room Light: H: Normal and V: Normal Dizziness: 0/10  - Smooth Pursuits (central): H: Normal and V: Normal Dizziness: 0/10  - Saccades (central): H: Normal and V: Normal Dizziness: 0/10, Observation: WFL  - Near Point Convergence (normal: < 4"/10 cm - central): H: Normal Dizziness: 0/10   - VOR Cancel (central): H: Normal and V: Normal Dizziness: 0/10  - Head Thrust (moderate to severe hypofunction): H: Normal Dizziness: 0/10       Outcome Measures Initial Eval  12/8        5xSTS 27 sec        TUG  - Regular  - Cognitive NV        10 meter NV        FGA NV        mCTSIB  - FTEO (firm)  - FTEC (firm)  - FTEO (foam)  - FTEC (foam)   30 sec  25 sec  30 sec  4 sec        6MWT NV        DHI 44/100        ABC 45/100              Precautions:   Past Medical History:   Diagnosis Date   • Atrial fibrillation ([facility] [address] )    • Hypertension        Manuals 12/8                                                                Neuro Re-Ed                                                                                                        Ther Ex                                                                                                                     Ther Activity             Patient education POC, endurance training, HEP, vetsibular system, balance            STS HEP            Gait Training                                       Modalities

## 2022-12-12 ENCOUNTER — OFFICE VISIT (OUTPATIENT)
Dept: PHYSICAL THERAPY | Facility: CLINIC | Age: 82
End: 2022-12-12

## 2022-12-12 DIAGNOSIS — R26.81 UNSTEADY GAIT WHEN WALKING: ICD-10-CM

## 2022-12-12 DIAGNOSIS — R42 DYSEQUILIBRIUM: ICD-10-CM

## 2022-12-12 DIAGNOSIS — R26.81 GAIT INSTABILITY: Primary | ICD-10-CM

## 2022-12-12 NOTE — PROGRESS NOTES
Daily Note     Today's date: 2022  Patient name: Eduarda Laguerre  : 1940  MRN: [id number]  Referring provider: Jermaine Riley*  Dx:   Encounter Diagnosis     ICD-10-CM    1  Gait instability  R26 81       2  Dysequilibrium  R42       3  Unsteady gait when walking  R26 81                      Subjective: Pt reports she did not realize she was supposed to perform her HEP  She states she does go for walks frequently  Objective: See treatment diary below      Assessment: Hip strategy demonstrated during NRE  Demonstrated hip fatigue following TE today  Decreased proprioception and requires VC for proper weight distribution during Biodex LOS activity  Lateral path deviations during 6 min walk  Pt would benefit from continued PT in order to improve balance, proprioception and strength for decreased risk of falling and injury  Plan: Continue per plan of care  Outcome Measures Initial Eval         5xSTS 27 sec        TUG  - Regular  - Cognitive NV        10 meter NV        FGA NV        mCTSIB  - FTEO (firm)  - FTEC (firm)  - FTEO (foam)  - FTEC (foam)   30 sec  25 sec  30 sec  4 sec        6MWT ' 8"       [address] [place] [address] 44/100        [facility] 45/100            Precautions:   Past Medical History:   Diagnosis Date   • Atrial fibrillation ([facility] [address] )    • Hypertension      Access Code: [email or web address]  URL: [email or web address]       Manuals                                                                Neuro Re-Ed             NBOS on foam c HT/HN  x10           Tandem stance  Firm 15"x4           Biodex LOS  static x3                                                               Ther Ex             6 min walk  814' 8"           Seated clamshell  BTB x20           Standing hip abd  YTB x10           Lateral walking  YTB 3 laps                                                               Ther Activity             Patient education POC, endurance training, HEP, vetsibular system, balance AF           STS HEP 2x8           Gait Training                                       Modalities

## 2022-12-13 ENCOUNTER — HOSPITAL ENCOUNTER (OUTPATIENT)
Dept: MAMMOGRAPHY | Facility: CLINIC | Age: 82
Discharge: HOME | End: 2022-12-13

## 2022-12-13 DIAGNOSIS — Z12.31 ENCOUNTER FOR SCREENING MAMMOGRAM FOR BREAST CANCER: ICD-10-CM

## 2022-12-16 ENCOUNTER — OFFICE VISIT (OUTPATIENT)
Dept: PHYSICAL THERAPY | Facility: CLINIC | Age: 82
End: 2022-12-16

## 2022-12-16 DIAGNOSIS — R42 DYSEQUILIBRIUM: ICD-10-CM

## 2022-12-16 DIAGNOSIS — R26.81 UNSTEADY GAIT WHEN WALKING: ICD-10-CM

## 2022-12-16 DIAGNOSIS — R26.81 GAIT INSTABILITY: Primary | ICD-10-CM

## 2022-12-16 NOTE — PROGRESS NOTES
Daily Note     Today's date: 2022  Patient name: Alyssia Cuevas  : 1940  MRN: [id number]  Referring provider: David Anders*  Dx:   Encounter Diagnosis     ICD-10-CM    1  Gait instability  R26 81       2  Dysequilibrium  R42       3  Unsteady gait when walking  R26 81                      Subjective: Pt reports completing HEP  She continues to report difficulty with ambulating due to balance difficulty  Objective: See treatment diary below      Assessment: Tolerated treatment well  Focused more intensely on somatosensory training for balance today as pt continues to demonstrate deficit here  Tandem walking was difficult at first, but improved with repetition  Noted ankle strategy for most static balance tasks today  Non-firm sufaces created a challenge, and bouncing was noted as pt was unable to smoothly coordinate proper co-activation of the LE muscles  Updated HEP to include both dynamic and static balance tasks along with continued LE strengthening (STS)  Patient would benefit from continued PT      Plan: Continue per plan of care  Progress treatment as tolerated         Manuals                                                               Neuro Re-Ed             NBOS on foam c HT/HN  x10           Tandem stance  Firm 15"x4           Biodex LOS  static x3           NBOS firm EC   5x10"          WBOS on foam EC   5x10"          SLS EO   5x10"          Tandem walking   4x railing          Unstable surface ambulation   5'          BOSU static hold   3'          Wobble board   A/P 3'                       Ther Ex             6 min walk  814' 8"           Seated clamshell  BTB x20           Standing hip abd  YTB x10           Lateral walking  YTB 3 laps                                                               Ther Activity             Patient education POC, endurance training, HEP, vetsibular system, balance AF           STS HEP 2x8           Gait Training Modalities

## 2022-12-19 ENCOUNTER — APPOINTMENT (OUTPATIENT)
Dept: PHYSICAL THERAPY | Facility: CLINIC | Age: 82
End: 2022-12-19

## 2022-12-20 ENCOUNTER — OFFICE VISIT (OUTPATIENT)
Dept: PHYSICAL THERAPY | Facility: CLINIC | Age: 82
End: 2022-12-20

## 2022-12-20 DIAGNOSIS — R26.81 GAIT INSTABILITY: ICD-10-CM

## 2022-12-20 DIAGNOSIS — R42 DYSEQUILIBRIUM: Primary | ICD-10-CM

## 2022-12-20 DIAGNOSIS — R26.81 UNSTEADY GAIT WHEN WALKING: ICD-10-CM

## 2022-12-20 NOTE — PROGRESS NOTES
Daily Note     Today's date: 2022  Patient name: January Campbell  : 1940  MRN: [id number]  Referring provider: Shanice Varghese  Dx:   Encounter Diagnosis     ICD-10-CM    1  Dysequilibrium  R42       2  Unsteady gait when walking  R26 81       3  Gait instability  R26 81                      Subjective: Pt states she was able to walk 2 miles over the weekend and felt good  Objective: See treatment diary below      Assessment: Improved 6 min walk distance, as noted below  Patient has difficulty performing step up due to quad weakness  She does experience knee pain with step ups  Reaching strategy utilized for LOB  Able to perform dynamic balance activities with close supervision at the hand rail and without LOB  Patient demonstrating less path deviations during 6 min walk  Plan: Continue per plan of care        Manuals                                                              Neuro Re-Ed             NBOS on foam c HT/HN  x10           Tandem stance  Firm 15"x4           Biodex LOS  static x3  Static x3         NBOS firm EC   5x10" 20"x4         WBOS on foam EC   5x10"          SLS EO   5x10"          Tandem walking   4x railing 5x railing         Unstable surface ambulation   5'          BOSU static hold   3' 15"x5         Wobble board   A/P 3'          Step up c foam    4"+foam x10 ea         Ther Ex             6 min walk  814' 8"  1119' 4"         Seated clamshell  BTB x20           Standing hip abd  YTB x10  RTB 2x10         Lateral walking  YTB 3 laps  RTB 4 laps         LAQ    2# 3x10                                                Ther Activity             Patient education POC, endurance training, HEP, vetsibular system, balance AF           STS HEP 2x8           Gait Training                                       Modalities

## 2022-12-22 ENCOUNTER — HOSPITAL ENCOUNTER (OUTPATIENT)
Dept: ULTRASOUND IMAGING | Facility: CLINIC | Age: 82
End: 2022-12-22

## 2022-12-22 ENCOUNTER — OFFICE VISIT (OUTPATIENT)
Dept: PHYSICAL THERAPY | Facility: CLINIC | Age: 82
End: 2022-12-22

## 2022-12-22 ENCOUNTER — HOSPITAL ENCOUNTER (OUTPATIENT)
Dept: MAMMOGRAPHY | Facility: CLINIC | Age: 82
End: 2022-12-22

## 2022-12-22 DIAGNOSIS — R92.8 ABNORMAL SCREENING MAMMOGRAM: ICD-10-CM

## 2022-12-22 DIAGNOSIS — R26.81 GAIT INSTABILITY: ICD-10-CM

## 2022-12-22 DIAGNOSIS — R26.81 UNSTEADY GAIT WHEN WALKING: ICD-10-CM

## 2022-12-22 DIAGNOSIS — R42 DYSEQUILIBRIUM: Primary | ICD-10-CM

## 2022-12-22 NOTE — PROGRESS NOTES
Daily Note     Today's date: 2022  Patient name: Yasmeen Morillo  : 1940  MRN: [id number]  Referring provider: Guadalupe Vargas  Dx:   Encounter Diagnosis     ICD-10-CM    1  Dysequilibrium  R42       2  Unsteady gait when walking  R26 81       3  Gait instability  R26 81         Start Time: 1105  Stop Time: 1158  Total time in clinic (min): 53 minutes    Subjective: Pt reports nothing new since last session  Objective: See treatment diary below    Assessment: Pt tolerated treatment well  Included TM training with head turns and dec UE support which pt found challenging noting inc lateral sway  Progressed LE strengthening and balance exercises  Pt had inc difficulty with SLS, requiring UE use PRN to recover independently  Will progress as able  Patient would benefit from continued PT to improve LE strength, balance and improve functional mobility independence to return to PLOF  Plan: Continue per plan of care        Manuals                                                             Neuro Re-Ed             NBOS on foam c HT/HN  x10           Tandem stance  Firm 15"x4   Foam 15s x4        Biodex LOS  static x3  Static x3         NBOS firm EC   5x10" 20"x4         WBOS on foam EC   5x10"          SLS EO   5x10"          Tandem walking   4x railing 5x railing 5x at railing on foam 1UE PRN        Unstable surface ambulation   5'          BOSU static hold   3' 15"x5         Wobble board   A/P 3'          HTK     2SH 4 laps 0UE    2 laps opp UE to knee        Cone taps     Foam 10x ea        Step up c foam    4"+foam x10 ea x10 ea        Ther Ex             6 min walk  814' 8"  1119' 4"         Seated clamshell  BTB x20           Standing hip abd  YTB x10  RTB 2x10 RTB 2x10        Lateral walking  YTB 3 laps  RTB 4 laps RTB 4 laps        LAQ    2# 3x10         TM     10' total  B/L UE 1 min x4 HT V/H; 0UE with ambulation                                  Ther Activity             Patient education POC, endurance training, HEP, vetsibular system, balance AF   EB        STS HEP 2x8           Gait Training                                       Modalities

## 2022-12-23 ENCOUNTER — TELEPHONE (OUTPATIENT)
Dept: INTERNAL MEDICINE CLINIC | Facility: CLINIC | Age: 82
End: 2022-12-23

## 2022-12-23 NOTE — TELEPHONE ENCOUNTER
----- Message from Lurlean Cockayne, DO sent at 12/23/2022  6:46 AM EST -----  No evidence of malignancy of mammogram

## 2022-12-30 ENCOUNTER — OFFICE VISIT (OUTPATIENT)
Dept: PHYSICAL THERAPY | Facility: CLINIC | Age: 82
End: 2022-12-30

## 2022-12-30 DIAGNOSIS — R26.81 UNSTEADY GAIT WHEN WALKING: ICD-10-CM

## 2022-12-30 DIAGNOSIS — R42 DYSEQUILIBRIUM: Primary | ICD-10-CM

## 2022-12-30 DIAGNOSIS — R26.81 GAIT INSTABILITY: ICD-10-CM

## 2022-12-30 NOTE — PROGRESS NOTES
Daily Note     Today's date: 2022  Patient name: January Campbell  : 1940  MRN: [id number]  Referring provider: Shanice Varghese  Dx:   Encounter Diagnosis     ICD-10-CM    1  Dysequilibrium  R42       2  Unsteady gait when walking  R26 81       3  Gait instability  R26 81         Start Time: 1215  Stop Time: 1300  Total time in clinic (min): 45 minutes    Subjective: Pt reports she overdid it yesterday and is feeling more off balance  Objective: See treatment diary below    Assessment: Tolerated treatment well  Able to initiate more return to tennis exercises, focusing on dec UE support with balance activities, simulating holding a racquet  She performed them with good technique minimal LOB noted and able to recover independently  Patient would benefit from continued PT to improve balance and functional mobility independence and return to PLOF  Plan: Continue per plan of care        Manuals                                                            Neuro Re-Ed             NBOS on foam c HT/HN  x10           Tandem stance  Firm 15"x4   Foam 15s x4 Foam 15s x2       Biodex LOS  static x3  Static x3         NBOS firm EC   5x10" 20"x4         WBOS on foam EC   5x10"          SLS EO   5x10"          Tandem walking   4x railing 5x railing 5x at railing on foam 1UE PRN 4x at railing on foam 1UE PRN  3 laps firm       Unstable surface ambulation   5'          BOSU static hold   3' 15"x5  15"x5       Wobble board   A/P 3'          HTK     2SH 4 laps 0UE    2 laps opp UE to knee 2SH 4 laps holding  simulate racquet       Cone taps     Foam 10x ea Side stepping foam cone taps 5x       Step up c foam    4"+foam x10 ea x10 ea        Quarter turns       holding racquet 40' x2       Ambulating HT      40' x 4 laps ea H/V VC for R LE ER       Side step shuffle      15' x 3 min holding racquet       Ther Ex             6 min walk  814' 8"  1119' 4"         Seated elizabethhell  BTB x20           Standing hip abd  YTB x10  RTB 2x10 RTB 2x10 RTB 3x10       Lateral walking  YTB 3 laps  RTB 4 laps RTB 4 laps RTB 6 laps       LAQ    2# 3x10         TM     10' total  B/L UE 1 min x4 HT V/H; 0UE with ambulation 10'   1 UE 1 min x4 HT V/H; 0UE with ambulation                                 Ther Activity             Patient education POC, endurance training, HEP, vetsibular system, balance AF   EB        STS HEP 2x8           Gait Training                                       Modalities

## 2023-01-03 ENCOUNTER — OFFICE VISIT (OUTPATIENT)
Dept: PHYSICAL THERAPY | Facility: CLINIC | Age: 83
End: 2023-01-03

## 2023-01-03 DIAGNOSIS — R26.81 GAIT INSTABILITY: ICD-10-CM

## 2023-01-03 DIAGNOSIS — R42 DYSEQUILIBRIUM: Primary | ICD-10-CM

## 2023-01-03 DIAGNOSIS — R26.81 UNSTEADY GAIT WHEN WALKING: ICD-10-CM

## 2023-01-03 NOTE — PROGRESS NOTES
Daily Note     Today's date: 1/3/2023  Patient name: Abraham Pan  : 1940  MRN: [id number]  Referring provider: Traci Mcclain  Dx:   Encounter Diagnosis     ICD-10-CM    1  Dysequilibrium  R42       2  Unsteady gait when walking  R26 81       3  Gait instability  R26 81         Start Time: 1200  Stop Time: 1245  Total time in clinic (min): 45 minutes    Subjective: Pt reports continued dizziness  She does report improved balance, however dizziness seems to be the same  Objective: See treatment diary below  Vitals:   Supine: 105/71mmHg, 67bpm  Sittin/82mmHg, 73bpm    Side-lying: Negative B/L  Roll: Negative B/L    Head thrust: Negative B/L    Assessment: Pt tolerated treatment well  Continued balance and strengthening training  She demonstrated good technique with new exercises  Patient negative for orthostatics and positional testing  Unable to illicit dizziness w/ testing  Patient would benefit from continued PT to return to [facility] and return to tennis  Plan: Continue per plan of care        Manuals 12/8 12/12 12/16 12/20 12/22 12/30 1/3                                                          Neuro Re-Ed             NBOS on foam c HT/HN  x10           Tandem stance  Firm 15"x4   Foam 15s x4 Foam 15s x2       Biodex LOS  static x3  Static x3         NBOS firm EC   5x10" 20"x4         WBOS on foam EC   5x10"          SLS EO   5x10"          Tandem walking   4x railing 5x railing 5x at railing on foam 1UE PRN 4x at railing on foam 1UE PRN  3 laps firm 4 laps 30'       Unstable surface ambulation   5'          BOSU static hold   3' 15"x5  15"x5       Wobble board   A/P 3'          HTK     2SH 4 laps 0UE    2 laps opp UE to knee 2SH 4 laps holding  simulate racquet 2SH 4 laps holding Y ball      Cone taps     Foam 10x ea Side stepping foam cone taps 5x       Step up c foam    4"+foam x10 ea x10 ea        Quarter turns       holding racquet 40' x2       Ambulating HT      40' x 4 laps ea H/V VC for R LE ER       Side step shuffle      15' x 3 min holding racquet       Ball toss w/ tracking       30' x4 laps to self; to partner ea      Ther Ex             6 min walk  814' 8"  1119' 4"         Seated clamshell  BTB x20           Standing hip abd  YTB x10  RTB 2x10 RTB 2x10 RTB 3x10       Lateral walking  YTB 3 laps  RTB 4 laps RTB 4 laps RTB 6 laps RTB 4 laps on tape      LAQ    2# 3x10         TM     10' total  B/L UE 1 min x4 HT V/H; 0UE with ambulation 10'   1 UE 1 min x4 HT V/H; 0UE with ambulation 8'                                 Ther Activity             Patient education POC, endurance training, HEP, vetsibular system, balance AF   EB  EB      STS HEP 2x8           Gait Training                                       Modalities

## 2023-01-05 ENCOUNTER — OFFICE VISIT (OUTPATIENT)
Dept: PHYSICAL THERAPY | Facility: CLINIC | Age: 83
End: 2023-01-05

## 2023-01-05 DIAGNOSIS — R26.81 UNSTEADY GAIT WHEN WALKING: ICD-10-CM

## 2023-01-05 DIAGNOSIS — R42 DYSEQUILIBRIUM: Primary | ICD-10-CM

## 2023-01-05 DIAGNOSIS — R26.81 GAIT INSTABILITY: ICD-10-CM

## 2023-01-05 NOTE — PROGRESS NOTES
Daily Note     Today's date: 2023  Patient name: Guillermina Goodell  : 1940  MRN: [id number]  Referring provider: Liban Gunter  Dx:   Encounter Diagnosis     ICD-10-CM    1  Dysequilibrium  R42       2  Unsteady gait when walking  R26 81       3  Gait instability  R26 81         Start Time: 1045  Stop Time: 1130  Total time in clinic (min): 45 minutes    Subjective: Pt reports she feels good today  Objective: See treatment diary below    Assessment: Pt tolerated treatment well  Noted dizziness throughout session, reports no inc with any activity  Pt verbalized hesitation with stair negotiation, practiced FF with VC for sequencing  Pt demonstrated good technique, no R knee buckling noted  Patient would benefit from continued PT to return to PLOF  Plan: Continue per plan of care        Manuals 12/8 12/12 12/16 12/20 12/22 12/30 1/3 1/5                                                         Neuro Re-Ed             NBOS on foam c HT/HN  x10           Tandem stance  Firm 15"x4   Foam 15s x4 Foam 15s x2       Biodex LOS  static x3  Static x3         NBOS firm EC   5x10" 20"x4         WBOS on foam EC   5x10"          SLS EO   5x10"          Tandem walking   4x railing 5x railing 5x at railing on foam 1UE PRN 4x at railing on foam 1UE PRN  3 laps firm 4 laps 30'       Unstable surface ambulation   5'          BOSU static hold   3' 15"x5  15"x5       Wobble board   A/P 3'          HTK     2SH 4 laps 0UE    2 laps opp UE to knee 2SH 4 laps holding  simulate racquet 2SH 4 laps holding Y ball 2SH 4 laps holding  simulate racquet  3# AW B/L     Cone taps     Foam 10x ea Side stepping foam cone taps 5x       Step up c foam    4"+foam x10 ea x10 ea        Quarter turns       holding racquet 40' x2  holding racquet 40' x2 box drill 4 cones     Ambulating HT      40' x 4 laps ea H/V VC for R LE ER       Side step shuffle      15' x 3 min holding racquet  15' x 3 min holding racquet 3# AW B/L     [employer] toss w/ tracking       30' x4 laps to self; to partner ea 30' x4 laps to self; to partner ea     Ther Ex             6 min walk  814' 8"  1119' 4"         Seated clamshell  BTB x20           Standing hip abd  YTB x10  RTB 2x10 RTB 2x10 RTB 3x10       Lateral walking  YTB 3 laps  RTB 4 laps RTB 4 laps RTB 6 laps RTB 4 laps on tape      LAQ    2# 3x10         TM     10' total  B/L UE 1 min x4 HT V/H; 0UE with ambulation 10'   1 UE 1 min x4 HT V/H; 0UE with ambulation 8'  10'   1 UE 1 min x4 HT V/H; 0UE with ambulation                               Ther Activity             Patient education POC, endurance training, HEP, vetsibular system, balance AF   EB  EB EB     FF        3x with VC for sequencing     STS HEP 2x8           Gait Training                                       Modalities

## 2023-01-09 ENCOUNTER — OFFICE VISIT (OUTPATIENT)
Dept: PHYSICAL THERAPY | Facility: CLINIC | Age: 83
End: 2023-01-09

## 2023-01-09 DIAGNOSIS — R26.81 UNSTEADY GAIT WHEN WALKING: ICD-10-CM

## 2023-01-09 DIAGNOSIS — R42 DYSEQUILIBRIUM: Primary | ICD-10-CM

## 2023-01-09 DIAGNOSIS — R26.81 GAIT INSTABILITY: ICD-10-CM

## 2023-01-09 NOTE — PROGRESS NOTES
Daily Note     Today's date: 2023  Patient name: Germaine Jin  : 1940  MRN: [id number]  Referring provider: Violetta Yo  Dx:   Encounter Diagnosis     ICD-10-CM    1  Dysequilibrium  R42       2  Unsteady gait when walking  R26 81       3  Gait instability  R26 81         Start Time: 1630  Stop Time: 1715  Total time in clinic (min): 45 minutes    Subjective: Pt reports her walks are getting easier, and her balance is better hwoever still getting dizzy  Objective: See treatment diary below   Oculomotor Screen  - Gaze Holding Nystagmus: H: Normal and V: Normal Dizziness: 0/10  - Spontaneous Nystagmus Room Light: H: Normal and V: Normal Dizziness: 0/10  - Smooth Pursuits (central): H: Normal and V: Normal Dizziness: 0/10  - Saccades (central): H: Normal and V: Normal Dizziness: 0/10, Observation: WFL  - Near Jovani & Syeda (normal: < 4"/10 cm - central): H: Normal Dizziness: 0/10   - VOR Cancel (central): H: Normal and V: Normal Dizziness: 0/10  - Head Thrust (moderate to severe hypofunction): H: Normal Dizziness: 0/10    Outcome Measures Initial Eval    1/           5xSTS 27 sec  19 95s           FGA NV  20/30           mCTSIB  - FTEO (firm)  - FTEC (firm)  - FTEO (foam)  - FTEC (foam)    30 sec  25 sec  30 sec  4 sec    30s  30s   30s  5s           6MWT 1119' 1122'           DHI 44/100  32/100           ABC 45/100  91/100              Assessment: Pt has been treated for 8 sessions, and continues to have dizziness, and balance disorder  Per testing, pt demonstrates improved balance, and dizziness with functional mobility  She has [facility] vestibular oculomotor exam, however has 3 line degradation DVA, indicating dec vestibular therapy tolerance  Will continue per POC, focusing on balance and vestibular therapy  Pt tolerated treatment well  Patient would benefit from continued PT to improve functional mobility, balance and returnt o PLOF including tennis       Goals:  Short Term Goals (4 weeks):    - Patient will improve mCTSIB position 4 by 2 9 seconds from 4 seconds to 9 seconds to facilitate improved balance  - ONGOING  - Patient will be independent in basic HEP 2-3 weeks- MET  - Patient will improve 5xSTS score by 2 3 seconds from 27 seconds to 24 7 seconds to promote improved LE functional strength needed for ADLs- MET  - Patient will improve DHI to </=34 demonstrating mild-no handicap  - MET  -Patient will improve ABC score to >70% to demonstrate improved confidence with functional mobility  -MET     Long Term Goals (12 weeks):  - Patient will be independent in a comprehensive home exercise program- ONGOING  - Patient will be able to demonstrate HT in gait without veering- ONGOING  - Patient will report 50% reduction in near falls in order to improve safety with functional tasks and reduce his risk for falls- MET  - Patient will report going on walks at least 3 days per week to promote independence and improved cardiovascular endurance- MET  - Patient will report 50% reduction in near falls when ambulating on uneven terrain- MET  -Patient will be able to tolerate 30 seconds with eyes closed on foam surface without any loss of balance demonstrating improvement in vestibular system- ONGOING  - Decrease time for 5xSTS to <15s to indicate improved LE strength/power and improving functional mobility capacity -ONGOING  - Increase FOTO to greater than predicted value-ONGOING    Plan: Continue per plan of care        Manuals 12/8 12/12 12/16 12/20 12/22 12/30 1/3 1/5 1/9                                                        Neuro Re-Ed             NBOS on foam c HT/HN  x10           Tandem stance  Firm 15"x4   Foam 15s x4 Foam 15s x2       Biodex LOS  static x3  Static x3         NBOS firm EC   5x10" 20"x4         WBOS on foam EC   5x10"          SLS EO   5x10"          Tandem walking   4x railing 5x railing 5x at railing on foam 1UE PRN 4x at railing on foam 1UE PRN  3 laps firm 4 laps 30' Unstable surface ambulation   5'          BOSU static hold   3' 15"x5  15"x5       Wobble board   A/P 3'          HTK     2SH 4 laps 0UE    2 laps opp UE to knee 2SH 4 laps holding  simulate racquet 2SH 4 laps holding Y ball 2SH 4 laps holding  simulate racquet  3# AW B/L     Cone taps     Foam 10x ea Side stepping foam cone taps 5x       Step up c foam    4"+foam x10 ea x10 ea        Quarter turns       holding racquet 40' x2  holding racquet 40' x2 box drill 4 cones     Ambulating HT      40' x 4 laps ea H/V VC for R LE ER       Side step shuffle      15' x 3 min holding racquet  15' x 3 min holding racquet 3# AW B/L     Ball toss w/ tracking       30' x4 laps to self; to partner ea 30' x4 laps to self; to partner ea     Ther Ex             6 min walk  814' 8"  1119' 4"         Seated clamshell  BTB x20           Standing hip abd  YTB x10  RTB 2x10 RTB 2x10 RTB 3x10       Lateral walking  YTB 3 laps  RTB 4 laps RTB 4 laps RTB 6 laps RTB 4 laps on tape      LAQ    2# 3x10         TM     10' total  B/L UE 1 min x4 HT V/H; 0UE with ambulation 10'   1 UE 1 min x4 HT V/H; 0UE with ambulation 8'  10'   1 UE 1 min x4 HT V/H; 0UE with ambulation                               Ther Activity             Patient education POC, endurance training, HEP, vetsibular system, balance AF   EB  EB EB     FF        3x with VC for sequencing     STS HEP 2x8           Gait Training                                       Modalities

## 2023-01-12 ENCOUNTER — OFFICE VISIT (OUTPATIENT)
Dept: PHYSICAL THERAPY | Facility: CLINIC | Age: 83
End: 2023-01-12

## 2023-01-12 DIAGNOSIS — R42 DYSEQUILIBRIUM: Primary | ICD-10-CM

## 2023-01-12 DIAGNOSIS — R26.81 GAIT INSTABILITY: ICD-10-CM

## 2023-01-12 DIAGNOSIS — R26.81 UNSTEADY GAIT WHEN WALKING: ICD-10-CM

## 2023-01-12 NOTE — PROGRESS NOTES
Daily Note     Today's date: 2023  Patient name: aMdhuri Hernandez  : 1940  MRN: [id number]  Referring provider: Leslie Or  Theodore:   Encounter Diagnosis     ICD-10-CM    1  Dysequilibrium  R42       2  Unsteady gait when walking  R26 81       3  Gait instability  R26 81         Start Time: 1100  Stop Time: 1145  Total time in clinic (min): 45 minutes    Subjective: Pt reports she has been doing her 360 spins and they are easy  She still has daily dizziness that is worse with exercise  In addition, she has L ear clogging that is induced with exercise and feels she may need to go back to ENT (possible tube insertion)  She finds that her L eye lens is also throwing her off, has a scheduled appointment in February with a specialist about a replacement, however it is in [facility], searching for something more local  She feels all of these are contributing to her balance  Objective: See treatment diary below    Assessment: Pt tolerated treatment well  Completed HT with ambulation on treadmill without UE support for 1 min ea direction  Inc difficulty noted with horizontal head turns  Dec SLS on RLE, noted inc ankle strategy and inc UE use to regain LOB  Significant maount of time spent completing patient education on what systems contribute to balance  Patient would benefit from continued PT to improve functional mobility independence and dec risk for falls  Plan: Continue per plan of care        Manuals 12/8 12/12 12/16 12/20 12/22 12/30 1/3 1/5 1/9 1/12                                                       Neuro Re-Ed             NBOS on foam c HT/HN  x10        30s x2 ea H/V   Tandem stance  Firm 15"x4   Foam 15s x4 Foam 15s x2       Biodex LOS  static x3  Static x3         NBOS firm EC   5x10" 20"x4      30s x2   WBOS on foam EC   5x10"          SLS EO   5x10"       30s x4 ea LE, noted inc difficulty with RLE   Tandem walking   4x railing 5x railing 5x at railing on foam 1UE PRN 4x at railing on foam 1UE PRN  3 laps firm 4 laps 30'    4 laps   Unstable surface ambulation   5'          BOSU static hold   3' 15"x5  15"x5    NV   Wobble board   A/P 3'          HTK     2SH 4 laps 0UE    2 laps opp UE to knee 2SH 4 laps holding  simulate racquet 2SH 4 laps holding Y ball 2SH 4 laps holding  simulate racquet  3# AW B/L     Cone taps     Foam 10x ea Side stepping foam cone taps 5x       Step up c foam    4"+foam x10 ea x10 ea        Quarter turns       holding racquet 40' x2  holding racquet 40' x2 box drill 4 cones  Holding racquest x60s x2 with HKM   Ambulating HT      40' x 4 laps ea H/V VC for R LE ER       Side step shuffle      15' x 3 min holding racquet  15' x 3 min holding racquet 3# AW B/L     Ball toss w/ tracking       30' x4 laps to self; to partner ea 30' x4 laps to self; to partner ea  27' x4 ea to self   Ther Ex             6 min walk  814' 8"  1119' 4"         Seated clamshell  BTB x20           Standing hip abd  YTB x10  RTB 2x10 RTB 2x10 RTB 3x10       Lateral walking  YTB 3 laps  RTB 4 laps RTB 4 laps RTB 6 laps RTB 4 laps on tape      LAQ    2# 3x10         TM     10' total  B/L UE 1 min x4 HT V/H; 0UE with ambulation 10'   1 UE 1 min x4 HT V/H; 0UE with ambulation 8'  10'   1 UE 1 min x4 HT V/H; 0UE with ambulation  10'   1 min HT V/H 0UE                             Ther Activity             Patient education POC, endurance training, HEP, vetsibular system, balance AF   EB  EB EB  EB   FF        3x with VC for sequencing  NV   STS HEP 2x8           Gait Training                                       Modalities

## 2023-01-16 ENCOUNTER — OFFICE VISIT (OUTPATIENT)
Dept: PHYSICAL THERAPY | Facility: CLINIC | Age: 83
End: 2023-01-16

## 2023-01-16 DIAGNOSIS — R26.81 UNSTEADY GAIT WHEN WALKING: ICD-10-CM

## 2023-01-16 DIAGNOSIS — R42 DYSEQUILIBRIUM: Primary | ICD-10-CM

## 2023-01-16 DIAGNOSIS — R26.81 GAIT INSTABILITY: ICD-10-CM

## 2023-01-16 NOTE — PROGRESS NOTES
Daily Note     Today's date: 2023  Patient name: Yasmeen Morillo  : 1940  MRN: [id number]  Referring provider: Guadalupe Vargas  Dx:   Encounter Diagnosis     ICD-10-CM    1  Dysequilibrium  R42       2  Unsteady gait when walking  R26 81       3  Gait instability  R26 81         Start Time: 1015  Stop Time: 1100  Total time in clinic (min): 45 minutes    Subjective: Noted inc difficulties with balance at [facility]  Objective: See treatment diary below    Assessment: Pt tolerated treatment well  Inc difficulty and dizziness with horizontal head turns, requiring 1UE at all times due to frequent LOB  Continued to progress balance exercises, which pt demonstrated good technique  Patient would benefit from continued PT to improve functional mobility independence, dec risk for falls, and return to PLOF  Plan: Continue per plan of care        Manuals 1/16  12/16 12/20 12/22 12/30 1/3 1/5 1/9 1/12                                                       Neuro Re-Ed             NBOS on foam c HT/HN          30s x2 ea H/V   Tandem stance     Foam 15s x4 Foam 15s x2       Biodex LOS    Static x3         NBOS firm EC   5x10" 20"x4      30s x2   WBOS on foam EC   5x10"          SLS EO 30s x2 ea    Foam 15s x2 ea   5x10"       30s x4 ea LE, noted inc difficulty with RLE   Tandem walking 4 laps  4x railing 5x railing 5x at railing on foam 1UE PRN 4x at railing on foam 1UE PRN  3 laps firm 4 laps 30'    4 laps   Unstable surface ambulation   5'          BOSU static hold   3' 15"x5  15"x5    NV   Wobble board   A/P 3'          HTK     2SH 4 laps 0UE    2 laps opp UE to knee 2SH 4 laps holding  simulate racquet 2SH 4 laps holding Y ball 2SH 4 laps holding  simulate racquet  3# AW B/L     Cone taps     Foam 10x ea Side stepping foam cone taps 5x       Step up c foam    4"+foam x10 ea x10 ea        Quarter turns Holding racquest x60s x2 with HKM      holding racquet 40' x2  holding racquet 40' x2 box drill 4 cones  Holding racquest x60s x2 with HKM   Ambulating HT      40' x 4 laps ea H/V VC for R LE ER       Side step shuffle 15' x 3 min holding racquet 3# AW B/L     15' x 3 min holding racquet  15' x 3 min holding racquet 3# AW B/L     360             Ball toss w/ tracking 30' x4 laps to self    Standing still with serving motion 1 min x2      30' x4 laps to self; to partner ea 30' x4 laps to self; to partner ea  27' x4 ea to self   Ther Ex             6 min walk    1119' 4"         Seated clamshell             Standing hip abd    RTB 2x10 RTB 2x10 RTB 3x10       Lateral walking    RTB 4 laps RTB 4 laps RTB 6 laps RTB 4 laps on tape      LAQ    2# 3x10         TM 8'   1' HT H/V 1UE    10' total  B/L UE 1 min x4 HT V/H; 0UE with ambulation 10'   1 UE 1 min x4 HT V/H; 0UE with ambulation 8'  10'   1 UE 1 min x4 HT V/H; 0UE with ambulation  10'   1 min HT V/H 0UE                             Ther Activity             Patient education EB    EB  EB EB  EB   FF 4x 5# KB       3x with VC for sequencing  NV   STS             Gait Training                                       Modalities

## 2023-01-19 ENCOUNTER — OFFICE VISIT (OUTPATIENT)
Dept: PHYSICAL THERAPY | Facility: CLINIC | Age: 83
End: 2023-01-19

## 2023-01-19 DIAGNOSIS — R26.81 GAIT INSTABILITY: ICD-10-CM

## 2023-01-19 DIAGNOSIS — R26.81 UNSTEADY GAIT WHEN WALKING: ICD-10-CM

## 2023-01-19 DIAGNOSIS — R42 DYSEQUILIBRIUM: Primary | ICD-10-CM

## 2023-01-19 NOTE — PROGRESS NOTES
Daily Note     Today's date: 2023  Patient name: Nina Cuevas  : 1940  MRN: [id number]  Referring provider: [place] Crew  Dx:   Encounter Diagnosis     ICD-10-CM    1  Dysequilibrium  R42       2  Unsteady gait when walking  R26 81       3  Gait instability  R26 81         Start Time: 1100  Stop Time: 1140  Total time in clinic (min): 40 minutes    Subjective: Pt reports she is feeling good today  Objective: See treatment diary below    Assessment: Tolerated treatment well  Noted improved balance and dec dizziness throughout session  Continued to progress balance and exercises catered toward returning to [facility] including playing tennis  Patient would benefit from continued PT  Plan: Continue per plan of care        Manuals 1/16 1/19   12/22 12/30 1/3 1/5 1/9 1/12                                                       Neuro Re-Ed             NBOS on foam c HT/HN          30s x2 ea H/V   Tandem stance     Foam 15s x4 Foam 15s x2       Biodex LOS             NBOS firm EC          30s x2   WBOS on foam EC             SLS EO 30s x2 ea    Foam 15s x2 ea  30s x2 ea  Foam 10s x2        30s x4 ea LE, noted inc difficulty with RLE   Tandem walking 4 laps 4 laps 0UE open clinic   5x at railing on foam 1UE PRN 4x at railing on foam 1UE PRN  3 laps firm 4 laps 30'    4 laps   Unstable surface ambulation             BOSU static hold      15"x5    NV   Wobble board             HTK  2SH 4 laps holding  simulate racquet   2SH 4 laps 0UE    2 laps opp UE to knee 2SH 4 laps holding  simulate racquet 2SH 4 laps holding Y ball 2SH 4 laps holding  simulate racquet  3# AW B/L     Cone taps     Foam 10x ea Side stepping foam cone taps 5x       Step up c foam     x10 ea        Quarter turns Holding racquest x60s x2 with HKM holding racquet x60s x2 with HKM     holding racquet 40' x2  holding racquet 40' x2 box drill 4 cones  Holding racquest x60s x2 with HKM   Ambulating HT      40' x 4 laps ea H/V VC for R LE ER       Side step shuffle 15' x 3 min holding racquet 3# AW B/L 1 min x3 holding raquet with swing    15' x 3 min holding racquet  15' x 3 min holding racquet 3# AW B/L     360             Ball toss w/ tracking 30' x4 laps to self    Standing still with serving motion 1 min x2 30' x4 laps to self    standing still with serving motion 1 min x2     30' x4 laps to self; to partner ea 30' x4 laps to self; to partner ea  27' x4 ea to self   Ther Ex             6 min walk             Seated clamshell             Standing hip abd     RTB 2x10 RTB 3x10       Lateral walking     RTB 4 laps RTB 6 laps RTB 4 laps on tape      LAQ             TM 8'   1' HT H/V 1UE 5'   1' HT H/V      10' total  B/L UE 1 min x4 HT V/H; 0UE with ambulation 10'   1 UE 1 min x4 HT V/H; 0UE with ambulation 8'  10'   1 UE 1 min x4 HT V/H; 0UE with ambulation  10'   1 min HT V/H 0UE                             Ther Activity             Patient education EB EB   EB  EB EB  EB   FF 4x 5# KB 3x 5# KB VC no UE use reciprocal fwd      3x with VC for sequencing  NV   STS             Gait Training                                       Modalities

## 2023-01-23 ENCOUNTER — OFFICE VISIT (OUTPATIENT)
Dept: PHYSICAL THERAPY | Facility: CLINIC | Age: 83
End: 2023-01-23

## 2023-01-23 DIAGNOSIS — R26.81 GAIT INSTABILITY: ICD-10-CM

## 2023-01-23 DIAGNOSIS — R42 DYSEQUILIBRIUM: Primary | ICD-10-CM

## 2023-01-23 DIAGNOSIS — R26.81 UNSTEADY GAIT WHEN WALKING: ICD-10-CM

## 2023-01-23 NOTE — PROGRESS NOTES
Daily Note     Today's date: 2023  Patient name: Jacqueline Beach  : 1940  MRN: [id number]  Referring provider: Elizabeth Babb  Dx:   Encounter Diagnosis     ICD-10-CM    1  Dysequilibrium  R42       2  Unsteady gait when walking  R26 81       3  Gait instability  R26 81         Start Time: 1015  Stop Time: 1100  Total time in clinic (min): 45 minutes    Subjective: Pt reports last session she was having a really good day  She has not had a good day since  Currently she feels she is about 40% back to normal  She does not feel comfortable going back to PLOF including recreational tennis  Objective: See treatment diary below    Assessment: Tolerated treatment well  Noted improved balance with spins and HTs, required intermittent UE support on TM  Pt demonstrated improved agility and improved balance with tennis simulated drills  No LOB noted  Patient education included return to play, starting with net play 1:1  Pt agreeable  Patient would benefit from continued PT to return to PLOF  Plan: Continue per plan of care        Manuals 1/16 1/19 1/23  12/22 12/30 1/3 1/5 1/9 1/12                                                       Neuro Re-Ed             NBOS on foam c HT/HN          30s x2 ea H/V   Tandem stance     Foam 15s x4 Foam 15s x2       Biodex LOS             NBOS firm EC          30s x2   WBOS on foam EC             SLS EO 30s x2 ea    Foam 15s x2 ea  30s x2 ea  Foam 10s x2        30s x4 ea LE, noted inc difficulty with RLE   Tandem walking 4 laps 4 laps 0UE open clinic   5x at railing on foam 1UE PRN 4x at railing on foam 1UE PRN  3 laps firm 4 laps 30'    4 laps   Unstable surface ambulation             BOSU static hold      15"x5    NV   Wobble board             HTK  2SH 4 laps holding  simulate racquet 2SH 4 laps holding  simulate racquet  2SH 4 laps 0UE    2 laps opp UE to knee 2SH 4 laps holding  simulate racquet 2SH 4 laps holding Y ball 2SH 4 laps holding  simulate racquet  3# AW B/L     Cone taps     Foam 10x ea Side stepping foam cone taps 5x       Step up c foam     x10 ea        Quarter turns Holding racquest x60s x2 with HKM holding racquet x60s x2 with HKM holding racquet x60s x2 with HKM    holding racquet 40' x2  holding racquet 40' x2 box drill 4 cones  Holding racquest x60s x2 with HKM   Ambulating HT      40' x 4 laps ea H/V VC for R LE ER       Side step shuffle 15' x 3 min holding racquet 3# AW B/L 1 min x3 holding raquet with swing 1 min x2 holding raquet with swing   15' x 3 min holding racquet  15' x 3 min holding racquet 3# AW B/L     360             Ball toss w/ tracking 30' x4 laps to self    Standing still with serving motion 1 min x2 30' x4 laps to self    standing still with serving motion 1 min x2 30' x4 laps to self fwd/bwd    standing still with serving motion 1 min x2    30' x4 laps to self; to partner ea 30' x4 laps to self; to partner ea  27' x4 ea to self   ladder   Fwd/lat   2 laps ea x3          rebounder   basketball tandem 20x     Narrow stance on foam 20x          Ther Ex             6 min walk             Seated clamshell             Standing hip abd     RTB 2x10 RTB 3x10       Lateral walking     RTB 4 laps RTB 6 laps RTB 4 laps on tape      LAQ             TM 8'   1' HT H/V 1UE 5'   1' HT H/V    10' 2' HT with 1UE PRN  10' total  B/L UE 1 min x4 HT V/H; 0UE with ambulation 10'   1 UE 1 min x4 HT V/H; 0UE with ambulation 8'  10'   1 UE 1 min x4 HT V/H; 0UE with ambulation  10'   1 min HT V/H 0UE                             Ther Activity             Patient education EB EB   EB  EB EB  EB   FF 4x 5# KB 3x 5# KB VC no UE use reciprocal fwd      3x with VC for sequencing  NV   STS             Gait Training                                       Modalities

## 2023-01-27 ENCOUNTER — OFFICE VISIT (OUTPATIENT)
Dept: PHYSICAL THERAPY | Facility: CLINIC | Age: 83
End: 2023-01-27

## 2023-01-27 DIAGNOSIS — R42 DYSEQUILIBRIUM: Primary | ICD-10-CM

## 2023-01-27 DIAGNOSIS — R26.81 GAIT INSTABILITY: ICD-10-CM

## 2023-01-27 DIAGNOSIS — R26.81 UNSTEADY GAIT WHEN WALKING: ICD-10-CM

## 2023-01-27 NOTE — PROGRESS NOTES
Daily Note     Today's date: 2023  Patient name: Catrina Sawyer  : 1940  MRN: [id number]  Referring provider: Rene James  Dx:   Encounter Diagnosis     ICD-10-CM    1  Dysequilibrium  R42       2  Unsteady gait when walking  R26 81       3  Gait instability  R26 81         Start Time: 1001  Stop Time: 1024  Total time in clinic (min): 23 minutes    Subjective: Pt reports she did not have the chance to try and play this week however did do her exercises and will try to over the weekend  Objective: See treatment diary below    Assessment: Pt tolerated treatment well  Continued dual task training on treadmill which pt demonstrated good technique and improved coordination, requiring dec UE assist  Continued reaction and balance training, simulating tennis and ADLs to help pt return to PLOF  Noted dec SLS on RLE, and dec balance with vertical head turns  Will continue to progress as able  Patient would benefit from continued PT to return to PLOF  Next visit progress note  Plan: Continue per plan of care        Manuals 1/16 1/19 1/23 1/27  12/30 1/3 1/5 1/9 1/12                                                       Neuro Re-Ed             NBOS on foam c HT/HN          30s x2 ea H/V   Tandem stance      Foam 15s x2       Biodex LOS             NBOS firm EC    30s x2      30s x2   WBOS on foam EC             SLS EO 30s x2 ea    Foam 15s x2 ea  30s x2 ea  Foam 10s x2  30s x1      30s x4 ea LE, noted inc difficulty with RLE   Tandem walking 4 laps 4 laps 0UE open clinic  4 laps 0UE  4x at railing on foam 1UE PRN  3 laps firm 4 laps 30'    4 laps   Unstable surface ambulation             BOSU static hold      15"x5    NV   Wobble board             HTK  2SH 4 laps holding  simulate racquet 2SH 4 laps holding  simulate racquet 2SH 4 laps  2SH 4 laps holding  simulate racquet 2SH 4 laps holding Y ball 2SH 4 laps holding  simulate racquet  3# AW B/L     Cone taps      Side stepping foam cone taps 5x       Step up c foam             Quarter turns Holding racquest x60s x2 with HKM holding racquet x60s x2 with HKM holding racquet x60s x2 with HKM holding racquet x60s x2 with HKM   holding racquet 40' x2  holding racquet 40' x2 box drill 4 cones  Holding racquest x60s x2 with HKM   Ambulating HT      40' x 4 laps ea H/V VC for R LE ER       Side step shuffle 15' x 3 min holding racquet 3# AW B/L 1 min x3 holding raquet with swing 1 min x2 holding raquet with swing 1 min x2 holding raquet with swing  15' x 3 min holding racquet  15' x 3 min holding racquet 3# AW B/L     360             Ball toss w/ tracking 30' x4 laps to self    Standing still with serving motion 1 min x2 30' x4 laps to self    standing still with serving motion 1 min x2 30' x4 laps to self fwd/bwd    standing still with serving motion 1 min x2 30' x4 laps to swlf fwd/bwd     Serving motion 1' x2   30' x4 laps to self; to partner ea 30' x4 laps to self; to partner ea  27' x4 ea to self   ladder   Fwd/lat   2 laps ea x3 Fwd/lat 4 laps ea holding racquet         rebounder   basketball tandem 20x     Narrow stance on foam 20x tandemstance on foam 20x         tandem foam, LOS    reactive reaching to tap outside of GARCIA 30" x2         Ther Ex             6 min walk             Seated clamshell             Standing hip abd      RTB 3x10       Lateral walking      RTB 6 laps RTB 4 laps on tape      LAQ             TM 8'   1' HT H/V 1UE 5'   1' HT H/V    10' 2' HT with 1UE PRN 8' 2' HT with no UE support  10'   1 UE 1 min x4 HT V/H; 0UE with ambulation 8'  10'   1 UE 1 min x4 HT V/H; 0UE with ambulation  10'   1 min HT V/H 0UE                             Ther Activity             Patient education EB EB  EB   EB EB  EB   FF 4x 5# KB 3x 5# KB VC no UE use reciprocal fwd      3x with VC for sequencing  NV   STS             Gait Training                                       Modalities

## 2023-01-30 ENCOUNTER — OFFICE VISIT (OUTPATIENT)
Dept: PHYSICAL THERAPY | Facility: CLINIC | Age: 83
End: 2023-01-30

## 2023-01-30 DIAGNOSIS — R26.81 GAIT INSTABILITY: ICD-10-CM

## 2023-01-30 DIAGNOSIS — R26.81 UNSTEADY GAIT WHEN WALKING: ICD-10-CM

## 2023-01-30 DIAGNOSIS — R42 DYSEQUILIBRIUM: Primary | ICD-10-CM

## 2023-01-30 NOTE — PROGRESS NOTES
Progress Note    Today's date: 2023  Patient name: Sandra Mishra  : 1940  MRN: [id number]  Referring provider: Danny Weaver  Dx:   Encounter Diagnosis     ICD-10-CM    1  Dysequilibrium  R42       2  Unsteady gait when walking  R26 81       3  Gait instability  R26 81         Start Time: 1015  Stop Time: 1100  Total time in clinic (min): 45 minutes    Assessment/Plan   Assessment: Maicol Rocha is an 80 y/o F who presents to OP PT for dizziness and balance issues  Since initial evaluation, pt has made significant progress in balance, LE strength and is at a dec risk for falls  Noted continued difficulty with dual task, eyes closed, and head turns  She reports she will attempt to play sometime this week to RTP tennis  At this time, will see pt 1x/week for 2 additional weeks then prepare for discharge  Pt agreeable  She will benefit from continued PT to improve balance, strength and return to PLOF including tennis  Goals:  Short Term Goals (4 weeks):    - Patient will improve mCTSIB position 4 by 2 9 seconds from 4 seconds to 7 seconds to facilitate improved balance  - ONGOING  - Patient will be independent in basic HEP 2-3 weeks- MET  - Patient will improve 5xSTS score by 2 3 seconds from 27 seconds to 24 7 seconds to promote improved LE functional strength needed for ADLs- MET  - Patient will improve DHI to </=34 demonstrating mild-no handicap  - MET  -Patient will improve ABC score to >70% to demonstrate improved confidence with functional mobility  -MET     Long Term Goals (12 weeks):  - Patient will be independent in a comprehensive home exercise program- MET  - Patient will be able to demonstrate HT in gait without veering- ONGOING  - Patient will report 50% reduction in near falls in order to improve safety with functional tasks and reduce his risk for falls- MET  - Patient will report going on walks at least 3 days per week to promote independence and improved cardiovascular endurance- MET  - Patient will report 50% reduction in near falls when ambulating on uneven terrain- MET  -Patient will be able to tolerate 30 seconds with eyes closed on foam surface without any loss of balance demonstrating improvement in vestibular system- MET  - Decrease time for 5xSTS to <15s to indicate improved LE strength/power and improving functional mobility capacity -MET  - Increase FOTO to greater than predicted value-MET    Plan: Will see 1x/week for 2 weeks  Subjective Pt reports she did not call to f/u about hitting  She notes improvement but does have some dizziness still  Objective  Outcome Measures Initial Eval  12/8 1/9 1/30         5xSTS 27 sec  19 95s  12 93s         FGA NV  20/30 23/30         mCTSIB  - FTEO (firm)  - FTEC (firm)  - FTEO (foam)  - FTEC (foam)    30 sec  25 sec  30 sec  4 sec    30s  30s   30s  5s    30s  30s  30s  30s         6MWT 1119' 1122'  NV         DHI 44/100  32/100  22/100         ABC 45/100  91/100  93/100            Plan: Continue per plan of care     Manuals 1/16 1/19 1/23 1/27  1/30  1/3 1/5 1/9 1/12                                                                                               Neuro Re-Ed                      NBOS on foam c HT/HN                  30s x2 ea H/V   Tandem stance                      Biodex LOS                      NBOS firm EC       30s x2          30s x2   WBOS on foam EC                      SLS EO 30s x2 ea     Foam 15s x2 ea  30s x2 ea  Foam 10s x2   30s x1          30s x4 ea LE, noted inc difficulty with RLE   Tandem walking 4 laps 4 laps 0UE open clinic   4 laps 0UE    4 laps 30'      4 laps   Unstable surface ambulation                      BOSU static hold                  NV   Wobble board                      HTK   2SH 4 laps holding  simulate racquet 2SH 4 laps holding  simulate racquet 2SH 4 laps    2SH 4 laps holding Y ball 2SH 4 laps holding  simulate racquet  3# AW B/L       Cone taps                      Step up c foam                      Quarter turns Holding racquest x60s x2 with HKM holding racquet x60s x2 with HKM holding racquet x60s x2 with HKM holding racquet x60s x2 with HKM      holding racquet 40' x2 box drill 4 cones   Holding racquest x60s x2 with HKM   Ambulating HT                      Side step shuffle 15' x 3 min holding racquet 3# AW B/L 1 min x3 holding raquet with swing 1 min x2 holding raquet with swing 1 min x2 holding raquet with swing      15' x 3 min holding racquet 3# AW B/L       360                      Ball toss w/ tracking 30' x4 laps to self     Standing still with serving motion 1 min x2 30' x4 laps to self     standing still with serving motion 1 min x2 30' x4 laps to self fwd/bwd     standing still with serving motion 1 min x2 30' x4 laps to swlf fwd/bwd      Serving motion 1' x2    30' x4 laps to self; to partner ea 30' x4 laps to self; to partner ea   30' x4 ea to self   ladder     Fwd/lat   2 laps ea x3 Fwd/lat 4 laps ea holding racquet              rebounder     basketball tandem 20x      Narrow stance on foam 20x tandemstance on foam 20x              tandem foam, LOS       reactive reaching to tap outside of GARCIA 30" x2              Ther Ex                      6 min walk                      Seated clamshell                      Standing hip abd                      Lateral walking            RTB 4 laps on tape         LAQ                      TM 8'   1' HT H/V 1UE 5'   1' HT H/V     10' 2' HT with 1UE PRN 8' 2' HT with no UE support    8'  10'   1 UE 1 min x4 HT V/H; 0UE with ambulation   10'   1 min HT V/H 0UE    balance         EB                                   Ther Activity                      Patient education EB EB   EB  EB  EB EB   EB   FF 4x 5# KB 3x 5# KB VC no UE use reciprocal fwd          3x with VC for sequencing   NV   STS                       Gait Training                                                                       Modalities

## 2023-02-02 ENCOUNTER — OFFICE VISIT (OUTPATIENT)
Dept: PHYSICAL THERAPY | Facility: CLINIC | Age: 83
End: 2023-02-02

## 2023-02-02 DIAGNOSIS — R26.81 GAIT INSTABILITY: ICD-10-CM

## 2023-02-02 DIAGNOSIS — R26.81 UNSTEADY GAIT WHEN WALKING: Primary | ICD-10-CM

## 2023-02-02 DIAGNOSIS — R42 DYSEQUILIBRIUM: ICD-10-CM

## 2023-02-02 NOTE — PROGRESS NOTES
Daily Note     Today's date: 2023  Patient name: Damir Sams  : 1940  MRN: [id number]  Referring provider: Sharon Gupta  Dx:   Encounter Diagnosis     ICD-10-CM    1  Unsteady gait when walking  R26 81       2  Gait instability  R26 81       3  Dysequilibrium  R42         Start Time: 0565  Stop Time: 1140  Total time in clinic (min): 42 minutes    Subjective: Pt reports she was able to hit with a friend and it went well  Objective: See treatment diary below    Assessment: Pt tolerated treatment well  Noted minimal dizziness with all exercises  Patient would benefit from continued PT for one more session in 2 weeks  NV discharge  Pt agreeable  Plan: Continue per plan of care  Plan: Continue per plan of care     Manuals                                                                                        Neuro Re-Ed                    NBOS on foam c HT/HN                30s x2 ea H/V   Tandem stance                    Biodex LOS                    NBOS firm EC       30s x2        30s x2   WBOS on foam EC                    SLS EO 30s x2 ea     Foam 15s x2 ea  30s x2 ea  Foam 10s x2   30s x1   15s x4 ea foam     30s x4 ea LE, noted inc difficulty with RLE   Tandem walking 4 laps 4 laps 0UE open clinic   4 laps 0UE        4 laps   Unstable surface ambulation                    BOSU static hold                NV   Wobble board                    HTK   2SH 4 laps holding  simulate racquet 2SH 4 laps holding  simulate racquet 2SH 4 laps            Cone taps                    Step up c foam                    Quarter turns Holding racquest x60s x2 with HKM holding racquet x60s x2 with HKM holding racquet x60s x2 with HKM holding racquet x60s x2 with HKM        Holding racquest x60s x2 with HKM   Ambulating HT                    Side step shuffle 15' x 3 min holding racquet 3# AW B/L 1 min x3 holding raquet with swing 1 min x2 holding raquet with swing 1 min x2 holding raquet with swing   1 min x2 holding raquet with swing         360                    Ball toss w/ tracking 30' x4 laps to self     Standing still with serving motion 1 min x2 30' x4 laps to self     standing still with serving motion 1 min x2 30' x4 laps to self fwd/bwd     standing still with serving motion 1 min x2 30' x4 laps to swlf fwd/bwd      Serving motion 1' x2   30' x4 laps to swlf fwd/bwd      Serving motion 1' x2        30' x4 ea to self   ladder     Fwd/lat   2 laps ea x3 Fwd/lat 4 laps ea holding racquet   Fwd/lat 3 laps ea holding racquet         rebounder     basketball tandem 20x      Narrow stance on foam 20x tandemstance on foam 20x   tandemstance on foam 20x         tandem foam, LOS       reactive reaching to tap outside of GARCIA 30" x2   reactive reaching to tap outside of GARCIA 30" x2         Ther Ex                    6 min walk                    Seated clamshell                    Standing hip abd                    Lateral walking                    LAQ                    TM 8'   1' HT H/V 1UE 5'   1' HT H/V     10' 2' HT with 1UE PRN 8' 2' HT with no UE support   8' 2' HT PRN no UE support     10'   1 min HT V/H 0UE    balance assessments         EB                               Ther Activity                    Patient education EB EB   EB  EB EB     EB   FF 4x 5# KB 3x 5# KB VC no UE use reciprocal fwd            NV   STS                       Gait Training                                                                       Modalities

## 2023-02-09 ENCOUNTER — APPOINTMENT (OUTPATIENT)
Dept: PHYSICAL THERAPY | Facility: CLINIC | Age: 83
End: 2023-02-09

## 2023-02-10 ENCOUNTER — RA CDI HCC (OUTPATIENT)
Dept: OTHER | Facility: HOSPITAL | Age: 83
End: 2023-02-10

## 2023-02-10 NOTE — PROGRESS NOTES
[facility]  coding opportunities       Chart reviewed, no opportunity found:   [address]        Patients Insurance     Medicare Insurance: [employer]

## 2023-02-13 ENCOUNTER — OFFICE VISIT (OUTPATIENT)
Dept: PHYSICAL THERAPY | Facility: CLINIC | Age: 83
End: 2023-02-13

## 2023-02-13 DIAGNOSIS — R42 DYSEQUILIBRIUM: ICD-10-CM

## 2023-02-13 DIAGNOSIS — R26.81 UNSTEADY GAIT WHEN WALKING: Primary | ICD-10-CM

## 2023-02-13 DIAGNOSIS — R26.81 GAIT INSTABILITY: ICD-10-CM

## 2023-02-13 NOTE — PROGRESS NOTES
PT Discharge    Today's date: 2023  Patient name: Tea Jennings  : 1940  MRN: [id number]  Referring provider: Juancarlos Sevilla  Dx:   Encounter Diagnosis     ICD-10-CM    1  Unsteady gait when walking  R26 81       2  Gait instability  R26 81       3  Dysequilibrium  R42         Start Time: 0845  Stop Time: 0915  Total time in clinic (min): 30 minutes    Assessment/Plan   Assessment:  Chela Gaspar is an 80 y/o F who presents to OP PT for balance and gait  She has been coming to PT for 2 months  She has made significant improvements in LE strength, power, balance, cardiovascular endurance and righting reactions  Per testing, she is at an inc risk for falls for dynamic balance activities, however noted improvement  Continued difficulty with head turns  She has met all of her goals, and is completely independent with HEP  She will continue to complete HEP at home, in addition to returning to tennis/pickleball a few times a week as able  Significant amount of time spent completing patient education on HEP progressions/regression, causes of balance deficits, and POC  At this time, pt will be discharged back to PCP for further evaluation  F/u scheduled for   Pt agreeable  Goals:  Short Term Goals (4 weeks):    - Patient will improve mCTSIB position 4 by 2 9 seconds from 4 seconds to 7 seconds to facilitate improved balance  - MET  - Patient will be independent in basic HEP 2-3 weeks- MET  - Patient will improve 5xSTS score by 2 3 seconds from 27 seconds to 24 7 seconds to promote improved LE functional strength needed for ADLs- MET  - Patient will improve DHI to </=34 demonstrating mild-no handicap  - MET  -Patient will improve ABC score to >70% to demonstrate improved confidence with functional mobility  -MET     Long Term Goals (12 weeks):  - Patient will be independent in a comprehensive home exercise program- MET  - Patient will be able to demonstrate HT in gait without veering- ONGOING  - Patient will report 50% reduction in near falls in order to improve safety with functional tasks and reduce his risk for falls- MET  - Patient will report going on walks at least 3 days per week to promote independence and improved cardiovascular endurance- MET  - Patient will report 50% reduction in near falls when ambulating on uneven terrain- MET  -Patient will be able to tolerate 30 seconds with eyes closed on foam surface without any loss of balance demonstrating improvement in vestibular system- MET  - Decrease time for 5xSTS to <15s to indicate improved LE strength/power and improving functional mobility capacity -MET  - Increase FOTO to greater than predicted value-MET    Subjective: Pt reports the past couple days have not been good  She reports she hit the ball for an hour last week and it went ok  She suspects she overdid it  However, she does report that her ear continue to plug with exercise and it does throw her balance off  Her left ear plugs multiple times a day and her R ear a few times a week  She also continues to have lens issues and is scheduled with a specialist next month       Objective  Outcome Measures Initial Eval  12/8 1/9 1/30   5xSTS 27 sec  19 95s  12 93s   FGA NV  20/30 23/30   mCTSIB  - FTEO (firm)  - FTEC (firm)  - FTEO (foam)  - FTEC (foam)    30 sec  25 sec  30 sec  4 sec    30s  30s   30s  5s    30s  30s  30s  30s   6MWT 1119' 1122'  NV   [address] 41/80  32/100  22/100   ABC 45/100  91/100  93/100         Precautions:   Past Medical History:   Diagnosis Date   • Atrial fibrillation ([address] )    • Hypertension    Plan: Continue per plan of care    Manuals 1/16 1/19 1/23 1/27 1/30 2/2 2/13 1/9 1/12                                                                                                   Neuro Re-Ed                       NBOS on foam c HT/HN                   30s x2 ea H/V   Tandem stance                       Biodex LOS                       NBOS firm EC       30s x2           35s x2   WBOS on foam EC                       SLS EO 30s x2 ea     Foam 15s x2 ea  30s x2 ea  Foam 10s x2   30s x1   15s x4 ea foam       30s x4 ea LE, noted inc difficulty with RLE   Tandem walking 4 laps 4 laps 0UE open clinic   4 laps 0UE           4 laps   Unstable surface ambulation                       BOSU static hold                   NV   Wobble board                       HTK   2SH 4 laps holding  simulate racquet 2SH 4 laps holding  simulate racquet 2SH 4 laps               Cone taps                       Step up c foam                       Quarter turns Holding racquest x60s x2 with HKM holding racquet x60s x2 with HKM holding racquet x60s x2 with HKM holding racquet x60s x2 with HKM           Holding racquest x60s x2 with HKM   Ambulating HT                       Side step shuffle 15' x 3 min holding racquet 3# AW B/L 1 min x3 holding raquet with swing 1 min x2 holding raquet with swing 1 min x2 holding raquet with swing   1 min x2 holding raquet with swing           360                       Ball toss w/ tracking 30' x4 laps to self     Standing still with serving motion 1 min x2 30' x4 laps to self     standing still with serving motion 1 min x2 30' x4 laps to self fwd/bwd     standing still with serving motion 1 min x2 30' x4 laps to swlf fwd/bwd      Serving motion 1' x2   30' x4 laps to swlf fwd/bwd      Serving motion 1' x2          30' x4 ea to self   ladder     Fwd/lat   2 laps ea x3 Fwd/lat 4 laps ea holding racquet   Fwd/lat 3 laps ea holding racquet           rebounder     basketball tandem 20x      Narrow stance on foam 20x tandemstance on foam 20x   tandemstance on foam 20x           tandem foam, LOS       reactive reaching to tap outside of GARCIA 30" x2   reactive reaching to tap outside of GARCIA 30" x2           Ther Ex                       6 min walk                       Seated clamshell                       Standing hip abd                       Lateral walking                       LAQ                       TM 8'   1' HT H/V 1UE 5'   1' HT H/V     10' 2' HT with 1UE PRN 8' 2' HT with no UE support   8' 2' HT PRN no UE support       10'   1 min HT V/H 0UE    balance assessments         EB                                     Ther Activity                       Patient education EB EB   EB  EB EB  EB     EB   FF 4x 5# KB 3x 5# KB VC no UE use reciprocal fwd               NV   STS                       Gait Training                                                                       Modalities

## 2023-02-17 ENCOUNTER — OFFICE VISIT (OUTPATIENT)
Dept: INTERNAL MEDICINE CLINIC | Facility: CLINIC | Age: 83
End: 2023-02-17

## 2023-02-17 VITALS
HEIGHT: 65 IN | SYSTOLIC BLOOD PRESSURE: 102 MMHG | DIASTOLIC BLOOD PRESSURE: 70 MMHG | WEIGHT: 123.8 LBS | OXYGEN SATURATION: 98 % | BODY MASS INDEX: 20.62 KG/M2 | HEART RATE: 77 BPM | TEMPERATURE: 97.4 F

## 2023-02-17 DIAGNOSIS — R26.81 UNSTEADY GAIT WHEN WALKING: ICD-10-CM

## 2023-02-17 DIAGNOSIS — I48.11 LONGSTANDING PERSISTENT ATRIAL FIBRILLATION (HCC): ICD-10-CM

## 2023-02-17 DIAGNOSIS — R42 EPISODIC LIGHTHEADEDNESS: Primary | ICD-10-CM

## 2023-02-17 DIAGNOSIS — H69.83 EUSTACHIAN TUBE DYSFUNCTION, BILATERAL: ICD-10-CM

## 2023-02-17 DIAGNOSIS — I10 ESSENTIAL HYPERTENSION: ICD-10-CM

## 2023-02-17 DIAGNOSIS — I42.8 OTHER CARDIOMYOPATHY (HCC): ICD-10-CM

## 2023-02-17 PROBLEM — E44.1 MILD PROTEIN-CALORIE MALNUTRITION (HCC): Status: RESOLVED | Noted: 2022-11-18 | Resolved: 2023-02-17

## 2023-02-17 PROBLEM — I48.91 ATRIAL FIBRILLATION (HCC): Status: RESOLVED | Noted: 2022-04-24 | Resolved: 2023-02-17

## 2023-02-17 RX ORDER — FLUTICASONE PROPIONATE 50 MCG
1 SPRAY, SUSPENSION (ML) NASAL DAILY
Qty: 11.1 ML | Refills: 0 | Status: SHIPPED | OUTPATIENT
Start: 2023-02-17 | End: 2023-03-19

## 2023-02-17 RX ORDER — LORATADINE 10 MG/1
10 TABLET ORAL DAILY
Qty: 7 TABLET | Refills: 0 | Status: SHIPPED | OUTPATIENT
Start: 2023-02-17 | End: 2023-02-24

## 2023-02-17 RX ORDER — PSEUDOEPHEDRINE HCL 30 MG
30 TABLET ORAL 2 TIMES DAILY PRN
Qty: 14 TABLET | Refills: 0 | Status: SHIPPED | OUTPATIENT
Start: 2023-02-17 | End: 2023-02-24

## 2023-02-17 NOTE — PROGRESS NOTES
Name: Qasim Hahn      : 1940      MRN: [id number]  Encounter Provider: Gita Funes DO  Encounter Date: 2023   Encounter [facility]: [facility]   [address]     1  Episodic lightheadedness  2  Essential hypertension- BP low end of normal  Discussed with pt in the past that her current dose of losartan 25mg is either too high or not needed  Pt initially hesitant to stop therapy  Has completed PT and lightheadedness hasn't resolved  Pt will hold for  A week while monitoring for improvement of symptoms and change in BP      3  Other cardiomyopathy ([facility] )- compensated  No diaruetic therapy required  2022 LUIS EDUARDO showed Left ventricular cavity size is normal  The left ventricular ejection fraction is 60% by visual estimation  Global longitudinal strain is normal       4  Longstanding persistent atrial fibrillation ([facility] )- resolved  No longer on amiodarone or anticoagulant therapy  5  Unsteady gait when walking- resolving s/p PT  6  Eustachian tube dysfunction, bilateral  -     pseudoephedrine (SUDAFED) 30 mg tablet; Take 1 tablet (30 mg total) by mouth 2 (two) times a day as needed for congestion for up to 7 days  -     fluticasone (FLONASE) 50 mcg/act nasal spray; 1 spray into each nostril daily  -     loratadine (CLARITIN) 10 mg tablet; Take 1 tablet (10 mg total) by mouth daily for 7 days           Subjective      Present for follow up  Has recently completed 2 months worth of PT  Feels this has greatly improved her gait  She conitnues to feel slighlty lightheaded  Reviewed antihypertenisve  Review of Systems   Neurological: Positive for light-headedness  Negative for dizziness and headaches         Current Outpatient Medications on File Prior to Visit   Medication Sig   • Calcium Carb-Cholecalciferol (CALCIUM 1000 + D PO) Take by mouth   • Glucosamine-Chondroit-Vit C-Mn (GLUCOSAMINE 1500 COMPLEX PO) Take 1 capsule by mouth daily   • losartan (COZAAR) 25 mg tablet Take 1 tablet (25 mg total) by mouth every other day       Objective     /70   Pulse 77   Temp (!) 97 4 °F (36 3 °C)   Ht 5' 5" (1 651 m)   Wt 56 2 kg (123 lb 12 8 oz)   SpO2 98%   BMI 20 60 kg/m²     Physical Exam  HENT:      Head: Normocephalic  Right Ear: A middle ear effusion is present  Left Ear: A middle ear effusion is present  Eyes:      Extraocular Movements: Extraocular movements intact  Conjunctiva/sclera: Conjunctivae normal       Pupils: Pupils are equal, round, and reactive to light  Cardiovascular:      Rate and Rhythm: Normal rate and regular rhythm  Heart sounds: No murmur heard  Pulmonary:      Effort: Pulmonary effort is normal       Breath sounds: Normal breath sounds  Neurological:      Mental Status: She is alert and oriented to person, place, and time         Angelito Nagel DO

## 2023-02-17 NOTE — PATIENT INSTRUCTIONS
Flonase spray daily for 7 day  30mg of the sudafed twice a day for 7 days   10mg of the loratidine once daily for longer duration  Hold your blood pressure medication for a  week track your blood pressures   The goal is less than 130s/ 80

## 2023-03-02 ENCOUNTER — OFFICE VISIT (OUTPATIENT)
Dept: INTERNAL MEDICINE CLINIC | Facility: CLINIC | Age: 83
End: 2023-03-02

## 2023-03-02 VITALS
OXYGEN SATURATION: 98 % | BODY MASS INDEX: 20.67 KG/M2 | RESPIRATION RATE: 18 BRPM | SYSTOLIC BLOOD PRESSURE: 134 MMHG | WEIGHT: 124.2 LBS | DIASTOLIC BLOOD PRESSURE: 75 MMHG | TEMPERATURE: 97.5 F | HEART RATE: 76 BPM

## 2023-03-02 DIAGNOSIS — I10 ESSENTIAL HYPERTENSION: ICD-10-CM

## 2023-03-02 DIAGNOSIS — H69.83 EUSTACHIAN TUBE DYSFUNCTION, BILATERAL: ICD-10-CM

## 2023-03-02 DIAGNOSIS — R42 EPISODIC LIGHTHEADEDNESS: Primary | ICD-10-CM

## 2023-03-02 NOTE — PROGRESS NOTES
Name: Mello Blunt      : 1940      MRN: [id number]  Encounter Provider: Cathie Bishop DO  Encounter Date: 3/2/2023   Encounter department: [facility]   [address]     1  Episodic lightheadedness- improved  occasionally noted when moving too quickly  encouraged pt to increase daily hydration and take her time when transitioning to a standing position  2  Eustachian tube dysfunction, bilateral- improved with sudafed, flonase and claritin use x 10 days  Pt to continue daily antihistamine use  3  Essential hypertension- stable off of antihypertensive  BP [id number] systolic and less than 90 diastolic here in office an at home  Pt to continue stay off of losartan and continue monitoring as needed           Subjective      Pt presents for f/u  Ears feels better  lightheadedness somewhat improved  Has held losartan as discussed  Keep ambulatory Bps  Reviewed together          Last 5 BP BP Readings from Last 5 Encounters:  23 : 134/75  23 : 102/70  22 : 118/74  22 : 122/80  22 : 145/81              Current Outpatient Medications on File Prior to Visit   Medication Sig   • Calcium Carb-Cholecalciferol (CALCIUM 1000 + D PO) Take by mouth   • Glucosamine-Chondroit-Vit C-Mn (GLUCOSAMINE 1500 COMPLEX PO) Take 1 capsule by mouth daily   • fluticasone (FLONASE) 50 mcg/act nasal spray 1 spray into each nostril daily (Patient not taking: Reported on 3/2/2023)   • loratadine (CLARITIN) 10 mg tablet Take 1 tablet (10 mg total) by mouth daily for 7 days (Patient not taking: Reported on 3/2/2023)   • losartan (COZAAR) 25 mg tablet Take 1 tablet (25 mg total) by mouth every other day   • pseudoephedrine (SUDAFED) 30 mg tablet Take 1 tablet (30 mg total) by mouth 2 (two) times a day as needed for congestion for up to 7 days       Objective     /75 (BP Location: Left arm, Patient Position: Sitting, Cuff Size: Standard)   Pulse 76 Temp 97 5 °F (36 4 °C) (Temporal)   Resp 18   Wt 56 3 kg (124 lb 3 2 oz)   SpO2 98%   BMI 20 67 kg/m²     Physical Exam  HENT:      Head: Normocephalic  Cardiovascular:      Rate and Rhythm: Normal rate and regular rhythm  Heart sounds: No murmur heard  Pulmonary:      Effort: Pulmonary effort is normal       Breath sounds: Normal breath sounds  Neurological:      Mental Status: She is alert and oriented to person, place, and time     Psychiatric:         Mood and Affect: Mood normal          Behavior: Behavior normal        Kory Binder, DO

## 2023-05-05 ENCOUNTER — HOSPITAL ENCOUNTER (EMERGENCY)
Facility: HOSPITAL | Age: 83
Discharge: HOME | End: 2023-05-05
Attending: EMERGENCY MEDICINE

## 2023-05-05 VITALS
HEART RATE: 65 BPM | WEIGHT: 127.43 LBS | SYSTOLIC BLOOD PRESSURE: 160 MMHG | TEMPERATURE: 97.9 F | RESPIRATION RATE: 16 BRPM | DIASTOLIC BLOOD PRESSURE: 82 MMHG | OXYGEN SATURATION: 98 % | BODY MASS INDEX: 21.23 KG/M2 | HEIGHT: 65 IN

## 2023-05-05 DIAGNOSIS — S51.811A LACERATION OF RIGHT FOREARM, INITIAL ENCOUNTER: ICD-10-CM

## 2023-05-05 DIAGNOSIS — S51.811A SKIN TEAR OF FOREARM WITHOUT COMPLICATION, RIGHT, INITIAL ENCOUNTER: Primary | ICD-10-CM

## 2023-05-05 DIAGNOSIS — S80.811A ABRASION OF RIGHT LOWER EXTREMITY, INITIAL ENCOUNTER: ICD-10-CM

## 2023-05-05 RX ORDER — LIDOCAINE HYDROCHLORIDE AND EPINEPHRINE 10; 10 MG/ML; UG/ML
20 INJECTION, SOLUTION INFILTRATION; PERINEURAL ONCE
Status: COMPLETED | OUTPATIENT
Start: 2023-05-05 | End: 2023-05-05

## 2023-05-05 RX ORDER — BACITRACIN, NEOMYCIN, POLYMYXIN B 400; 3.5; 5 [USP'U]/G; MG/G; [USP'U]/G
1 OINTMENT TOPICAL ONCE
Status: COMPLETED | OUTPATIENT
Start: 2023-05-05 | End: 2023-05-05

## 2023-05-05 RX ADMIN — LIDOCAINE HYDROCHLORIDE,EPINEPHRINE BITARTRATE 20 ML: 10; .01 INJECTION, SOLUTION INFILTRATION; PERINEURAL at 10:31

## 2023-05-05 RX ADMIN — NEOMYCIN AND POLYMYXIN B SULFATES AND BACITRACIN ZINC 1 SMALL APPLICATION: 400; 3.5; 5 OINTMENT TOPICAL at 11:38

## 2023-05-05 NOTE — DISCHARGE INSTRUCTIONS
Suture instructions: suture removal in 10-12 days - return to ED, any urgent care or primary care physician for suture removal   apply nonstick dressing or bacitracin, neosporin or any other triple antibiotic ointment to area daily until suture removal   return to ED for any redness, purulent drainage, increasing pain or any other worrisome or worsening symptoms  do not submerge laceration in water - if it gets wet, pat it dry immediately  use tylenol or motrin over the counter as needed for pain/swelling

## 2023-05-05 NOTE — ED PROVIDER NOTES
History  Chief Complaint   Patient presents with    Fall     Pt states she fell off the bottom step of a step ladder and hit her right forearm on an end table causing a skin tear, pt also hit right lower leg, denies hitting head, -BT     61-year-old female presents to the emergency department via EMS with chief complaint of a skin tear to her right elbow  She reports she was getting a suitcase down off of a ladder at home and she slipped down the last 2 steps of the ladder and hit her forearm on a nearby and table resulting in a skin tear to the area  She also reports she hit her right lower leg and has an abrasion to the area  She denies head strike  Denies blood thinner use  Denies chest or abdominal pain  She reports no upper or lower extremity paralysis paresthesia or weakness  Past medical history reviewed: Positive for hypertension and atrial fibrillation status post ablation now in sinus rhythm  Past surgical history reviewed: Positive for appendectomy, D&C, laparoscopy for ectopic pregnancy, cardiac ablation procedure  Social history reviewed: Reformed smoker  Quit in 2006  Patient reports she consumes 1 glass of wine per week  Denies drug use  Allergies reviewed: No known drug allergies  History provided by:  Patient and EMS personnel   used: No        Prior to Admission Medications   Prescriptions Last Dose Informant Patient Reported? Taking?    Calcium Carb-Cholecalciferol (CALCIUM 1000 + D PO)   Yes No   Sig: Take by mouth   loratadine (CLARITIN) 10 mg tablet   No No   Sig: Take 1 tablet (10 mg total) by mouth daily for 7 days   Patient not taking: Reported on 3/2/2023   losartan (COZAAR) 25 mg tablet   No No   Sig: Take 1 tablet (25 mg total) by mouth every other day   pseudoephedrine (SUDAFED) 30 mg tablet   No No   Sig: Take 1 tablet (30 mg total) by mouth 2 (two) times a day as needed for congestion for up to 7 days      Facility-Administered Medications: None Past Medical History:   Diagnosis Date    Atrial fibrillation ([facility] [address] )     Hypertension        Past Surgical History:   Procedure Laterality Date    APPENDECTOMY      BREAST BIOPSY Left     neg    BREAST BIOPSY Left     neg    CARDIAC ELECTROPHYSIOLOGY PROCEDURE N/A 2022    Procedure: Cardiac eps/afib ablation;  Surgeon: Arturo Rahman MD;  [facility]: [facility]; Service: Cardiology    CARDIAC ELECTROPHYSIOLOGY PROCEDURE N/A 2022    Procedure: Cardiac eps/aflutter ablation;  Surgeon: Arturo Rahman MD;  [facility]; Service: Cardiology    CATARACT EXTRACTION, BILATERAL      DILATION AND CURETTAGE OF UTERUS      IR EMBOLIZATION (SPECIFY VESSEL OR SITE)  2022    LAPAROSCOPY FOR ECTOPIC PREGNANCY      OTHER SURGICAL HISTORY      surgical excision of tubal pregnancy        Family History   Problem Relation Age of Onset    Hypertension Mother     Ovarian cancer Mother     Pneumonia Father     No Known Problems Sister     No Known Problems Sister     No Known Problems Sister     No Known Problems Daughter     No Known Problems Maternal Grandmother     No Known Problems Paternal Grandmother     Cancer Maternal Aunt     No Known Problems Maternal Aunt     No Known Problems Maternal Aunt     No Known Problems Maternal Aunt     No Known Problems Maternal Aunt     No Known Problems Paternal Aunt     No Known Problems Paternal Aunt     Breast cancer Neg Hx      I have reviewed and agree with the history as documented      E-Cigarette/Vaping    E-Cigarette Use Never User      E-Cigarette/Vaping Substances    Nicotine No     THC No     CBD No     Flavoring No     Other No     Unknown No      Social History     Tobacco Use    Smoking status: Former     Packs/day: 0 50     Years: 20 00     Pack years: 10 00     Types: Cigarettes     Quit date: 2006     Years since quittin 3    Smokeless tobacco: Never   Vaping Use    Vaping "Use: Never used   Substance Use Topics    Alcohol use: Yes     Alcohol/week: 1 0 standard drink     Types: 1 Glasses of wine per week     Comment: social     Drug use: No       Review of Systems   Constitutional: Negative for fever  Respiratory: Negative for chest tightness, shortness of breath and stridor  Cardiovascular: Negative for chest pain  Musculoskeletal: Negative for arthralgias and gait problem  Skin: Positive for wound  Negative for rash  Neurological: Negative for seizures, syncope, facial asymmetry and numbness  All other systems reviewed and are negative  Physical Exam  Physical Exam  Vitals and nursing note reviewed  Constitutional:       General: She is not in acute distress  Appearance: Normal appearance  Comments: /82 (BP Location: Left arm)   Pulse 65   Temp 97 9 °F (36 6 °C) (Oral)   Resp 16   Ht 5' 5\" (1 651 m)   Wt 57 8 kg (127 lb 6 8 oz)   SpO2 98%   BMI 21 20 kg/m²      HENT:      Head: Normocephalic and atraumatic  Right Ear: External ear normal       Left Ear: External ear normal       Nose: Nose normal    Eyes:      General: No scleral icterus  Right eye: No discharge  Left eye: No discharge  Cardiovascular:      Rate and Rhythm: Normal rate  Pulses: Normal pulses  Pulmonary:      Effort: Pulmonary effort is normal       Breath sounds: Normal breath sounds  Musculoskeletal:         General: Tenderness and signs of injury present  No deformity  Normal range of motion  Right shoulder: Normal       Right elbow: Swelling and laceration present  No tenderness  Right wrist: Normal       Right hand: Normal       Cervical back: Normal range of motion and neck supple  Right knee: Normal       Right lower leg: No swelling or lacerations  No edema  Right ankle: Normal         Legs:    Skin:     General: Skin is dry  Capillary Refill: Capillary refill takes less than 2 seconds        Coloration: Skin is " not jaundiced  Findings: No erythema or rash  Neurological:      General: No focal deficit present  Mental Status: She is alert and oriented to person, place, and time  Mental status is at baseline  Sensory: No sensory deficit  Motor: No weakness  Gait: Gait normal    Psychiatric:         Mood and Affect: Mood normal          Behavior: Behavior normal          Thought Content: Thought content normal          Vital Signs  ED Triage Vitals [05/05/23 1014]   Temperature Pulse Respirations Blood Pressure SpO2   97 9 °F (36 6 °C) 65 16 160/82 98 %      Temp Source Heart Rate Source Patient Position - Orthostatic VS BP Location FiO2 (%)   Oral Monitor Sitting Left arm --      Pain Score       6           Vitals:    05/05/23 1014   BP: 160/82   Pulse: 65   Patient Position - Orthostatic VS: Sitting         Visual Acuity      ED Medications  Medications   lidocaine-epinephrine (XYLOCAINE/EPINEPHRINE) 1 %-1:100,000 injection 20 mL (20 mL Infiltration Given 5/5/23 1031)   neomycin-bacitracin-polymyxin b (NEOSPORIN) ointment 1 small application (1 small application Topical Given 5/5/23 1138)       Diagnostic Studies  Results Reviewed     None                 No orders to display              Procedures  Laceration repair    Date/Time: 5/5/2023 10:30 AM  Performed by: Arnol Welch PA-C  Authorized by: Arnol Welch PA-C   Consent: Verbal consent obtained    Risks and benefits: risks, benefits and alternatives were discussed  Consent given by: patient  Patient identity confirmed: verbally with patient  Body area: upper extremity  Location details: right lower arm  Laceration length: 3 5 cm  Foreign bodies: no foreign bodies  Tendon involvement: none  Nerve involvement: none  Vascular damage: no  Anesthesia: local infiltration    Anesthesia:  Local Anesthetic: lidocaine 1% with epinephrine    Sedation:  Patient sedated: no      Wound Dehiscence:    Secondary closure or dehiscence: complex    Procedure Details:  Preparation: Patient was prepped and draped in the usual sterile fashion  Irrigation solution: saline  Irrigation method: syringe  Amount of cleaning: standard  Debridement: minimal  Degree of undermining: minimal  Skin closure: 4-0 nylon  Number of sutures: 7  Technique: simple  Approximation: loose  Approximation difficulty: complex  Dressing: antibiotic ointment, tube gauze and gauze roll  Patient tolerance: patient tolerated the procedure well with no immediate complications  Comments: L shaped laceration with overlying skin tear to right proximal forearm  Underlying skin layers repairs  Epidermis laid over top of repaired laceration  ED Course                               SBIRT 22yo+    Flowsheet Row Most Recent Value   Initial Alcohol Screen: US AUDIT-C     1  How often do you have a drink containing alcohol? 0 Filed at: 05/05/2023 1016   2  How many drinks containing alcohol do you have on a typical day you are drinking? 0 Filed at: 05/05/2023 1016   3b  FEMALE Any Age, or MALE 65+: How often do you have 4 or more drinks on one occassion? 0 Filed at: 05/05/2023 1016   Audit-C Score 0 Filed at: 05/05/2023 1016   ROEL: How many times in the past year have you    Used an illegal drug or used a prescription medication for non-medical reasons? Never Filed at: 05/05/2023 1016                    Medical Decision Making  36-year-old female presents to the emergency department with skin tear and laceration to her right forearm  She also has an associated abrasion to the right anterior leg  Declined right leg x-rays or elbow x-rays  Has full range of motion  Reports she is here basically for repair of her laceration  Wound was irrigated, anesthetized, explored to depth  No foreign bodies  No injury to underlying structures  The superficial layer of epidermis was left in place as a natural bandage with the underlying laceration was repaired    Discussed care of sutured wound  Discussed suture removal in 10 to 12 days  Discussed use of nonstick dressing and tube gauze for management of laceration  Discussed follow-up with primary care physician for recheck in 3 to 5 days  Reviewed reasons to return the emergency department  Patient is stable for discharge and outpatient treatment  No indication for additional work-up  Risk  OTC drugs  Prescription drug management  Disposition  Final diagnoses:   Skin tear of forearm without complication, right, initial encounter   Laceration of right forearm, initial encounter   Abrasion of right lower extremity, initial encounter     Time reflects when diagnosis was documented in both MDM as applicable and the Disposition within this note     Time User Action Codes Description Comment    5/5/2023 11:27 AM Monna Sandhoff Add [M30 770B] Skin tear of forearm without complication, right, initial encounter     5/5/2023 11:27 AM Monna Sandhoff Add [N18 439R] Laceration of right forearm, initial encounter     5/5/2023 11:27 AM Monna Sandhoff Add [S80 811A] Abrasion of right lower extremity, initial encounter       ED Disposition     ED Disposition   Discharge    Condition   Stable    Date/Time   Fri May 5, 2023 11:26 AM    Comment   Kurt Hess discharge to home/self care                 Follow-up Information     Follow up With Specialties Details Why Contact Info Additional Information    Tejas Rocha DO Family Medicine Call in 1 week for further evaluation of symptoms [address] Emergency Department Emergency Medicine Go in 10 days For suture removal [address] Emergency Department, [address] [place] [address], [id number]          Discharge Medication List as of 5/5/2023 11:28 AM      CONTINUE these medications which have NOT CHANGED    Details   Calcium Carb-Cholecalciferol (CALCIUM 1000 + D PO) Take by mouth, Historical Med      loratadine (CLARITIN) 10 mg tablet Take 1 tablet (10 mg total) by mouth daily for 7 days, Starting Fri 2/17/2023, Until Fri 2/24/2023, Normal      losartan (COZAAR) 25 mg tablet Take 1 tablet (25 mg total) by mouth every other day, Starting Fri 11/18/2022, Normal      pseudoephedrine (SUDAFED) 30 mg tablet Take 1 tablet (30 mg total) by mouth 2 (two) times a day as needed for congestion for up to 7 days, Starting Fri 2/17/2023, Until Fri 2/24/2023 at 2359, Normal      fluticasone (FLONASE) 50 mcg/act nasal spray 1 spray into each nostril daily, Starting Fri 2/17/2023, Until Sun 3/19/2023, Normal      Glucosamine-Chondroit-Vit C-Mn (GLUCOSAMINE 1500 COMPLEX PO) Take 1 capsule by mouth daily, Starting Tue 3/11/2014, Historical Med             No discharge procedures on file      PDMP Review     None          ED Provider  Electronically Signed by           Nay Plascencia PA-C  05/05/23 1614

## 2023-05-08 ENCOUNTER — VBI (OUTPATIENT)
Dept: ADMINISTRATIVE | Facility: OTHER | Age: 83
End: 2023-05-08

## 2023-05-08 NOTE — TELEPHONE ENCOUNTER
Jazmine Rodríguez    ED Visit Information     Ed visit date: 05/05/2023  Diagnosis Description: Skin tear of forearm without complication, right, initial encounter; Laceration of right forearm, initial encounter; Abrasion of right lower extremity, initial encounter  In Network? Yes [address]  Discharge status: Home  Discharged with meds ? No  Number of ED visits to date: 0  ED Severity:3     Outreach Information    Outreach successful: Yes 1  Date letter mailed:N/A  Date Finalized:05/08/2023    Care Coordination    Follow up appointment with pcp: no no  Transportation issues ? No    Value [employer] type: 3 Day Outreach  Emergent necessity warranted by diagnosis: Yes  Called PCP first?: No  Practice Contacted Patient ?: No  Pt had ED follow up with pcp/staff ?: No    S/W patient who states wound looks pretty good but ugly  There is no more bleeding and no redness   Patient plans to return to ED for suture removal  Patient advised to call PCP if any questions about laceration before her return for suture removal

## 2023-05-16 ENCOUNTER — HOSPITAL ENCOUNTER (EMERGENCY)
Facility: HOSPITAL | Age: 83
Discharge: HOME | End: 2023-05-16
Attending: EMERGENCY MEDICINE

## 2023-05-16 VITALS
DIASTOLIC BLOOD PRESSURE: 78 MMHG | SYSTOLIC BLOOD PRESSURE: 135 MMHG | OXYGEN SATURATION: 99 % | HEART RATE: 85 BPM | TEMPERATURE: 97.8 F | RESPIRATION RATE: 18 BRPM

## 2023-05-16 DIAGNOSIS — Z48.02 VISIT FOR SUTURE REMOVAL: ICD-10-CM

## 2023-05-16 DIAGNOSIS — Z51.89 VISIT FOR WOUND CHECK: Primary | ICD-10-CM

## 2023-05-16 RX ORDER — BACITRACIN, NEOMYCIN, POLYMYXIN B 400; 3.5; 5 [USP'U]/G; MG/G; [USP'U]/G
1 OINTMENT TOPICAL ONCE
Status: COMPLETED | OUTPATIENT
Start: 2023-05-16 | End: 2023-05-16

## 2023-05-16 RX ADMIN — BACITRACIN ZINC, NEOMYCIN, POLYMYXIN B 1 SMALL APPLICATION: 400; 3.5; 5 OINTMENT TOPICAL at 10:41

## 2023-05-16 NOTE — DISCHARGE INSTRUCTIONS
Return to ED if you have signs of infection  Follow up with your family doctor if your wound is not improving in one week  Keep clean and dry, change dressing daily or as needed if it gets wet or soiled

## 2023-05-16 NOTE — ED PROVIDER NOTES
"History  Chief Complaint   Patient presents with   • Wound Check     Here for suture removal from right arm      Patient is an 81-year-old female who presents here for wound check and suture removal   Pt states she feel and sustained a laceration to her R forearm  She reports no fevers, chills, pain, or swelling  The wound was healing well until 3 days ago when the top layer of skin pulled away  The laceration itself was \"deep\" according to the patient and she says that is healed well except for the skin tear on top  No purulent drainage, no erythema, no warmth of the skin  Prior to Admission Medications   Prescriptions Last Dose Informant Patient Reported? Taking? Calcium Carb-Cholecalciferol (CALCIUM 1000 + D PO)   Yes No   Sig: Take by mouth   loratadine (CLARITIN) 10 mg tablet   No No   Sig: Take 1 tablet (10 mg total) by mouth daily for 7 days   Patient not taking: Reported on 3/2/2023   losartan (COZAAR) 25 mg tablet   No No   Sig: Take 1 tablet (25 mg total) by mouth every other day   pseudoephedrine (SUDAFED) 30 mg tablet   No No   Sig: Take 1 tablet (30 mg total) by mouth 2 (two) times a day as needed for congestion for up to 7 days      Facility-Administered Medications: None       Past Medical History:   Diagnosis Date   • Atrial fibrillation ([facility] [address] )    • Hypertension        Past Surgical History:   Procedure Laterality Date   • APPENDECTOMY     • BREAST BIOPSY Left 1980    neg   • BREAST BIOPSY Left 1990    neg   • CARDIAC ELECTROPHYSIOLOGY PROCEDURE N/A 04/28/2022    Procedure: Cardiac eps/afib ablation;  Surgeon: Marie Lockett MD;  [facility]; Service: Cardiology   • CARDIAC ELECTROPHYSIOLOGY PROCEDURE N/A 04/28/2022    Procedure: Cardiac eps/aflutter ablation;  Surgeon: Marie Lockett MD;  [facility];   Service: Cardiology   • CATARACT EXTRACTION, BILATERAL     • DILATION AND CURETTAGE OF UTERUS     • IR EMBOLIZATION (SPECIFY VESSEL OR SITE)  " 2022   • LAPAROSCOPY FOR ECTOPIC PREGNANCY     • OTHER SURGICAL HISTORY      surgical excision of tubal pregnancy        Family History   Problem Relation Age of Onset   • Hypertension Mother    • Ovarian cancer Mother    • Pneumonia Father    • No Known Problems Sister    • No Known Problems Sister    • No Known Problems Sister    • No Known Problems Daughter    • No Known Problems Maternal Grandmother    • No Known Problems Paternal Grandmother    • Cancer Maternal Aunt    • No Known Problems Maternal Aunt    • No Known Problems Maternal Aunt    • No Known Problems Maternal Aunt    • No Known Problems Maternal Aunt    • No Known Problems Paternal Aunt    • No Known Problems Paternal Aunt    • Breast cancer Neg Hx      I have reviewed and agree with the history as documented  E-Cigarette/Vaping   • E-Cigarette Use Never User      E-Cigarette/Vaping Substances   • Nicotine No    • THC No    • CBD No    • Flavoring No    • Other No    • Unknown No      Social History     Tobacco Use   • Smoking status: Former     Packs/day: 0 50     Years: 20 00     Pack years: 10 00     Types: Cigarettes     Quit date: 2006     Years since quittin 3   • Smokeless tobacco: Never   Vaping Use   • Vaping Use: Never used   Substance Use Topics   • Alcohol use: Yes     Alcohol/week: 1 0 standard drink     Types: 1 Glasses of wine per week     Comment: social    • Drug use: No       Review of Systems   Constitutional: Negative for chills, diaphoresis and fever  Respiratory: Negative for shortness of breath  Cardiovascular: Negative for chest pain  Gastrointestinal: Negative for nausea and vomiting  Musculoskeletal: Negative for gait problem  Skin: Positive for wound  Negative for pallor and rash  Neurological: Negative for syncope, facial asymmetry, weakness, light-headedness, numbness and headaches  All other systems reviewed and are negative        Physical Exam  Physical Exam  Vitals and nursing note reviewed  Constitutional:       General: She is awake  She is not in acute distress  Appearance: Normal appearance  She is not toxic-appearing or diaphoretic  HENT:      Head: Normocephalic and atraumatic  Right Ear: External ear normal       Left Ear: External ear normal       Nose: Nose normal    Eyes:      General: No scleral icterus  Right eye: No discharge  Left eye: No discharge  Cardiovascular:      Rate and Rhythm: Normal rate  Pulmonary:      Effort: Pulmonary effort is normal  No respiratory distress  Musculoskeletal:         General: No tenderness, deformity or signs of injury  Right forearm: Laceration present  No swelling, edema, deformity, tenderness or bony tenderness  Cervical back: Normal range of motion and neck supple  Comments: Laceration of R forearm with repair with no dehiscence  Skin tear of the superior portion of laceration  Pink granulation tissue present, no bleeding or purulent drainage  No erythema or edema of surrounding skin  Nontender  Pt has full ROM of forearm  Skin:     General: Skin is warm and dry  Capillary Refill: Capillary refill takes less than 2 seconds  Coloration: Skin is not cyanotic, jaundiced, mottled or pale  Findings: Laceration present  No erythema or rash  Comments: See musc system for description of wound  Neurological:      General: No focal deficit present  Mental Status: She is alert and oriented to person, place, and time  Mental status is at baseline  Gait: Gait normal    Psychiatric:         Mood and Affect: Mood normal          Behavior: Behavior normal  Behavior is cooperative  Thought Content: Thought content normal         Media Information  Document Information    Clinical Image - Mobile Device      05/16/2023 09:40   Attached To:    Hospital Encounter on 5/16/23     Source Information    April RACHEL Jose Ed         Vital Signs  ED Triage "Vitals [05/16/23 0911]   Temperature Pulse Respirations Blood Pressure SpO2   97 8 °F (36 6 °C) 87 18 140/76 98 %      Temp Source Heart Rate Source Patient Position - Orthostatic VS BP Location FiO2 (%)   Temporal Monitor Sitting Left arm --      Pain Score       No Pain           Vitals:    05/16/23 0911 05/16/23 1114   BP: 140/76 135/78   Pulse: 87 85   Patient Position - Orthostatic VS: Sitting          Visual Acuity      ED Medications  Medications   neomycin-bacitracin-polymyxin b (NEOSPORIN) ointment 1 small application (1 small application Topical Given 5/16/23 1041)       Diagnostic Studies  Results Reviewed     None                 No orders to display              Procedures  Suture removal    Date/Time: 5/16/2023 10:29 AM  Performed by: Terese Vallecillo PA-C  Authorized by: Anna Sanches PA-C   Universal Protocol:  Consent: Verbal consent obtained  Risks and benefits: risks, benefits and alternatives were discussed  Consent given by: patient  Time out: Immediately prior to procedure a \"time out\" was called to verify the correct patient, procedure, equipment, support staff and site/side marked as required  Patient understanding: patient states understanding of the procedure being performed  Patient consent: the patient's understanding of the procedure matches consent given  Procedure consent: procedure consent matches procedure scheduled        Patient location:  ED  Location:     Laterality:  Right    Location:  Upper extremity    Upper extremity location:  Arm    Arm location:  R lower arm  Procedure details: Tools used:  Suture removal kit    Wound appearance:  No sign(s) of infection, moist and pink    Number of sutures removed:  7  Post-procedure details:     Post-removal:  Antibiotic ointment applied and dressing applied    Patient tolerance of procedure:   Tolerated well, no immediate complications  Comments:      Pt has a skin tear to the superior aspect of the laceration with pink " wound bed, no active bleeding  Pt's skin is thin and fragile  No s/s of infection  Bacitracin applied, xeroform, and bandage with roll gauze applied  ED Course                               SBIRT 22yo+    Flowsheet Row Most Recent Value   Initial Alcohol Screen: US AUDIT-C     1  How often do you have a drink containing alcohol? 0 Filed at: 05/16/2023 0909   2  How many drinks containing alcohol do you have on a typical day you are drinking? 0 Filed at: 05/16/2023 0909   3a  Male UNDER 65: How often do you have five or more drinks on one occasion? 0 Filed at: 05/16/2023 0909   3b  FEMALE Any Age, or MALE 65+: How often do you have 4 or more drinks on one occassion? 0 Filed at: 05/16/2023 0909   Audit-C Score 0 Filed at: 05/16/2023 7119   ROEL: How many times in the past year have you    Used an illegal drug or used a prescription medication for non-medical reasons? Never Filed at: 05/16/2023 1148                    Medical Decision Making  MDM      Patient with history as above presented with wound check and suture removal  History obtained from patient  Patient was nontoxic, stable  Ambulatory  Exam as above  Seven sutures removed in entirety  Wound cleansed, redressed with bacitracin ointment, xeroform, and gauze  Reviewed s/s of infection with patient  Reviewed wound care due to her skin tear  Reviewed following up with her primary care doctor if the wound does not improve in one week  Since no erythema, edema, drainage, pain, or fever/chills are present, no need for antibiotics at this time  The patient was stable for outpatient management  Disposition:  Discharged with instructions to obtain outpatient follow up of patient's symptoms and findings, with strict return precautions if patient develops new or worsening symptoms  Patient verbalized understanding and agreement with the plan of care as outlined above          Visit for suture removal: self-limited or minor problem  Visit for wound check: self-limited or minor problem  Risk  OTC drugs  Disposition  Final diagnoses:   Visit for wound check   Visit for suture removal     Time reflects when diagnosis was documented in both MDM as applicable and the Disposition within this note     Time User Action Codes Description Comment    5/16/2023 11:11 AM Nicolasa Major, April Add [Z51 89] Visit for wound check     5/16/2023 11:11 AM Nicolasa Major, April Add [Z48 02] Visit for suture removal       ED Disposition     ED Disposition   Discharge    Condition   Stable    Date/Time   Tue May 16, 2023 11:11 AM    Comment   Linnea Elder discharge to home/self care  Follow-up Information     Follow up With Specialties Details Why Contact Info Additional Information    Cecilia Luna,  Family Medicine Schedule an appointment as soon as possible for a visit  For follow up visit [address] Emergency Department Emergency Medicine Go to  If symptoms worsen [address] Emergency Department, [address] [place] [address], [facility]          Patient's Medications   Discharge Prescriptions    No medications on file       No discharge procedures on file      PDMP Review     None          ED Provider  Electronically Signed by           Anna Lemon PA-C  05/16/23 1126

## 2023-05-18 ENCOUNTER — VBI (OUTPATIENT)
Dept: ADMINISTRATIVE | Facility: OTHER | Age: 83
End: 2023-05-18

## 2023-05-18 NOTE — TELEPHONE ENCOUNTER
Paulina Slade    ED Visit Information     Ed visit date: 5-18-23  Diagnosis Description: wound check   In Network? Yes Ludivina Dye  Discharge status: Home  Discharged with meds ? No  Number of ED visits to date: 2  ED Severity:5     Outreach Information    Outreach successful: Yes 1  Date letter mailed:NA  Date Finalized:5-18-23    Care Coordination    Follow up appointment with pcp: no none  Transportation issues ? NA    Value Base Outreach    Outreach type: 3 Day Outreach  Emergent necessity warranted by diagnosis: Yes  [facility] PCP: Yes  Called PCP first?: No  Feels able to call PCP for urgent problems ?: Yes  Understands what emergencies can be handled by PCP ?: Yes  Practice Contacted Patient ?: No  Urgent care Education?: Yes  05/18/2023 11:13 AM EDT by Rosalba Vidales MA 05/18/2023 11:13 AM EDT by ORLANDO Bear (Self) [phone] (DOLX)[phone] (Home)  [phone] (Home) Remove  - Communicated - patient was in for stitch removal doing well

## 2023-09-28 ENCOUNTER — TELEPHONE (OUTPATIENT)
Age: 83
End: 2023-09-28

## 2023-09-28 DIAGNOSIS — Z23 ENCOUNTER FOR IMMUNIZATION: Primary | ICD-10-CM

## 2023-10-03 DIAGNOSIS — Z23 ENCOUNTER FOR IMMUNIZATION: Primary | ICD-10-CM

## 2023-10-11 ENCOUNTER — IMMUNIZATIONS (OUTPATIENT)
Age: 83
End: 2023-10-11
Payer: COMMERCIAL

## 2023-10-11 DIAGNOSIS — Z23 ENCOUNTER FOR IMMUNIZATION: Primary | ICD-10-CM

## 2023-10-11 PROCEDURE — 90662 IIV NO PRSV INCREASED AG IM: CPT

## 2023-10-11 PROCEDURE — G0008 ADMIN INFLUENZA VIRUS VAC: HCPCS

## 2023-11-09 ENCOUNTER — TELEPHONE (OUTPATIENT)
Dept: FAMILY MEDICINE CLINIC | Facility: CLINIC | Age: 83
End: 2023-11-09

## 2023-11-09 NOTE — TELEPHONE ENCOUNTER
Left voicemail to schedule covid vaccine fopr her and her - please schedule under covid resource (not our patient).

## 2023-11-10 ENCOUNTER — IMMUNIZATIONS (OUTPATIENT)
Dept: FAMILY MEDICINE CLINIC | Facility: CLINIC | Age: 83
End: 2023-11-10
Payer: COMMERCIAL

## 2023-11-10 ENCOUNTER — TELEPHONE (OUTPATIENT)
Age: 83
End: 2023-11-10

## 2023-11-10 DIAGNOSIS — Z23 NEED FOR COVID-19 VACCINE: Primary | ICD-10-CM

## 2023-11-10 PROCEDURE — 90480 ADMN SARSCOV2 VAC 1/ONLY CMP: CPT

## 2023-11-10 PROCEDURE — 91320 SARSCV2 VAC 30MCG TRS-SUC IM: CPT

## 2023-11-10 NOTE — TELEPHONE ENCOUNTER
Pt is going for her covid vaccine today at [facility] and needs her order put in to [facility].

## 2023-11-18 ENCOUNTER — NURSE TRIAGE (OUTPATIENT)
Dept: OTHER | Facility: OTHER | Age: 83
End: 2023-11-18

## 2023-11-18 ENCOUNTER — OFFICE VISIT (OUTPATIENT)
Age: 83
End: 2023-11-18
Payer: COMMERCIAL

## 2023-11-18 VITALS
HEART RATE: 61 BPM | TEMPERATURE: 97.3 F | OXYGEN SATURATION: 97 % | BODY MASS INDEX: 21.13 KG/M2 | RESPIRATION RATE: 18 BRPM | WEIGHT: 127 LBS

## 2023-11-18 DIAGNOSIS — Z23 ENCOUNTER FOR IMMUNIZATION: ICD-10-CM

## 2023-11-18 DIAGNOSIS — S91.001A ANKLE WOUND, RIGHT, INITIAL ENCOUNTER: Primary | ICD-10-CM

## 2023-11-18 PROCEDURE — 90471 IMMUNIZATION ADMIN: CPT

## 2023-11-18 PROCEDURE — 99214 OFFICE O/P EST MOD 30 MIN: CPT | Performed by: PHYSICIAN ASSISTANT

## 2023-11-18 PROCEDURE — 90715 TDAP VACCINE 7 YRS/> IM: CPT

## 2023-11-18 RX ORDER — CEPHALEXIN 500 MG/1
500 CAPSULE ORAL EVERY 12 HOURS SCHEDULED
Qty: 14 CAPSULE | Refills: 0 | Status: SHIPPED | OUTPATIENT
Start: 2023-11-18 | End: 2023-11-25

## 2023-11-18 RX ORDER — LANOLIN ALCOHOL/MO/W.PET/CERES
1 CREAM (GRAM) TOPICAL 3 TIMES DAILY
COMMUNITY

## 2023-11-18 NOTE — TELEPHONE ENCOUNTER
Regarding: Poss Cellulitis  ----- Message from Erica Saldana sent at 11/18/2023  8:12 AM EST -----  " I think I have cellulitis on my leg, it is red and warm to the touch.  I have an appt on Monday with Dr Willye Lesch but I don't know if I should wait that long to be seen"

## 2023-11-18 NOTE — PATIENT INSTRUCTIONS
Wound Infection   WHAT YOU NEED TO KNOW:   A wound infection occurs when bacteria enters a break in the skin. The infection may involve just the skin, or affect deeper tissues or organs close to the wound. DISCHARGE INSTRUCTIONS:   Return to the emergency department if:   You feel short of breath. Your heart is beating faster than usual.    You feel confused. Blood soaks through your bandages. Your wound comes apart or feels like it is ripping. You have severe pain. You see red streaks coming from the infected area. Call your doctor if:   You have a fever or chills. You have more pain, redness, or swelling near your wound. Your symptoms do not improve. The skin around your wound feels numb. You have questions or concerns about your condition or care. Medicines: You may need any of the following:  NSAIDs , such as ibuprofen, help decrease swelling, pain, and fever. This medicine is available with or without a doctor's order. NSAIDs can cause stomach bleeding or kidney problems in certain people. If you take blood thinner medicine, always ask your healthcare provider if NSAIDs are safe for you. Always read the medicine label and follow directions. Antibiotics  help treat a bacterial infection. Take your medicine as directed. Contact your healthcare provider if you think your medicine is not helping or if you have side effects. Tell your provider if you are allergic to any medicine. Keep a list of the medicines, vitamins, and herbs you take. Include the amounts, and when and why you take them. Bring the list or the pill bottles to follow-up visits. Carry your medicine list with you in case of an emergency. Care for your wound as directed:  Keep your wound clean and dry. You may need to cover your wound when you bathe so it does not get wet. Clean your wound as directed with soap and water or wound . Put on new, clean bandages as directed.  Change your bandages when they get wet or dirty. Help your wound heal:   Eat a variety of healthy foods. Examples include fruits, vegetables, whole-grain breads, low-fat dairy products, beans, lean meats, and fish. Healthy foods may help you heal faster. You may also need to take vitamins and minerals. Ask if you need to be on a special diet. Manage other health conditions. Follow your provider's directions to manage health conditions that can cause slow wound healing. Examples include high blood pressure and diabetes. Do not smoke. Nicotine and other chemicals in cigarettes and cigars can cause slow wound healing. Ask your provider for information if you currently smoke and need help to quit. E-cigarettes or smokeless tobacco still contain nicotine. Talk to your provider before you use these products. Follow up with your doctor in 1 to 2 days:  Write down your questions so you remember to ask them during your visits. © Copyright [employer] 2023 Information is for End User's use only and may not be sold, redistributed or otherwise used for commercial purposes. The above information is an  only. It is not intended as medical advice for individual conditions or treatments. Talk to your doctor, nurse or pharmacist before following any medical regimen to see if it is safe and effective for you.

## 2023-11-18 NOTE — PROGRESS NOTES
[facility]        NAME: Robinson Jang is a 81 y.o. female  : 1940    MRN: [id number]  DATE: 2023  TIME: 3:38 PM    Assessment and Plan   Ankle wound, right, initial encounter [S91.001A]  1. Ankle wound, right, initial encounter  cephalexin (KEFLEX) 500 mg capsule      2. Encounter for immunization  Tdap Vaccine greater than or equal to 8yo            Patient Instructions       Follow up with PCP in 3-5 days. Proceed to  ER if symptoms worsen. Chief Complaint     Chief Complaint   Patient presents with    Ankle Injury     Pt states she got a cut on her right ankle doing yard work 2 weeks ago. Wound is now red, swollen, and painful. History of Present Illness       Pleasant 69-year-old female is presenting today with a wound to the medial aspect of the right ankle above the malleolus, she obtained 2 weeks ago while gardening. Patient reports that as she was pulling leaves over a tarp a branch jammed up into her leg causing the wound to the right ankle. Patient does not recall the last tetanus booster. Since then, there has been inflammation swelling and increased redness, as per patient. She denies fever chills anorexia malaise fatigue headaches or weakness. Review of Systems   Review of Systems   Constitutional:  Negative for fever. Respiratory:  Negative for cough. Gastrointestinal:  Negative for nausea and vomiting. Musculoskeletal:  Negative for arthralgias, joint swelling and myalgias. Skin:  Positive for wound. Negative for rash. Neurological:  Negative for headaches.          Current Medications       Current Outpatient Medications:     Calcium Carb-Cholecalciferol (CALCIUM 1000 + D PO), Take by mouth, Disp: , Rfl:     cephalexin (KEFLEX) 500 mg capsule, Take 1 capsule (500 mg total) by mouth every 12 (twelve) hours for 7 days, Disp: 14 capsule, Rfl: 0    glucosamine-chondroitin 500-400 MG tablet, Take 1 tablet by mouth 3 (three) times a day, Disp: , Rfl:     loratadine (CLARITIN) 10 mg tablet, Take 1 tablet (10 mg total) by mouth daily for 7 days (Patient not taking: Reported on 3/2/2023), Disp: 7 tablet, Rfl: 0    losartan (COZAAR) 25 mg tablet, Take 1 tablet (25 mg total) by mouth every other day, Disp: 30 tablet, Rfl: 2    pseudoephedrine (SUDAFED) 30 mg tablet, Take 1 tablet (30 mg total) by mouth 2 (two) times a day as needed for congestion for up to 7 days, Disp: 14 tablet, Rfl: 0    Current Allergies     Allergies as of 11/18/2023    (No Known Allergies)            The following portions of the patient's history were reviewed and updated as appropriate: allergies, current medications, past family history, past medical history, past social history, past surgical history and problem list.     Past Medical History:   Diagnosis Date    Atrial fibrillation ([address])     Hypertension        Past Surgical History:   Procedure Laterality Date    APPENDECTOMY      BREAST BIOPSY Left 1980    neg    BREAST BIOPSY Left 1990    neg    CARDIAC ELECTROPHYSIOLOGY PROCEDURE N/A 04/28/2022    Procedure: Cardiac eps/afib ablation;  Surgeon: Jayy Freeman MD;  [facility]; Service: Cardiology    CARDIAC ELECTROPHYSIOLOGY PROCEDURE N/A 04/28/2022    Procedure: Cardiac eps/aflutter ablation;  Surgeon: Jayy Freeman MD;  [facility];   Service: Cardiology    CATARACT EXTRACTION, BILATERAL      DILATION AND CURETTAGE OF UTERUS      IR EMBOLIZATION (SPECIFY VESSEL OR SITE)  04/28/2022    LAPAROSCOPY FOR ECTOPIC PREGNANCY      OTHER SURGICAL HISTORY      surgical excision of tubal pregnancy        Family History   Problem Relation Age of Onset    Hypertension Mother     Ovarian cancer Mother     Pneumonia Father     No Known Problems Sister     No Known Problems Sister     No Known Problems Sister     No Known Problems Daughter     No Known Problems Maternal Grandmother     No Known Problems Paternal Grandmother     Cancer Maternal Aunt No Known Problems Maternal Aunt     No Known Problems Maternal Aunt     No Known Problems Maternal Aunt     No Known Problems Maternal Aunt     No Known Problems Paternal Aunt     No Known Problems Paternal Aunt     Breast cancer Neg Hx          Medications have been verified. Objective   Pulse 61   Temp (!) 97.3 °F (36.3 °C)   Resp 18   Wt 57.6 kg (127 lb)   SpO2 97%   BMI 21.13 kg/m²        Physical Exam     Physical Exam  Vitals and nursing note reviewed. Constitutional:       General: She is not in acute distress. Appearance: Normal appearance. She is not ill-appearing. HENT:      Head: Normocephalic and atraumatic. Eyes:      General: No scleral icterus. Extraocular Movements: Extraocular movements intact. Conjunctiva/sclera: Conjunctivae normal.      Pupils: Pupils are equal, round, and reactive to light. Cardiovascular:      Rate and Rhythm: Normal rate and regular rhythm. Pulses: Normal pulses. Heart sounds: Normal heart sounds. Pulmonary:      Effort: Pulmonary effort is normal. No respiratory distress. Breath sounds: Normal breath sounds. Musculoskeletal:         General: Normal range of motion. Cervical back: Normal range of motion and neck supple. Skin:     Comments: 1 cm flap-like healed laceration noted over the medial malleolus of the right ankle. Mild halo erythematous surrounding the area. Nontender on palpation. Slightly warm. No visible discharge, or moisture. Neurological:      General: No focal deficit present. Mental Status: She is alert and oriented to person, place, and time.       Coordination: Coordination normal.      Gait: Gait normal.   Psychiatric:         Mood and Affect: Mood normal.         Behavior: Behavior normal.

## 2023-11-18 NOTE — TELEPHONE ENCOUNTER
Reason for Disposition   [1] Looks infected AND [2] large red area (>2 inches or 5 cm) or streak    Answer Assessment - Initial Assessment Questions  1. APPEARANCE of INJURY: "What does the injury look like?"       Pt cut her inner right ankle 2 weeks ago with a twig while working outside. Says she cleansed and treated the area with neosporin. Area was starting to heal. Noticed that the cut started turning red 2 days ago. Redness has now spread to her foot and area is warm to touch. Denies fever. 2. SIZE: "How large is the cut?"        3/4 inch tear    3. BLEEDING: "Is it bleeding now?" If Yes, ask: "Is it difficult to stop?"       No    4. LOCATION: "Where is the injury located?"       Left leg- inner right ankle. 5. ONSET: "How long ago did the injury occur?"       Cut leg 2 weeks ago. Area looked like it was healing but then started turning red 2 days ago. 6. MECHANISM: "Tell me how it happened."       Was outside and cut her ankle with a twig. 7. TETANUS: "When was the last tetanus booster?"       Unknown    Protocols used: Cuts and Lacerations-ADULT-AH      Pt will head over to [facility].

## 2023-11-20 ENCOUNTER — OFFICE VISIT (OUTPATIENT)
Age: 83
End: 2023-11-20
Payer: COMMERCIAL

## 2023-11-20 ENCOUNTER — APPOINTMENT (OUTPATIENT)
Age: 83
End: 2023-11-20
Payer: COMMERCIAL

## 2023-11-20 VITALS
OXYGEN SATURATION: 98 % | TEMPERATURE: 97.5 F | SYSTOLIC BLOOD PRESSURE: 110 MMHG | DIASTOLIC BLOOD PRESSURE: 68 MMHG | RESPIRATION RATE: 18 BRPM | WEIGHT: 120.8 LBS | BODY MASS INDEX: 20.1 KG/M2 | HEART RATE: 85 BPM

## 2023-11-20 DIAGNOSIS — I10 ESSENTIAL HYPERTENSION: ICD-10-CM

## 2023-11-20 DIAGNOSIS — Z00.00 ENCOUNTER FOR SUBSEQUENT ANNUAL WELLNESS VISIT (AWV) IN MEDICARE PATIENT: Primary | ICD-10-CM

## 2023-11-20 DIAGNOSIS — Z00.00 ENCOUNTER FOR SUBSEQUENT ANNUAL WELLNESS VISIT (AWV) IN MEDICARE PATIENT: ICD-10-CM

## 2023-11-20 DIAGNOSIS — L03.115 CELLULITIS OF RIGHT LOWER EXTREMITY: ICD-10-CM

## 2023-11-20 DIAGNOSIS — H90.0 CONDUCTIVE HEARING LOSS, BILATERAL: ICD-10-CM

## 2023-11-20 DIAGNOSIS — Z98.890 H/O CARDIAC RADIOFREQUENCY ABLATION: ICD-10-CM

## 2023-11-20 PROBLEM — S30.1XXA RECTUS SHEATH HEMATOMA: Status: RESOLVED | Noted: 2022-04-28 | Resolved: 2023-11-20

## 2023-11-20 PROBLEM — K03.7 DISCOLORATION OF TOOTH: Status: RESOLVED | Noted: 2022-05-01 | Resolved: 2023-11-20

## 2023-11-20 LAB
ALBUMIN SERPL BCP-MCNC: 4.5 G/DL (ref 3.5–5)
ALP SERPL-CCNC: 63 U/L (ref 34–104)
ALT SERPL W P-5'-P-CCNC: 10 U/L (ref 7–52)
ANION GAP SERPL CALCULATED.3IONS-SCNC: 9 MMOL/L
AST SERPL W P-5'-P-CCNC: 17 U/L (ref 13–39)
BILIRUB SERPL-MCNC: 0.67 MG/DL (ref 0.2–1)
BUN SERPL-MCNC: 24 MG/DL (ref 5–25)
CALCIUM SERPL-MCNC: 11.5 MG/DL (ref 8.4–10.2)
CHLORIDE SERPL-SCNC: 102 MMOL/L (ref 96–108)
CHOLEST SERPL-MCNC: 229 MG/DL
CO2 SERPL-SCNC: 29 MMOL/L (ref 21–32)
CREAT SERPL-MCNC: 0.85 MG/DL (ref 0.6–1.3)
GFR SERPL CREATININE-BSD FRML MDRD: 64 ML/MIN/1.73SQ M
GLUCOSE P FAST SERPL-MCNC: 79 MG/DL (ref 65–99)
HDLC SERPL-MCNC: 96 MG/DL
LDLC SERPL CALC-MCNC: 122 MG/DL (ref 0–100)
POTASSIUM SERPL-SCNC: 4.6 MMOL/L (ref 3.5–5.3)
PROT SERPL-MCNC: 7.2 G/DL (ref 6.4–8.4)
SODIUM SERPL-SCNC: 140 MMOL/L (ref 135–147)
TRIGL SERPL-MCNC: 53 MG/DL

## 2023-11-20 PROCEDURE — G0439 PPPS, SUBSEQ VISIT: HCPCS | Performed by: FAMILY MEDICINE

## 2023-11-20 PROCEDURE — 99214 OFFICE O/P EST MOD 30 MIN: CPT | Performed by: FAMILY MEDICINE

## 2023-11-20 PROCEDURE — 80061 LIPID PANEL: CPT

## 2023-11-20 PROCEDURE — 36415 COLL VENOUS BLD VENIPUNCTURE: CPT

## 2023-11-20 PROCEDURE — 80053 COMPREHEN METABOLIC PANEL: CPT

## 2023-11-20 NOTE — PROGRESS NOTES
Assessment and Plan:     Problem List Items Addressed This Visit       Essential hypertension- stable off of antihypertensive    Conductive hearing loss, bilateral- wearing hearing aids    H/O cardiac atrial fib ablation- still in sinus. Not on anticoagulation. Follows with cardiology      Other Visit Diagnoses       Encounter for subsequent annual wellness visit (AWV) in Medicare patient    -  Primary    Relevant Orders    Comprehensive metabolic panel    Lipid Panel with Direct LDL reflex    Cellulitis of right lower extremity    - new onset. Seen in  over the weekend. On 7 day course of keflex. Pt to finish course and f/u if there is not resolution. Depression Screening and Follow-up Plan: Patient was screened for depression during today's encounter. They screened negative with a PHQ-2 score of 0. Falls Plan of Care: balance, strength, and gait training instructions were provided. Preventive health issues were discussed with patient, and age appropriate screening tests were ordered as noted in patient's After Visit Summary. Personalized health advice and appropriate referrals for health education or preventive services given if needed, as noted in patient's After Visit Summary. History of Present Illness:     Patient presents for a Medicare Wellness Visit      Patient Care Team:  Mik Montanez DO as PCP - General (Family Medicine)  [facility], MD Ninoska Pierre MD (Cardiology)     Review of Systems:     Review of Systems   Constitutional:  Negative for fever. Cardiovascular:  Negative for chest pain and leg swelling. Gastrointestinal:  Negative for constipation and nausea. Musculoskeletal:  Negative for gait problem. Skin:  Positive for wound. Neurological:  Negative for light-headedness.         Problem List:     Patient Active Problem List   Diagnosis    Psoriasis    Screening for skin condition    Essential hypertension Dupuytren's contracture of both hands    Arthritis    Conductive hearing loss, bilateral    Hyperlipidemia    History of osteoporosis    Knee mass, left    Fall    Closed fracture of left wrist    H/O cardiac atrial fib ablation    Acute blood loss anemia    Other cardiomyopathy [facility])      Past Medical and Surgical History:     Past Medical History:   Diagnosis Date    Atrial fibrillation ([address])     Hypertension     Rectus sheath hematoma 04/28/2022     Past Surgical History:   Procedure Laterality Date    APPENDECTOMY      BREAST BIOPSY Left 1980    neg    BREAST BIOPSY Left 1990    neg    CARDIAC ELECTROPHYSIOLOGY PROCEDURE N/A 04/28/2022    Procedure: Cardiac eps/afib ablation;  Surgeon: Hira Bashir MD;  Location: [facility]; Service: Cardiology    CARDIAC ELECTROPHYSIOLOGY PROCEDURE N/A 04/28/2022    Procedure: Cardiac eps/aflutter ablation;  Surgeon: Hira Bashir MD;  Location: [facility];   Service: Cardiology    CATARACT EXTRACTION, BILATERAL      DILATION AND CURETTAGE OF UTERUS      IR EMBOLIZATION (SPECIFY VESSEL OR SITE)  04/28/2022    LAPAROSCOPY FOR ECTOPIC PREGNANCY      OTHER SURGICAL HISTORY      surgical excision of tubal pregnancy       Family History:     Family History   Problem Relation Age of Onset    Hypertension Mother     Ovarian cancer Mother     Pneumonia Father     No Known Problems Sister     No Known Problems Sister     No Known Problems Sister     No Known Problems Daughter     No Known Problems Maternal Grandmother     No Known Problems Paternal Grandmother     Cancer Maternal Aunt     No Known Problems Maternal Aunt     No Known Problems Maternal Aunt     No Known Problems Maternal Aunt     No Known Problems Maternal Aunt     No Known Problems Paternal Aunt     No Known Problems Paternal Aunt     Breast cancer Neg Hx       Social History:     Social History     Socioeconomic History    Marital status: /Civil Union     Spouse name: None    Number of children: None    Years of education: None    Highest education level: None   Occupational History    None   Tobacco Use    Smoking status: Former     Packs/day: 0.50     Years: 20.00     Total pack years: 10.00     Types: Cigarettes     Quit date: 2006     Years since quittin.8    Smokeless tobacco: Never   Vaping Use    Vaping Use: Never used   Substance and Sexual Activity    Alcohol use: Yes     Alcohol/week: 1.0 standard drink of alcohol     Types: 1 Glasses of wine per week     Comment: social     Drug use: No    Sexual activity: Not Currently   Other Topics Concern    None   Social History Narrative    History of advance directive      Social Determinants of Health     Financial Resource Strain: Low Risk  (2023)    Overall Financial Resource Strain (CARDIA)     Difficulty of Paying Living Expenses: Not hard at all   Food Insecurity: Not on file   Transportation Needs: No Transportation Needs (2023)    PRAPARE - Transportation     Lack of Transportation (Medical): No     Lack of Transportation (Non-Medical): No   Physical Activity: Sufficiently Active (2021)    Exercise Vital Sign     Days of Exercise per Week: 5 days     Minutes of Exercise per Session: 40 min   Stress: Stress Concern Present (2021)    [facility]     Feeling of Stress : To some extent   Social Connections: Not on file   Intimate Partner Violence: Not on file   Housing Stability: Not on file      Medications and Allergies:     Current Outpatient Medications   Medication Sig Dispense Refill    Calcium Carb-Cholecalciferol (CALCIUM 1000 + D PO) Take by mouth      cephalexin (KEFLEX) 500 mg capsule Take 1 capsule (500 mg total) by mouth every 12 (twelve) hours for 7 days 14 capsule 0    glucosamine-chondroitin 500-400 MG tablet Take 1 tablet by mouth 3 (three) times a day       No current facility-administered medications for this visit.      No Known Allergies   Immunizations:     Immunization History   Administered Date(s) Administered    COVID-19 MODERNA VACC 0.25 ML IM BOOSTER 11/10/2021    COVID-19 MODERNA VACC 0.5 ML IM 01/25/2021, 02/20/2021    COVID-19 Pfizer Vac BIVALENT Elias-sucrose 12 Yr+ IM 11/11/2022    COVID-19 Pfizer mRNA vacc PF elias-sucrose 12 yr and older (Celestino Friedman) 11/10/2023    H1N1, All Formulations 11/13/2009    INFLUENZA 10/09/2014, 10/15/2015, 10/17/2016, 10/10/2017, 09/06/2018, 10/17/2021    Influenza Split High Dose Preservative Free IM 10/22/2019    Influenza, high dose seasonal 0.7 mL 10/20/2020, 09/30/2022, 10/11/2023    Influenza, seasonal, injectable 10/11/2013    Pneumococcal Conjugate 13-Valent 09/24/2018    Pneumococcal Polysaccharide PPV23 1940, 12/09/2019    Tdap 11/18/2023      Health Maintenance:         Topic Date Due    Breast Cancer Screening: Mammogram  12/13/2023    DXA SCAN  09/23/2024     There are no preventive care reminders to display for this patient. Medicare Screening Tests and Risk Assessments:     Madai Rodrigez is here for her Subsequent Wellness visit. Health Risk Assessment:   Patient rates overall health as good. Patient feels that their physical health rating is same. Patient is satisfied with their life. Eyesight was rated as same. Hearing was rated as same. Patient feels that their emotional and mental health rating is same. Patients states they are never, rarely angry. Patient states they are sometimes unusually tired/fatigued. Pain experienced in the last 7 days has been some. Patient's pain rating has been 3/10. Patient states that she has experienced no weight loss or gain in last 6 months. Depression Screening:   PHQ-2 Score: 0      Fall Risk Screening:    In the past year, patient has experienced: history of falling in past year    Number of falls: 1  Injured during fall?: Yes    Feels unsteady when standing or walking?: No    Worried about falling?: Yes      Urinary Incontinence Screening:   Patient has not leaked urine accidently in the last six months. Home Safety:  Patient does not have trouble with stairs inside or outside of their home. Patient has no working smoke alarms and has no working carbon monoxide detector. Home safety hazards include: none. Nutrition:   Current diet is Regular. Medications:   Patient is currently taking over-the-counter supplements. OTC medications include: see medication list. Patient is able to manage medications. Activities of Daily Living (ADLs)/Instrumental Activities of Daily Living (IADLs):   Walk and transfer into and out of bed and chair?: Yes  Dress and groom yourself?: Yes    Bathe or shower yourself?: Yes    Feed yourself? Yes  Do your laundry/housekeeping?: Yes  Manage your money, pay your bills and track your expenses?: Yes  Make your own meals?: Yes    Do your own shopping?: Yes    Durable Medical Equipment Suppliers  [employer]    Previous Hospitalizations:   Any hospitalizations or ED visits within the last 12 months?: Yes    How many hospitalizations have you had in the last year?: 1-2    Advance Care Planning:   Living will: Yes    Durable POA for healthcare: Yes    Advanced directive: Yes      PREVENTIVE SCREENINGS      Cardiovascular Screening:    General: Screening Not Indicated and History Lipid Disorder      Breast Cancer Screening:     General: Screening Current      Cervical Cancer Screening:    General: Screening Not Indicated      Osteoporosis Screening:    General: Screening Current      Lung Cancer Screening:     General: Screening Not Indicated    Screening, Brief Intervention, and Referral to Treatment (SBIRT)    Screening  Typical number of drinks in a day: 1  Typical number of drinks in a week: 7  Interpretation: Low risk drinking behavior.     AUDIT-C Screenin) How often did you have a drink containing alcohol in the past year? 4 or more times a week  2) How many drinks did you have on a typical day when you were drinking in the past year? 1 to 2  3) How often did you have 6 or more drinks on one occasion in the past year? never    AUDIT-C Score: 4  Interpretation: Score 3-12 (female): POSITIVE screen for alcohol misuse    AUDIT Screenin) How often during the last year have you found that you were not able to stop drinking once you had started? 0 - never  5) How often during the last year have you failed to do what was normally expected from you because of drinking? 0 - never  6) How often during the last year have you needed a first drink in the morning to get yourself going after a heavy drinking session? 0 - never  7) How often during the last year have you had a feeling of guilt or remorse after drinking? 0 - never  8) How often during the last year have you been unable to remember what happened the night before because you had been drinking? 0 - never  9) Have you or someone else been injured as a result of your drinking? 0 - no  10) Has a relative or friend or a doctor or another health worker been concerned about your drinking or suggested you cut down? 0 - no    AUDIT Score: 4  Interpretation: Low risk alcohol consumption    Single Item Drug Screening:  How often have you used an illegal drug (including marijuana) or a prescription medication for non-medical reasons in the past year? never    Single Item Drug Screen Score: 0  Interpretation: Negative screen for possible drug use disorder    No results found. Physical Exam:     /68 (BP Location: Left arm, Patient Position: Sitting, Cuff Size: Standard)   Pulse 85   Temp 97.5 °F (36.4 °C) (Temporal)   Resp 18   Wt 54.8 kg (120 lb 12.8 oz)   SpO2 98%   BMI 20.10 kg/m²     Physical Exam  HENT:      Head: Normocephalic. Right Ear: External ear normal.      Left Ear: External ear normal.   Eyes:      Pupils: Pupils are unequal.      Left eye: Pupil is not reactive. Cardiovascular:      Rate and Rhythm: Normal rate and regular rhythm. Heart sounds: No murmur heard. Pulmonary:      Effort: Pulmonary effort is normal.      Breath sounds: Normal breath sounds. Musculoskeletal:      Right lower leg: No edema. Left lower leg: No edema. Skin:     Findings: Wound present. Comments: Cellulitc wound on the medial right lower leg above ankle    Neurological:      Mental Status: She is alert and oriented to person, place, and time.    Psychiatric:         Mood and Affect: Mood normal.              Tony Devendra, DO

## 2023-11-20 NOTE — PATIENT INSTRUCTIONS
Fall Prevention    In order to prevent falls, you are encouraged to:  · Exercise  · Utilize assistive/adaptive devices  · Avoid multifocal lenses when walking  · Avoid hazards in home  · Maintain a regular toileting schedule    Any questions please contact our office. Preventing Falls in the Home  (This education is for all patients over 65 regardless of symptoms)     As you get older, falls are more likely. That’s because your reaction time slows. Your muscles and joints may also get stiffer, making them less flexible. Illness, medications, and vision changes can also affect your balance. A fall could leave you unable to live on your own. To make your home safer, follow these tips:    Floors  · Put nonskid pads under area rugs  · Remove throw rugs  · Replace worn floor coverings  · Tack carpets firmly to each step on carpeted stairs.  Put nonskid strips on the edges of uncarpeted stairs  · Keep floors and stairs free of clutter and cords  · Arrange furniture so there are clear pathways  · Clean up any spills right away    Bathrooms  · Install grab bars in the tub or shower  · Apply nonskid strips or put a nonskid rubber mat in the tub or shower  · Sit on a bath chair to bathe  · Use bathmats with nonskid backing    Lighting  · Keep a flashlight in each room  · Put a nightlight along the pathway between the bedroom and the bathroom

## 2023-11-22 ENCOUNTER — TELEPHONE (OUTPATIENT)
Age: 83
End: 2023-11-22

## 2023-11-22 DIAGNOSIS — E83.52 HYPERCALCEMIA: Primary | ICD-10-CM

## 2023-11-22 NOTE — TELEPHONE ENCOUNTER
----- Message from Logan Torrez DO sent at 11/22/2023 10:02 AM EST -----  Please notify patient that blood work shows cholesterol levels are unchanged. Still slightly elevated. Also her calcium level is high. I have ordered follow-up blood work for continued monitoring. She should hold her calcium supplements as well.   Please get laboratory studies within the next 4 to 5 days

## 2023-11-24 ENCOUNTER — TELEPHONE (OUTPATIENT)
Age: 83
End: 2023-11-24

## 2023-11-24 NOTE — TELEPHONE ENCOUNTER
Spoke to patient.  In regards to message from 11/22/23 and was notified and will be having labs done

## 2023-11-27 ENCOUNTER — APPOINTMENT (OUTPATIENT)
Age: 83
End: 2023-11-27
Payer: COMMERCIAL

## 2023-11-27 DIAGNOSIS — E83.52 HYPERCALCEMIA: Primary | ICD-10-CM

## 2023-11-27 LAB
25(OH)D3 SERPL-MCNC: 39 NG/ML (ref 30–100)
CA-I BLD-SCNC: 1.21 MMOL/L (ref 1.12–1.32)
PTH-INTACT SERPL-MCNC: 40.5 PG/ML (ref 12–88)

## 2023-11-27 PROCEDURE — 36415 COLL VENOUS BLD VENIPUNCTURE: CPT

## 2023-11-27 PROCEDURE — 82306 VITAMIN D 25 HYDROXY: CPT

## 2023-11-27 PROCEDURE — 82330 ASSAY OF CALCIUM: CPT

## 2023-11-27 PROCEDURE — 83970 ASSAY OF PARATHORMONE: CPT

## 2023-11-28 ENCOUNTER — TELEPHONE (OUTPATIENT)
Age: 83
End: 2023-11-28

## 2023-11-28 NOTE — TELEPHONE ENCOUNTER
----- Message from Tino Ac DO sent at 11/28/2023 12:48 PM EST -----  Follow-up blood work was normal except for vitamin D level. This was on the low end of normal.  Recommend taking vitamin D supplements 2000 units daily to help bring this up.   Blood calcium level has returned to normal.

## 2023-11-28 NOTE — TELEPHONE ENCOUNTER
Patient informed of results but stated that she still has the wound on her ankle from a stick. She was wondering if [facility] has a wound care center. Can someone please call her back.

## 2023-11-30 NOTE — TELEPHONE ENCOUNTER
Called [facility] and was notified , ans has had more then 2 weeks , will call wound care if she needs them

## 2024-02-21 PROBLEM — Z13.89 SCREENING FOR SKIN CONDITION: Status: RESOLVED | Noted: 2018-02-21 | Resolved: 2024-02-21

## 2024-03-16 ENCOUNTER — PATIENT MESSAGE (OUTPATIENT)
Age: 84
End: 2024-03-16

## 2024-03-16 DIAGNOSIS — Z12.31 BREAST CANCER SCREENING BY MAMMOGRAM: Primary | ICD-10-CM

## 2024-04-22 ENCOUNTER — HOSPITAL ENCOUNTER (OUTPATIENT)
Age: 84
Discharge: HOME | End: 2024-04-22
Payer: COMMERCIAL

## 2024-04-22 DIAGNOSIS — Z12.31 BREAST CANCER SCREENING BY MAMMOGRAM: ICD-10-CM

## 2024-04-22 PROCEDURE — 77063 BREAST TOMOSYNTHESIS BI: CPT

## 2024-04-22 PROCEDURE — 77067 SCR MAMMO BI INCL CAD: CPT

## 2024-05-14 ENCOUNTER — RA CDI HCC (OUTPATIENT)
Dept: OTHER | Facility: HOSPITAL | Age: 84
End: 2024-05-14

## 2024-05-20 ENCOUNTER — OFFICE VISIT (OUTPATIENT)
Age: 84
End: 2024-05-20
Payer: COMMERCIAL

## 2024-05-20 VITALS
HEART RATE: 86 BPM | OXYGEN SATURATION: 98 % | HEIGHT: 65 IN | SYSTOLIC BLOOD PRESSURE: 118 MMHG | BODY MASS INDEX: 20.19 KG/M2 | TEMPERATURE: 98.2 F | RESPIRATION RATE: 18 BRPM | DIASTOLIC BLOOD PRESSURE: 64 MMHG | WEIGHT: 121.2 LBS

## 2024-05-20 DIAGNOSIS — I10 ESSENTIAL HYPERTENSION: Primary | ICD-10-CM

## 2024-05-20 DIAGNOSIS — E78.2 MIXED HYPERLIPIDEMIA: Primary | ICD-10-CM

## 2024-05-20 DIAGNOSIS — M25.562 CHRONIC PAIN OF LEFT KNEE: Primary | ICD-10-CM

## 2024-05-20 DIAGNOSIS — H90.0 CONDUCTIVE HEARING LOSS, BILATERAL: ICD-10-CM

## 2024-05-20 DIAGNOSIS — I48.11 LONGSTANDING PERSISTENT ATRIAL FIBRILLATION (HCC): ICD-10-CM

## 2024-05-20 DIAGNOSIS — I42.8 OTHER CARDIOMYOPATHY (HCC): ICD-10-CM

## 2024-05-20 DIAGNOSIS — G89.29 CHRONIC PAIN OF LEFT KNEE: Primary | ICD-10-CM

## 2024-05-20 DIAGNOSIS — L98.9 LEG SKIN LESION, RIGHT: ICD-10-CM

## 2024-05-20 PROBLEM — I27.20 PULMONARY HYPERTENSION (HCC): Status: ACTIVE | Noted: 2024-05-20

## 2024-05-20 PROBLEM — D62 ACUTE BLOOD LOSS ANEMIA: Status: RESOLVED | Noted: 2022-04-30 | Resolved: 2024-05-20

## 2024-05-20 PROCEDURE — 99214 OFFICE O/P EST MOD 30 MIN: CPT | Performed by: FAMILY MEDICINE

## 2024-05-20 PROCEDURE — G2211 COMPLEX E/M VISIT ADD ON: HCPCS | Performed by: FAMILY MEDICINE

## 2024-05-20 NOTE — PROGRESS NOTES
"Ambulatory Visit  Name: Scarlett Zamarripa      : 1940      MRN: [id number]  Encounter Provider: Gilma Kumari DO  Encounter Date: 2024   Encounter department: [facility] [place]    Assessment & Plan   1. Essential hypertension-diet controlled.  Previously on losartan 25 mg daily but that was leading to hypotensive episodes.  Will continue to monitor  -     Comprehensive metabolic panel; Future; Expected date: 2024  -     Lipid Panel with Direct LDL reflex; Future; Expected date: 2024  2. Other cardiomyopathy (HCC)-history of with a previous EF of 50% in .  This was around the time where she had poorly controlled A-fib.  She is status post atrial ablation.  No longer on antiarrhythmics.  Asymptomatic.  She last saw cardiology in .  3. Leg skin lesion, right-present x 1 year.  Refractory to topical steroids, cells and antifungals.  Concern for SCC versus seborrheic keratoses.  Will place urgent referral to specialist.  -     Ambulatory Referral to Dermatology; Future           History of Present Illness     Patient presents for 6-month follow-up.  Discussed lesion on right thigh.  Has been there for about a year.  Has tried topical agents without significant improvement.  Discussed blood pressure and chronic OA related joint pain        Review of Systems   Constitutional:  Negative for fever.   Cardiovascular:  Negative for chest pain.   Gastrointestinal:  Negative for constipation and diarrhea.   Musculoskeletal:  Positive for arthralgias.   Skin:  Positive for color change.       Objective     /64 (BP Location: Left arm, Patient Position: Sitting, Cuff Size: Standard)   Pulse 86   Temp 98.2 °F (36.8 °C) (Tympanic)   Resp 18   Ht 5' 5\" (1.651 m)   Wt 55 kg (121 lb 3.2 oz)   SpO2 98%   BMI 20.17 kg/m²     Physical Exam  HENT:      Head: Normocephalic and atraumatic.   Cardiovascular:      Rate and Rhythm: Normal rate and regular rhythm.      Heart " sounds: No murmur heard.  Pulmonary:      Effort: Pulmonary effort is normal.      Breath sounds: Normal breath sounds.   Musculoskeletal:      Right lower leg: No edema.      Left lower leg: No edema.   Skin:     Findings: Lesion present.   Neurological:      Mental Status: She is alert.   Psychiatric:         Mood and Affect: Mood normal.         Behavior: Behavior normal.       Administrative Statements

## 2024-05-21 ENCOUNTER — CONSULT (OUTPATIENT)
Age: 84
End: 2024-05-21
Payer: COMMERCIAL

## 2024-05-21 VITALS — BODY MASS INDEX: 20.16 KG/M2 | HEIGHT: 65 IN | TEMPERATURE: 97.8 F | WEIGHT: 121 LBS

## 2024-05-21 DIAGNOSIS — L98.9 LEG SKIN LESION, RIGHT: ICD-10-CM

## 2024-05-21 DIAGNOSIS — D48.9 NEOPLASM OF UNCERTAIN BEHAVIOR: ICD-10-CM

## 2024-05-21 DIAGNOSIS — D18.01 CHERRY ANGIOMA: ICD-10-CM

## 2024-05-21 DIAGNOSIS — L82.1 SEBORRHEIC KERATOSIS: ICD-10-CM

## 2024-05-21 DIAGNOSIS — D22.9 MULTIPLE MELANOCYTIC NEVI: Primary | ICD-10-CM

## 2024-05-21 PROCEDURE — 88305 TISSUE EXAM BY PATHOLOGIST: CPT | Performed by: STUDENT IN AN ORGANIZED HEALTH CARE EDUCATION/TRAINING PROGRAM

## 2024-05-21 PROCEDURE — 88342 IMHCHEM/IMCYTCHM 1ST ANTB: CPT | Performed by: STUDENT IN AN ORGANIZED HEALTH CARE EDUCATION/TRAINING PROGRAM

## 2024-05-21 PROCEDURE — 11102 TANGNTL BX SKIN SINGLE LES: CPT | Performed by: DERMATOLOGY

## 2024-05-21 PROCEDURE — 88341 IMHCHEM/IMCYTCHM EA ADD ANTB: CPT | Performed by: STUDENT IN AN ORGANIZED HEALTH CARE EDUCATION/TRAINING PROGRAM

## 2024-05-21 PROCEDURE — 11103 TANGNTL BX SKIN EA SEP/ADDL: CPT | Performed by: DERMATOLOGY

## 2024-05-21 PROCEDURE — 99204 OFFICE O/P NEW MOD 45 MIN: CPT | Performed by: DERMATOLOGY

## 2024-05-21 NOTE — PROGRESS NOTES
"[facility] Dermatology Clinic Note     Patient Name: Scarlett Zamarripa  Encounter Date: 5/21/2024    Have you been cared for by a [facility] Dermatologist in the last 3 years and, if so, which description applies to you?    NO.   I am considered a \"new\" patient and must complete all patient intake questions. I am FEMALE/of child-bearing potential.    REVIEW OF SYSTEMS:  Have you recently had or currently have any of the following? Recent fever or chills? No  Any non-healing wound? No  Are you pregnant or planning to become pregnant? No  Are you currently or planning to be nursing or breast feeding? No   PAST MEDICAL HISTORY:  Have you personally ever had or currently have any of the following?  If \"YES,\" then please provide more detail. Skin cancer (such as Melanoma, Basal Cell Carcinoma, Squamous Cell Carcinoma?  No  Tuberculosis, HIV/AIDS, Hepatitis B or C: No  Radiation Treatment No   HISTORY OF IMMUNOSUPPRESSION:   Do you have a history of any of the following:  Systemic Immunosuppression such as Diabetes, Biologic or Immunotherapy, Chemotherapy, Organ Transplantation, Bone Marrow Transplantation?  No    Answering \"YES\" requires the addition of the dotphrase \"IMMUNOSUPPRESSED\" as the first diagnosis of the patient's visit.   FAMILY HISTORY:  Any \"first degree relatives\" (parent, brother, sister, or child) with the following?    Skin Cancer, Pancreatic or Other Cancer? No   PATIENT EXPERIENCE:    Do you want the Dermatologist to perform a COMPLETE skin exam today including a clinical examination under the \"bra and underwear\" areas?  Yes  If necessary, do we have your permission to call and leave a detailed message on your Preferred Phone number that includes your specific medical information?  Yes      No Known Allergies   Current Outpatient Medications:     Calcium Carb-Cholecalciferol (CALCIUM 1000 + D PO), Take by mouth, Disp: , Rfl:     glucosamine-chondroitin 500-400 MG tablet, Take 1 tablet by mouth 3 (three) " "times a day, Disp: , Rfl:           Whom besides the patient is providing clinical information about today's encounter?   NO ADDITIONAL HISTORIAN (patient alone provided history)    Physical Exam and Assessment/Plan by Diagnosis:    CHIEF COMPLAINT    83 year old male or female patient presents today for New Patient.  Patient has a No history of skin cancer    MELANOCYTIC NEVI (\"Moles\")    Physical Exam:  Anatomic Location Affected: Mostly on sun-exposed areas of the trunk and extremities   Morphological Description:  Scattered, 1-4mm round to ovoid, symmetrical-appearing, even bordered, skin colored to dark brown macules/papules, mostly in sun-exposed areas  Pertinent Positives:  Pertinent Negatives:    Additional History of Present Condition:  Present on exam     Assessment and Plan:  Based on a thorough discussion of this condition and the management approach to it (including a comprehensive discussion of the known risks, side effects and potential benefits of treatment), the patient (family) agrees to implement the following specific plan:  Provided handout with information regarding the ABCDE's of moles   Recommend routine skin exams every 6 months    Sun avoidance, protective clothing (known as UPF clothing), and the use of at least SPF 30 sunscreens is advised. Sunscreen should be reapplied every two hours when outside.       SEBORRHEIC KERATOSIS; NON-INFLAMED    Physical Exam:  Anatomic Location Affected:  scattered across trunk, extremities,  face  Morphological Description:  Flat and raised, waxy, smooth to warty textured, yellow to brownish-grey to dark brown to blackish, discrete, \"stuck-on\" appearing papules.  Pertinent Positives:  Pertinent Negatives:    Additional History of Present Condition:  Patient reports new bumps on the skin.  Denies itch, burn, pain, bleeding or ulceration.  Present constantly; nothing seems to make it worse or better.  No prior treatment.      Assessment and Plan:  Based on a " "thorough discussion of this condition and the management approach to it (including a comprehensive discussion of the known risks, side effects and potential benefits of treatment), the patient (family) agrees to implement the following specific plan:  Reassured benign        ANGIOMA (\"CHERRY ANGIOMA\")    Physical Exam:  Anatomic Location: scattered across sun exposed areas of the trunk and extremities   Morphologic Description: Firm red to reddish-blue discrete papules  Pertinent Positives:  Pertinent Negatives:    Additional History of Present Condition:  Present on exam.    Assessment and Plan:  Reassured benign      NEOPLASM OF UNCERTAIN BEHAVIOR OF SKIN    Physical Exam:  (Anatomic Location); (Size and Morphological Description); (Differential Diagnosis):  A; Right thigh; 16 mm scaly erythematous macule; diffdx; rule out BCC   B; Right lower leg; 6 mm keratotic nodule; diffdx rule out SCC   C: Left lower leg 1 cm scaly erythematous crusted nodule; diffdx; Rule out BCC     Pertinent Positives:  Pertinent Negatives:    Additional History of Present Condition:  Present on exam     Assessment and Plan:  I have discussed with the patient that a sample of skin via a \"skin biopsy” would be potentially helpful to further make a specific diagnosis under the microscope.  Based on a thorough discussion of this condition and the management approach to it (including a comprehensive discussion of the known risks, side effects and potential benefits of treatment), the patient (family) agrees to implement the following specific plan:    Procedure:  Skin Biopsy.  After a thorough discussion of treatment options and risk/benefits/alternatives (including but not limited to local pain, scarring, dyspigmentation, blistering, possible superinfection, and inability to confirm a diagnosis via histopathology), verbal and written consent were obtained and portion of the rash was biopsied for tissue sample.  See below for consent that was " obtained from patient and subsequent Procedure Note.  PROCEDURE TANGENTIAL (SHAVE) BIOPSY NOTE:    Performing Physician:   Anatomic Location; Clinical Description with size (cm); Pre-Op Diagnosis:   A; Right thigh; 16 mm scaly erythematous macule; diffdx; rule out BCC   Post-op diagnosis: Same     Local anesthesia: 3:1 1% xylocaine with epi and 1-100,000 buffered     Topical anesthesia: None    Hemostasis: Aluminum chloride     PROCEDURE TANGENTIAL (SHAVE) BIOPSY NOTE:    Performing Physician:   Anatomic Location; Clinical Description with size (cm); Pre-Op Diagnosis:   B; Right lower leg; 6 mm keratotic nodule; diffdx rule out SCC   Post-op diagnosis: Same     Local anesthesia: 3:1 1% xylocaine with epi and 1-100,000 buffered     Topical anesthesia: None    Hemostasis: Aluminum chloride     PROCEDURE TANGENTIAL (SHAVE) BIOPSY NOTE:    Performing Physician:   Anatomic Location; Clinical Description with size (cm); Pre-Op Diagnosis:   C: Left lower leg 1 cm scaly erythematous crusted nodule; diffdx; Rule out BCC   Post-op diagnosis: Same     Local anesthesia: 3:1 1% xylocaine with epi and 1-100,000 buffered     Topical anesthesia: None    Hemostasis: Aluminum chloride       After obtaining informed consent  at which time there was a discussion about the purpose of biopsy  and low risks of infection and bleeding.  The area was prepped and draped in the usual fashion. Anesthesia was obtained with 1% lidocaine with epinephrine. A shave biopsy to an appropriate sampling depth was obtained by Shave (Dermablade or 15 blade) The resulting wound was covered with surgical ointment and bandaged appropriately.     The patient tolerated the procedure well without complications and was without signs of functional compromise.      Specimen has been sent for review by Dermatopathology.    Standard post-procedure care has been explained and has been included in written form within the patient's copy  "of Informed Consent.    INFORMED CONSENT DISCUSSION AND POST-OPERATIVE INSTRUCTIONS FOR PATIENT    I.  RATIONALE FOR PROCEDURE  I understand that a skin biopsy allows the Dermatologist to test a lesion or rash under the microscope to obtain a diagnosis.  It usually involves numbing the area with numbing medication and removing a small piece of skin; sometimes the area will be closed with sutures. In this specific procedure, sutures are not usually needed.  If any sutures are placed, then they are usually need to be removed in 2 weeks or less.    I understand that my Dermatologist recommends that a skin \"shave\" biopsy be performed today.  A local anesthetic, similar to the kind that a dentist uses when filling a cavity, will be injected with a very small needle into the skin area to be sampled.  The injected skin and tissue underneath \"will go to sleep” and become numb so no pain should be felt afterwards.  An instrument shaped like a tiny \"razor blade\" (shave biopsy instrument) will be used to cut a small piece of tissue and skin from the area so that a sample of tissue can be taken and examined more closely under the microscope.  A slight amount of bleeding will occur, but it will be stopped with direct pressure and a pressure bandage and any other appropriate methods.  I understands that a scar will form where the wound was created.  Surgical ointment will be applied to help protect the wound.  Sutures are not usually needed.    II.  RISKS AND POTENTIAL COMPLICATIONS   I understand the risks and potential complications of a skin biopsy include but are not limited to the following:  Bleeding  Infection  Pain  Scar/keloid  Skin discoloration  Incomplete Removal  Recurrence  Nerve Damage/Numbness/Loss of Function  Allergic Reaction to Anesthesia  Biopsies are diagnostic procedures and based on findings additional treatment or evaluation may be required  Loss or destruction of specimen resulting in no additional " "findings    My Dermatologist has explained to me the nature of the condition, the nature of the procedure, and the benefits to be reasonably expected compared with alternative approaches.  My Dermatologist has discussed the likelihood of major risks or complications of this procedure including the specific risks listed above, such as bleeding, infection, and scarring/keloid.  I understand that a scar is expected after this procedure.  I understand that my physician cannot predict if the scar will form a \"keloid,\" which extends beyond the borders of the wound that is created.  A keloid is a thick, painful, and bumpy scar.  A keloid can be difficult to treat, as it does not always respond well to therapy, which includes injecting cortisone directly into the keloid every few weeks.  While this usually reduces the pain and size of the scar, it does not eliminate it.      I understand that photographs may be taken before and after the procedure.  These will be maintained as part of the medical providers confidential records and may not be made available to me.  I further authorize the medical provider to use the photographs for teaching purposes or to illustrate scientific papers, books, or lectures if in his/her judgment, medical research, education, or science may benefit from its use.    I have had an opportunity to fully inquire about the risks and benefits of this procedure and its alternatives.   I have been given ample time and opportunity to ask questions and to seek a second opinion if I wished to do so.  I acknowledge that there have specifically been no guarantees as to the cosmetic results from the procedure.  I am aware that with any procedure there is always the possibility of an unexpected complication.    III. POST-PROCEDURAL CARE (WHAT YOU WILL NEED TO DO \"AFTER THE BIOPSY\" TO OPTIMIZE HEALING)    Keep the area clean and dry.  Try NOT to remove the bandage or get it wet for the first 24 hours.    Gently " "clean the area and apply surgical ointment (such as Vaseline petrolatum ointment, which is available \"over the counter\" and not a prescription) to the biopsy site for up to 2 weeks straight.  This acts to protect the wound from the outside world.      Sutures are not usually placed in this procedure.  If any sutures were placed, return for suture removal as instructed (generally 1 week for the face, 2 weeks for the body).      Take Acetaminophen (Tylenol) for discomfort, if no contraindications.  Ibuprofen or aspirin could make bleeding worse.    Call our office immediately for signs of infection: fever, chills, increased redness, warmth, tenderness, discomfort/pain, or pus or foul smell coming from the wound.    WHAT TO DO IF THERE IS ANY BLEEDING?  If a small amount of bleeding is noticed, place a clean cloth over the area and apply firm pressure for ten minutes.  Check the wound after 10 minutes of direct pressure.  If bleeding persists, try one more time for an additional 10 minutes of direct pressure on the area.  If the bleeding becomes heavier or does not stop after the second attempt, or if you have any other questions about this procedure, then please call your [facility]'s Dermatologist by calling [phone] (SKIN).     I hereby acknowledge that I have reviewed and verified the site with my Dermatologist and have requested and authorized my Dermatologist to proceed with the procedure.    Scribe Attestation      I,:  Ann Lau MA am acting as a scribe while in the presence of the attending physician.:       I,:  Boone Maki MD personally performed the services described in this documentation    as scribed in my presence.:                "

## 2024-05-21 NOTE — PATIENT INSTRUCTIONS
"MELANOCYTIC NEVI (\"Moles\")      Melanocytic nevi (\"moles\") are tan or brown, raised or flat areas of the skin which have an increased number of melanocytes. Melanocytes are the cells in our body which make pigment and account for skin color.    Some moles are present at birth (I.e., \"congenital nevi\"), while others come up later in life (i.e., \"acquired nevi\").  The sun can stimulate the body to make more moles.  Sunburns are not the only thing that triggers more moles.  Chronic sun exposure can do it too.     Clinically distinguishing a healthy mole from melanoma may be difficult, even for experienced dermatologists. The \"ABCDE's\" of moles have been suggested as a means of helping to alert a person to a suspicious mole and the possible increased risk of melanoma.  The suggestions for raising alert are as follows:    Asymmetry: Healthy moles tend to be symmetric, while melanomas are often asymmetric.  Asymmetry means if you draw a line through the mole, the two halves do not match in color, size, shape, or surface texture. Asymmetry can be a result of rapid enlargement of a mole, the development of a raised area on a previously flat lesion, scaling, ulceration, bleeding or scabbing within the mole.  Any mole that starts to demonstrate \"asymmetry\" should be examined promptly by a board certified dermatologist.     Border: Healthy moles tend to have discrete, even borders.  The border of a melanoma often blends into the normal skin and does not sharply delineate the mole from normal skin.  Any mole that starts to demonstrate \"uneven borders\" should be examined promptly by a board certified dermatologist.     Color: Healthy moles tend to be one color throughout.  Melanomas tend to be made up of different colors ranging from dark black, blue, white, or red.  Any mole that demonstrates a color change should be examined promptly by a board certified dermatologist.     Diameter: Healthy moles tend to be smaller than 0.6 cm " "in size; an exception are \"congenital nevi\" that can be larger.  Melanomas tend to grow and can often be greater than 0.6 cm (1/4 of an inch, or the size of a pencil eraser). This is only a guideline, and many normal moles may be larger than 0.6 cm without being unhealthy.  Any mole that starts to change in size (small to bigger or bigger to smaller) should be examined promptly by a board certified dermatologist.     Evolving: Healthy moles tend to \"stay the same.\"  Melanomas may often show signs of change or evolution such as a change in size, shape, color, or elevation.  Any mole that starts to itch, bleed, crust, burn, hurt, or ulcerate or demonstrate a change or evolution should be examined promptly by a board certified dermatologist.      Dysplastic Nevi  Dysplastic moles are moles that fit the ABCDE rules of melanoma but are not identified as melanomas when examined under the microscope.  They may indicate an increased risk of melanoma in that person. If there is a family history of melanoma, most experts agree that the person may be at an increased risk for developing a melanoma.  Experts still do not agree on what dysplastic moles mean in patients without a personal or family history of melanoma.  Dysplastic moles are usually larger than common moles and have different colors within it with irregular borders. The appearance can be very similar to a melanoma. Biopsies of dysplastic moles may show abnormalities which are different from a regular mole.      Melanoma  Malignant melanoma is a type of skin cancer that can be deadly if it spreads throughout the body. The incidence of melanoma in the [place] is growing faster than any other cancer. Melanoma usually grows near the surface of the skin for a period of time, and then begins to grow deeper into the skin. Once it grows deeper into the skin, the risk of spread to other organs greatly increases. Therefore, early detection and removal of a malignant " "melanoma may result in a better chance at a complete cure; removal after the tumor has spread may not be as effective, leading to worse clinical outcomes such as death.    The true rate of nevus transformation into a melanoma is unknown. It has been estimated that the lifetime risk for any acquired melanocytic nevus on any 20-year-old individual transforming into melanoma by age 80 is 0.03% (1 in 3,164) for men and 0.009% (1 in 10,800) for women.     The appearance of a \"new mole\" remains one of the most reliable methods for identifying a malignant melanoma.  Occasionally, melanomas appear as rapidly growing, blue-black, dome-shaped bumps within a previous mole or previous area of normal skin.  Other times, melanomas are suspected when a mole suddenly appears or changes. Itching, burning, or pain in a pigmented lesion should increase suspicion, but most patients with early melanoma have no skin discomfort whatsoever.  Melanoma can occur anywhere on the skin, including areas that are difficult for self-examination. Many melanomas are first noticed by other family members.  Suspicious-looking moles may be removed for microscopic examination.       You may be able to prevent death from melanoma by doing two simple things:    Try to avoid unnecessary sun exposure and protect your skin when it is exposed to the sun.  People who live near the equator, people who have intermittent exposures to large amounts of sun, and people who have had sunburns in childhood or adolescence have an increased risk for melanoma. Sun sense and vigilant sun protection may be keys to helping to prevent melanoma.  We recommend wearing UPF-rated sun protective clothing and sunglasses whenever possible and applying a moisturizer-sunscreen combination product (SPF 50+) such as Neutrogena Daily Defense to sun exposed areas of skin at least three times a day.    Have your moles regularly examined by a board certified dermatologist AND by yourself or " "a family member/friend at home.  We recommend that you have your moles examined at least once a year by a board certified dermatologist.  Use your birthday as an annual reminder to have your \"Birthday Suit\" (I.e., your skin) examined; it is a nice birthday gift to yourself to know that your skin is healthy appearing!  Additionally, at-home self examinations may be helpful for detecting a possible melanoma.  Use the ABCDEs we discussed and check your moles once a month at home.        SEBORRHEIC KERATOSIS  A seborrheic keratosis is a harmless warty spot that appears during adult life as a common sign of skin aging.  Seborrheic keratoses can arise on any area of skin, covered or uncovered, with the usual exception of the palms and soles. They do not arise from mucous membranes. Seborrheic keratoses can have highly variable appearance.      Seborrheic keratoses are extremely common. It has been estimated that over 90% of adults over the age of 60 years have one or more of them. They occur in males and females of all races, typically beginning to erupt in the 30s or 40s. They are uncommon under the age of 20 years.  The precise cause of seborrhoeic keratoses is not known.  Seborrhoeic keratoses are considered degenerative in nature. As time goes by, seborrheic keratoses tend to become more numerous. Some people inherit a tendency to develop a very large number of them; some people may have hundreds of them.    The name \"seborrheic keratosis\" is misleading, because these lesions are not limited to a seborrhoeic distribution (scalp, mid-face, chest, upper back), nor are they formed from sebaceous glands, nor are they associated with sebum -- which is greasy.  Seborrheic keratosis may also be called \"SK,\" \"Seb K,\" \"basal cell papilloma,\" \"senile wart,\" or \"barnacle.\"      There is no easy way to remove multiple lesions on a single occasion.  Unless a specific lesion is \"inflamed\" and is causing pain or stinging/burning or " "is bleeding, most insurance companies do not authorize treatment.      ANGIOMA (\"CHERRY ANGIOMA\")  Hamilton angiomas markedly increase in number from about the age of 40, so it has been estimated that 75% of people over 75 years of age have them. Although they also called \"senile angiomas,\" they can occur in young people too - 5% of adolescents have been found to have them.     Cherry angiomas are very common in males and females of any age or race, with no difference in sexes or races affected. They are however more noticeable in white skin than in skin of colour.  There may be a family history of similar lesions. Eruptive (very large number appearing in a short period of time) cherry angiomas have been rarely reported to be associated with internal malignancy and pregnancy.     INFORMED CONSENT DISCUSSION AND POST-OPERATIVE INSTRUCTIONS FOR PATIENT    I.  RATIONALE FOR PROCEDURE  I understand that a skin biopsy allows the Dermatologist to test a lesion or rash under the microscope to obtain a diagnosis.  It usually involves numbing the area with numbing medication and removing a small piece of skin; sometimes the area will be closed with sutures. In this specific procedure, sutures are not usually needed.  If any sutures are placed, then they are usually need to be removed in 2 weeks or less.    I understand that my Dermatologist recommends that a skin \"shave\" biopsy be performed today.  A local anesthetic, similar to the kind that a dentist uses when filling a cavity, will be injected with a very small needle into the skin area to be sampled.  The injected skin and tissue underneath \"will go to sleep” and become numb so no pain should be felt afterwards.  An instrument shaped like a tiny \"razor blade\" (shave biopsy instrument) will be used to cut a small piece of tissue and skin from the area so that a sample of tissue can be taken and examined more closely under the microscope.  A slight amount of bleeding will " "occur, but it will be stopped with direct pressure and a pressure bandage and any other appropriate methods.  I understands that a scar will form where the wound was created.  Surgical ointment will be applied to help protect the wound.  Sutures are not usually needed.    II.  RISKS AND POTENTIAL COMPLICATIONS   I understand the risks and potential complications of a skin biopsy include but are not limited to the following:  Bleeding  Infection  Pain  Scar/keloid  Skin discoloration  Incomplete Removal  Recurrence  Nerve Damage/Numbness/Loss of Function  Allergic Reaction to Anesthesia  Biopsies are diagnostic procedures and based on findings additional treatment or evaluation may be required  Loss or destruction of specimen resulting in no additional findings    My Dermatologist has explained to me the nature of the condition, the nature of the procedure, and the benefits to be reasonably expected compared with alternative approaches.  My Dermatologist has discussed the likelihood of major risks or complications of this procedure including the specific risks listed above, such as bleeding, infection, and scarring/keloid.  I understand that a scar is expected after this procedure.  I understand that my physician cannot predict if the scar will form a \"keloid,\" which extends beyond the borders of the wound that is created.  A keloid is a thick, painful, and bumpy scar.  A keloid can be difficult to treat, as it does not always respond well to therapy, which includes injecting cortisone directly into the keloid every few weeks.  While this usually reduces the pain and size of the scar, it does not eliminate it.      I understand that photographs may be taken before and after the procedure.  These will be maintained as part of the medical providers confidential records and may not be made available to me.  I further authorize the medical provider to use the photographs for teaching purposes or to illustrate scientific " "papers, books, or lectures if in his/her judgment, medical research, education, or science may benefit from its use.    I have had an opportunity to fully inquire about the risks and benefits of this procedure and its alternatives.   I have been given ample time and opportunity to ask questions and to seek a second opinion if I wished to do so.  I acknowledge that there have specifically been no guarantees as to the cosmetic results from the procedure.  I am aware that with any procedure there is always the possibility of an unexpected complication.    III. POST-PROCEDURAL CARE (WHAT YOU WILL NEED TO DO \"AFTER THE BIOPSY\" TO OPTIMIZE HEALING)    Keep the area clean and dry.  Try NOT to remove the bandage or get it wet for the first 24 hours.    Gently clean the area and apply surgical ointment (such as Vaseline petrolatum ointment, which is available \"over the counter\" and not a prescription) to the biopsy site for up to 2 weeks straight.  This acts to protect the wound from the outside world.      Sutures are not usually placed in this procedure.  If any sutures were placed, return for suture removal as instructed (generally 1 week for the face, 2 weeks for the body).      Take Acetaminophen (Tylenol) for discomfort, if no contraindications.  Ibuprofen or aspirin could make bleeding worse.    Call our office immediately for signs of infection: fever, chills, increased redness, warmth, tenderness, discomfort/pain, or pus or foul smell coming from the wound.    WHAT TO DO IF THERE IS ANY BLEEDING?  If a small amount of bleeding is noticed, place a clean cloth over the area and apply firm pressure for ten minutes.  Check the wound after 10 minutes of direct pressure.  If bleeding persists, try one more time for an additional 10 minutes of direct pressure on the area.  If the bleeding becomes heavier or does not stop after the second attempt, or if you have any other questions about this procedure, then please call " your [facility] D[facility]atologist by calling [phone] (SKIN).     I hereby acknowledge that I have reviewed and verified the site with my Dermatologist and have requested and authorized my Dermatologist to proceed with the procedure.

## 2024-05-23 ENCOUNTER — OFFICE VISIT (OUTPATIENT)
Dept: OBGYN CLINIC | Facility: CLINIC | Age: 84
End: 2024-05-23
Payer: COMMERCIAL

## 2024-05-23 ENCOUNTER — APPOINTMENT (OUTPATIENT)
Dept: RADIOLOGY | Facility: CLINIC | Age: 84
End: 2024-05-23
Payer: COMMERCIAL

## 2024-05-23 VITALS — HEIGHT: 65 IN | WEIGHT: 121.4 LBS | BODY MASS INDEX: 20.23 KG/M2

## 2024-05-23 DIAGNOSIS — M17.12 PRIMARY OSTEOARTHRITIS OF LEFT KNEE: Primary | ICD-10-CM

## 2024-05-23 DIAGNOSIS — G89.29 CHRONIC PAIN OF LEFT KNEE: ICD-10-CM

## 2024-05-23 DIAGNOSIS — M25.562 CHRONIC PAIN OF LEFT KNEE: ICD-10-CM

## 2024-05-23 PROCEDURE — 73562 X-RAY EXAM OF KNEE 3: CPT

## 2024-05-23 PROCEDURE — 99203 OFFICE O/P NEW LOW 30 MIN: CPT | Performed by: ORTHOPAEDIC SURGERY

## 2024-05-23 RX ORDER — LORATADINE 10 MG/1
10 TABLET, ORALLY DISINTEGRATING ORAL DAILY
COMMUNITY

## 2024-05-23 RX ORDER — OMEGA-3S/DHA/EPA/FISH OIL/D3 300MG-1000
400 CAPSULE ORAL DAILY
COMMUNITY

## 2024-05-23 NOTE — PROGRESS NOTES
"Orthopaedics Office Visit - New Patient Visit    ASSESSMENT/PLAN:    Assessment:   Left knee osteoarthritis    Plan:   X-rays obtained and reviewed in the office today  She was provided a home exercise plan  The patient was provided a hinge knee brace in the office today  Continue activity as tolerated  OTC analgesics as needed  She will follow up on an as needed basis    To Do Next Visit:  prn    _____________________________________________________  CHIEF COMPLAINT:  Chief Complaint   Patient presents with    Left Knee - Pain         SUBJECTIVE:  Scarlett Zamarripa is a 83 y.o. female who presents for initial evaluation of left knee pain for about 5 days. She notes she \"overdid it.\" The patient was walking at the park and went up and down the basement stairs several times. She was also planting flowers. She had soreness Sunday morning at [employer]. Monday, the pain was unbearable and she had some swelling. She used ice, elevation, and Aleve. By Tuesday, the symptoms had subsided.     PAST MEDICAL HISTORY:  Past Medical History:   Diagnosis Date    Atrial fibrillation (HCC)     Hypertension     Rectus sheath hematoma 04/28/2022       PAST SURGICAL HISTORY:  Past Surgical History:   Procedure Laterality Date    APPENDECTOMY      BREAST BIOPSY Left 1980    neg    BREAST BIOPSY Left 1990    neg    CARDIAC ELECTROPHYSIOLOGY PROCEDURE N/A 04/28/2022    Procedure: Cardiac eps/afib ablation;  Surgeon: Kahlil Villatoro MD;  Location: [facility];  Service: Cardiology    CARDIAC ELECTROPHYSIOLOGY PROCEDURE N/A 04/28/2022    Procedure: Cardiac eps/aflutter ablation;  Surgeon: Kahlil Villatoro MD;  Location: [facility];  Service: Cardiology    CATARACT EXTRACTION, BILATERAL      DILATION AND CURETTAGE OF UTERUS      IR EMBOLIZATION (SPECIFY VESSEL OR SITE)  04/28/2022    LAPAROSCOPY FOR ECTOPIC PREGNANCY      OTHER SURGICAL HISTORY      surgical excision of tubal pregnancy        FAMILY HISTORY:  Family History " "  Problem Relation Age of Onset    Hypertension Mother     Ovarian cancer Mother     Pneumonia Father     No Known Problems Sister     No Known Problems Sister     No Known Problems Sister     No Known Problems Daughter     No Known Problems Maternal Grandmother     No Known Problems Paternal Grandmother     Cancer Maternal Aunt     No Known Problems Maternal Aunt     No Known Problems Maternal Aunt     No Known Problems Maternal Aunt     No Known Problems Maternal Aunt     No Known Problems Paternal Aunt     No Known Problems Paternal Aunt     Breast cancer Neg Hx        SOCIAL HISTORY:  Social History     Tobacco Use    Smoking status: Former     Current packs/day: 0.00     Average packs/day: 0.5 packs/day for 20.0 years (10.0 ttl pk-yrs)     Types: Cigarettes     Start date: 1986     Quit date: 2006     Years since quittin.4    Smokeless tobacco: Never   Vaping Use    Vaping status: Never Used   Substance Use Topics    Alcohol use: Yes     Alcohol/week: 1.0 standard drink of alcohol     Types: 1 Glasses of wine per week     Comment: social     Drug use: No       MEDICATIONS:    Current Outpatient Medications:     cholecalciferol (VITAMIN D3) 400 units tablet, Take 400 Units by mouth daily, Disp: , Rfl:     loratadine (CLARITIN REDITABS) 10 MG dissolvable tablet, Take 10 mg by mouth daily, Disp: , Rfl:     Calcium Carb-Cholecalciferol (CALCIUM 1000 + D PO), Take by mouth, Disp: , Rfl:     glucosamine-chondroitin 500-400 MG tablet, Take 1 tablet by mouth 3 (three) times a day, Disp: , Rfl:     ALLERGIES:  No Known Allergies    REVIEW OF SYSTEMS:  MSK: as noted in HPI  Neuro: WNL  Pertinent items are otherwise noted in HPI.  A comprehensive review of systems was otherwise negative.    LABS:  HgA1c: No results found for: \"HGBA1C\"  BMP:   Lab Results   Component Value Date    GLUCOSE 85 2015    CALCIUM 11.5 (H) 2023     2015    K 4.6 2023    CO2 29 2023     " "11/20/2023    BUN 24 11/20/2023    CREATININE 0.85 11/20/2023     CBC: No components found for: \"CBC\"    _____________________________________________________  PHYSICAL EXAMINATION:  Vital signs: Ht 5' 5\" (1.651 m)   Wt 55.1 kg (121 lb 6.4 oz)   BMI 20.20 kg/m²   General: No acute distress, awake and alert  Psychiatric: Mood and affect appear appropriate  HEENT: Trachea Midline, No torticollis, no apparent facial trauma  Cardiovascular: No audible murmurs; Extremities appear perfused  Pulmonary: No audible wheezing or stridor  Skin: No open lesions; see further details (if any) below    MUSCULOSKELETAL EXAMINATION:  Extremities:    Left Knee  Range of motion from 3 to 120.    There is mild crepitus with range of motion.   There is no effusion.    There is tenderness over the lateral joint line.    There is 5/5 quadriceps strength and equal tone.    The patient is able to perform a straight leg raise.    Varus stress testing reveals no instability at 0 and 30 degrees   Valgus stress testing reveals no instability at 0 and 30 degrees  The patient is neurovascular intact distally.        _____________________________________________________  STUDIES REVIEWED:  I personally reviewed the images and interpretation is as follows:  X-rays of the left knee obtained 5/23/2024 demonstrate moderate-severe tricompartmental degenerative changes. No acute fracture or dislocation.    PROCEDURES PERFORMED:  Procedures  None.       Scribe Attestation      I,:  Ruby Mitchell am acting as a scribe while in the presence of the attending physician.:       I,:  Kyle Mills MD personally performed the services described in this documentation    as scribed in my presence.:             "

## 2024-05-28 PROCEDURE — 88305 TISSUE EXAM BY PATHOLOGIST: CPT | Performed by: STUDENT IN AN ORGANIZED HEALTH CARE EDUCATION/TRAINING PROGRAM

## 2024-05-28 PROCEDURE — 88342 IMHCHEM/IMCYTCHM 1ST ANTB: CPT | Performed by: STUDENT IN AN ORGANIZED HEALTH CARE EDUCATION/TRAINING PROGRAM

## 2024-05-28 PROCEDURE — 88341 IMHCHEM/IMCYTCHM EA ADD ANTB: CPT | Performed by: STUDENT IN AN ORGANIZED HEALTH CARE EDUCATION/TRAINING PROGRAM

## 2024-06-04 ENCOUNTER — TELEPHONE (OUTPATIENT)
Age: 84
End: 2024-06-04

## 2024-06-04 NOTE — TELEPHONE ENCOUNTER
Patient calling to discuss biopsy results, she received them via my chart and has never had skin cancer before. Advised that I would have the provider review her results and give her a call back.

## 2024-06-04 NOTE — TELEPHONE ENCOUNTER
Dr Maki,     Spoke with patient, she saw her test results through [employer] and and is aware of her skin cancer. She is leaving [facility] on the 11th and would like a call back on Monday to know what the plan is. Please advise.     Virginia Gee/ciera  06/04/24  11:14 AM

## 2024-06-06 DIAGNOSIS — D09.9 SQUAMOUS CELL CARCINOMA IN SITU: Primary | ICD-10-CM

## 2024-06-06 RX ORDER — FLUOROURACIL 50 MG/G
CREAM TOPICAL
Qty: 40 G | Refills: 0 | Status: SHIPPED | OUTPATIENT
Start: 2024-06-06

## 2024-07-25 ENCOUNTER — PROCEDURE VISIT (OUTPATIENT)
Age: 84
End: 2024-07-25
Payer: COMMERCIAL

## 2024-07-25 VITALS
BODY MASS INDEX: 20.16 KG/M2 | TEMPERATURE: 98.7 F | HEART RATE: 70 BPM | OXYGEN SATURATION: 98 % | WEIGHT: 121 LBS | HEIGHT: 65 IN

## 2024-07-25 DIAGNOSIS — C44.729 SQUAMOUS CELL CARCINOMA OF LEFT LOWER LEG: ICD-10-CM

## 2024-07-25 DIAGNOSIS — C44.722 SQUAMOUS CELL CARCINOMA OF RIGHT LOWER LEG: Primary | ICD-10-CM

## 2024-07-25 PROCEDURE — 11602 EXC TR-EXT MAL+MARG 1.1-2 CM: CPT | Performed by: DERMATOLOGY

## 2024-07-25 PROCEDURE — 12032 INTMD RPR S/A/T/EXT 2.6-7.5: CPT | Performed by: DERMATOLOGY

## 2024-07-25 PROCEDURE — 88341 IMHCHEM/IMCYTCHM EA ADD ANTB: CPT | Performed by: STUDENT IN AN ORGANIZED HEALTH CARE EDUCATION/TRAINING PROGRAM

## 2024-07-25 PROCEDURE — 88305 TISSUE EXAM BY PATHOLOGIST: CPT | Performed by: STUDENT IN AN ORGANIZED HEALTH CARE EDUCATION/TRAINING PROGRAM

## 2024-07-25 PROCEDURE — 88342 IMHCHEM/IMCYTCHM 1ST ANTB: CPT | Performed by: STUDENT IN AN ORGANIZED HEALTH CARE EDUCATION/TRAINING PROGRAM

## 2024-07-25 NOTE — PROGRESS NOTES
"[facility] D[facility]atology Clinic Note     Patient Name: Scarlett Zamarripa  Encounter Date: July 25, 2024     Have you been cared for by a [facility] Dermatologist in the last 3 years and, if so, which description applies to you?    Yes.  I have been here within the last 3 years, and my medical history has NOT changed since that time.  I am FEMALE/of child-bearing potential.    REVIEW OF SYSTEMS:  Have you recently had or currently have any of the following? No changes in my recent health.   PAST MEDICAL HISTORY:  Have you personally ever had or currently have any of the following?  If \"YES,\" then please provide more detail. No changes in my medical history.   HISTORY OF IMMUNOSUPPRESSION: Do you have a history of any of the following:  Systemic Immunosuppression such as Diabetes, Biologic or Immunotherapy, Chemotherapy, Organ Transplantation, Bone Marrow Transplantation?  No     Answering \"YES\" requires the addition of the dotphrase \"IMMUNOSUPPRESSED\" as the first diagnosis of the patient's visit.   FAMILY HISTORY:  Any \"first degree relatives\" (parent, brother, sister, or child) with the following?    No changes in my family's known health.   PATIENT EXPERIENCE:    Do you want the Dermatologist to perform a COMPLETE skin exam today including a clinical examination under the \"bra and underwear\" areas?  NO  If necessary, do we have your permission to call and leave a detailed message on your Preferred Phone number that includes your specific medical information?  Yes      No Known Allergies   Current Outpatient Medications:     Calcium Carb-Cholecalciferol (CALCIUM 1000 + D PO), Take by mouth, Disp: , Rfl:     cholecalciferol (VITAMIN D3) 400 units tablet, Take 400 Units by mouth daily, Disp: , Rfl:     fluorouracil (EFUDEX) 5 % cream, Apply to spot on right thigh twice a day for 6 weeks. Wash hands after application. Keep out of reach from children and pets., Disp: 40 g, Rfl: 0    glucosamine-chondroitin 500-400 MG " tablet, Take 1 tablet by mouth 3 (three) times a day, Disp: , Rfl:     loratadine (CLARITIN REDITABS) 10 MG dissolvable tablet, Take 10 mg by mouth daily, Disp: , Rfl:           Whom besides the patient is providing clinical information about today's encounter?   NO ADDITIONAL HISTORIAN (patient alone provided history)    Physical Exam and Assessment/Plan by Diagnosis:    Chief complaint: Patient is a 84 y/o feale present for an Excision x2 on the Bilateral Lower Legs    Site A - Right Lower Leg - PROCEDURE:  EXCISION WITH INTERMEDIATE LAYERED CLOSURE     Attending:   Assistant:  Ashley    Pre-Op Diagnosis: Squamous Cell Carcinoma  Post-Op Diagnosis: Same   Benign versus Malignant Malignant      Lesion Anatomic Location: Right Lower Leg (Previous Accession Number: [id number])  Pre-op size: 6mm  Size of defect:  14 (with 0.4 centimeter margins)  Final repaired wound length:  3.0 cm    Written and verbal, witnessed informed consent was obtained. I discussed that excision is a method of removing lesions both benign and malignant lesions.  A portion of normal skin is often taken to ensure completeness of removal.  I reviewed that procedure will include numbing the area,  cutting around and under defect, undermining tissue, and closing the wound with sutures both inside and out.  These sutures are usually removed in 7 to 14 days. Risks (bleeding, pain, infection, scarring, recurrence) and benefits discussed. It was discussed with patient that every effort is made to minimize scar, but scarring is influenced also by extrinsic factor such as location, age and genetics.     Time Out: performed:  yes  Correct patient: yes.   Correct site per Clinic Report: yes  Correct site per Patient Report: yes    LOCAL ANESTHESIA: 3:1 1% xylocaine with epi and 1-100,000 buffered    DESCRIPTION OF PROCEDURE: The patient was brought back into the procedure room, anesthetized locally, prepped and draped in the usual fashion. Using  a #15 blade with a scalpel, the lesion was excised in elliptical fashion. The wound was  undermined in the  fascial plane. Hemostasis was achieved with light electrocoagulation. Purpose of undermining was to decrease wound tension and facilitate closure.    The wound was closed with subcutaneous sutures as follows:    Deep suture:4-0 Vicryl 3 sutures    Epidermal edge closure was accomplished with superficial sutures as follows:    Superficial suture: 4-0 Ethilon  Superficial suture type: Interrupted 6 sutures    Estimated blood loss less than 3ml.    The patient tolerated the procedure well without any complications. The wound was cleaned with sterile saline, dried off, surgical vaseline ointment was applied, and the wound was covered. A pressure dressing was applied for stabilization and light pressure. The patient was given detailed oral and written instructions on postoperative care as detailed in consent. The patient left in good medical condition.    Site B - Left Lower Leg - PROCEDURE:  EXCISION WITH INTERMEDIATE LAYERED CLOSURE     Attending:   Assistant:  Ashley    Pre-Op Diagnosis: Squamous Cell Carcinoma  Post-Op Diagnosis: Same   Benign versus Malignant Malignant    Lesion Anatomic Location: Left Lower Leg (Previous Accession Number: [id number])  Pre-op size: 1cm  Size of defect:  18mm (with 0.4 centimeter margins)  Final repaired wound length:  3.1cm    Written and verbal, witnessed informed consent was obtained. I discussed that excision is a method of removing lesions both benign and malignant lesions.  A portion of normal skin is often taken to ensure completeness of removal.  I reviewed that procedure will include numbing the area,  cutting around and under defect, undermining tissue, and closing the wound with sutures both inside and out.  These sutures are usually removed in 7 to 14 days. Risks (bleeding, pain, infection, scarring, recurrence) and benefits discussed. It was discussed  with patient that every effort is made to minimize scar, but scarring is influenced also by extrinsic factor such as location, age and genetics.     Time Out: performed:  yes  Correct patient: yes.   Correct site per Clinic Report: yes  Correct site per Patient Report: yes    LOCAL ANESTHESIA: 3:1 1% xylocaine with epi and 1-100,000 buffered    DESCRIPTION OF PROCEDURE: The patient was brought back into the procedure room, anesthetized locally, prepped and draped in the usual fashion. Using a #15 blade with a scalpel, the lesion was excised in elliptical fashion. The wound was  undermined in the  fascial plane. Hemostasis was achieved with light electrocoagulation. Purpose of undermining was to decrease wound tension and facilitate closure.    The wound was closed with subcutaneous sutures as follows:    Deep suture:4-0 Vicryl  3 sutures    Epidermal edge closure was accomplished with superficial sutures as follows:    Superficial suture: 4-0 Ethilon  Superficial suture type: Interrupted 6 sutures    Estimated blood loss less than 3ml.    The patient tolerated the procedure well without any complications. The wound was cleaned with sterile saline, dried off, surgical vaseline ointment was applied, and the wound was covered. A pressure dressing was applied for stabilization and light pressure. The patient was given detailed oral and written instructions on postoperative care as detailed in consent. The patient left in good medical condition.    Scribe Attestation      I,:  Ashley Diamond am acting as a scribe while in the presence of the attending physician.:       I,:  Boone Maki MD personally performed the services described in this documentation    as scribed in my presence.:

## 2024-07-25 NOTE — PATIENT INSTRUCTIONS
"Dr. Maki - Surgery with Sutures After Care Instructions    WOUND CARE AFTER SURGERY:    Do NOT to remove the pressure bandage for 48 hours. Keep the area clean and dry while this bandage is on.    After removing the bandage for the first time, gently clean the area with soap and water. If the bandage is difficult to remove, getting the bandage wet in the shower will sometimes help soften the adhesive and allow it to be removed more easily.     You will now need to cleanse this area daily in the shower with gentle soap. There is no need to scrub the area. Apply plain Vaseline ointment (this is over the counter and not a prescription) to the site followed by a clean appropriately sized bandage to area.  Non stick dressing and paper tape (or Hypafix) are recommended for sensitive skin but a bandaid is fine if it covers the area well.  DO NOT USE NEOSPORIN, BACITRACIN OR ANY TOPICAL ANTIBIOTIC UNLESS INSTRUCTED BY THE DOCTOR.      You will need to continue the above daily wound care until you return for suture removal in 14 days (generally 7 days for the face, 10-14 days off the face)      RESTRICTIONS:     For two DAYS:   - You will need to take it very easy as this time is highest risk for bleeding. Being a \"couch potato\" during these two days is generally recommended.   - For surgeries on the face/neck/scalp: Avoid leaning down to pick things up off the floor as this brings blood up to your head. Instead, squat down to pick things up.     For two WEEKS:   - No heavy lifting (anything greater than 10 pounds)   - You can start to do slow, gentle activities such as slow walking but nothing to increase your heart rate and blood pressure too much (such as cardiovascular exercise). It is important to take it easy as there is still a risk for bleeding and a high risk popping of stitches open during this time.     If we did surgery near the eyes (including the nose, forehead, front part of your scalp, cheeks): It is VERY " common to get a large amount of swelling around your eyes (puffy eyes). Although less frequent, this can be enough to swell your eyes shut and can also come along with bruising. This should not hurt and is very expected and normal. It is typically worst at ~ 3 days out from your surgery and dramatically better 1 week post-operatively.     If we did surgery around your nose: No blowing your nose as this puts you at higher risk of popping stitches durign this time. Instead dab under your nose with a tissue or use a Q-tip inside your nose.    If we did surgery on the skin above or bellow your lip or your lip itself: No sipping from straws as this uses a lot of the muscles around your mouth and increases the risk of popping stitches during this time.    MANAGING YOUR PAIN AFTER SURGERY     You can expect to have some pain after surgery. This is normal. The pain is typically worse the first two days after surgery, and quickly begins to get better.     The best strategy for controlling your pain after surgery is around the clock pain control. You can take over the counter Acetaminophen (Tylenol) for discomfort, if no contraindications.     If you are taking this at the maximum dose, you can alternate this with Motrin (ibuprofen or Advil) as well. Alternating these medications with each other allows you to maximize your pain control. In addition to Tylenol and Motrin, you can use heating pads or ice packs on your incisions to help reduce your pain.     How will I alternate your regular strength over-the-counter pain medication?  You will take a dose of pain medication every three hours.   Start by taking 650 mg of Tylenol (2 pills of 325 mg)   3 hours later take 600 mg of Motrin (3 pills of 200 mg)   3 hours after taking the Motrin take 650 mg of Tylenol   3 hours after that take 600 mg of Motrin.    See example - if your first dose of Tylenol is at 12:00 PM     12:00 PM  Tylenol 650 mg (2 pills of 325 mg)    3:00 PM   Motrin 600 mg (3 pills of 200 mg)    6:00 PM  Tylenol 650 mg (2 pills of 325 mg)    9:00 PM  Motrin 600 mg (3 pills of 200 mg)    Continue alternating every 3 hours      Important:   Do not take more than 4000mg of Tylenol or 3200mg of Motrin in a 24-hour period.       CALL OUR OFFICE IMMEDIATELY FOR ANY SIGNS OF INFECTION:    This includes fever, chills, increased redness, warmth, tenderness, severe discomfort/pain, or pus or foul smell coming from the wound.  Call [facility] directly at   [phone]    IF BLEEDING IS NOTICED:    Place a clean cloth over the area and apply firm pressure for thirty minutes.  Check the wound ONLY after 30 minutes of direct pressure; do not cheat and sneak a peak, as that does not count.  If bleeding persists after 30 minutes of legitimate direct pressure, then try one more round of direct pressure to the area.  Should the bleeding become heavier or not stop after the second attempt, call [facility] directly at [phone]).

## 2024-07-30 ENCOUNTER — TELEPHONE (OUTPATIENT)
Age: 84
End: 2024-07-30

## 2024-07-30 PROCEDURE — 88341 IMHCHEM/IMCYTCHM EA ADD ANTB: CPT | Performed by: STUDENT IN AN ORGANIZED HEALTH CARE EDUCATION/TRAINING PROGRAM

## 2024-07-30 PROCEDURE — 88342 IMHCHEM/IMCYTCHM 1ST ANTB: CPT | Performed by: STUDENT IN AN ORGANIZED HEALTH CARE EDUCATION/TRAINING PROGRAM

## 2024-07-30 PROCEDURE — 88305 TISSUE EXAM BY PATHOLOGIST: CPT | Performed by: STUDENT IN AN ORGANIZED HEALTH CARE EDUCATION/TRAINING PROGRAM

## 2024-07-30 NOTE — TELEPHONE ENCOUNTER
----- Message from Boone Maki MD sent at 7/30/2024 12:02 PM EDT -----  Call patient if not coming in shortly for suture removal to let him know that the margins were clear

## 2024-08-01 ENCOUNTER — PROCEDURE VISIT (OUTPATIENT)
Age: 84
End: 2024-08-01
Payer: COMMERCIAL

## 2024-08-01 VITALS
HEIGHT: 65 IN | DIASTOLIC BLOOD PRESSURE: 64 MMHG | HEART RATE: 80 BPM | RESPIRATION RATE: 20 BRPM | TEMPERATURE: 98 F | BODY MASS INDEX: 19.99 KG/M2 | SYSTOLIC BLOOD PRESSURE: 118 MMHG | OXYGEN SATURATION: 99 % | WEIGHT: 120 LBS

## 2024-08-01 DIAGNOSIS — C44.722 SQUAMOUS CELL CARCINOMA OF RIGHT THIGH: Primary | ICD-10-CM

## 2024-08-01 PROCEDURE — 88342 IMHCHEM/IMCYTCHM 1ST ANTB: CPT | Performed by: STUDENT IN AN ORGANIZED HEALTH CARE EDUCATION/TRAINING PROGRAM

## 2024-08-01 PROCEDURE — 88305 TISSUE EXAM BY PATHOLOGIST: CPT | Performed by: STUDENT IN AN ORGANIZED HEALTH CARE EDUCATION/TRAINING PROGRAM

## 2024-08-01 PROCEDURE — 12032 INTMD RPR S/A/T/EXT 2.6-7.5: CPT | Performed by: DERMATOLOGY

## 2024-08-01 PROCEDURE — 11603 EXC TR-EXT MAL+MARG 2.1-3 CM: CPT | Performed by: DERMATOLOGY

## 2024-08-01 PROCEDURE — 88341 IMHCHEM/IMCYTCHM EA ADD ANTB: CPT | Performed by: STUDENT IN AN ORGANIZED HEALTH CARE EDUCATION/TRAINING PROGRAM

## 2024-08-01 NOTE — PROGRESS NOTES
"[facility] Dermatology Clinic Note     Patient Name: Scarlett Zamarripa  Encounter Date: 08/01/2024     Have you been cared for by a [facility] Dermatologist in the last 3 years and, if so, which description applies to you?    Yes.  I have been here within the last 3 years, and my medical history has NOT changed since that time.  I am FEMALE/of child-bearing potential.    REVIEW OF SYSTEMS:  Have you recently had or currently have any of the following? No changes in my recent health.   PAST MEDICAL HISTORY:  Have you personally ever had or currently have any of the following?  If \"YES,\" then please provide more detail. No changes in my medical history.   HISTORY OF IMMUNOSUPPRESSION: Do you have a history of any of the following:  Systemic Immunosuppression such as Diabetes, Biologic or Immunotherapy, Chemotherapy, Organ Transplantation, Bone Marrow Transplantation?  No     Answering \"YES\" requires the addition of the dotphrase \"IMMUNOSUPPRESSED\" as the first diagnosis of the patient's visit.   FAMILY HISTORY:  Any \"first degree relatives\" (parent, brother, sister, or child) with the following?    No changes in my family's known health.   PATIENT EXPERIENCE:    Do you want the Dermatologist to perform a COMPLETE skin exam today including a clinical examination under the \"bra and underwear\" areas?  Yes  If necessary, do we have your permission to call and leave a detailed message on your Preferred Phone number that includes your specific medical information?  Yes      No Known Allergies   Current Outpatient Medications:     cholecalciferol (VITAMIN D3) 400 units tablet, Take 400 Units by mouth daily, Disp: , Rfl:     glucosamine-chondroitin 500-400 MG tablet, Take 1 tablet by mouth 3 (three) times a day, Disp: , Rfl:     loratadine (CLARITIN REDITABS) 10 MG dissolvable tablet, Take 10 mg by mouth daily, Disp: , Rfl:     Calcium Carb-Cholecalciferol (CALCIUM 1000 + D PO), Take by mouth, Disp: , Rfl:     fluorouracil " (EFUDEX) 5 % cream, Apply to spot on right thigh twice a day for 6 weeks. Wash hands after application. Keep out of reach from children and pets., Disp: 40 g, Rfl: 0          Whom besides the patient is providing clinical information about today's encounter?   NO ADDITIONAL HISTORIAN (patient alone provided history)    Physical Exam and Assessment/Plan by Diagnosis:    PROCEDURE:  EXCISION WITH INTERMEDIATE LAYERED CLOSURE     Attending:   Assistant:  Margo Bello     Pre-Op Diagnosis: Squamous cell carcinoma   Post-Op Diagnosis: Same   Benign versus Malignant Malignant       Lesion Anatomic Location: right thigh  (Previous Accession Number:  [id number])  Pre-op size: 16mm  Size of defect:  24mm   (with 0.4 centimeter margins)  Final repaired wound length:  4.5cm    Written and verbal, witnessed informed consent was obtained. I discussed that excision is a method of removing lesions both benign and malignant lesions.  A portion of normal skin is often taken to ensure completeness of removal.  I reviewed that procedure will include numbing the area,  cutting around and under defect, undermining tissue, and closing the wound with sutures both inside and out.  These sutures are usually removed in 7 to 14 days. Risks (bleeding, pain, infection, scarring, recurrence) and benefits discussed. It was discussed with patient that every effort is made to minimize scar, but scarring is influenced also by extrinsic factor such as location, age and genetics.     Time Out: performed:  yes  Correct patient: yes.   Correct site per Clinic Report: yes  Correct site per Patient Report: yes    LOCAL ANESTHESIA: 3:1 1% xylocaine with epi and 1-100,000 buffered    DESCRIPTION OF PROCEDURE: The patient was brought back into the procedure room, anesthetized locally, prepped and draped in the usual fashion. Using a #15 blade with a scalpel, the lesion was excised in elliptical fashion. The wound was  undermined in the  fascial  plane. Hemostasis was achieved with light electrocoagulation. Purpose of undermining was to decrease wound tension and facilitate closure.    The wound was closed with subcutaneous sutures as follows:    Deep suture:4-0 Vicryl 3 sutures      Epidermal edge closure was accomplished with superficial sutures as follows:    Superficial suture: 4-0 Ethilon  Superficial suture type: 9 sutures interrupted    Estimated blood loss less than 3ml.    The patient tolerated the procedure well without any complications. The wound was cleaned with sterile saline, dried off, surgical vaseline ointment was applied, and the wound was covered. A pressure dressing was applied for stabilization and light pressure. The patient was given detailed oral and written instructions on postoperative care as detailed in consent. The patient left in good medical condition.    POSTOP DISCUSSION DISCUSSION AND INSTRUCTIONS FOR PATIENT      Rationale for Procedure  A skin excision allows the dermatologist to remove a lesion. The procedure involves a local numbing medication and removing the entire lesion. Typically, the lesion is being removed because it is atypical, traumatized, or for significant appearance reasons.  The area will be open like a brush burn and allowed to heal.   There will be no sutures.Tissue is sent to pathologist who will reconfirm diagnosis and verify completeness of lesion removal.    Description of Procedure  We would like to perform a skin excision today.  A local anesthetic, similar to the kind that a dentist uses when filling a cavity, will be injected with a very small needle into the skin area to be sampled.  The injected skin and tissue underneath should “go to sleep” and become numbed so that no further pain should be felt.  A scalpel will be used to cut around the lesion and tissue will be submitted to pathology for examination.  Depending on the diagnosis the lesion will be excised with a certain amount of normal  skin to help assure completeness of lesion removal.  The physician will discuss in advance the anticipated size and extent of removal.   Bleeding will occur, but it will stopped with direct pressure, sutures, and electrocautery.    Surgical “Vaseline-type” ointment will also applied after the procedure to help create a barrier between the wound and the outside world.      Risks and Potential Complications  The advantage of a skin excision is that it allows us to remove a problem lesion quickly.  Although this usually permits the lesion to heal as soon as possible with the least scarring, there are some risks and potential complications that include but are not limited to the following:  Some bleeding is normal at time of procedure and some bleeding on gauze is normal  the first few days after surgery.  Profuse bleeding and bleeding with swelling and pain should be reported as detailed  below  Infection is uncommon in skin surgery.  Infection should be reported and is indicated by pain, redness, and discharge of purulent material.  Some dull to at time sharp pain could occur initially the day after surgery.  Persistent pain or increasing pain days after surgery is not expected and should be reported.  Every effort is made to minimize scar, but location, size, and genetics do play a role in scar appearance.  A surgical wound does not achieve its optimal appearance until 6 months.  There are several treatments available if scarring would be problematic including scar creams, silicone pad, laser and scar revision.  Skin discoloration can occur especially in people of color.  Its important to avoid sun on wound in first 6 months after surgery.  Treatment is available if pigment is problematic.  Incomplete removal of the lesion or recurrence of lesion can occur and this would then require further treatment and more surgery.  Nerve Damage/Numbness/Loss of Function is very rare, but is most likely to occur if lesion is  large or if it is in a high risk location  Allergic Reaction to lidocaine is rare.  More commonly,  epinephrine is used  with the lidocaine.  Occasionally, epinephrine (aka adrenalin) may cause a brief  feeling of anxiety or jitteriness.  The person at the microscope  (pathologist) may provide additional information that was unexpected. This unexpected finding could provoke the need for additional treatment or evaluation.    What You Will Need to Do After the Procedure  Keep the area clean and dry the first day. Try NOT to remove the bandage for the first day.  Gently clean the area with soap and water and apply Vaseline ointment (this is over the counter and not a prescription) to the excision  site for up to 2 weeks.    Apply a clean appropriately sized bandage to area.  Gauze and paper tape are recommended for sensitive skin.  Return for suture removal as instructed (generally 1 week for the face, 2 weeks for the body).  Take Acetaminophen (Tylenol) for discomfort, if no contraindications.  Do NOT take Ibuprofen or aspirin unless specifically told to do so by your Dermatologist because these medications can make bleeding worse.  Call our office immediately for signs of infection: fever, chills, increased redness, warmth, tenderness, discomfort/pain, or pus or foul smell coming from the wound.    If bleeding is noticed, place a clean cloth over the area and apply firm pressure for thirty minutes.  Check the wound ONLY after 30 minutes of direct pressure; do not cheat and sneak a peak, as that does not count.  If bleeding persists after 30 minutes of legitimate direct pressure, then try one more round of direct pressure for an additional 10 minutes to the area.  Should the bleeding become heavier or not stop after the second attempt, call [facility] directly at [phone] or, if after hours, go to your local Emergency Room/Emergency Department.          Scribe Attestation      I,:  Margo  ORLANDO Bello am acting as a scribe while in the presence of the attending physician.:       I,:  Boone Maki MD personally performed the services described in this documentation    as scribed in my presence.:

## 2024-08-01 NOTE — PATIENT INSTRUCTIONS
"Dr. Maki - Surgery with Sutures After Care Instructions    WOUND CARE AFTER SURGERY:    Do NOT to remove the pressure bandage for 48 hours. Keep the area clean and dry while this bandage is on.    After removing the bandage for the first time, gently clean the area with soap and water. If the bandage is difficult to remove, getting the bandage wet in the shower will sometimes help soften the adhesive and allow it to be removed more easily.     You will now need to cleanse this area daily in the shower with gentle soap. There is no need to scrub the area. Apply plain Vaseline ointment (this is over the counter and not a prescription) to the site followed by a clean appropriately sized bandage to area.  Non stick dressing and paper tape (or Hypafix) are recommended for sensitive skin but a bandaid is fine if it covers the area well.  DO NOT USE NEOSPORIN, BACITRACIN OR ANY TOPICAL ANTIBIOTIC UNLESS INSTRUCTED BY THE DOCTOR.      You will need to continue the above daily wound care until you return for suture removal in 14 days (generally 7 days for the face, 10-14 days off the face)      RESTRICTIONS:     For two DAYS:   - You will need to take it very easy as this time is highest risk for bleeding. Being a \"couch potato\" during these two days is generally recommended.   - For surgeries on the face/neck/scalp: Avoid leaning down to pick things up off the floor as this brings blood up to your head. Instead, squat down to pick things up.     For two WEEKS:   - No heavy lifting (anything greater than 10 pounds)   - You can start to do slow, gentle activities such as slow walking but nothing to increase your heart rate and blood pressure too much (such as cardiovascular exercise). It is important to take it easy as there is still a risk for bleeding and a high risk popping of stitches open during this time.     If we did surgery near the eyes (including the nose, forehead, front part of your scalp, cheeks): It is VERY " common to get a large amount of swelling around your eyes (puffy eyes). Although less frequent, this can be enough to swell your eyes shut and can also come along with bruising. This should not hurt and is very expected and normal. It is typically worst at ~ 3 days out from your surgery and dramatically better 1 week post-operatively.     If we did surgery around your nose: No blowing your nose as this puts you at higher risk of popping stitches durign this time. Instead dab under your nose with a tissue or use a Q-tip inside your nose.    If we did surgery on the skin above or bellow your lip or your lip itself: No sipping from straws as this uses a lot of the muscles around your mouth and increases the risk of popping stitches during this time.    MANAGING YOUR PAIN AFTER SURGERY     You can expect to have some pain after surgery. This is normal. The pain is typically worse the first two days after surgery, and quickly begins to get better.     The best strategy for controlling your pain after surgery is around the clock pain control. You can take over the counter Acetaminophen (Tylenol) for discomfort, if no contraindications.     If you are taking this at the maximum dose, you can alternate this with Motrin (ibuprofen or Advil) as well. Alternating these medications with each other allows you to maximize your pain control. In addition to Tylenol and Motrin, you can use heating pads or ice packs on your incisions to help reduce your pain.     How will I alternate your regular strength over-the-counter pain medication?  You will take a dose of pain medication every three hours.   Start by taking 650 mg of Tylenol (2 pills of 325 mg)   3 hours later take 600 mg of Motrin (3 pills of 200 mg)   3 hours after taking the Motrin take 650 mg of Tylenol   3 hours after that take 600 mg of Motrin.    See example - if your first dose of Tylenol is at 12:00 PM     12:00 PM  Tylenol 650 mg (2 pills of 325 mg)    3:00 PM   Motrin 600 mg (3 pills of 200 mg)    6:00 PM  Tylenol 650 mg (2 pills of 325 mg)    9:00 PM  Motrin 600 mg (3 pills of 200 mg)    Continue alternating every 3 hours      Important:   Do not take more than 4000mg of Tylenol or 3200mg of Motrin in a 24-hour period.       CALL OUR OFFICE IMMEDIATELY FOR ANY SIGNS OF INFECTION:    This includes fever, chills, increased redness, warmth, tenderness, severe discomfort/pain, or pus or foul smell coming from the wound.  Call [facility] directly at   [phone]    IF BLEEDING IS NOTICED:    Place a clean cloth over the area and apply firm pressure for thirty minutes.  Check the wound ONLY after 30 minutes of direct pressure; do not cheat and sneak a peak, as that does not count.  If bleeding persists after 30 minutes of legitimate direct pressure, then try one more round of direct pressure to the area.  Should the bleeding become heavier or not stop after the second attempt, call [facility] directly at [phone]).

## 2024-08-06 ENCOUNTER — TELEPHONE (OUTPATIENT)
Age: 84
End: 2024-08-06

## 2024-08-06 PROCEDURE — 88342 IMHCHEM/IMCYTCHM 1ST ANTB: CPT | Performed by: STUDENT IN AN ORGANIZED HEALTH CARE EDUCATION/TRAINING PROGRAM

## 2024-08-06 PROCEDURE — 88341 IMHCHEM/IMCYTCHM EA ADD ANTB: CPT | Performed by: STUDENT IN AN ORGANIZED HEALTH CARE EDUCATION/TRAINING PROGRAM

## 2024-08-06 PROCEDURE — 88305 TISSUE EXAM BY PATHOLOGIST: CPT | Performed by: STUDENT IN AN ORGANIZED HEALTH CARE EDUCATION/TRAINING PROGRAM

## 2024-08-06 NOTE — TELEPHONE ENCOUNTER
----- Message from Boone Maki MD sent at 8/6/2024 11:10 AM EDT -----  Call patient if not coming in shortly for suture removal to let him know that the margins were clear

## 2024-08-06 NOTE — TELEPHONE ENCOUNTER
Left a voicemail for patient notifying her of test results.     Virginia Gee/ciera  08/06/24  5:01 PM

## 2024-08-08 ENCOUNTER — CLINICAL SUPPORT (OUTPATIENT)
Age: 84
End: 2024-08-08

## 2024-08-08 VITALS
HEIGHT: 65 IN | HEART RATE: 66 BPM | TEMPERATURE: 98 F | OXYGEN SATURATION: 98 % | BODY MASS INDEX: 20.33 KG/M2 | DIASTOLIC BLOOD PRESSURE: 72 MMHG | WEIGHT: 122 LBS | SYSTOLIC BLOOD PRESSURE: 124 MMHG

## 2024-08-08 DIAGNOSIS — Z48.02 VISIT FOR SUTURE REMOVAL: Primary | ICD-10-CM

## 2024-08-08 PROCEDURE — 99024 POSTOP FOLLOW-UP VISIT: CPT | Performed by: DERMATOLOGY

## 2024-08-08 NOTE — PROGRESS NOTES
"Suture removal    Date/Time: 8/8/2024 9:00 AM    Performed by: Virginia Gee MA  Authorized by: Boone Maki MD  Universal Protocol:  Consent: Verbal consent obtained. Written consent not obtained.  Risks and benefits: risks, benefits and alternatives were discussed  Consent given by: patient  Time out: Immediately prior to procedure a \"time out\" was called to verify the correct patient, procedure, equipment, support staff and site/side marked as required.  Timeout called at: 8/8/2024 9:38 AM.  Patient understanding: patient states understanding of the procedure being performed  Patient consent: the patient's understanding of the procedure matches consent given  Procedure consent: procedure consent matches procedure scheduled  Relevant documents: relevant documents not present or verified  Test results: test results not available  Site marked: the operative site was not marked  Radiology Images displayed and confirmed. If images not available, report reviewed: imaging studies not available  Patient identity confirmed: verbally with patient      Patient location:  Clinic  Location:     Anesthesia laterality: right and lower leg.  Procedure details:     Tools used:  Suture removal kit    Wound appearance:  No sign(s) of infection, tender and red  Post-procedure details:     Post-removal:  Steri-Strips applied    Patient tolerance of procedure:  Tolerated well, no immediate complications       Virginia Gee/ciera  08/08/24  9:38 AM    "

## 2024-08-12 ENCOUNTER — TELEPHONE (OUTPATIENT)
Age: 84
End: 2024-08-12

## 2024-08-12 NOTE — TELEPHONE ENCOUNTER
----- Message from Boone aMki MD sent at 8/8/2024 10:59 AM EDT -----  Call patient if not coming in shortly for suture removal to let him know that the margins were clear

## 2024-08-15 ENCOUNTER — CLINICAL SUPPORT (OUTPATIENT)
Age: 84
End: 2024-08-15

## 2024-08-15 DIAGNOSIS — Z48.02 ENCOUNTER FOR REMOVAL OF SUTURES: Primary | ICD-10-CM

## 2024-08-15 NOTE — PROGRESS NOTES
"[facility] Note     Patient Name: Scarlett Zamarripa  Encounter Date: 08/15/2024     Have you been cared for by a [facility] Dermatologist in the last 3 years and, if so, which description applies to you?    Yes.  I have been here within the last 3 years, and my medical history has NOT changed since that time.  I am FEMALE/of child-bearing potential.    REVIEW OF SYSTEMS:  Have you recently had or currently have any of the following? No changes in my recent health.   PAST MEDICAL HISTORY:  Have you personally ever had or currently have any of the following?  If \"YES,\" then please provide more detail. No changes in my medical history.   HISTORY OF IMMUNOSUPPRESSION: Do you have a history of any of the following:  Systemic Immunosuppression such as Diabetes, Biologic or Immunotherapy, Chemotherapy, Organ Transplantation, Bone Marrow Transplantation?  No     Answering \"YES\" requires the addition of the dotphrase \"IMMUNOSUPPRESSED\" as the first diagnosis of the patient's visit.   FAMILY HISTORY:  Any \"first degree relatives\" (parent, brother, sister, or child) with the following?    No changes in my family's known health.   PATIENT EXPERIENCE:    Do you want the Dermatologist to perform a COMPLETE skin exam today including a clinical examination under the \"bra and underwear\" areas?  NO  If necessary, do we have your permission to call and leave a detailed message on your Preferred Phone number that includes your specific medical information?  Yes      No Known Allergies   Current Outpatient Medications:   •  Calcium Carb-Cholecalciferol (CALCIUM 1000 + D PO), Take by mouth, Disp: , Rfl:   •  cholecalciferol (VITAMIN D3) 400 units tablet, Take 400 Units by mouth daily, Disp: , Rfl:   •  fluorouracil (EFUDEX) 5 % cream, Apply to spot on right thigh twice a day for 6 weeks. Wash hands after application. Keep out of reach from children and pets., Disp: 40 g, Rfl: 0  •  glucosamine-chondroitin 500-400 MG " tablet, Take 1 tablet by mouth 3 (three) times a day, Disp: , Rfl:   •  loratadine (CLARITIN REDITABS) 10 MG dissolvable tablet, Take 10 mg by mouth daily, Disp: , Rfl:           Whom besides the patient is providing clinical information about today's encounter?   NO ADDITIONAL HISTORIAN (patient alone provided history)    Physical Exam and Assessment/Plan by Diagnosis:    Suture removal    Date/Time: 8/15/2024 2:45 PM    Performed by: Margo Bello MA  Authorized by: Boone Maki MD  Universal Protocol:  Procedure performed by:  Consent given by: patient  Patient understanding: patient states understanding of the procedure being performed  Patient consent: the patient's understanding of the procedure matches consent given  Procedure consent: procedure consent matches procedure scheduled  Relevant documents: relevant documents present and verified  Test results: test results available and properly labeled (margin clear)  Site marked: the operative site was not marked  Radiology Images displayed and confirmed. If images not available, report reviewed: imaging studies not available  Patient identity confirmed: verbally with patient      Patient location:  Clinic  Location:     Laterality:  Right    Location:  Lower extremity    Lower extremity location:  Leg    Leg location:  R upper leg  Procedure details:     Tools used:  Suture removal kit    Wound appearance:  No sign(s) of infection, good wound healing and clean  Post-procedure details:     Post-removal:  Steri-Strips applied    Patient tolerance of procedure:  Tolerated well, no immediate complications        Scribe Attestation      I,:   am acting as a scribe while in the presence of the attending physician.:       I,:   personally performed the services described in this documentation    as scribed in my presence.:                     Scribe Attestation      I,:   am acting as a scribe while in the presence of the attending physician.:       I,:    personally performed the services described in this documentation    as scribed in my presence.:

## 2024-09-10 ENCOUNTER — TELEPHONE (OUTPATIENT)
Age: 84
End: 2024-09-10

## 2024-09-10 NOTE — TELEPHONE ENCOUNTER
Patient called, she is interested in getting her Flu and Covid booster once they are available in the office.     She is asking if we could contact her to be scheduled when they are available to be given.    Please advise, thank you.

## 2024-10-02 ENCOUNTER — IMMUNIZATIONS (OUTPATIENT)
Age: 84
End: 2024-10-02
Payer: COMMERCIAL

## 2024-10-02 DIAGNOSIS — Z23 ENCOUNTER FOR IMMUNIZATION: Primary | ICD-10-CM

## 2024-10-02 DIAGNOSIS — Z23 NEED FOR COVID-19 VACCINE: ICD-10-CM

## 2024-10-02 PROCEDURE — G0008 ADMIN INFLUENZA VIRUS VAC: HCPCS

## 2024-10-02 PROCEDURE — 90662 IIV NO PRSV INCREASED AG IM: CPT

## 2024-10-02 PROCEDURE — 90480 ADMN SARSCOV2 VAC 1/ONLY CMP: CPT

## 2024-10-02 PROCEDURE — 91320 SARSCV2 VAC 30MCG TRS-SUC IM: CPT

## 2024-11-15 ENCOUNTER — RESULTS FOLLOW-UP (OUTPATIENT)
Age: 84
End: 2024-11-15

## 2024-11-15 ENCOUNTER — APPOINTMENT (OUTPATIENT)
Age: 84
End: 2024-11-15
Payer: COMMERCIAL

## 2024-11-15 DIAGNOSIS — I10 ESSENTIAL HYPERTENSION: ICD-10-CM

## 2024-11-15 LAB
ALBUMIN SERPL BCG-MCNC: 4.2 G/DL (ref 3.5–5)
ALP SERPL-CCNC: 76 U/L (ref 34–104)
ALT SERPL W P-5'-P-CCNC: 12 U/L (ref 7–52)
ANION GAP SERPL CALCULATED.3IONS-SCNC: 9 MMOL/L (ref 4–13)
AST SERPL W P-5'-P-CCNC: 19 U/L (ref 13–39)
BILIRUB SERPL-MCNC: 0.47 MG/DL (ref 0.2–1)
BUN SERPL-MCNC: 23 MG/DL (ref 5–25)
CALCIUM SERPL-MCNC: 9.3 MG/DL (ref 8.4–10.2)
CHLORIDE SERPL-SCNC: 105 MMOL/L (ref 96–108)
CHOLEST SERPL-MCNC: 204 MG/DL (ref ?–200)
CO2 SERPL-SCNC: 28 MMOL/L (ref 21–32)
CREAT SERPL-MCNC: 0.82 MG/DL (ref 0.6–1.3)
GFR SERPL CREATININE-BSD FRML MDRD: 66 ML/MIN/1.73SQ M
GLUCOSE P FAST SERPL-MCNC: 120 MG/DL (ref 65–99)
HDLC SERPL-MCNC: 82 MG/DL
LDLC SERPL CALC-MCNC: 103 MG/DL (ref 0–100)
POTASSIUM SERPL-SCNC: 5.1 MMOL/L (ref 3.5–5.3)
PROT SERPL-MCNC: 6.7 G/DL (ref 6.4–8.4)
SODIUM SERPL-SCNC: 142 MMOL/L (ref 135–147)
TRIGL SERPL-MCNC: 95 MG/DL (ref ?–150)

## 2024-11-15 PROCEDURE — 80053 COMPREHEN METABOLIC PANEL: CPT

## 2024-11-15 PROCEDURE — 36415 COLL VENOUS BLD VENIPUNCTURE: CPT

## 2024-11-15 PROCEDURE — 80061 LIPID PANEL: CPT

## 2024-11-19 ENCOUNTER — TELEPHONE (OUTPATIENT)
Age: 84
End: 2024-11-19

## 2024-11-19 ENCOUNTER — APPOINTMENT (OUTPATIENT)
Age: 84
End: 2024-11-19
Payer: COMMERCIAL

## 2024-11-19 ENCOUNTER — OFFICE VISIT (OUTPATIENT)
Age: 84
End: 2024-11-19
Payer: COMMERCIAL

## 2024-11-19 VITALS
RESPIRATION RATE: 16 BRPM | DIASTOLIC BLOOD PRESSURE: 78 MMHG | HEIGHT: 65 IN | TEMPERATURE: 98.6 F | BODY MASS INDEX: 20.06 KG/M2 | SYSTOLIC BLOOD PRESSURE: 122 MMHG | WEIGHT: 120.4 LBS | HEART RATE: 78 BPM | OXYGEN SATURATION: 99 %

## 2024-11-19 DIAGNOSIS — I42.8 OTHER CARDIOMYOPATHY (HCC): ICD-10-CM

## 2024-11-19 DIAGNOSIS — R06.09 DOE (DYSPNEA ON EXERTION): ICD-10-CM

## 2024-11-19 DIAGNOSIS — R06.09 DOE (DYSPNEA ON EXERTION): Primary | ICD-10-CM

## 2024-11-19 DIAGNOSIS — I27.20 PULMONARY HYPERTENSION (HCC): ICD-10-CM

## 2024-11-19 LAB
BASOPHILS # BLD AUTO: 0.06 THOUSANDS/ÂΜL (ref 0–0.1)
BASOPHILS NFR BLD AUTO: 1 % (ref 0–1)
BNP SERPL-MCNC: 47 PG/ML (ref 0–100)
EOSINOPHIL # BLD AUTO: 0.1 THOUSAND/ÂΜL (ref 0–0.61)
EOSINOPHIL NFR BLD AUTO: 1 % (ref 0–6)
ERYTHROCYTE [DISTWIDTH] IN BLOOD BY AUTOMATED COUNT: 13.2 % (ref 11.6–15.1)
HCT VFR BLD AUTO: 42 % (ref 34.8–46.1)
HGB BLD-MCNC: 13.2 G/DL (ref 11.5–15.4)
IMM GRANULOCYTES # BLD AUTO: 0.03 THOUSAND/UL (ref 0–0.2)
IMM GRANULOCYTES NFR BLD AUTO: 0 % (ref 0–2)
LYMPHOCYTES # BLD AUTO: 1.95 THOUSANDS/ÂΜL (ref 0.6–4.47)
LYMPHOCYTES NFR BLD AUTO: 28 % (ref 14–44)
MCH RBC QN AUTO: 29 PG (ref 26.8–34.3)
MCHC RBC AUTO-ENTMCNC: 31.4 G/DL (ref 31.4–37.4)
MCV RBC AUTO: 92 FL (ref 82–98)
MONOCYTES # BLD AUTO: 0.74 THOUSAND/ÂΜL (ref 0.17–1.22)
MONOCYTES NFR BLD AUTO: 11 % (ref 4–12)
NEUTROPHILS # BLD AUTO: 4.15 THOUSANDS/ÂΜL (ref 1.85–7.62)
NEUTS SEG NFR BLD AUTO: 59 % (ref 43–75)
NRBC BLD AUTO-RTO: 0 /100 WBCS
PLATELET # BLD AUTO: 267 THOUSANDS/UL (ref 149–390)
PMV BLD AUTO: 9.1 FL (ref 8.9–12.7)
RBC # BLD AUTO: 4.55 MILLION/UL (ref 3.81–5.12)
WBC # BLD AUTO: 7.03 THOUSAND/UL (ref 4.31–10.16)

## 2024-11-19 PROCEDURE — 83880 ASSAY OF NATRIURETIC PEPTIDE: CPT

## 2024-11-19 PROCEDURE — G2211 COMPLEX E/M VISIT ADD ON: HCPCS

## 2024-11-19 PROCEDURE — 71046 X-RAY EXAM CHEST 2 VIEWS: CPT

## 2024-11-19 PROCEDURE — 85025 COMPLETE CBC W/AUTO DIFF WBC: CPT

## 2024-11-19 PROCEDURE — 36415 COLL VENOUS BLD VENIPUNCTURE: CPT

## 2024-11-19 PROCEDURE — 99214 OFFICE O/P EST MOD 30 MIN: CPT

## 2024-11-19 NOTE — PROGRESS NOTES
Name: Scarlett Zamarripa      : 1940      MRN: [id number]  Encounter Provider: Anna Sandoval PA-C  Encounter Date: 2024   Encounter department: [facility] [place]    Assessment & Plan  BEE (dyspnea on exertion)    Orders:    Echo stress test, dobutamine; Future    Complete PFT with post bronchodilator; Future    CBC and differential; Future    B-Type Natriuretic Peptide(BNP); Future    XR chest pa and lateral; Future    Pulmonary hypertension (HCC)    Orders:    Echo stress test, dobutamine; Future    Other cardiomyopathy (HCC)    Orders:    Echo stress test, dobutamine; Future    XR chest pa and lateral; Future     With worsening BEE over the past six months, will repeat a stress echo, obtain labs, CXR, and PFT.  No wheezing heard on exam today and her VSS with well controlled BP.  She is no longer on antiarrhythmics since her ablation.  Heart rate was regular.  She will return in 4 weeks for AWV and discuss results of testing.      History of Present Illness     HPI    Pt is here for a six month follow up.      She has a hx of a-fib, has increasing SOB.  She was in PT for about six months.  Her dizziness started in .  She fell and broke her wrist.  She was diagnosed with cardiomyopathy in .  Her a-fib was poorly controlled, she was on amiodarone for six months and she had an ablation.  She was stopped Eliquis after the ablation in 2022.  She was weaned off of her blood pressure medications as well.    Pt has increasing SOB (has had SOB for years) over the past six months.  She comes up the stairs from the basement she has SOB, walking through the house.  She had to give up tennis because of her SOB. SOB is with activity. She hears wheezing at times when she is active.  No orthopnea.  No leg swelling.  No palpitations.  She does have LHN with rest (present for years).  Her last cardiology appointment was in .  She used to walk 3 miles, but now she can barely get  1.5 miles and cannot push herself.    Pt has a 10 pack year smoking history and quit well over 18 years ago.  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      Her eustachian tube dysfunction is improved on the claritin.    Review of Systems   Constitutional: Negative.    HENT: Negative.     Respiratory:  Positive for shortness of breath and wheezing. Negative for cough and chest tightness.    Cardiovascular:  Negative for chest pain, palpitations and leg swelling.   Gastrointestinal: Negative.    Musculoskeletal:  Negative for gait problem.   Skin: Negative.    Neurological: Negative.    All other systems reviewed and are negative.    Past Medical History:   Diagnosis Date    Atrial fibrillation (HCC)     Hypertension     Rectus sheath hematoma 04/28/2022     Past Surgical History:   Procedure Laterality Date    APPENDECTOMY      BREAST BIOPSY Left 1980    neg    BREAST BIOPSY Left 1990    neg    CARDIAC ELECTROPHYSIOLOGY PROCEDURE N/A 04/28/2022    Procedure: Cardiac eps/afib ablation;  Surgeon: Kahlil Villatoro MD;  Location: [facility];  Service: Cardiology    CARDIAC ELECTROPHYSIOLOGY PROCEDURE N/A 04/28/2022    Procedure: Cardiac eps/aflutter ablation;  Surgeon: Kahlil Villatoro MD;  Location: [facility];  Service: Cardiology    CATARACT EXTRACTION, BILATERAL      DILATION AND CURETTAGE OF UTERUS      IR EMBOLIZATION (SPECIFY VESSEL OR SITE)  04/28/2022    LAPAROSCOPY FOR ECTOPIC PREGNANCY      OTHER SURGICAL HISTORY      surgical excision of tubal pregnancy      Family History   Problem Relation Age of Onset    Hypertension Mother     Ovarian cancer Mother     Pneumonia Father     No Known Problems Sister     No Known Problems Sister     No Known Problems Sister     No Known Problems Daughter     No Known Problems Maternal Grandmother     No Known Problems Paternal Grandmother     Cancer Maternal Aunt     No Known Problems Maternal Aunt     No Known Problems Maternal Aunt      No Known Problems Maternal Aunt     No Known Problems Maternal Aunt     No Known Problems Paternal Aunt     No Known Problems Paternal Aunt     Breast cancer Neg Hx      Social History     Tobacco Use    Smoking status: Former     Current packs/day: 0.00     Average packs/day: 0.5 packs/day for 20.0 years (10.0 ttl pk-yrs)     Types: Cigarettes     Start date: 1986     Quit date: 2006     Years since quittin.8    Smokeless tobacco: Never   Vaping Use    Vaping status: Never Used   Substance and Sexual Activity    Alcohol use: Yes     Alcohol/week: 1.0 standard drink of alcohol     Types: 1 Glasses of wine per week     Comment: social     Drug use: No    Sexual activity: Not Currently     Current Outpatient Medications on File Prior to Visit   Medication Sig    cholecalciferol (VITAMIN D3) 400 units tablet Take 400 Units by mouth daily    glucosamine-chondroitin 500-400 MG tablet Take 1 tablet by mouth 3 (three) times a day    loratadine (CLARITIN REDITABS) 10 MG dissolvable tablet Take 10 mg by mouth daily    [DISCONTINUED] Calcium Carb-Cholecalciferol (CALCIUM 1000 + D PO) Take by mouth (Patient not taking: Reported on 2024)    [DISCONTINUED] fluorouracil (EFUDEX) 5 % cream Apply to spot on right thigh twice a day for 6 weeks. Wash hands after application. Keep out of reach from children and pets. (Patient not taking: Reported on 2024)     No Known Allergies  Immunization History   Administered Date(s) Administered    COVID-19 MODERNA VACC 0.25 ML IM BOOSTER 11/10/2021    COVID-19 MODERNA VACC 0.5 ML IM 2021, 2021    COVID-19 Pfizer Vac BIVALENT Elias-sucrose 12 Yr+ IM 2022    COVID-19 Pfizer mRNA vacc PF elias-sucrose 12 yr and older (Comirnaty) 11/10/2023, 10/02/2024    H1N1, All Formulations 2009    INFLUENZA 10/09/2014, 10/15/2015, 10/17/2016, 10/10/2017, 2018, 10/17/2021, 2022    Influenza Split High Dose Preservative Free IM 10/22/2019, 10/02/2024  "   Influenza, high dose seasonal 0.7 mL 10/20/2020, 09/30/2022, 10/11/2023    Influenza, seasonal, injectable 10/11/2013    Pneumococcal Conjugate 13-Valent 09/24/2018    Pneumococcal Polysaccharide PPV23 1940, 12/09/2019    Tdap 11/18/2023     Objective   /78 (BP Location: Left arm, Patient Position: Sitting, Cuff Size: Standard)   Pulse 78   Temp 98.6 °F (37 °C) (Tympanic)   Resp 16   Ht 5' 5\" (1.651 m)   Wt 54.6 kg (120 lb 6.4 oz)   SpO2 99%   BMI 20.04 kg/m²     Physical Exam    "

## 2024-11-20 ENCOUNTER — RESULTS FOLLOW-UP (OUTPATIENT)
Age: 84
End: 2024-11-20

## 2024-12-04 ENCOUNTER — OFFICE VISIT (OUTPATIENT)
Age: 84
End: 2024-12-04
Payer: COMMERCIAL

## 2024-12-04 VITALS
HEIGHT: 65 IN | SYSTOLIC BLOOD PRESSURE: 122 MMHG | BODY MASS INDEX: 19.99 KG/M2 | DIASTOLIC BLOOD PRESSURE: 80 MMHG | TEMPERATURE: 98.6 F | WEIGHT: 120 LBS | HEART RATE: 80 BPM | OXYGEN SATURATION: 98 %

## 2024-12-04 DIAGNOSIS — Z85.828 HISTORY OF SKIN CANCER: ICD-10-CM

## 2024-12-04 DIAGNOSIS — Z13.89 SCREENING FOR SKIN CONDITION: Primary | ICD-10-CM

## 2024-12-04 DIAGNOSIS — D18.01 CHERRY ANGIOMA: ICD-10-CM

## 2024-12-04 DIAGNOSIS — L82.1 SEBORRHEIC KERATOSIS: ICD-10-CM

## 2024-12-04 DIAGNOSIS — D22.9 MULTIPLE MELANOCYTIC NEVI: ICD-10-CM

## 2024-12-04 PROCEDURE — 99213 OFFICE O/P EST LOW 20 MIN: CPT | Performed by: DERMATOLOGY

## 2024-12-04 NOTE — PATIENT INSTRUCTIONS
ACTINIC KERATOSIS    Actinic keratoses are very common on sites repeatedly exposed to the sun, especially the backs of the hands and the face, most often affecting the ears, nose, cheeks, upper lip, vermilion of the lower lip, temples, forehead and balding scalp. In severely chronically sun-damaged individuals, they may also be found on the upper trunk, upper and lower limbs, and dorsum of feet.    We discussed the theoretical premalignant (“pre-cancerous”) nature and etiology of these growths.  We discussed the prevailing notion that actinic keratoses are a reflection of abnormal skin cell development due to DNA damage by short wavelength UVB.  They are more likely to appear if the immune function is poor, due to aging, recent sun exposure, predisposing disease or certain drugs.    We discussed that the main concern is that actinic keratoses may predispose to squamous cell carcinoma. It is rare for a solitary actinic keratosis to evolve to squamous cell carcinoma (SCC), but the risk of SCC occurring at some stage in a patient with more than 10 actinic keratoses is thought to be about 10 to 15%. A tender, thickened, ulcerated or enlarging actinic keratosis is suspicious of SCC.    Actinic keratoses may be prevented by strict sun protection. If already present, keratoses may improve with a very high sun protection factor (50+) broad-spectrum sunscreen applied at least daily to affected areas, year-round.  We recommend that UPF-rated clothing and hats and sunglasses be worn whenever possible and that a sunscreen-moisturizer combination product such as Neutrogena Daily Defense be applied at least three times a day.    We performed a thorough discussion of treatment options and specific risk/benefits/alternatives including but not limited to medical “field” treatment with medications such as the following:    Topical “field area” medications such as 5-fluorouracil or Aldara (specifically, the trouble with long-term  compliance, blistering and local skin reaction versus the convenience of at-home therapy and that field therapy “gets what is not yet seen”).    Cryotherapy (specifically, local pain, scarring, dyspigmentation, blistering, possible superinfection, and treats “only what we see” versus directed treatment today).    Photodynamic therapy (specifically, local pain, scarring, dyspigmentation, blistering, possible superinfection, need to schedule for a later date, and time spent in the office versus field therapy that “gets what is not yet seen”).      BASAL CELL CARCINOMA    What is basal cell carcinoma?  Basal cell carcinoma (BCC) is a common, locally invasive, keratinocytic, or non-melanoma, skin cancer. It is also known as rodent ulcer and basalioma. Patients with BCC often develop multiple primary tumours over time.    Who gets basal cell carcinoma?  Risk factors for BCC include:  Age and sex: BCCs are particularly prevalent in elderly males. However, they also affect females and younger adults   Previous BCC or other form of skin cancer (squamous cell carcinoma, melanoma)   Sun damage (photoaging, actinic keratoses)   Repeated prior episodes of sunburn   Fair skin, blue eyes and blond or red hair--note; BCC can also affect darker skin types   Previous cutaneous injury, thermal burn, disease (eg cutaneous lupus, sebaceous naevus)   Inherited syndromes: BCC is a particular problem for families with basal cell naevus syndrome (Gorlin syndrome), Lzxaz-Ttqcé-Nufgomky syndrome, Rombo syndrome, Oley syndrome and xeroderma pigmentosum   Other risk factors include ionising radiation, exposure to arsenic, and immune suppression due to disease or medicines    What causes basal cell carcinoma?  The cause of BCC is multifactorial.  Most often, there are DNA mutations in the patched (PTCH) tumour suppressor gene, part of hedgehog signaling pathway   These may be triggered by exposure to ultraviolet radiation   Various spontaneous  and inherited gene defects predispose to BCC    What are the clinical features of basal cell carcinoma?  BCC is a locally invasive skin tumour. The main characteristics are:  Slowly growing plaque or nodule   Skin coloured, pink or pigmented   Varies in size from a few millimetres to several centimetres in diameter   Spontaneous bleeding or ulceration  BCC is very rarely a threat to life. A tiny proportion of BCCs grow rapidly, invade deeply, and/or metastasise to local lymph nodes.    Types of basal cell carcinoma  There are several distinct clinical types of BCC, and over 20 histological growth patterns of BCC.  Nodular BCC  Most common type of facial BCC   Shiny or pearly nodule with a smooth surface   May have central depression or ulceration, so its edges appear rolled   Blood vessels cross its surface   Cystic variant is soft, with jelly-like contents   Micronodular, microcystic and infiltrative types are potentially aggressive subtypes   Also known as nodulocystic carcinoma  Superficial BCC  Most common type in younger adults   Most common type on upper trunk and shoulders   Slightly scaly, irregular plaque   Thin, translucent rolled border   Multiple microerosions  Morphoeaform BCC  Usually found in mid-facial sites   Waxy, scar-like plaque with indistinct borders   Wide and deep subclinical extension   May infiltrate cutaneous nerves (perineural spread)   Also known as morpheic, morphoeiform or sclerosing BCC  Basosquamous carcinoma  Mixed basal cell carcinoma (BCC) and squamous cell carcinoma (SCC)   Infiltrative growth pattern   Potentially more aggressive than other forms of BCC   Also known as basisquamous carcinoma and mixed basal-squamous cell carcinoma       Complications of basal cell carcinoma    Recurrent BCC  Recurrence of BCC after initial treatment is not uncommon. Characteristics of recurrent BCC often include:  Incomplete excision or narrow margins at primary excision   Morphoeic,  micronodular, and infiltrative subtypes   Location on head and neck    Advanced BCC  Advanced BCCs are large, often neglected tumours.  They may be several centimetres in diameter   They may be deeply infiltrating into tissues below the skin   They are difficult or impossible to treat surgically    Metastatic BCC  Very rare   Primary tumour is often large, neglected or recurrent, located on head and neck, with aggressive subtype   May have had multiple prior treatments   May arise in site exposed to ionising radiation   Can be fatal    How is basal cell carcinoma diagnosed?  BCC is diagnosed clinically by the presence of a slowly enlarging skin lesion with typical appearance. The diagnosis and  by a diagnostic biopsy or following excision.  Some typical superficial BCCs on trunk and limbs are clinically diagnosed and have non-surgical treatment without histology.    What is the treatment for primary basal cell carcinoma?  The treatment for a BCC depends on its type, size and location, the number to be treated, patient factors, and the preference or expertise of the doctor. Most BCCs are treated surgically. Long-term follow-up is recommended to check for new lesions and recurrence; the latter may be unnecessary if histology has reported wide clear margins.    Excision biopsy  Excision means the lesion is cut out and the skin stitched up.  Most appropriate treatment for nodular, infiltrative and morphoeic BCCs   Should include 3 to 5 mm margin of normal skin around the tumour   Very large lesions may require flap or skin graft to repair the defect   Pathologist will report deep and lateral margins   Further surgery is recommended for lesions that are incompletely excised    Mohs micrographically controlled excision  Mohs micrographically controlled surgery involves examining carefully marked excised tissue under the microscope, layer by layer, to ensure complete excision.  Very high cure rates achieved by trained Mohs  surgeons   Used in high-risk areas of the face around eyes, lips and nose   Suitable for ill-defined, morphoeic, infiltrative and recurrent subtypes   Large defects are repaired by flap or skin graft    Superficial skin surgery  Superficial skin surgery comprises shave, curettage, and electrocautery. It is a rapid technique using local anaesthesia and does not require sutures.  Suitable for small, well-defined nodular or superficial BCCs   Lesions are usually located on trunk or limbs   Wound is left open to heal by secondary intention   Moist wound dressings lead to healing within a few weeks   Eventual scar quality variable    Cryotherapy  Cryotherapy is the treatment of a superficial skin lesion by freezing it, usually with liquid nitrogen.  Suitable for small superficial BCCs on covered areas of trunk and limbs   Best avoided for BCCs on head and neck, and distal to knees   Double freeze-thaw technique   Results in a blister that crusts over and heals within several weeks.   Leaves permanent white irving    Photodynamic therapy  Photodynamic therapy (PDT) refers to a technique in which BCC is treated with a photosensitising chemical, and exposed to light several hours later.  Topical photosensitisers include aminolevulinic acid lotion and methyl aminolevulinate cream   Suitable for low-risk small, superficial BCCs   Best avoided if tumour in site at high risk of recurrence   Results in inflammatory reaction, maximal 3-4 days after procedure   Treatment repeated 7 days after initial treatment   Excellent cosmetic results    Imiquimod cream  Imiquimod is an immune response modifier.  Best used for superficial BCCs less than 2 cm diameter   Applied three to five times each week, for 6-16 weeks   Results in a variable inflammatory reaction, maximal at three weeks   Minimal scarring is usual    Fluorouracil cream  5-Fluorouracil cream is a topical cytotoxic agent.  Used to treat small superficial basal cell carcinomas    Requires prolonged course, eg twice daily for 6-12 weeks   Causes inflammatory reaction   Has high recurrence rates    Radiotherapy  Radiotherapy or X-ray treatment can be used to treat primary BCCs or as adjunctive treatment if margins are incomplete.  Mainly used if surgery is not suitable   Best avoided in young patients and in genetic conditions predisposing to skin cancer   Best cosmetic results achieved using multiple fractions   Typically, patient attends once-weekly for several weeks   Causes inflammatory reaction followed by scar   Risk of radiodermatitis, late recurrence, and new tumours    What is the treatment for advanced or metastatic basal cell carcinoma?  Locally advanced primary, recurrent or metastatic BCC requires multidisciplinary consultation. Often a combination of treatments is used.  Surgery   Radiotherapy   Targeted therapy  Targeted therapy refers to the hedgehog signalling pathway inhibitors, vismodegib and sonidegib. These drugs have some important risks and side effects.    How can basal cell carcinoma be prevented?  The most important way to prevent BCC is to avoid sunburn. This is especially important in childhood and early life. Fair skinned individuals and those with a personal or family history of BCC should protect their skin from sun exposure daily, year-round and lifelong.  Stay indoors or under the shade in the middle of the day   Wear covering clothing   Apply high protection factor SPF50+ broad-spectrum sunscreens generously to exposed skin if outdoors   Avoid indoor tanning (sun beds, solaria)  Oral nicotinamide (vitamin B3) in a dose of 500 mg twice daily may reduce the number and severity of BCCs.    What is the outlook for basal cell carcinoma?  Most BCCs are cured by treatment. Cure is most likely if treatment is undertaken when the lesion is small.  About 50% of people with BCC develop a second one within 3 years of the first. They are also at increased risk of other  skin cancers, especially melanoma. Regular self-skin examinations and long-term annual skin checks by an experienced health professional are recommended.        SQUAMOUS CELL CARCINOMA    What is cutaneous squamous cell carcinoma?  Cutaneous squamous cell carcinoma (SCC) is a common type of keratinocyte or non-melanoma skin cancer. It is derived from cells within the epidermis that make keratin -- the horny protein that makes up skin, hair and nails.  Cutaneous SCC is an invasive disease, referring to cancer cells that have grown beyond the epidermis. SCC can sometimes metastasise and may prove fatal.  Intraepidermal carcinoma (cutaneous SCC in situ) and mucosal SCC are considered elsewhere.    Who gets cutaneous squamous cell carcinoma?  Risk factors for cutaneous SCC include:  Age and sex: SCCs are particularly prevalent in elderly males. However, they also affect females and younger adults.   Previous SCC or another form of skin cancer (basal cell carcinoma, melanoma) are a strong predictor for further skin cancers.   Actinic keratoses   Outdoor occupation or recreation   Smoking   Fair skin, blue eyes and blond or red hair   Previous cutaneous injury, thermal burn, disease (eg cutaneous lupus, epidermolysis bullosa, leg ulcer)   Inherited syndromes: SCC is a particular problem for families with xeroderma pigmentosum and albinism   Other risk factors include ionising radiation, exposure to arsenic, and immune suppression due to disease (eg chronic lymphocytic leukaemia) or medicines. Organ transplant recipients have a massively increased risk of developing SCC.    What causes cutaneous squamous cell carcinoma?  More than 90% of cases of SCC are associated with numerous DNA mutations in multiple somatic genes. Mutations in the p53 tumour suppressor gene are caused by exposure to ultraviolet radiation (UV), especially UVB (known as signature 7). Other signature mutations relate to cigarette smoking, ageing and  immune suppression (eg, to drugs such as azathioprine). Mutations in signalling pathways affect the epidermal growth factor receptor, SUZIE, Fyn, and [id number] signalling.   Beta-genus human papillomaviruses (wart virus) are thought to play a role in SCC arising in immune-suppressed populations. ?-HPV and HPV subtypes 5, 8, 17, 20, 24, and 38 have also been associated with an increased risk of cutaneous SCC in immunocompetent individuals.     What are the clinical features of cutaneous squamous cell carcinoma?  Cutaneous SCCs present as enlarging scaly or crusted lumps. They usually arise within pre-existing actinic keratosis or intraepidermal carcinoma.  They grow over weeks to months   They may ulcerate   They are often tender or painful   Located on sun-exposed sites, particularly the face, lips, ears, hands, forearms and lower legs   Size varies from a few millimetres to several centimetres in diameter.    Types of cutaneous squamous cell carcinoma  Distinct clinical types of invasive cutaneous SCC include:  Cutaneous horn -- the horn is due to excessive production of keratin   Keratoacanthoma (KA) -- a rapidly growing keratinising nodule that may resolve without treatment   Carcinoma cuniculatum (‘verrucous carcinoma’), a slow-growing, warty tumour on the sole of the foot.   Multiple eruptive SCC/KA-like lesions arising in syndromes, such as multiple self-healing squamous epitheliomas of Harper-Smith and Grzybowski syndrome  The pathologist may classify a tumour as well differentiated, moderately well differentiated, poorly differentiated or anaplastic cutaneous SCC. There are other variants.    Classification of squamous cell carcinoma by risk  Cutaneous SCC is classified as low-risk or high-risk, depending on the chance of tumour recurrence and metastasis. Characteristics of high-risk SCC include:  High-risk cutaneous squamous cell carcinoma has the following characteristics:  Diameter greater than or equal to  2 cm   Location on the ear, vermilion of the lip, central face, hands, feet, genitalia   Arising in elderly or immune suppressed patient   Histological thickness greater than 2 mm, poorly differentiated histology, or with the invasion of the subcutaneous tissue, nerves and blood vessels  Metastatic SCC is found in regional lymph nodes (80%), lungs, liver, brain, bones and skin.    Staging cutaneous squamous cell carcinoma  In 2011, the American Joint Committee on Cancer (AJCC) published a new staging systemic for cutaneous SCC for the 7th Edition of the AJCC manual. This evaluates the dimensions of the original primary tumour (T) and its metastases to lymph nodes (N).    Tumour staging for cutaneous SCC  TX: Th Primary tumour cannot be assessed  T0: No evidence of a primary tumour  Tis: Carcinoma in situ  T1: Tumour <= 2cm without high-risk features  T2: Tumour >= 2cm; or; Tumour <= 2 cm with high-risk features  T3: Tumour with the invasion of maxilla, mandible, orbit or temporal bone  T4: Tumour with the invasion of axial or appendicular skeleton or perineural invasion of skull base    May staging for cutaneous SCC  NX: Regional lymph nodes cannot be assessed  N0: No regional lymph node metastasis  N1: Metastasis in one local lymph node <= 3cm  N2: Metastasis in one local lymph node >= 3cm; or; Metastasis in >1 local lymph node <= 6cm  N3: Metastasis in lymph node >= 6cm    How is squamous cell carcinoma diagnosed?  Diagnosis of cutaneous SCC is based on clinical features. The diagnosis and histological subtype are confirmed pathologically by diagnostic biopsy or following excision. See squamous cell carcinoma - pathology.  Patients with high-risk SCC may also undergo staging investigations to determine whether it has spread to lymph nodes or elsewhere. These may include:  Imaging using ultrasound scan, X-rays, CT scans, MRI scans   Lymph node or other tissue biopsies    What is the treatment for cutaneous  squamous cell carcinoma?  Cutaneous SCC is nearly always treated surgically. Most cases are excised with a 3-10 mm margin of normal tissue around a visible tumour. A flap or skin graft may be needed to repair the defect.  Other methods of removal include:  Shave, curettage, and electrocautery for low-risk tumours on trunk and limbs   Aggressive cryotherapy for very small, thin, low-risk tumours   Mohs micrographic surgery for large facial lesions with indistinct margins or recurrent tumours   Radiotherapy for an inoperable tumour, patients unsuitable for surgery, or as adjuvant    What is the treatment for advanced or metastatic squamous cell carcinoma?  Locally advanced primary, recurrent or metastatic SCC requires multidisciplinary consultation. Often a combination of treatments is used.  Surgery   Radiotherapy   Cemiplimab   Experimental targeted therapy using epidermal growth factor receptor inhibitors    How can cutaneous squamous cell carcinoma be prevented?  There is a great deal of evidence to show that very careful sun protection at any time of life reduces the number of SCCs. This is particularly important in ageing, sun-damaged, fair skin; in patients that are immune suppressed; and in those who already have actinic keratoses or previous SCC.  Stay indoors or under the shade in the middle of the day   Wear covering clothing   Apply high protection factor SPF50+ broad-spectrum sunscreens generously to exposed skin if outdoors   Avoid indoor tanning (sun beds, solaria)    Oral nicotinamide (vitamin B3) in a dose of 500 mg twice daily may reduce the number and severity of SCCs in people at high risk.  Patients with multiple squamous cell carcinomas may be prescribed an oral retinoid (acitretin or isotretinoin). These reduce the number of tumours but have some nuisance side effects.    What is the outlook for cutaneous squamous cell carcinoma?  Most SCCs are cured by treatment. A cure is most likely if  treatment is undertaken when the lesion is small. The risk of recurrence or disease-associated death is greater for tumours that are > 20 mm in diameter and/or > 2 mm in thickness at the time of surgical excision.  About 50% of people at high risk of SCC develop a second one within 5 years of the first. They are also at increased risk of other skin cancers, especially melanoma. Regular self-skin examinations and long-term annual skin checks by an experienced health professional are recommended.

## 2024-12-04 NOTE — PROGRESS NOTES
"[facility] Note     Patient Name: Scarlett Zamarripa  Encounter Date: December 4, 2024     Have you been cared for by a [facility] Dermatologist in the last 3 years and, if so, which description applies to you?    Yes.  I have been here within the last 3 years, and my medical history has NOT changed since that time.  I am FEMALE/of child-bearing potential.    REVIEW OF SYSTEMS:  Have you recently had or currently have any of the following? No changes in my recent health.   PAST MEDICAL HISTORY:  Have you personally ever had or currently have any of the following?  If \"YES,\" then please provide more detail. No changes in my medical history.   HISTORY OF IMMUNOSUPPRESSION: Do you have a history of any of the following:  Systemic Immunosuppression such as Diabetes, Biologic or Immunotherapy, Chemotherapy, Organ Transplantation, Bone Marrow Transplantation or Prednisone?  No     Answering \"YES\" requires the addition of the dotphrase \"IMMUNOSUPPRESSED\" as the first diagnosis of the patient's visit.   FAMILY HISTORY:  Any \"first degree relatives\" (parent, brother, sister, or child) with the following?    No changes in my family's known health.   PATIENT EXPERIENCE:    Do you want the Dermatologist to perform a COMPLETE skin exam today including a clinical examination under the \"bra and underwear\" areas?  YES  If necessary, do we have your permission to call and leave a detailed message on your Preferred Phone number that includes your specific medical information?  Yes      No Known Allergies   Current Outpatient Medications:     cholecalciferol (VITAMIN D3) 400 units tablet, Take 400 Units by mouth daily, Disp: , Rfl:     glucosamine-chondroitin 500-400 MG tablet, Take 1 tablet by mouth 3 (three) times a day, Disp: , Rfl:     loratadine (CLARITIN REDITABS) 10 MG dissolvable tablet, Take 10 mg by mouth daily, Disp: , Rfl:           Whom besides the patient is providing clinical information about today's " "encounter?   NO ADDITIONAL HISTORIAN (patient alone provided history)    Physical Exam and Assessment/Plan by Diagnosis:    Chief Complaint: Patient is an 83 y/o female present today for a routine skin exam with history of non-melanoma skin cancer and has a few spots of concern on the face and legs.       HISTORY OF SQUAMOUS CELL CARCINOMA     Physical Exam:  Anatomic Location Affected:  Right Thigh, Right Lower Leg, and Left Lower Leg - 05/2024  Morphological Description of Scar:  well healed  Suspected Recurrence: no  Regional adenopathy: no    Additional History of Present Condition:  history of SCC- treated    Assessment and Plan:  Based on a thorough discussion of this condition and the management approach to it (including a comprehensive discussion of the known risks, side effects and potential benefits of treatment), the patient (family) agrees to implement the following specific plan:  Monitor for changes      MELANOCYTIC NEVI (\"Moles\")    Physical Exam:  Anatomic Location Affected: Mostly on sun-exposed areas of the trunk and extremities  Morphological Description:  Scattered, 1-4mm round to ovoid, symmetrical-appearing, even bordered, skin colored to dark brown macules/papules, mostly in sun-exposed areas  Pertinent Positives:  Pertinent Negatives:    Additional History of Present Condition:  present on exam     Assessment and Plan:  Based on a thorough discussion of this condition and the management approach to it (including a comprehensive discussion of the known risks, side effects and potential benefits of treatment), the patient (family) agrees to implement the following specific plan:  Provided handout with information regarding the ABCDE's of moles   Recommend routine skin exams every 6 months   Sun avoidance, protective clothing (known as UPF clothing), and the use of at least SPF 30 sunscreens is advised. Sunscreen should be reapplied every two hours when outside.     SEBORRHEIC KERATOSIS; " "NON-INFLAMED    Physical Exam:  Anatomic Location Affected:  scattered across trunk, extremities,  face  Morphological Description:  Flat and raised, waxy, smooth to warty textured, yellow to brownish-grey to dark brown to blackish, discrete, \"stuck-on\" appearing papules.  Pertinent Positives:  Pertinent Negatives:    Additional History of Present Condition:  Patient reports new bumps on the skin.  Denies itch, burn, pain, bleeding or ulceration.  Present constantly; nothing seems to make it worse or better.  No prior treatment.      Assessment and Plan:  Based on a thorough discussion of this condition and the management approach to it (including a comprehensive discussion of the known risks, side effects and potential benefits of treatment), the patient (family) agrees to implement the following specific plan:  Reassured benign      ANGIOMA (\"CHERRY ANGIOMA\")    Physical Exam:  Anatomic Location: scattered across sun exposed areas of the trunk and extremities   Morphologic Description: Firm red to reddish-blue discrete papules  Pertinent Positives:  Pertinent Negatives:    Additional History of Present Condition:  Present on exam.     Assessment and Plan:  Reassured benign    Scribe Attestation      I,:  Alize Luna am acting as a scribe while in the presence of the attending physician.:       I,:  Boone Maki MD personally performed the services described in this documentation    as scribed in my presence.:                         "

## 2024-12-09 ENCOUNTER — TELEPHONE (OUTPATIENT)
Age: 84
End: 2024-12-09

## 2024-12-19 ENCOUNTER — HOSPITAL ENCOUNTER (OUTPATIENT)
Dept: NON INVASIVE DIAGNOSTICS | Facility: HOSPITAL | Age: 84
Discharge: HOME | End: 2024-12-19
Payer: COMMERCIAL

## 2024-12-19 VITALS — SYSTOLIC BLOOD PRESSURE: 144 MMHG | DIASTOLIC BLOOD PRESSURE: 70 MMHG | HEART RATE: 65 BPM | OXYGEN SATURATION: 99 %

## 2024-12-19 DIAGNOSIS — R06.09 DOE (DYSPNEA ON EXERTION): ICD-10-CM

## 2024-12-19 DIAGNOSIS — I42.8 OTHER CARDIOMYOPATHY (HCC): ICD-10-CM

## 2024-12-19 DIAGNOSIS — I27.20 PULMONARY HYPERTENSION (HCC): ICD-10-CM

## 2024-12-19 LAB
CHEST PAIN STATEMENT: NORMAL
MAX DIASTOLIC BP: 70 MMHG
MAX DIASTOLIC BP: 78 MMHG
MAX HR PERCENT: 111 %
MAX HR: 153 BPM
MAX PREDICTED HEART RATE: 137 BPM
MAX PREDICTED HEART RATE: 137 BPM
PROTOCOL NAME: NORMAL
PROTOCOL NAME: NORMAL
RATE PRESSURE PRODUCT: NORMAL
REASON FOR TERMINATION: NORMAL
SL CV STRESS RECOVERY BP: NORMAL MMHG
SL CV STRESS RECOVERY HR: 67 BPM
SL CV STRESS RECOVERY O2 SAT: 99 %
SL CV STRESS STAGE REACHED: 2
STRESS ANGINA INDEX: 0
STRESS BASELINE BP: NORMAL MMHG
STRESS BASELINE HR: 65 BPM
STRESS O2 SAT REST: 99 %
STRESS PEAK HR: 127 BPM
STRESS POST ESTIMATED WORKLOAD: 7 METS
STRESS POST EXERCISE DUR MIN: 0 MIN
STRESS POST EXERCISE DUR MIN: 4 MIN
STRESS POST EXERCISE DUR MIN: 4 MIN
STRESS POST EXERCISE DUR SEC: 0 SEC
STRESS POST EXERCISE DUR SEC: 30 SEC
STRESS POST EXERCISE DUR SEC: 30 SEC
STRESS POST O2 SAT PEAK: 97 %
STRESS POST PEAK BP: 174 MMHG
STRESS POST PEAK HR: 153 BPM
STRESS POST PEAK HR: 94 BPM
STRESS POST PEAK SYSTOLIC BP: 144 MMHG
STRESS POST PEAK SYSTOLIC BP: 174 MMHG
TARGET HR FORMULA: NORMAL
TARGET HR FORMULA: NORMAL
TEST INDICATION: NORMAL
TEST INDICATION: NORMAL

## 2024-12-19 PROCEDURE — 93350 STRESS TTE ONLY: CPT

## 2024-12-19 PROCEDURE — 93350 STRESS TTE ONLY: CPT | Performed by: STUDENT IN AN ORGANIZED HEALTH CARE EDUCATION/TRAINING PROGRAM

## 2024-12-20 ENCOUNTER — RESULTS FOLLOW-UP (OUTPATIENT)
Age: 84
End: 2024-12-20

## 2024-12-24 ENCOUNTER — HOSPITAL ENCOUNTER (OUTPATIENT)
Dept: PULMONOLOGY | Facility: HOSPITAL | Age: 84
Discharge: HOME | End: 2024-12-24
Payer: COMMERCIAL

## 2024-12-24 DIAGNOSIS — R06.09 DOE (DYSPNEA ON EXERTION): ICD-10-CM

## 2024-12-24 PROCEDURE — 94060 EVALUATION OF WHEEZING: CPT

## 2024-12-24 PROCEDURE — 94729 DIFFUSING CAPACITY: CPT

## 2024-12-24 PROCEDURE — 94726 PLETHYSMOGRAPHY LUNG VOLUMES: CPT | Performed by: INTERNAL MEDICINE

## 2024-12-24 PROCEDURE — 94760 N-INVAS EAR/PLS OXIMETRY 1: CPT

## 2024-12-24 PROCEDURE — 94726 PLETHYSMOGRAPHY LUNG VOLUMES: CPT

## 2024-12-24 PROCEDURE — 94060 EVALUATION OF WHEEZING: CPT | Performed by: INTERNAL MEDICINE

## 2024-12-24 PROCEDURE — 94729 DIFFUSING CAPACITY: CPT | Performed by: INTERNAL MEDICINE

## 2024-12-24 RX ORDER — ALBUTEROL SULFATE 0.83 MG/ML
2.5 SOLUTION RESPIRATORY (INHALATION) ONCE
Status: COMPLETED | OUTPATIENT
Start: 2024-12-24 | End: 2024-12-24

## 2024-12-24 RX ADMIN — ALBUTEROL SULFATE 2.5 MG: 2.5 SOLUTION RESPIRATORY (INHALATION) at 11:28

## 2025-01-15 ENCOUNTER — OFFICE VISIT (OUTPATIENT)
Age: 85
End: 2025-01-15
Payer: COMMERCIAL

## 2025-01-15 VITALS
HEIGHT: 65 IN | HEART RATE: 57 BPM | TEMPERATURE: 95.2 F | DIASTOLIC BLOOD PRESSURE: 70 MMHG | OXYGEN SATURATION: 100 % | WEIGHT: 118.4 LBS | SYSTOLIC BLOOD PRESSURE: 110 MMHG | BODY MASS INDEX: 19.73 KG/M2

## 2025-01-15 DIAGNOSIS — M85.88 OSTEOPENIA OF OTHER SITE: ICD-10-CM

## 2025-01-15 DIAGNOSIS — J44.9 COPD, MILD (HCC): ICD-10-CM

## 2025-01-15 DIAGNOSIS — I42.8 OTHER CARDIOMYOPATHY (HCC): ICD-10-CM

## 2025-01-15 DIAGNOSIS — Z00.00 MEDICARE ANNUAL WELLNESS VISIT, SUBSEQUENT: Primary | ICD-10-CM

## 2025-01-15 DIAGNOSIS — Z98.890 H/O CARDIAC RADIOFREQUENCY ABLATION: ICD-10-CM

## 2025-01-15 PROBLEM — I27.20 PULMONARY HYPERTENSION (HCC): Status: RESOLVED | Noted: 2024-05-20 | Resolved: 2025-01-15

## 2025-01-15 PROCEDURE — G0439 PPPS, SUBSEQ VISIT: HCPCS

## 2025-01-15 PROCEDURE — 99397 PER PM REEVAL EST PAT 65+ YR: CPT

## 2025-01-15 NOTE — ASSESSMENT & PLAN NOTE
Mild hypokinesis seen on exercise stress test.  Patient's dyspnea on exertion is minimal and only with extreme exertion.  She plans to establish with Dr. Wheeler in the near future.

## 2025-01-15 NOTE — ASSESSMENT & PLAN NOTE
No longer on anticoagulation after ablation.  Patient has been rate controlled.  She did have increased shortness of breath, but stress test revealed hypokinesis, but no atrial fibrillation.  No longer on beta-blocker and blood pressure has been well-controlled.  Follow-up with cardiology.

## 2025-01-15 NOTE — PATIENT INSTRUCTIONS
Medicare Preventive Visit Patient Instructions  Thank you for completing your Welcome to Medicare Visit or Medicare Annual Wellness Visit today. Your next wellness visit will be due in one year (1/16/2026).  The screening/preventive services that you may require over the next 5-10 years are detailed below. Some tests may not apply to you based off risk factors and/or age. Screening tests ordered at today's visit but not completed yet may show as past due. Also, please note that scanned in results may not display below.  Preventive Screenings:  Service Recommendations Previous Testing/Comments   Colorectal Cancer Screening  * Colonoscopy    * Fecal Occult Blood Test (FOBT)/Fecal Immunochemical Test (FIT)  * Fecal DNA/Cologuard Test  * Flexible Sigmoidoscopy Age: 45-75 years old   Colonoscopy: every 10 years (may be performed more frequently if at higher risk)  OR  FOBT/FIT: every 1 year  OR  Cologuard: every 3 years  OR  Sigmoidoscopy: every 5 years  Screening may be recommended earlier than age 45 if at higher risk for colorectal cancer. Also, an individualized decision between you and your healthcare provider will decide whether screening between the ages of 76-85 would be appropriate. Colonoscopy: 05/26/2015  FOBT/FIT: Not on file  Cologuard: Not on file  Sigmoidoscopy: Not on file          Breast Cancer Screening Age: 40+ years old  Frequency: every 1-2 years  Not required if history of left and right mastectomy Mammogram: 04/22/2024    Screening Current   Cervical Cancer Screening Between the ages of 21-29, pap smear recommended once every 3 years.   Between the ages of 30-65, can perform pap smear with HPV co-testing every 5 years.   Recommendations may differ for women with a history of total hysterectomy, cervical cancer, or abnormal pap smears in past. Pap Smear: Not on file    Screening Not Indicated   Hepatitis C Screening Once for adults born between 1945 and 1965  More frequently in patients at high risk  for Hepatitis C Hep C Antibody: Not on file        Diabetes Screening 1-2 times per year if you're at risk for diabetes or have pre-diabetes Fasting glucose: 120 mg/dL (11/15/2024)  A1C: No results in last 5 years (No results in last 5 years)  Screening Current   Cholesterol Screening Once every 5 years if you don't have a lipid disorder. May order more often based on risk factors. Lipid panel: 11/15/2024    Screening Not Indicated  History Lipid Disorder     Other Preventive Screenings Covered by Medicare:  Abdominal Aortic Aneurysm (AAA) Screening: covered once if your at risk. You're considered to be at risk if you have a family history of AAA.  Lung Cancer Screening: covers low dose CT scan once per year if you meet all of the following conditions: (1) Age 55-77; (2) No signs or symptoms of lung cancer; (3) Current smoker or have quit smoking within the last 15 years; (4) You have a tobacco smoking history of at least 20 pack years (packs per day multiplied by number of years you smoked); (5) You get a written order from a healthcare provider.  Glaucoma Screening: covered annually if you're considered high risk: (1) You have diabetes OR (2) Family history of glaucoma OR (3)  aged 50 and older OR (4)  American aged 65 and older  Osteoporosis Screening: covered every 2 years if you meet one of the following conditions: (1) You're estrogen deficient and at risk for osteoporosis based off medical history and other findings; (2) Have a vertebral abnormality; (3) On glucocorticoid therapy for more than 3 months; (4) Have primary hyperparathyroidism; (5) On osteoporosis medications and need to assess response to drug therapy.   Last bone density test (DXA Scan): 09/23/2022.  HIV Screening: covered annually if you're between the age of 15-65. Also covered annually if you are younger than 15 and older than 65 with risk factors for HIV infection. For pregnant patients, it is covered up to 3 times  per pregnancy.    Immunizations:  Immunization Recommendations   Influenza Vaccine Annual influenza vaccination during flu season is recommended for all persons aged >= 6 months who do not have contraindications   Pneumococcal Vaccine   * Pneumococcal conjugate vaccine = PCV13 (Prevnar 13), PCV15 (Vaxneuvance), PCV20 (Prevnar 20)  * Pneumococcal polysaccharide vaccine = PPSV23 (Pneumovax) Adults 19-63 yo with certain risk factors or if 65+ yo  If never received any pneumonia vaccine: recommend Prevnar 20 (PCV20)  Give PCV20 if previously received 1 dose of PCV13 or PPSV23   Hepatitis B Vaccine 3 dose series if at intermediate or high risk (ex: diabetes, end stage renal disease, liver disease)   Respiratory syncytial virus (RSV) Vaccine - COVERED BY MEDICARE PART D  * RSVPreF3 (Arexvy) CDC recommends that adults 60 years of age and older may receive a single dose of RSV vaccine using shared clinical decision-making (SCDM)   Tetanus (Td) Vaccine - COST NOT COVERED BY MEDICARE PART B Following completion of primary series, a booster dose should be given every 10 years to maintain immunity against tetanus. Td may also be given as tetanus wound prophylaxis.   Tdap Vaccine - COST NOT COVERED BY MEDICARE PART B Recommended at least once for all adults. For pregnant patients, recommended with each pregnancy.   Shingles Vaccine (Shingrix) - COST NOT COVERED BY MEDICARE PART B  2 shot series recommended in those 19 years and older who have or will have weakened immune systems or those 50 years and older     Health Maintenance Due:      Topic Date Due   • DXA SCAN  09/23/2024   • Breast Cancer Screening: Mammogram  04/22/2025     Immunizations Due:  There are no preventive care reminders to display for this patient.  Advance Directives   What are advance directives?  Advance directives are legal documents that state your wishes and plans for medical care. These plans are made ahead of time in case you lose your ability to make  decisions for yourself. Advance directives can apply to any medical decision, such as the treatments you want, and if you want to donate organs.   What are the types of advance directives?  There are many types of advance directives, and each state has rules about how to use them. You may choose a combination of any of the following:  Living will:  This is a written record of the treatment you want. You can also choose which treatments you do not want, which to limit, and which to stop at a certain time. This includes surgery, medicine, IV fluid, and tube feedings.   Durable power of  for healthcare (DPAHC):  This is a written record that states who you want to make healthcare choices for you when you are unable to make them for yourself. This person, called a proxy, is usually a family member or a friend. You may choose more than 1 proxy.  Do not resuscitate (DNR) order:  A DNR order is used in case your heart stops beating or you stop breathing. It is a request not to have certain forms of treatment, such as CPR. A DNR order may be included in other types of advance directives.  Medical directive:  This covers the care that you want if you are in a coma, near death, or unable to make decisions for yourself. You can list the treatments you want for each condition. Treatment may include pain medicine, surgery, blood transfusions, dialysis, IV or tube feedings, and a ventilator (breathing machine).  Values history:  This document has questions about your views, beliefs, and how you feel and think about life. This information can help others choose the care that you would choose.  Why are advance directives important?  An advance directive helps you control your care. Although spoken wishes may be used, it is better to have your wishes written down. Spoken wishes can be misunderstood, or not followed. Treatments may be given even if you do not want them. An advance directive may make it easier for your family  "to make difficult choices about your care.   Fall Prevention    Fall prevention  includes ways to make your home and other areas safer. It also includes ways you can move more carefully to prevent a fall. Health conditions that cause changes in your blood pressure, vision, or muscle strength and coordination may increase your risk for falls. Medicines may also increase your risk for falls if they make you dizzy, weak, or sleepy.   Fall prevention tips:   Stand or sit up slowly.    Use assistive devices as directed.    Wear shoes that fit well and have soles that .    Wear a personal alarm.    Stay active.    Manage your medical conditions.    Home Safety Tips:  Add items to prevent falls in the bathroom.    Keep paths clear.    Install bright lights in your home.    Keep items you use often on shelves within reach.    Paint or place reflective tape on the edges of your stairs.    Alcohol Use and Your Health    Drinking too much can harm your health.  Excessive alcohol use leads to about 88,000 death in the [place] each year, and shortens the life of those who diet by almost 30 years.  Further, excessive drinking cost the economy $249 billion in 2010.  Most excessive drinkers are not alcohol dependent.    Excessive alcohol use has immediate effects that increase the risk of many harmful health conditions.  These are most often the result of binge drinking.  Over time, excessive alcohol use can lead to the development of chronic diseases and other series health problems.    What is considered a \"drink\"?        Excessive alcohol use includes:  Binge Drinking: For women, 4 or more drinks consumed on one occasion. For men, 5 or more drinks consumed on one occasion.  Heavy Drinking: For women, 8 or more drinks per week. For men, 15 or more drinks per week  Any alcohol used by pregnant women  Any alcohol used by those under the age of 21 years    If you choose to drink, do so in moderation:  Do not drink at all " if you are under the age of 21, or if you are or may be pregnant, or have health problems that could be made worse by drinking.  For women, up to 1 drink per day  For men, up to 2 drinks a day    No one should begin drinking or drink more frequently based on potential health benefits    Short-Term Health Risks:  Injuries: motor vehicle crashes, falls, drownings, burns  Violence: homicide, suicide, sexual assault, intimate partner violence  Alcohol poisoning  Reproductive health: risky sexual behaviors, unintended prengnacy, sexually transmitted diseases, miscarriage, stillbirth, fetal alcohol syndrome    Long-Term Health Risks:  Chronic diseases: high blood pressure, heart disease, stroke, liver disease, digestive problems  Cancers: breast, mouth and throat, liver, colon  Learning and memory problems: dementia, poor school performance  Mental health: depression, anxiety, insomnia  Social problems: lost productivity, family problems, unemployment  Alcohol dependence    For support and more information:  Substance Abuse and Mental Health Services Administration  [address]  [place] [address]  Web Address: http://www.samhsa.gov    Alcoholics Anonymous        Web Address: http://www.aa.org    https://www.cdc.gov/alcohol/fact-sheets/alcohol-use.htm     © Copyright [employer]â„[employer] 2018 Information is for End User's use only and may not be sold, redistributed or otherwise used for commercial purposes. All illustrations and images included in CareNotes® are the copyrighted property of [employer] or [employer]

## 2025-01-15 NOTE — PROGRESS NOTES
Name: Scarlett Zamarripa      : 1940      MRN: [id number]  Encounter Provider: Anna Sandoval PA-C  Encounter Date: 1/15/2025   Encounter department: [facility] PRIMARY CARE [facility]  Medicare annual wellness visit, subsequent    Annual physical exam completed today.  - Medical history reviewed, including existing medical conditions, medications, and surgeries.   - Labs discussed to evaluate cholesterol, blood sugar, kidney function, liver function, and other important markers of health.  - BMI evaluated and discussed.  - Cancer screenings discussed: Mammogram.   - Vaccination status reviewed and pertinent immunizations and booster shots discussed.   - Bone health reviewed.   - Skin examination.  Discussed importance of sunscreen and other preventative measures for skin cancer.    - Lifestyle and health counseling completed including diet, exercise habits, smoking status, alcohol consumption.   - Mental health and wellbeing evaluated and discussed.  - Family history obtained to identify any of hereditary health risks.          Other cardiomyopathy (HCC)  Mild hypokinesis seen on exercise stress test.  Patient's dyspnea on exertion is minimal and only with extreme exertion.  She plans to establish with Dr. Wheeler in the near future.  H/O cardiac atrial fib ablation  No longer on anticoagulation after ablation.  Patient has been rate controlled.  She did have increased shortness of breath, but stress test revealed hypokinesis, but no atrial fibrillation.  No longer on beta-blocker and blood pressure has been well-controlled.  Follow-up with cardiology.       COPD, mild (HCC)  Pt referred to pulmonary.  Pt declines control inhaler or rescue inhaler prescription today.  Doesn't feel her symptoms are bad enough to do that today, but will follow up with pulmonary.  Discussed her PFT results.  Orders:  •  Ambulatory Referral to Pulmonology; Future    Osteopenia of other site  Patient is  due for DEXA, order placed.  She is taking vitamin D and tries to stay active.  Orders:  •  DXA bone density spine hip and pelvis; Future       Preventive health issues were discussed with patient, and age appropriate screening tests were ordered as noted in patient's After Visit Summary. Personalized health advice and appropriate referrals for health education or preventive services given if needed, as noted in patient's After Visit Summary.    History of Present Illness     HPI     Pt is here for AWV and review of lab/test results.    Patient was given pulmonary referral based on her PFT showing mild COPD, former smoker.  Reviewed her exercise echocardiogram and other testing.  She plans to establish with Dr. Swan with cardiology.    Wishes to go to every other year mammograms.  Had prior breast biopsies x 2, but were negative.  No symptoms or issues.    She has two spots on her left shin that are concerning for skin cancer.  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  She has an appointment with dermatology in about 6 months, but she will call to try and get in sooner for evaluation.      Patient Care Team:  Gilma Kumari DO as PCP - General (Family Medicine)  RACHEL Garcia MD Arjinder Sethi, MD (Cardiology)    Review of Systems   Constitutional: Negative.    HENT: Negative.     Respiratory:  Positive for shortness of breath and wheezing. Negative for cough and chest tightness.    Cardiovascular:  Negative for chest pain, palpitations and leg swelling.   Gastrointestinal: Negative.    Musculoskeletal:  Negative for gait problem.   Skin:  Positive for color change (left shin x 2).   Neurological: Negative.    All other systems reviewed and are negative.    Medical History Reviewed by provider this encounter:  Tobacco  Allergies  Meds  Problems  Med Hx  Surg Hx  Fam Hx       Annual Wellness Visit Questionnaire   Scarlett is here for her Subsequent  Wellness visit.     Health Risk Assessment:   Patient rates overall health as good. Patient feels that their physical health rating is slightly worse. Patient is satisfied with their life. Eyesight was rated as same. Hearing was rated as same. Patient feels that their emotional and mental health rating is slightly worse. Patients states they are never, rarely angry. Patient states they are sometimes unusually tired/fatigued. Pain experienced in the last 7 days has been some. Patient's pain rating has been 1/10. Patient states that she has experienced no weight loss or gain in last 6 months.     Fall Risk Screening:   In the past year, patient has experienced: history of falling in past year    Number of falls: 1  Injured during fall?: No    Feels unsteady when standing or walking?: No    Worried about falling?: No      Urinary Incontinence Screening:   Patient has not leaked urine accidently in the last six months.     Home Safety:  Patient does not have trouble with stairs inside or outside of their home. Patient has no working smoke alarms and has no working carbon monoxide detector. Home safety hazards include: none.     Nutrition:   Current diet is Regular.     Medications:   Patient is currently taking over-the-counter supplements. OTC medications include: see medication list. Patient is able to manage medications.     Activities of Daily Living (ADLs)/Instrumental Activities of Daily Living (IADLs):   Walk and transfer into and out of bed and chair?: Yes  Dress and groom yourself?: Yes    Bathe or shower yourself?: Yes    Feed yourself? Yes  Do your laundry/housekeeping?: Yes  Manage your money, pay your bills and track your expenses?: Yes  Make your own meals?: Yes    Do your own shopping?: Yes    Previous Hospitalizations:   Any hospitalizations or ED visits within the last 12 months?: No      Advance Care Planning:   Living will: Yes    Durable POA for healthcare: Yes    Advanced directive: Yes       Cognitive Screening:   Provider or family/friend/caregiver concerned regarding cognition?: No    PREVENTIVE SCREENINGS      Cardiovascular Screening:    General: Screening Not Indicated and History Lipid Disorder      Diabetes Screening:     General: Screening Current      Colorectal Cancer Screening:     General: Screening Not Indicated      Breast Cancer Screening:     General: Screening Current      Cervical Cancer Screening:    General: Screening Not Indicated      Osteoporosis Screening:    General: Risks and Benefits Discussed    Due for: DXA Axial      Abdominal Aortic Aneurysm (AAA) Screening:        General: Screening Not Indicated      Lung Cancer Screening:     General: Screening Not Indicated      Hepatitis C Screening:    General: Screening Not Indicated    Screening, Brief Intervention, and Referral to Treatment (SBIRT)    Screening  Typical number of drinks in a day: 2  Typical number of drinks in a week: 14  Interpretation: Low risk drinking behavior.    AUDIT-C Screenin) How often did you have a drink containing alcohol in the past year? 4 or more times a week  2) How many drinks did you have on a typical day when you were drinking in the past year? 1 to 2  3) How often did you have 6 or more drinks on one occasion in the past year? never    AUDIT-C Score: 4  Interpretation: Score 3-12 (female): POSITIVE screen for alcohol misuse    AUDIT Screenin) How often during the last year have you found that you were not able to stop drinking once you had started? 0 - never  5) How often during the last year have you failed to do what was normally expected from you because of drinking? 0 - never  6) How often during the last year have you needed a first drink in the morning to get yourself going after a heavy drinking session? 0 - never  7) How often during the last year have you had a feeling of guilt or remorse after drinking? 0 - never  8) How often during the last year have you been unable  "to remember what happened the night before because you had been drinking? 0 - never  9) Have you or someone else been injured as a result of your drinking? 0 - no  10) Has a relative or friend or a doctor or another health worker been concerned about your drinking or suggested you cut down? 0 - no    AUDIT Score: 4  Interpretation: Low risk alcohol consumption    Single Item Drug Screening:  How often have you used an illegal drug (including marijuana) or a prescription medication for non-medical reasons in the past year? never    Single Item Drug Screen Score: 0  Interpretation: Negative screen for possible drug use disorder    Other Counseling Topics:   Car/seat belt/driving safety, skin self-exam, sunscreen and calcium and vitamin D intake and regular weightbearing exercise.     Social Drivers of Health     Financial Resource Strain: Low Risk  (11/14/2023)    Overall Financial Resource Strain (CARDIA)    • Difficulty of Paying Living Expenses: Not hard at all   Food Insecurity: No Food Insecurity (12/26/2024)    Hunger Vital Sign    • Worried About Running Out of Food in the Last Year: Never true    • Ran Out of Food in the Last Year: Never true   Transportation Needs: No Transportation Needs (12/26/2024)    PRAPARE - Transportation    • Lack of Transportation (Medical): No    • Lack of Transportation (Non-Medical): No   Housing Stability: Low Risk  (12/26/2024)    Housing Stability Vital Sign    • Unable to Pay for Housing in the Last Year: No    • Number of Times Moved in the Last Year: 0    • Homeless in the Last Year: No   Utilities: Not At Risk (12/26/2024)    [facility] Utilities    • Threatened with loss of utilities: No     No results found.    Objective   /70 (Patient Position: Sitting, Cuff Size: Standard)   Pulse 57   Temp (!) 95.2 °F (35.1 °C)   Ht 5' 5\" (1.651 m)   Wt 53.7 kg (118 lb 6.4 oz)   SpO2 100%   BMI 19.70 kg/m²     Physical Exam  Constitutional:       General: She is not in acute " distress.     Appearance: Normal appearance. She is not toxic-appearing.   HENT:      Head: Normocephalic and atraumatic.      Right Ear: Hearing, tympanic membrane, ear canal and external ear normal.      Left Ear: Hearing, tympanic membrane, ear canal and external ear normal.      Nose: Nose normal.      Mouth/Throat:      Lips: Pink.      Mouth: Mucous membranes are moist. No oral lesions.      Dentition: Normal dentition.      Pharynx: Oropharynx is clear.   Eyes:      General: Lids are normal. No scleral icterus.     Conjunctiva/sclera: Conjunctivae normal.      Pupils: Pupils are equal, round, and reactive to light.   Neck:      Thyroid: No thyroid mass, thyromegaly or thyroid tenderness.      Vascular: No carotid bruit.   Cardiovascular:      Rate and Rhythm: Normal rate and regular rhythm.      Pulses:           Radial pulses are 2+ on the right side and 2+ on the left side.        Posterior tibial pulses are 2+ on the right side and 2+ on the left side.      Heart sounds: Normal heart sounds.   Pulmonary:      Effort: Pulmonary effort is normal. No respiratory distress.      Breath sounds: Normal breath sounds.   Abdominal:      General: Bowel sounds are normal. There is no distension.      Palpations: Abdomen is soft.      Tenderness: There is no abdominal tenderness.   Musculoskeletal:      Cervical back: Normal range of motion and neck supple.      Right lower leg: No edema.      Left lower leg: No edema.      Comments:      Lymphadenopathy:      Cervical: No cervical adenopathy.   Skin:     General: Skin is warm and dry.      Coloration: Skin is not jaundiced.             Comments: 2 areas of concern on her left shin.  Top 1 is pearly pink approximately half a centimeter in size.  The distal lesion is raised and around the area of a varicose vein, nonblanchable.   Neurological:      Mental Status: She is alert and oriented to person, place, and time.      Motor: No weakness or tremor.      Gait: Gait is  intact.   Psychiatric:         Mood and Affect: Mood normal.         Behavior: Behavior is cooperative.

## 2025-01-22 ENCOUNTER — OFFICE VISIT (OUTPATIENT)
Age: 85
End: 2025-01-22
Payer: COMMERCIAL

## 2025-01-22 ENCOUNTER — HOSPITAL ENCOUNTER (OUTPATIENT)
Age: 85
Discharge: HOME | End: 2025-01-22
Payer: COMMERCIAL

## 2025-01-22 VITALS
HEIGHT: 65 IN | OXYGEN SATURATION: 99 % | DIASTOLIC BLOOD PRESSURE: 82 MMHG | TEMPERATURE: 97.8 F | HEART RATE: 84 BPM | SYSTOLIC BLOOD PRESSURE: 124 MMHG | WEIGHT: 122 LBS | BODY MASS INDEX: 20.33 KG/M2

## 2025-01-22 DIAGNOSIS — M85.88 OSTEOPENIA OF OTHER SITE: ICD-10-CM

## 2025-01-22 DIAGNOSIS — D48.9 NEOPLASM OF UNCERTAIN BEHAVIOR: Primary | ICD-10-CM

## 2025-01-22 PROCEDURE — 77080 DXA BONE DENSITY AXIAL: CPT

## 2025-01-22 PROCEDURE — 11103 TANGNTL BX SKIN EA SEP/ADDL: CPT | Performed by: STUDENT IN AN ORGANIZED HEALTH CARE EDUCATION/TRAINING PROGRAM

## 2025-01-22 PROCEDURE — 88342 IMHCHEM/IMCYTCHM 1ST ANTB: CPT | Performed by: STUDENT IN AN ORGANIZED HEALTH CARE EDUCATION/TRAINING PROGRAM

## 2025-01-22 PROCEDURE — 11102 TANGNTL BX SKIN SINGLE LES: CPT | Performed by: STUDENT IN AN ORGANIZED HEALTH CARE EDUCATION/TRAINING PROGRAM

## 2025-01-22 PROCEDURE — 99214 OFFICE O/P EST MOD 30 MIN: CPT | Performed by: STUDENT IN AN ORGANIZED HEALTH CARE EDUCATION/TRAINING PROGRAM

## 2025-01-22 PROCEDURE — 88305 TISSUE EXAM BY PATHOLOGIST: CPT | Performed by: STUDENT IN AN ORGANIZED HEALTH CARE EDUCATION/TRAINING PROGRAM

## 2025-01-22 NOTE — PATIENT INSTRUCTIONS
Shave/ Punch Biopsy After Care Instructions      Remove bandage the next day. Keep bandage dry.    Shower or Bathe as usual the next day.    Cleanse the area once daily with saline or hydrogen peroxide.    Apply Vaseline.  WE ADVISE YOU NOT TO USE NEOSPORIN OR ANY TOPICAL ANTIBIOTIC UNLESS INSTRUCTED BY THE DOCTOR.    Cover area with a dressing or Band-Aid if possible.      Continue treatment until completely healed. (Skin appears in pink).    Try to avoid scab formation.      Slight bleeding may occur after the Band-Aid/Dressing is removed or the first few days after the procedure was done.      Don't panic!!  Apply continuous, direct pressure on the dressing over the wound for 15-20 minutes. DO NOT remove dressing.    HINT:  Set a timer for 15-20 minutes to make sure you press on the wound long enough  SOAKING: the dressing before you remove it should decrease chances of bleeding.  If the wound is on the legs or arms, swelling may occur. Elevating the arm or leg above the level of the heart as much as possible will also decrease swelling, promote healing and decrease chances of bleeding.        ANY QUESTION PLEASE CALL OUR OFFICE AT [phone]  IF AFTER HOURS, THE ANSWERING SERVICE WILL GET A HOLD OF THE DOCTOR.    PLEASE BE ADVISED THAT BIOPSY RESULTS CAN TAKE UP TO 1 TO 2 WEEKS. YOU WILL RECEIVE THE RESULTS IN [facility] FIRST, BUT PLEASE WAIT FOR THE DOCTOR OR STAFF TO NOTIFY YOU.      THANK YOU!!

## 2025-01-22 NOTE — PROGRESS NOTES
"[facility] Dermatology Clinic Note     Patient Name: Scarlett Zamarripa  Encounter Date: January 22, 2025     Have you been cared for by a [facility] Dermatologist in the last 3 years and, if so, which description applies to you?    Yes.  I have been here within the last 3 years, and my medical history has NOT changed since that time.  I am FEMALE/of child-bearing potential.    REVIEW OF SYSTEMS:  Have you recently had or currently have any of the following? No changes in my recent health.   PAST MEDICAL HISTORY:  Have you personally ever had or currently have any of the following?  If \"YES,\" then please provide more detail. No changes in my medical history.   HISTORY OF IMMUNOSUPPRESSION: Do you have a history of any of the following:  Systemic Immunosuppression such as Diabetes, Biologic or Immunotherapy, Chemotherapy, Organ Transplantation, Bone Marrow Transplantation or Prednisone?  No     Answering \"YES\" requires the addition of the dotphrase \"IMMUNOSUPPRESSED\" as the first diagnosis of the patient's visit.   FAMILY HISTORY:  Any \"first degree relatives\" (parent, brother, sister, or child) with the following?    No changes in my family's known health.   PATIENT EXPERIENCE:    Do you want the Dermatologist to perform a COMPLETE skin exam today including a clinical examination under the \"bra and underwear\" areas?  NO  If necessary, do we have your permission to call and leave a detailed message on your Preferred Phone number that includes your specific medical information?  Yes      No Known Allergies   Current Outpatient Medications:   •  cholecalciferol (VITAMIN D3) 400 units tablet, Take 400 Units by mouth daily, Disp: , Rfl:   •  glucosamine-chondroitin 500-400 MG tablet, Take 1 tablet by mouth 3 (three) times a day, Disp: , Rfl:   •  loratadine (CLARITIN REDITABS) 10 MG dissolvable tablet, Take 10 mg by mouth daily, Disp: , Rfl:           Whom besides the patient is providing clinical information about today's " "encounter?   NO ADDITIONAL HISTORIAN (patient alone provided history)    Physical Exam and Assessment/Plan by Diagnosis:    Chief complaint: patient is an 83 y/o female present for 2 spots of concern on her left shin.One spot was pointed out to her by her primary care provider, and one was noticed by her.     NEOPLASM OF UNCERTAIN BEHAVIOR OF SKIN    Physical Exam:  (Anatomic Location); (Size and Morphological Description); (Differential Diagnosis):  Specimen A; Left upper leg; 0.6 x 0.6 cm  keratotic papule; DDX; rule out Squamous Cell Carcinoma  Specimen B; Left lower leg; 0.6 x 0.6 cm keratotic papule; DDX; rule out Squamous Cell Carcinoma  Pertinent Positives:  Pertinent Negatives:    Additional History of Present Condition:  present on exam.     Assessment and Plan:  I have discussed with the patient that a sample of skin via a \"skin biopsy” would be potentially helpful to further make a specific diagnosis under the microscope.  Based on a thorough discussion of this condition and the management approach to it (including a comprehensive discussion of the known risks, side effects and potential benefits of treatment), the patient (family) agrees to implement the following specific plan:    Procedure:  Skin Biopsy.  After a thorough discussion of treatment options and risk/benefits/alternatives (including but not limited to local pain, scarring, dyspigmentation, blistering, possible superinfection, and inability to confirm a diagnosis via histopathology), verbal and written consent were obtained and portion of the rash was biopsied for tissue sample.  See below for consent that was obtained from patient and subsequent Procedure Note.          PROCEDURE TANGENTIAL (SHAVE) BIOPSY NOTE:    Performing Physician:   Anatomic Location; Clinical Description with size (cm); Pre-Op Diagnosis:   Specimen A; Left upper leg; 0.6 x 0.6 cm  keratotic papule; DDX; rule out Squamous Cell Carcinoma  Specimen B; Left lower " "leg; 0.6 x 0.6 cm keratotic papule; DDX; rule out Squamous Cell Carcinoma  Post-op diagnosis: Same     Local anesthesia: 1% xylocaine with epi      Topical anesthesia: None    Hemostasis: Aluminum chloride       After obtaining informed consent  at which time there was a discussion about the purpose of biopsy  and low risks of infection and bleeding.  The area was prepped and draped in the usual fashion. Anesthesia was obtained with 1% lidocaine with epinephrine. A shave biopsy to an appropriate sampling depth was obtained by Shave (Dermablade or 15 blade) The resulting wound was covered with surgical ointment and bandaged appropriately.     The patient tolerated the procedure well without complications and was without signs of functional compromise.      Specimen has been sent for review by Dermatopathology.    Standard post-procedure care has been explained and has been included in written form within the patient's copy of Informed Consent.    INFORMED CONSENT DISCUSSION AND POST-OPERATIVE INSTRUCTIONS FOR PATIENT    I.  RATIONALE FOR PROCEDURE  I understand that a skin biopsy allows the Dermatologist to test a lesion or rash under the microscope to obtain a diagnosis.  It usually involves numbing the area with numbing medication and removing a small piece of skin; sometimes the area will be closed with sutures. In this specific procedure, sutures are not usually needed.  If any sutures are placed, then they are usually need to be removed in 2 weeks or less.    I understand that my Dermatologist recommends that a skin \"shave\" biopsy be performed today.  A local anesthetic, similar to the kind that a dentist uses when filling a cavity, will be injected with a very small needle into the skin area to be sampled.  The injected skin and tissue underneath \"will go to sleep” and become numb so no pain should be felt afterwards.  An instrument shaped like a tiny \"razor blade\" (shave biopsy instrument) will be used to cut " "a small piece of tissue and skin from the area so that a sample of tissue can be taken and examined more closely under the microscope.  A slight amount of bleeding will occur, but it will be stopped with direct pressure and a pressure bandage and any other appropriate methods.  I understands that a scar will form where the wound was created.  Surgical ointment will be applied to help protect the wound.  Sutures are not usually needed.    II.  RISKS AND POTENTIAL COMPLICATIONS   I understand the risks and potential complications of a skin biopsy include but are not limited to the following:  Bleeding  Infection  Pain  Scar/keloid  Skin discoloration  Incomplete Removal  Recurrence  Nerve Damage/Numbness/Loss of Function  Allergic Reaction to Anesthesia  Biopsies are diagnostic procedures and based on findings additional treatment or evaluation may be required  Loss or destruction of specimen resulting in no additional findings    My Dermatologist has explained to me the nature of the condition, the nature of the procedure, and the benefits to be reasonably expected compared with alternative approaches.  My Dermatologist has discussed the likelihood of major risks or complications of this procedure including the specific risks listed above, such as bleeding, infection, and scarring/keloid.  I understand that a scar is expected after this procedure.  I understand that my physician cannot predict if the scar will form a \"keloid,\" which extends beyond the borders of the wound that is created.  A keloid is a thick, painful, and bumpy scar.  A keloid can be difficult to treat, as it does not always respond well to therapy, which includes injecting cortisone directly into the keloid every few weeks.  While this usually reduces the pain and size of the scar, it does not eliminate it.      I understand that photographs may be taken before and after the procedure.  These will be maintained as part of the medical providers " "confidential records and may not be made available to me.  I further authorize the medical provider to use the photographs for teaching purposes or to illustrate scientific papers, books, or lectures if in his/her judgment, medical research, education, or science may benefit from its use.    I have had an opportunity to fully inquire about the risks and benefits of this procedure and its alternatives.   I have been given ample time and opportunity to ask questions and to seek a second opinion if I wished to do so.  I acknowledge that there have specifically been no guarantees as to the cosmetic results from the procedure.  I am aware that with any procedure there is always the possibility of an unexpected complication.    III. POST-PROCEDURAL CARE (WHAT YOU WILL NEED TO DO \"AFTER THE BIOPSY\" TO OPTIMIZE HEALING)    Keep the area clean and dry.  Try NOT to remove the bandage or get it wet for the first 24 hours.    Gently clean the area and apply surgical ointment (such as Vaseline petrolatum ointment, which is available \"over the counter\" and not a prescription) to the biopsy site for up to 2 weeks straight.  This acts to protect the wound from the outside world.      Sutures are not usually placed in this procedure.  If any sutures were placed, return for suture removal as instructed (generally 1 week for the face, 2 weeks for the body).      Take Acetaminophen (Tylenol) for discomfort, if no contraindications.  Ibuprofen or aspirin could make bleeding worse.    Call our office immediately for signs of infection: fever, chills, increased redness, warmth, tenderness, discomfort/pain, or pus or foul smell coming from the wound.    WHAT TO DO IF THERE IS ANY BLEEDING?  If a small amount of bleeding is noticed, place a clean cloth over the area and apply firm pressure for ten minutes.  Check the wound after 10 minutes of direct pressure.  If bleeding persists, try one more time for an additional 10 minutes of direct " pressure on the area.  If the bleeding becomes heavier or does not stop after the second attempt, or if you have any other questions about this procedure, then please call your [facility] Dermatologist by calling [phone] ([phone]).     I hereby acknowledge that I have reviewed and verified the site with my Dermatologist and have requested and authorized my Dermatologist to proceed with the procedure.    Scribe Attestation    I,:  Alize Luna am acting as a scribe while in the presence of the attending physician.:       I,:  Kyle Rene, DO personally performed the services described in this documentation    as scribed in my presence.:

## 2025-01-24 ENCOUNTER — RESULTS FOLLOW-UP (OUTPATIENT)
Age: 85
End: 2025-01-24

## 2025-01-27 ENCOUNTER — TELEPHONE (OUTPATIENT)
Dept: DERMATOLOGY | Facility: CLINIC | Age: 85
End: 2025-01-27

## 2025-01-27 ENCOUNTER — RESULTS FOLLOW-UP (OUTPATIENT)
Dept: DERMATOLOGY | Facility: CLINIC | Age: 85
End: 2025-01-27

## 2025-01-27 DIAGNOSIS — C44.92 SQUAMOUS CELL CARCINOMA OF SKIN: Primary | ICD-10-CM

## 2025-01-27 PROCEDURE — 88342 IMHCHEM/IMCYTCHM 1ST ANTB: CPT | Performed by: STUDENT IN AN ORGANIZED HEALTH CARE EDUCATION/TRAINING PROGRAM

## 2025-01-27 PROCEDURE — 88305 TISSUE EXAM BY PATHOLOGIST: CPT | Performed by: STUDENT IN AN ORGANIZED HEALTH CARE EDUCATION/TRAINING PROGRAM

## 2025-01-27 NOTE — TELEPHONE ENCOUNTER
Spoke to Scarlett and she would like to look elsewhere for MOHS closer to home and not travel to [place]. I asked patient to call us either way so we know where she is going. Patient understood.

## 2025-01-28 NOTE — TELEPHONE ENCOUNTER
Pt calling back stating she called around and apparently could not find anyone else local to do MOHS    Pt would like to schedule it with us, but later in the morning if possible due to commute.    Advised Clarice is away from her desk, and I will have Clarice call her back at [phone]

## 2025-01-29 NOTE — TELEPHONE ENCOUNTER
Pre- operative Mohs Telephone Scheduling Note    Do you have a pacemaker, defibrillator, spinal or brain stimulator? no    Do you take antibiotics before skin or dental procedures? no  If yes, will likely require pre-operative antibiotics. Ask  the patient why they take the antibiotics (usually because of joint replacement).    Do you have a history of a joint replacements within the past 2 years? no   If yes, will likely require pre-operative antibiotics. Ask if orthopaedic surgeon has prescribed pre-operative antibiotics to take before procedures/dental work?    Do you take any OTC medications that thin your blood (Aspirin, Aleve, Ibuprofen) or supplements that thin your blood (fish oil, garlic, vitamin E, Ginko Biloba)? no    Do you take any prescribed medications that thin your blood (Coumadin, Plavix, Xarelto, Eliquis or another prescribed blood thinner)? no    Do you have an allergy to lidocaine or epinephrine? no    Do you have allergies to Iodine? no    Do you wear a lidocaine patch? no    Have you ever been diagnosed with HIV, AIDS, Hep B and Hep C? no    Do you use a cane, walker or wheelchair? no    Is the patient from a nursing home? no If yes, Is there any special accommodations that is needed for patient n/a    Do you smoke? no      If yes,  patient to try and stop 2 days before surgery and 7 days after the surgery. Minimizing smoking as much as possible during this time will improve healing and the cosmetic result after surgery.    Do you use supplemental oxygen? If so, how many liters and can you be off it for a short period of time? N/a    Date scheduled: SCC leg (x2) 2/26/2025 9:00 AM Dr Bojorquez    Coordination of Care with other provider (Oculoplastics, Plastics, ENT) required? no   IF YES, PLEASE FORWARD TO APPROPRIATE PERSONNEL TO HELP COORDINATE.    Are there remaining tumors to be scheduled? no    Was Prior Authorization obtained? No (please use [email or web address] to document prior  auth)

## 2025-02-26 ENCOUNTER — PROCEDURE VISIT (OUTPATIENT)
Dept: DERMATOLOGY | Facility: CLINIC | Age: 85
End: 2025-02-26
Payer: COMMERCIAL

## 2025-02-26 VITALS
HEIGHT: 65 IN | TEMPERATURE: 97.8 F | SYSTOLIC BLOOD PRESSURE: 138 MMHG | OXYGEN SATURATION: 94 % | DIASTOLIC BLOOD PRESSURE: 82 MMHG | WEIGHT: 122.6 LBS | HEART RATE: 72 BPM | BODY MASS INDEX: 20.43 KG/M2

## 2025-02-26 DIAGNOSIS — C44.92 SQUAMOUS CELL CARCINOMA OF SKIN: ICD-10-CM

## 2025-02-26 PROCEDURE — 17313 MOHS 1 STAGE T/A/L: CPT | Performed by: DERMATOLOGY

## 2025-02-26 PROCEDURE — 17314 MOHS ADDL STAGE T/A/L: CPT | Performed by: DERMATOLOGY

## 2025-02-26 PROCEDURE — 12032 INTMD RPR S/A/T/EXT 2.6-7.5: CPT | Performed by: DERMATOLOGY

## 2025-02-26 RX ORDER — MUPIROCIN 20 MG/G
OINTMENT TOPICAL ONCE
Status: COMPLETED | OUTPATIENT
Start: 2025-02-26 | End: 2025-02-26

## 2025-02-26 RX ADMIN — MUPIROCIN: 20 OINTMENT TOPICAL at 14:21

## 2025-02-26 NOTE — PROGRESS NOTES
MOHS Procedure Note    Patient: Scarlett Zamarripa  : 1940  MRN: [id number]  Date: 2025    History of Present Illness: The patient is a 84 y.o. 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     Past Medical History:   Diagnosis Date    Atrial fibrillation (HCC)     Hypertension     Rectus sheath hematoma 2022    SCC (squamous cell carcinoma) 2025    left lower leg, mohs    Squamous cell skin cancer 2025    left upper leg, mohs       Past Surgical History:   Procedure Laterality Date    APPENDECTOMY      BREAST BIOPSY Left     neg    BREAST BIOPSY Left     neg    CARDIAC ELECTROPHYSIOLOGY PROCEDURE N/A 2022    Procedure: Cardiac eps/afib ablation;  Surgeon: Kahlil Villatoro MD;  [facility]: [facility];  Service: Cardiology    CARDIAC ELECTROPHYSIOLOGY PROCEDURE N/A 2022    Procedure: Cardiac eps/aflutter ablation;  Surgeon: Kahlil Villatoro MD;  [facility];  Service: Cardiology    CATARACT EXTRACTION, BILATERAL      DILATION AND CURETTAGE OF UTERUS      IR EMBOLIZATION (SPECIFY VESSEL OR SITE)  2022    LAPAROSCOPY FOR ECTOPIC PREGNANCY      MOHS SURGERY Left 2025    SCC left upper leg, Dr Bojorquez    MOHS SURGERY Left 2025    SCC left lower leg, Dr Bojorquez    OTHER SURGICAL HISTORY      surgical excision of tubal pregnancy          Current Outpatient Medications:     CALCIUM PO, Take 500 mg by mouth in the morning, Disp: , Rfl:     cholecalciferol (VITAMIN D3) 400 units tablet, Take 400 Units by mouth daily, Disp: , Rfl:     glucosamine-chondroitin 500-400 MG tablet, Take 1 tablet by mouth 3 (three) times a day, Disp: , Rfl:     loratadine (CLARITIN REDITABS) 10 MG dissolvable tablet, Take 10 mg by mouth daily, Disp: , Rfl:     No Known Allergies    Physical Exam:   Vitals:    25 0904   BP: 138/82   Pulse: 72   Temp: 97.8 °F (36.6 °C)   SpO2: 94%     General: Awake, Alert, Oriented x 3,  Mood and affect appropriate  Respiratory: Respirations even and unlabored  Cardiovascular: Peripheral pulses intact; no edema  Musculoskeletal Exam: n/a    Skin: 0.8 cm x 0.7 cm pink scaly macule    Assessment: Biopsy confirmed squamous cell carcinoma invasive, 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     Plan: Mohs    Time of H&P Completion:0902    MOHS Procedure Timeout      Flowsheet Row Most Recent Value   Timeout: 0914   Patient Identity Verified: Yes   Correct Site Verified: Yes   Correct Procedure Verified: Yes            MOHS Diagnosis/Indication/Location/ID      Flowsheet Row Most Recent Value   Pathology Type Squamous cell carcinoma   Anatomic Site left leg  [upper]   Indications for MOHS tumor location   MOHS ID [id number]            MOHS Site/Accession/Pre-Post      Flowsheet Row Most Recent Value   Original Site Identified (as submitted by referring clinician) Photo, Referral   Biopsy Accession/Specimen # (as submitted by referring clincian) [id number]   Pre-MOHS Size Length (cm) 0.8   Pre-MOHS Size Width (cm) 0.7   Post-MOHS Size-Length (cm) 1  [B: 1.2 cm]   Post MOHS Size-Width (cm) 1.5  [B: 1.5 cm]   Repair Type Intermediate layered closure   Suture Type Vicryl, Prolene   Prolene Suture Size 4   Vicryl Suture Size 4   Final repair length (cm): 3.7   Anesthetic Used 1% Lidocaine with epinephrine  [5 mL]            MOHS Tumor Stage 1 Information      Flowsheet Row Most Recent Value   Tissue Sections (blocks) 2   Microscopic Exam Section 1: No tumor identified in section.  [*]   Microscopic Exam Section 2: Arising from the epidermis is a keratin-producing proliferation of atypical keratinocytes with invasion into the underlying dermis. Mitoses and atypical forms are evident. Intercellular bridge and keratin karyna formation are evident.  [*]   Tumor Clear After Stage I? No            MOHS Tumor Stage 2 Information      Flowsheet Row Most Recent Value   Tissue Sections (blocks) 1   Microscopic Exam  Section 1: No tumor identified in section.   Tumor Clear After Stage II? Yes                      Patient identified, procedure verified, site identified and verified. Time out completed. Surgical removal of the lesion discussed with the patient (risks and benefits, including possibility of scarring, infection, recurrence or potential for further treatment)  I have specifically identified the site with the patient. I have discussed the fact that the patient will have a scar after the procedure regardless of granulation or repair with sutures. I have discussed that the repair options can range from granulation in some cases to linear or curvilinear closures to larger flaps or grafts.  There are sometimes flaps or grafts used that require multiples stages of surgery and will not be completed today, rather be completed over a series of appointments. I have discussed that occasionally due to location, size or depth of the lesion I may recommend consultation with and transfer of care for further removal or the reconstruction to another provider such as ophthalmology surgery, plastic surgery, ENT surgery, or surgical oncology. There are cases in which other testing such as imaging with MRI or CT scan or testing of lymph nodes is recommended because of the nature/depth/location of tumor seen during the removal. There is a risk of injury to nerves causing temporary or permanent numbness or the inability to move muscles full such as the inability to lift eyebrows. Questions answered and verbal and written consent was obtained.    The tumor qualifies for Mohs based on AUC criteria. Dr. Bojorquez served as the surgeon and pathologist during the procedure.    With the patient in the supine position and under adequate local anesthesia with 1% lidocaine with epinephrine 1:100,000, the defect was scrubbed with Iodine. Sterile drapes were placed from the sterile tray.  Because of the location of the surgical defect, an intermediate  closure was judged to give the best possible cosmetic and functional result.  The edges of the defect were carefully debrided removing any dead or coagulated tissue.      Hemostasis was obtained by pinpoint electrocoagulation.  Careful planning of removal of redundant tissue at either end of the defect was drawn out so that the suture lines would fall in the optimal orientation with regard to the relaxed skin tension lines.  These were then removed with a #15 blade scalpel.  The wound was then approximated by a deep layer of buried vertical mattress sutures and the cutaneous margins were approximated and closed by superficial sutures as noted above.  Estimated blood loss was less than 5 mL.      The patient tolerated the procedure well.  The wound was dressed with petrolatum, a non-stick pad, and a compression dressing.     Antonio Bojorquez MD served as the surgeon and pathologist during the procedure.    Postoperative care: Wound care discussed at length.  I urged the patient to call us if any problems or question should arise.     Complications: none  Post-op medications: none  Patient condition after procedure: stable  Discharge plans: Plan for return to us for suture removal, as scheduled in 14 days.     MOHS Procedure 2 Note    Patient: Scarlett Zamarripa  : 1940  MRN: [id number]  Date: 2025    History of Present Illness: The patient is a 84 y.o. female who presents with complaints of Squamous cell carcinoma of the left lower leg.     Past Medical History:   Diagnosis Date    Atrial fibrillation (HCC)     Hypertension     Rectus sheath hematoma 2022    SCC (squamous cell carcinoma) 2025    left lower leg, mohs    Squamous cell skin cancer 2025    left upper leg, mohs       Past Surgical History:   Procedure Laterality Date    APPENDECTOMY      BREAST BIOPSY Left     neg    BREAST BIOPSY Left     neg    CARDIAC ELECTROPHYSIOLOGY PROCEDURE N/A 2022    Procedure: Cardiac  eps/afib ablation;  Surgeon: Kahlil Villatoro MD;  [facility];  Service: Cardiology    CARDIAC ELECTROPHYSIOLOGY PROCEDURE N/A 04/28/2022    Procedure: Cardiac eps/aflutter ablation;  Surgeon: Kahlil Villatoro MD;  [facility];  Service: Cardiology    CATARACT EXTRACTION, BILATERAL      DILATION AND CURETTAGE OF UTERUS      IR EMBOLIZATION (SPECIFY VESSEL OR SITE)  04/28/2022    LAPAROSCOPY FOR ECTOPIC PREGNANCY      MOHS SURGERY Left 02/26/2025    SCC left upper leg, Dr Bojorquez    MOHS SURGERY Left 02/26/2025    SCC left lower leg, Dr Bojorquez    OTHER SURGICAL HISTORY      surgical excision of tubal pregnancy          Current Outpatient Medications:     CALCIUM PO, Take 500 mg by mouth in the morning, Disp: , Rfl:     cholecalciferol (VITAMIN D3) 400 units tablet, Take 400 Units by mouth daily, Disp: , Rfl:     glucosamine-chondroitin 500-400 MG tablet, Take 1 tablet by mouth 3 (three) times a day, Disp: , Rfl:     loratadine (CLARITIN REDITABS) 10 MG dissolvable tablet, Take 10 mg by mouth daily, Disp: , Rfl:     No Known Allergies    Physical Exam:   Vitals:    02/26/25 0904   BP: 138/82   Pulse: 72   Temp: 97.8 °F (36.6 °C)   SpO2: 94%     General: Awake, Alert, Oriented x 3, Mood and affect appropriate  Respiratory: Respirations even and unlabored  Cardiovascular: Peripheral pulses intact; no edema  Musculoskeletal Exam: n/a    Skin: 0.9 cm x 0.7 cm pink macule    Assessment: Biopsy confirmed squamous cell carcinoma invasive, well differentiated of the left lower leg.     Plan: Mohs    Time of H&P Completion:0902    MOHS Procedure Timeout      Flowsheet Row Most Recent Value   Timeout: 0914   Patient Identity Verified: Yes   Correct Site Verified: Yes   Correct Procedure Verified: Yes            MOHS Diagnosis/Indication/Location/ID      Flowsheet Row Most Recent Value   Pathology Type Squamous cell carcinoma   Anatomic Site left leg  [lower]   Indications for MOHS tumor  location   MOHS ID [id number]            MOHS Site/Accession/Pre-Post      Flowsheet Row Most Recent Value   Original Site Identified (as submitted by referring clinician) Photo, Referral   Biopsy Accession/Specimen # (as submitted by referring clincian) [id number]   Pre-MOHS Size Length (cm) 0.9   Pre-MOHS Size Width (cm) 0.7     Post-MOHS Size-Length (cm) 1.2   Post MOHS Size-Width (cm) 1.5   Repair Type Intermediate layered closure   Suture Type Vicryl, Prolene   Prolene Suture Size 4   Vicryl Suture Size 4   Final repair length (cm): 2.1   Anesthetic Used 1% Lidocaine with epinephrine  [5 mL]          MOHS Tumor Stage 1 Information      Flowsheet Row Most Recent Value   Tissue Sections (blocks) 2   Microscopic Exam Section 1: Arising from the epidermis is a keratin-producing proliferation of atypical keratinocytes with invasion into the underlying dermis. Mitoses and atypical forms are evident. Intercellular bridge and keratin karyna formation are evident.   Microscopic Exam Section 2: No tumor identified in section.   Tumor Clear After Stage I? No            MOHS Tumor Stage 2 Information      Flowsheet Row Most Recent Value   Tissue Sections (blocks) 1   Microscopic Exam Section 1: No tumor identified in section.   Tumor Clear After Stage II? Yes                      Patient identified, procedure verified, site identified and verified. Time out completed. Surgical removal of the lesion discussed with the patient (risks and benefits, including possibility of scarring, infection, recurrence or potential for further treatment).    I have specifically identified the site with the patient. I have discussed the fact that the patient will have a scar after the procedure regardless of granulation or repair with sutures. I have discussed that the repair options can range from granulation in some cases to linear or curvilinear closures to larger flaps or grafts.  There are sometimes flaps or grafts used that require  multiples stages of surgery and will not be completed today, rather be completed over a series of appointments. I have discussed that occasionally due to location, size or depth of the lesion I may recommend consultation with and transfer of care for further removal or the reconstruction to another provider such as ophthalmology surgery, plastic surgery, ENT surgery, or surgical oncology. There are cases in which other testing such as imaging with MRI or CT scan or testing of lymph nodes is recommended because of the nature/depth/location of tumor seen during the removal. There is a risk of injury to nerves causing temporary or permanent numbness or the inability to move muscles full such as the inability to lift eyebrows. Questions answered and verbal and written consent was obtained.    The tumor qualifies for Mohs based on AUC criteria. Dr. Bojorquez served as the surgeon and pathologist during the procedure.    With the patient in the supine position and under adequate local anesthesia with 1% lidocaine with epinephrine 1:100,000, the defect was scrubbed with Iodine. Sterile drapes were placed from the sterile tray.  Because of the location of the surgical defect, an intermediate closure was judged to give the best possible cosmetic and functional result.  The edges of the defect were carefully debrided removing any dead or coagulated tissue.      Hemostasis was obtained by pinpoint electrocoagulation.  Careful planning of removal of redundant tissue at either end of the defect was drawn out so that the suture lines would fall in the optimal orientation with regard to the relaxed skin tension lines.  These were then removed with a #15 blade scalpel.  The wound was then approximated by a deep layer of buried vertical mattress sutures and the cutaneous margins were approximated and closed by superficial sutures as noted above.  Estimated blood loss was less than 5 mL.      The patient tolerated the procedure well.   The wound was dressed with petrolatum, a non-stick pad, and a compression dressing.     Antonio Bojorquez MD served as the surgeon and pathologist during the procedure.    Postoperative care: Wound care discussed at length.  I urged the patient to call us if any problems or question should arise.     Complications: none  Post-op medications: none  Patient condition after procedure: stable  Discharge plans: Plan for return to us for suture removal, as scheduled in 14 days.     SCC on left upper leg cleared with 2 stages of mohs and repaired with 3.7cm closure.  Well tolerated.  S/R in 2 weeks.    SCC on left lower leg cleared with 2 stages of mohs and repaired with 2.1cm closure.  Well tolerated.  S/R in 2 weeks.    Scribe Attestation      I,:  Nenita Jimenez MA am acting as a scribe while in the presence of the attending physician.:       I,:  Antonio Bojorquez MD personally performed the services described in this documentation    as scribed in my presence.:

## 2025-02-26 NOTE — PATIENT INSTRUCTIONS
"Mohs Microscopic Surgery After Care    WOUND CARE AFTER SURGERY:    Do NOT to remove the pressure bandage for 48 hours. Keep the area clean and dry while this bandage is on.    After removing the bandage for the first time, gently clean the area with soap and water. If the bandage is difficult to remove, getting the bandage wet in the shower will sometimes help soften the adhesive and allow it to be removed more easily.     You will now need to cleanse this area daily in the shower with gentle soap. There is no need to scrub the area. Apply plain Vaseline ointment (this is over the counter and not a prescription) to the site followed by a clean appropriately sized bandage to area.  Non stick dressing and paper tape (or Hypafix) are recommended for sensitive skin but a bandaid is fine if it covers the area well.    You will need to continue the above daily wound care until you return for suture removal in 14 days (generally 7 days for the face, 10-14 days off the face)      RESTRICTIONS:     For two DAYS:   - You will need to take it very easy as this time is highest risk for bleeding. Being a \"couch potato\" during these two days is generally recommended.   - For surgeries on the face/neck/scalp: Avoid leaning down to pick things up off the floor as this brings blood up to your head. Instead, squat down to pick things up.     For two WEEKS:   - No heavy lifting (anything greater than 10 pounds)   - You can start to do slow, gentle activities such as slow walking but nothing to increase your heart rate and blood pressure too much (such as cardiovascular exercise). It is important to take it easy as there is still a risk for bleeding and a high risk popping of stitches open during this time.     MANAGING YOUR PAIN AFTER SURGERY     You can expect to have some pain after surgery. This is normal. The pain is typically worse the first two days after surgery, and quickly begins to get better.     The best strategy for " controlling your pain after surgery is around the clock pain control. You can take over the counter Acetaminophen (Tylenol) for discomfort, if no contraindications.     If you are taking this at the maximum dose, you can alternate this with Motrin (ibuprofen or Advil) as well. Alternating these medications with each other allows you to maximize your pain control. In addition to Tylenol and Motrin, you can use heating pads or ice packs on your incisions to help reduce your pain.     How will I alternate your regular strength over-the-counter pain medication?  You will take a dose of pain medication every three hours.   Start by taking 650 mg of Tylenol (2 pills of 325 mg)   3 hours later take 600 mg of Motrin (3 pills of 200 mg)   3 hours after taking the Motrin take 650 mg of Tylenol   3 hours after that take 600 mg of Motrin.    See example - if your first dose of Tylenol is at 12:00 PM     12:00 PM  Tylenol 650 mg (2 pills of 325 mg)    3:00 PM  Motrin 600 mg (3 pills of 200 mg)    6:00 PM  Tylenol 650 mg (2 pills of 325 mg)    9:00 PM  Motrin 600 mg (3 pills of 200 mg)    Continue alternating every 3 hours      Important:   Do not take more than 4000mg of Tylenol or 3200mg of Motrin in a 24-hour period.     What if I still have pain?   If you have pain that is not controlled with the over-the-counter pain medications (Tylenol and Motrin or Advil), don't hesitate to call our staff using the number provided. We will help make sure you are managing your pain in the best way possible, and if necessary, we can provide a prescription for additional pain medication.     CALL OUR OFFICE IMMEDIATELY FOR ANY SIGNS OF INFECTION:    This includes fever, chills, increased redness, warmth, tenderness, severe discomfort/pain, or pus or foul smell coming from the wound. If you are experiencing any of the above, please call [facility] directly at [phone].    IF BLEEDING IS NOTICED:    Place a clean cloth  over the area and apply firm pressure for thirty minutes.  Check the wound ONLY after 30 minutes of direct pressure; do not cheat and sneak a peak, as that does not count.  If bleeding persists after 30 minutes of legitimate direct pressure, then try one more round of direct pressure to the area.  Should the bleeding become heavier or not stop after the second attempt, call [facility] directly at [phone]. Your call will get routed to the dermatology surgeon on call even after hours.

## 2025-03-12 ENCOUNTER — CLINICAL SUPPORT (OUTPATIENT)
Age: 85
End: 2025-03-12

## 2025-03-12 VITALS
WEIGHT: 124 LBS | BODY MASS INDEX: 20.66 KG/M2 | OXYGEN SATURATION: 98 % | DIASTOLIC BLOOD PRESSURE: 76 MMHG | HEART RATE: 88 BPM | SYSTOLIC BLOOD PRESSURE: 122 MMHG | TEMPERATURE: 97.1 F | HEIGHT: 65 IN

## 2025-03-12 DIAGNOSIS — Z48.02 VISIT FOR SUTURE REMOVAL: Primary | ICD-10-CM

## 2025-03-12 PROCEDURE — RECHECK: Performed by: STUDENT IN AN ORGANIZED HEALTH CARE EDUCATION/TRAINING PROGRAM

## 2025-03-12 NOTE — PROGRESS NOTES
"[facility] Clinic Note     Patient Name: Scarlett Zamarripa  Encounter Date: March 12, 2025    Suture removal    Date/Time: 3/12/2025 9:00 AM    Performed by: Alize Luna MA  Authorized by: Kyle Rene DO  Universal Protocol:  procedure performed by consultantConsent: Verbal consent obtained. Written consent not obtained.  Risks and benefits: risks, benefits and alternatives were discussed  Consent given by: patient  Time out: Immediately prior to procedure a \"time out\" was called to verify the correct patient, procedure, equipment, support staff and site/side marked as required.  Timeout called at: 3/12/2025 9:27 AM.  Patient understanding: patient states understanding of the procedure being performed  Patient consent: the patient's understanding of the procedure matches consent given  Procedure consent: procedure consent matches procedure scheduled  Relevant documents: relevant documents present and verified  Test results: test results available and properly labeled  Site marked: the operative site was not marked  Radiology Images displayed and confirmed. If images not available, report reviewed: imaging studies not available  Patient identity confirmed: verbally with patient      Patient location:  Clinic  Location:     Laterality:  Left    Location:  Lower extremity    Lower extremity location:  Leg    Leg location:  L upper leg and L lower leg  Procedure details:     Tools used:  Suture removal kit    Wound appearance:  No sign(s) of infection, good wound healing and clean  Post-procedure details:     Post-removal:  Steri-Strips applied       Alize Luna"

## 2025-03-31 ENCOUNTER — CONSULT (OUTPATIENT)
Age: 85
End: 2025-03-31
Payer: COMMERCIAL

## 2025-03-31 VITALS
WEIGHT: 124 LBS | RESPIRATION RATE: 18 BRPM | HEART RATE: 81 BPM | OXYGEN SATURATION: 93 % | BODY MASS INDEX: 20.66 KG/M2 | SYSTOLIC BLOOD PRESSURE: 122 MMHG | TEMPERATURE: 97.8 F | HEIGHT: 65 IN | DIASTOLIC BLOOD PRESSURE: 76 MMHG

## 2025-03-31 DIAGNOSIS — Z87.891 FORMER SMOKER: ICD-10-CM

## 2025-03-31 DIAGNOSIS — J44.9 COPD, MODERATE (HCC): ICD-10-CM

## 2025-03-31 DIAGNOSIS — J44.9 COPD, MILD (HCC): ICD-10-CM

## 2025-03-31 DIAGNOSIS — R06.09 DOE (DYSPNEA ON EXERTION): Primary | ICD-10-CM

## 2025-03-31 PROCEDURE — 99204 OFFICE O/P NEW MOD 45 MIN: CPT | Performed by: INTERNAL MEDICINE

## 2025-03-31 RX ORDER — FLUTICASONE FUROATE, UMECLIDINIUM BROMIDE AND VILANTEROL TRIFENATATE 100; 62.5; 25 UG/1; UG/1; UG/1
1 POWDER RESPIRATORY (INHALATION) DAILY
Qty: 60 BLISTER | Refills: 0 | Status: SHIPPED | OUTPATIENT
Start: 2025-03-31 | End: 2025-04-30

## 2025-03-31 NOTE — PROGRESS NOTES
Consultation - Pulmonary Medicine   Name: Scarlett Zamarripa      : 1940      MRN: [id number]  Encounter Provider: Shivani Gaston MD  Encounter Date: 3/31/2025   Encounter department: [facility] PULMONARY [facility]    Requesting Provider:   Anna Sandoval Pa-c  [address] [place] [address]     Reason for Consult: Shortness of breath:  Assessment & Plan  BEE (dyspnea on exertion)  Shortness of breath and dyspnea on exertion with underlying moderate COPD on the PFTs  Prior cardiac stress test done about 3 months ago has all been negative       COPD, mild (HCC)    Orders:    Ambulatory Referral to Pulmonology    COPD, moderate (HCC)  Moderate COPD on the PFTs with FEV1 of 50% with positive bronchodilator response  Will send in Trelegy 1 puff daily  Rinse mouth after use  Discussed use demonstrated to the patient  Also given a sample given unclear if this is covered by her insurance she will call her insurance and find out if any colons of the Trelegy are covered and she will let us know if Trelegy is expensive  Orders:    fluticasone-umeclidinium-vilanterol (Trelegy Ellipta) 100-62.5-25 mcg/actuation inhaler; Inhale 1 puff daily Rinse mouth after use.    Former smoker  Former smoker who quit about 25 years ago not a candidate for lung cancer screening         No follow-ups on file.  History of Present Illness   Scarlett Zamarripa is a 84 y.o. female who presents for initial visit she is a former smoker with less than 18-pack-year smoking history who quit more than 25 years ago.  States she was doing well until about a year to 1-1/2 years ago when she started to have progressive shortness of breath about 3 to 4 months ago when the symptoms got worse she had extensive workup done cardiac workup done with stress test which was all negative and she had a PFT done and she is here for evaluation she states that she has trouble going up and down the flight of stairs at home which she  used to do well about 6 to 7 months ago also on a plane ground if she walks for about 10 minutes she states that she has to stop    Review of Systems   Constitutional: Negative.    HENT: Negative.     Eyes: Negative.    Respiratory:  Positive for shortness of breath.    Cardiovascular: Negative.    Gastrointestinal: Negative.    Endocrine: Negative.    Genitourinary: Negative.    Musculoskeletal: Negative.    Allergic/Immunologic: Negative.    Neurological: Negative.    Psychiatric/Behavioral: Negative.         Aside from what is mentioned in the HPI, ROS is otherwise negative    Medical History Reviewed by provider this encounter:     .    Historical Information   Past Medical History:   Diagnosis Date    Abnormal ECG 4/15/2021    Arthritis 1970    Atrial fibrillation (HCC)     Cataract 202`    Complication of anesthesia April 2022    Fibrocystic breast before menapause    Hyperlipidemia     Hypertension     Osteoarthritis     Psoriasis     Rectus sheath hematoma 04/28/2022    SCC (squamous cell carcinoma) 01/22/2025    left lower leg, mohs    Skin disorder psorisis    Squamous cell skin cancer 01/22/2025    left upper leg, mohs    Varicella 1946?    Verruca      Past Surgical History:   Procedure Laterality Date    ABLATION OF DYSRHYTHMIC FOCUS  4/27/22    APPENDECTOMY      BREAST BIOPSY Left 1980    neg    BREAST BIOPSY Left 1990    neg    CARDIAC ELECTROPHYSIOLOGY PROCEDURE N/A 04/28/2022    Procedure: Cardiac eps/afib ablation;  Surgeon: Kahlil Villatoro MD;  Location: [facility];  Service: Cardiology    CARDIAC ELECTROPHYSIOLOGY PROCEDURE N/A 04/28/2022    Procedure: Cardiac eps/aflutter ablation;  Surgeon: Kahlil Villatoro MD;  Location: [facility];  Service: Cardiology    CATARACT EXTRACTION, BILATERAL      DILATION AND CURETTAGE OF UTERUS      EYE SURGERY  cataracts    IR EMBOLIZATION (SPECIFY VESSEL OR SITE)  04/28/2022    LAPAROSCOPY FOR ECTOPIC PREGNANCY      MOHS SURGERY Left 02/26/2025  "   SCC left upper leg, Dr Bojorquez    MOHS SURGERY Left 2025    SCC left lower leg, Dr Bojorquez    OOPHORECTOMY      OTHER SURGICAL HISTORY      surgical excision of tubal pregnancy      Social History   Social History     Tobacco Use   Smoking Status Former    Current packs/day: 0.00    Average packs/day: 0.5 packs/day for 37.0 years (18.5 ttl pk-yrs)    Types: Cigarettes    Start date:     Quit date:     Years since quittin.2    Passive exposure: Past   Smokeless Tobacco Never           Family History:   Family History   Problem Relation Age of Onset    Hypertension Mother     Ovarian cancer Mother     Arthritis Mother     Cancer Mother     Pneumonia Father     Hearing loss Father     Psoriasis Father     No Known Problems Sister     No Known Problems Sister     No Known Problems Sister     No Known Problems Daughter     No Known Problems Maternal Grandmother     No Known Problems Paternal Grandmother     Cancer Maternal Aunt     No Known Problems Maternal Aunt     No Known Problems Maternal Aunt     No Known Problems Maternal Aunt     No Known Problems Maternal Aunt     No Known Problems Paternal Aunt     No Known Problems Paternal Aunt     Breast cancer Neg Hx        Objective   /76 (BP Location: Left arm, Patient Position: Sitting, Cuff Size: Large)   Pulse 81   Temp 97.8 °F (36.6 °C) (Oral)   Resp 18   Ht 5' 5\" (1.651 m)   Wt 56.2 kg (124 lb)   SpO2 93%   BMI 20.63 kg/m²      Physical Exam  Vitals and nursing note reviewed.   Constitutional:       Appearance: She is well-developed.   HENT:      Head: Normocephalic and atraumatic.   Eyes:      Conjunctiva/sclera: Conjunctivae normal.      Pupils: Pupils are equal, round, and reactive to light.   Neck:      Thyroid: No thyromegaly.      Vascular: No JVD.   Cardiovascular:      Rate and Rhythm: Normal rate and regular rhythm.      Heart sounds: Normal heart sounds. No murmur heard.     No friction rub. No gallop.   Pulmonary:      " Effort: Pulmonary effort is normal. No respiratory distress.      Breath sounds: Normal breath sounds. No wheezing or rales.   Chest:      Chest wall: No tenderness.   Musculoskeletal:         General: No tenderness or deformity. Normal range of motion.      Cervical back: Normal range of motion and neck supple.   Lymphadenopathy:      Cervical: No cervical adenopathy.   Skin:     General: Skin is warm and dry.   Neurological:      Mental Status: She is alert and oriented to person, place, and time.           Diagnostic Data:  Labs: I personally reviewed the most recent laboratory data pertinent to today's visit.      Radiology results:  Radiology Results Review: I personally reviewed the following image studies in PACS and associated radiology reports: chest xray. My interpretation of the radiology images/reports is: Chest x-ray 11/19/2024 with no evidence of any pneumonia.      PFT/spirometry results: PFTs in December 2024 demonstrating moderate obstructive airflow defect with an FEV1 of 50% positive postbronchodilator response normal lung volumes and mildly decreased DLCO    Shivani Gaston MD

## 2025-03-31 NOTE — ASSESSMENT & PLAN NOTE
Shortness of breath and dyspnea on exertion with underlying moderate COPD on the PFTs  Prior cardiac stress test done about 3 months ago has all been negative

## 2025-04-14 ENCOUNTER — TELEPHONE (OUTPATIENT)
Age: 85
End: 2025-04-14

## 2025-04-14 NOTE — TELEPHONE ENCOUNTER
Can we have her fill out the paperwork for the pharmaceutical company?  That would be the only other option.

## 2025-04-14 NOTE — TELEPHONE ENCOUNTER
Patient used her last sample of Trelegy, patient states the inhaler really worked for her.  The co-pay is $158, she is wondering if she would be able to get the medication at a better cost.  Her insurance company told her any inhaler would cost $158.      Patient requesting a call back

## 2025-04-15 DIAGNOSIS — J44.9 COPD, MODERATE (HCC): ICD-10-CM

## 2025-04-15 RX ORDER — FLUTICASONE FUROATE, UMECLIDINIUM BROMIDE AND VILANTEROL TRIFENATATE 100; 62.5; 25 UG/1; UG/1; UG/1
1 POWDER RESPIRATORY (INHALATION) DAILY
Qty: 60 BLISTER | Refills: 3 | Status: SHIPPED | OUTPATIENT
Start: 2025-04-15 | End: 2025-05-15

## 2025-04-17 NOTE — DISCHARGE SUMMARY
RE: Plan of Care    Sterling Joya DO    Thank you for referring Casi Wells. The following information reflects my assessment and plan of care.    Please review and sign the attached form to indicate your approval of the plan of care. Insurance compliance requires your approval be on this plan of care. After your review, please fax back all pages received. Should you have any questions, feel free to contact me.    Sam Herring, PT  [address] [address]  Phone: [phone]  Fax: [phone]           Plan of Care 25   Effective from: 2025  Effective to: 2025    Plan ID: [id number]                Participants as of Finalize on 2025      Name Type Comments Contact Info    Sterling Joya DO PCP - General  [phone]    Sam Herring, PT Physical Therapist                 Priscilla, Casi MRN:[id number] (:1951 73 year old F)                 Evaluation       Author: Sam Herring, PT Status: Signed Last edited: 2025  3:15 PM         Physical Therapy Evaluation    Visit Type: Initial Evaluation  Visit: 1  Referring Provider: Sterling Joya DO  Medical Diagnosis (from order): S32.010A - Compression fracture of L1 vertebra, initial encounter  (CMD)   Treatment Diagnosis: lumbar - increased pain/symptoms, impaired posture, impaired range of motion, impaired muscle length/flexibility, impaired joint play/mobility, impaired strength and impaired activity tolerance.  Onset  - Date of onset: 2024  Chart reviewed at time of initial evaluation (relevant co-morbidities, allergies, tests and medications listed):   - Diagnostic tests reviewed: X-Ray and MRI studies  osteoporosis  Past Medical History:  2010: Adenomatous polyp of colon      Comment:  Formatting of this note might be different from the                original. Colon 10/09 tubular adenoma X 1, repeat 5 yrs  No date:  [facility]  Discharge- Doyal Beaumont 1940, [de-identified] y o  female MRN: [id number]  Unit/Bed#: [facility] 502-01 Encounter: [id number]  Primary Care Provider: Caitlin Thomas DO   Date and time admitted to hospital: 4/27/2022  4:11 PM    * Atrial fibrillation [facility])  Assessment & Plan  · Initially presented to [facility] on 4/24/22 after multiple falls related to dizziness, suspected related to afib  · Transferred here for an EP consultation for possible ablation  · On transfer here, in atrial flutter with heart rate in the 60s  · Echo from 7/2021 with EF 50%, mild diffuse hypokinesis  · S/p unsuccessful DCCV on 03/29  Patient has had recurrent symptomatic atrial fibrillation/flutter despite treatment with oral amiodarone  · Second DCCV was aborted on 04/22 when she was in sinus bradycardia with heart rate in the 40s  · Continue amiodarone infusion for rate/rhythm control  · Continue with Eliquis  · EP following  · Will downgrade to telemetry      Rectus sheath hematoma  Assessment & Plan  · Following ablation, patient was noted to be hypotensive with abdominal pain  · 4/28/22 CT A/P with contrast: Large right abdominal rectus sheath hematoma containing a focus of contrast blush concerning for active bleed  Smaller right-sided retroperitoneal hemorrhage tracking medial and anterior to the psoas muscle, within the right paracolic gutter, and along the inferomedial right hepatic lobe  ·  4/29/22 IR: right inferior epigastric artery gelfoam and coil embolization  · Repeat STAT CT with acute drop in hemoglobin with associated changes in hemodynamics concerning for re-bleeding  · 5/2/2022:  The patient seems mobile painful or mass and procedure site from IR embolization  Will get vascular pseudoaneurysm study    IR notified, will evaluate  · Evaluated by IR, hematoma and received vascular study for pseudoaneurysm, negative         Acute blood loss anemia  Assessment & Anxiety  08/24/2015: Bone marrow edema  05/07/2013: Calculus of kidney  05/07/2011: Chronic tonsillitis      Comment:  Formatting of this note might be different from the                original. 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  01/28/2010: Mixed incontinence urge and stress (male)(female)  04/23/2018: Obstructive sleep apnea syndrome in adult  04/21/2017: Pelvic floor dysfunction  No date: Pelvic muscle atrophy  04/21/2017: Pelvic pain in female  04/21/2017: Recurrent UTI   Past Surgical History:  No date: Bladder suspension  No date: Foot/toes surgery proc unlisted; Left  No date: Inner ear surgery  No date: Tonsillectomy   ALLERGIES:   -- Sulfa Antibiotics -- HIVES, RASH, PRURITUS, Cough and                            Other (See Comments)    --  Runny nose   -- Dust Mite Extract -- Runny Nose   -- Molds & Smuts -- Runny Nose   -- Shrimp Extract Allergy Skin Test -- RASH       SUBJECTIVE                                                                                                               At eval on 4/17/25 patient complaining of left hip/lateral thigh pain since September 2024 when she sustained wedge compression fracture L1. Patient states she had Dexa scan which showed osteoporosis.  Will have MRI 4/21/25.    Occupation  retired MRI technician.  Sports/hobbies  walking  2000 steps per day.    Pain / Symptoms  - Pain/symptom is: intermittent  - Pain rating (out of 10): Current: 2 ; Best: 0; Worst: 6  - Location: Left hip and lateral thigh (some left low back pain)  - Quality / Description: ache, stiff, tight  - Alleviating Factors: change in position  - Progression since onset: no  Plan  · Secondary to retroperitoneal bleed rectal sheath hematoma secondary to postop complication from ablation  · Moderate hemoglobin  · Transfuse to keep above 7  · Hemoglobing stable on AC    Discoloration of tooth  Assessment & Plan  · Patient complaining that she noticed that her lower tooth is discolored  She states that she has not seen his tooth or herself earlier in fact sheet she has been admitted to the hospital but she noticed today that her tooth was discolored  She states she was recently the test and to clean  Patient states that her daughter noticed when she arrived from out of town also  Patient complains and she thinks this may have been done today anesthesia and wanted to have this documented in her chart  As explained the patient is unclear how anesthesia could affect the coloration tooth, but patient still insisted  · Evaluated by anesthesia, rec follow up with dentist for tooth discoloration    H/O cardiac atrial fib ablation  Assessment & Plan  · EP following    Closed fracture of left wrist  Assessment & Plan  · Present on admission, secondary to fall  Seen by Orthopedics, conservative management  · Wrist splint, nonweightbearing  · Follow-up in orthopedic office as outpatient for repeat imaging and casting   · Pain regimen Tylenol and Percocet p r n  Fall  Assessment & Plan  · Patient presents to the ED with complaints of multiple falls; brought in by EMS after patient fell and complained of left wrist pain/swelling  Patient reports multiple falls lately due to being dizzy    · Was to be seen by cardiology to consider possible cardioversion outpatient this week  · Transferred here for EP evaluation as above  · Fall likely exacerbated due to arrhythmia  · PT OT    Essential hypertension  Assessment & Plan  · Bps meds are currently on hold  · BP is acceptable at this time  · Can adjust meds post ablation when more stable        Medical Problems             Resolved Problems  Date Reviewed: 5/3/2022    None              Discharging Physician / Practitioner: Dulce Grier MD  PCP: Erwin Perla DO  Admission Date:   Admission Orders (From admission, onward)     Ordered        04/27/22 1627  Inpatient Admission  Once                      Discharge Date: 05/03/22    Consultations During Hospital Stay:  · Orthopedics, Cardiology, electrophysiology, vascular, Critical Care, Interventional Radiology    Procedures Performed:   · Cardiac ablation, arm splinting, epigastric artery embolization    Significant Findings / Test Results:   · Rectal sheath retroperitoneal hematoma    Incidental Findings:   · None     Test Results Pending at Discharge (will require follow up): · None     Outpatient Tests Requested:  · None    Complications:  None    Reason for Admission:  Cardiac ablation for atrial fibrillation    Hospital Course:   Maria Isabel Uribe is a [de-identified] y o  female patient who originally presented to the hospital on 4/27/2022 due to dizziness and fall  Alicia Guajardo a [de-identified] y o  female patient who originally presented to [facility] on 4/24/2022 due to falls and dizziness at home   Patient with known afib on eliquis, had been considered for cardioversion after experiencing symptoms after decreasing amiodarone and discontinuing metoprolol   Found to have episodes of rapid afib and ultimately advised to undergo ablation with electrophysiology   Currently on amiodarone drip and remains on eliquis 2 5 mg BID   Unfortunately from fall, she does have a left distal radial fracture that is currently splinted and will need casting upon discharge   Orthopedics did evaluate patient in hospital   NWB to the left arm   PT/OT evaluated as well to educate on safe maneuvers  She was transferred to [facility] for an EP evaluation  Patient received cardiac ablation with EP  But this is complicated by retroperitoneal hematoma  Patient had epigastric artery embolized    Patient's hemoglobin eventually scan change    Function:   Prior Level of Function: declining function, therefore referred to therapy,    Patient Goals: decreased pain, independence with ADLs/IADLs, increased strength and return to sport/leisure activities.    Prior treatment  - outpatient PT  - Discharged from hospital, home health, or skilled nursing facility in last 30 days: no  Home Environment   - Patient lives with: alone  - Type of home: multiple level home  - Assistance available: intermittent  - Denies 2 or more falls or an unexplained fall with injury in the last year.  - Feel safe at home / work / school: yes      OBJECTIVE                                                                                                                     Range of Motion (ROM)   (degrees unless noted; active unless noted; norms in ( ); negative=lacking to 0, positive=beyond 0)  Lumbar:    - Flexion (60-80):  80°     - Extension (25):  12°     - Rotation (30-45):         Left:  25°          Right:  35°     - Side Bend (25-35):         Left:  15°          Right:  15°   Comments: At al on 4/17/25 bilateral hip ROM within functional limits.  Bilateral straight leg raise within normal limits for age.  Palpation moderate stiff/mild tender lumbar spine left>right with very tight lumbar paraspinals.                        Outcome/Assessments  Outcome Measures: eval 4/17/25.  OSWESTRY Total Scored: 9  OSWESTRY Total Possible Score: 45  OSWESTRY Score Calculated: 20 %  (0-20% = minimal disability; 20-40% = moderate disability; 40-60% = severe disability; 60-80% = crippled; % = bed bound) see flowsheet for additional documentation       Treatment     Therapeutic Exercise  Performed to improve strength, range of motion and flexibility of lumbar spine for increased independence and improved functional mobility with ADLs.    - Supine Posterior Pelvic Tilt   - 10 reps (with verbal and tactile cueing)  - Supine Lower Trunk Rotation   - 10 reps  - Supine left  Piriformis Stretch with Foot on Ground   - 3 reps - 20 secs hold\Sleep on right side with pillow between knees.       Manual Therapy   Performed in order to improve  joint osteokinematics and tissue extensibility of lumbar spine for improved mobility and increased independence with ADLs.     Gentle massage left lumbar spine and gluteal region (right side lying)    Skilled input: verbal instruction/cues    Writer verbally educated and received verbal consent for hand placement, positioning of patient, and techniques to be performed today from patient for hand placement and palpation for techniques as described above and how they are pertinent to the patient's plan of care.  Home Exercise Program  Access Code: [email or web address]  URL: [email or web address]  Date: 04/17/2025  Prepared by: Peterson Herring    Exercises  - Supine Posterior Pelvic Tilt  - 1 x daily - 7 x weekly - 2 sets - 10 reps  - Supine Lower Trunk Rotation  - 1 x daily - 7 x weekly - 1 sets - 10 reps  - Supine Piriformis Stretch with Foot on Ground  - 1 x daily - 7 x weekly - 1 sets - 3 reps - 20 secs hold      ASSESSMENT                                                                                                          73 year old patient has reported functional limitations listed above impacted by signs and symptoms consistent with treatment diagnosis below.  Treatment Diagnosis:   - Involved: lumbar.  - Symptoms/impairments: increased pain/symptoms, impaired posture, impaired range of motion, impaired muscle length/flexibility, impaired joint play/mobility, impaired strength and impaired activity tolerance.    At [facility] on 4/17/25 signs and symptoms consistent with wedge compression fracture L1     Prognosis: Patient will benefit from skilled therapy.  Rehabilitative potential is: good.  Predicted patient presentation: Low (stable) - Patient comorbidities and complexities, as defined above, will have little effect on progress for  stable was transitioned back to her Eliquis  Patient did develop a small hematoma at the site of the embolization  This was evaluated by IR and by vascular study for pseudoaneurysm  Study was negative for pseudoaneurysm  In addition patient was complaining the tooth discoloration after procedure  She thinks this is due to anesthesia  Patient was via by Anesthesia for her tooth complaint  There was no fracture damage since the tooth and she was told to follow-up with her dentist for tooth discoloration  Patient improved is medically optimized for discharge with outpatient follow-up with PCP within 1 weeks, Orthopedics, Cardiology  Please see above list of diagnoses and related plan for additional information  Condition at Discharge: good    Discharge Day Visit / Exam:   Subjective:  Patient seen at bedside no acute events overnight  Patient states her arm pain and hematoma pain are improved  Vitals: Blood Pressure: 131/61 (05/03/22 0808)  Pulse: 61 (05/03/22 0808)  Temperature: 97 9 °F (36 6 °C) (05/03/22 0808)  Temp Source: Oral (05/03/22 7856)  Respirations: 18 (05/03/22 0808)  Height: 5' 5" (165 1 cm) (04/28/22 1430)  Weight - Scale: 54 2 kg (119 lb 7 8 oz) (05/03/22 0537)  SpO2: 97 % (05/03/22 6694)  Exam:   Physical Exam  Vitals and nursing note reviewed  Constitutional:       General: She is not in acute distress  Appearance: She is well-developed  HENT:      Head: Normocephalic and atraumatic  Eyes:      Conjunctiva/sclera: Conjunctivae normal    Cardiovascular:      Rate and Rhythm: Normal rate and regular rhythm  Heart sounds: No murmur heard  Pulmonary:      Effort: Pulmonary effort is normal  No respiratory distress  Breath sounds: Normal breath sounds  Abdominal:      Palpations: Abdomen is soft  Tenderness: There is no abdominal tenderness  Musculoskeletal:         General: Signs of injury present  Cervical back: Neck supple        Comments: Left arm prescribed plan of care.  Education:   - Present and ready to learn: patient  - Results of above outlined education: Verbalizes understanding and Demonstrates understanding    PLAN                                                                                                                         The following skilled interventions to be implemented to achieve goals listed below:  Neuromuscular Re-Education (65838)  Therapeutic Activity (15317)  Therapeutic Exercise (57512)  Manual Therapy (20632)    Frequency / Duration  1 times per week tapering as patient progresses for 6 weeks for an estimated total of 6 visits    Patient involved in and agreed to plan of care and goals.  Patient given attendance agreement at time of initial evaluation.    Suggestions for next session as indicated: Progress per plan of care, gentle Manual Therapy, posture correction, stretches and core strengthening.    Goals  Long Term Goals: to be met by end of plan of care  1. Improve active ROM lumbar flexion by 10% to allow patient to perform light work around the house without compensation.  2. Improve hip and core strength to good to allow patient to lift 10 lb with good mechanics and without pain.  3. Improve sleeping posture to allow patient to sleep uninterrupted 4+ hours.  4. Patient will be independent with progressed and modified home exercise program.      Additional time was required for chart review in preparation for this visit, before face to face time with the patient obtaining a history and performing an appropriate examination. Treatment included time spent with the individual demonstrating specific interventions to be used by family, direct support, and other workers to ensure they will be done properly and frequently enough to be effective. Additional minutes were necessary to develop the individual service plan for this patient, and in referring to and communicating with other health care professionals.      Therapy  splinted   Skin:     General: Skin is warm and dry  Neurological:      Mental Status: She is alert  Discussion with Family: Patient declined call to   Discharge instructions/Information to patient and family:   See after visit summary for information provided to patient and family  Provisions for Follow-Up Care:  See after visit summary for information related to follow-up care and any pertinent home health orders  Disposition:   Home with VNA Services (Reminder: Complete face to face encounter)    Planned Readmission:  No     Discharge Statement:  I spent 35 minutes discharging the patient  This time was spent on the day of discharge  I had direct contact with the patient on the day of discharge  Greater than 50% of the total time was spent examining patient, answering all patient questions, arranging and discussing plan of care with patient as well as directly providing post-discharge instructions  Additional time then spent on discharge activities  Discharge Medications:  See after visit summary for reconciled discharge medications provided to patient and/or family        **Please Note: This note may have been constructed using a voice recognition system** procedure time and total treatment time can be found documented on the Time Entry flowsheet           Current Participants as of 4/17/2025      Name Type Comments Contact Info    Sterling Joya DO PCP - General  [phone]    Signature pending    Sam Herring PT Physical Therapist      Electronically signed by Sam Herring PT at 4/17/2025 1575 CDT            RE: Plan of Care for Casi Wells, YOB: 1951     I certify the need for these services, furnished under this plan of treatment and while under my care.  I agree with the plan of care as stated and request that therapy proceed.        __________________________________________________________________________________  Provider Signature         Date

## 2025-06-11 ENCOUNTER — TELEPHONE (OUTPATIENT)
Age: 85
End: 2025-06-11

## 2025-06-11 NOTE — TELEPHONE ENCOUNTER
Left message for pt to reschedule 7/15/25 appt with dr marino   please schedule pt for next available

## 2025-07-15 ENCOUNTER — TELEPHONE (OUTPATIENT)
Age: 85
End: 2025-07-15

## 2025-07-22 ENCOUNTER — OFFICE VISIT (OUTPATIENT)
Age: 85
End: 2025-07-22
Payer: COMMERCIAL

## 2025-07-22 VITALS
HEART RATE: 65 BPM | SYSTOLIC BLOOD PRESSURE: 124 MMHG | OXYGEN SATURATION: 97 % | DIASTOLIC BLOOD PRESSURE: 72 MMHG | HEIGHT: 65 IN | TEMPERATURE: 97.1 F | BODY MASS INDEX: 20.66 KG/M2 | WEIGHT: 124 LBS

## 2025-07-22 DIAGNOSIS — L82.1 SEBORRHEIC KERATOSIS: ICD-10-CM

## 2025-07-22 DIAGNOSIS — D18.01 CHERRY ANGIOMA: ICD-10-CM

## 2025-07-22 DIAGNOSIS — Z13.89 SCREENING FOR SKIN CONDITION: Primary | ICD-10-CM

## 2025-07-22 DIAGNOSIS — D22.9 MULTIPLE MELANOCYTIC NEVI: ICD-10-CM

## 2025-07-22 DIAGNOSIS — L40.9 PSORIASIS: ICD-10-CM

## 2025-07-22 PROCEDURE — 99213 OFFICE O/P EST LOW 20 MIN: CPT | Performed by: STUDENT IN AN ORGANIZED HEALTH CARE EDUCATION/TRAINING PROGRAM

## 2025-07-30 RX ORDER — CALCIPOTRIENE 50 UG/G
OINTMENT TOPICAL 2 TIMES DAILY
Qty: 60 G | Refills: 3 | Status: SHIPPED | OUTPATIENT
Start: 2025-07-30

## 2025-07-30 RX ORDER — TRIAMCINOLONE ACETONIDE 1 MG/G
CREAM TOPICAL 2 TIMES DAILY
Qty: 80 G | Refills: 2 | Status: SHIPPED | OUTPATIENT
Start: 2025-07-30

## (undated) DEVICE — Device: Brand: PROTRACK PIGTAIL WIRE

## (undated) DEVICE — CATH ULTRASOUND ACUNAV ICE 8FR 90CM GE VIVID-I

## (undated) DEVICE — GUIDE SHEATH SLO 8.5 FR

## (undated) DEVICE — NEEDLE TRANSSEPTAL BRK-1 71CM

## (undated) DEVICE — CATH COAXIAL UMBILICAL

## (undated) DEVICE — CABLE CRYOCATH ELECTRICAL UMBILICAL

## (undated) DEVICE — CATH ABLATION FLEXCATH ADVANCE OD12 FR STEERABLE

## (undated) DEVICE — DGW .035 FC J3MM 150CM TEF: Brand: EMERALD

## (undated) DEVICE — CATH EP ACHIEVE 20 MM MAPPING LOOP

## (undated) DEVICE — CABLE CATHETER ACHIEVE MAPPING

## (undated) DEVICE — INTRO SHEATH 8FR 24CM ARROW-FLEX

## (undated) DEVICE — REF PATCH ENSITE PRECISION

## (undated) DEVICE — PINNACLE INTRODUCER SHEATH: Brand: PINNACLE

## (undated) DEVICE — CATH ABLATION CRYOCATH ARCTIC FRONT ADV PRO 28MM

## (undated) DEVICE — CATH EP 7FR DI-DIR 10-POLE DEFL CS 2-8-2 FJ

## (undated) DEVICE — CATH ABLATION TACTICATH SE BI-DIR FJ CRV

## (undated) DEVICE — TUBING SET COOL POINT